# Patient Record
Sex: MALE | Race: WHITE | Employment: OTHER | URBAN - METROPOLITAN AREA
[De-identification: names, ages, dates, MRNs, and addresses within clinical notes are randomized per-mention and may not be internally consistent; named-entity substitution may affect disease eponyms.]

---

## 2018-08-23 ENCOUNTER — TELEPHONE (OUTPATIENT)
Dept: ADMINISTRATIVE | Age: 48
End: 2018-08-23

## 2018-09-19 ENCOUNTER — HOSPITAL ENCOUNTER (OUTPATIENT)
Age: 48
Discharge: HOME | End: 2018-09-21
Payer: COMMERCIAL

## 2018-09-19 LAB
ALBUMIN SERPL-MCNC: 4.1 G/DL (ref 3.5–5.2)
ALP BLD-CCNC: 50 U/L (ref 40–129)
ALT SERPL-CCNC: 34 U/L (ref 0–40)
ANION GAP SERPL CALCULATED.3IONS-SCNC: 17 MMOL/L (ref 7–16)
AST SERPL-CCNC: 27 U/L (ref 0–39)
BILIRUB SERPL-MCNC: 0.4 MG/DL (ref 0–1.2)
BUN BLDV-MCNC: 17 MG/DL (ref 6–20)
CALCIUM SERPL-MCNC: 9.1 MG/DL (ref 8.6–10.2)
CHLORIDE BLD-SCNC: 103 MMOL/L (ref 98–107)
CHOLESTEROL, TOTAL: 131 MG/DL (ref 0–199)
CO2: 22 MMOL/L (ref 22–29)
CREAT SERPL-MCNC: 1 MG/DL (ref 0.7–1.2)
GFR AFRICAN AMERICAN: >60
GFR NON-AFRICAN AMERICAN: >60 ML/MIN/1.73
GLUCOSE BLD-MCNC: 99 MG/DL (ref 74–109)
HDLC SERPL-MCNC: 32 MG/DL
LDL CHOLESTEROL CALCULATED: 80 MG/DL (ref 0–99)
POTASSIUM SERPL-SCNC: 4.5 MMOL/L (ref 3.5–5)
PROSTATE SPECIFIC ANTIGEN: 1.37 NG/ML (ref 0–4)
SODIUM BLD-SCNC: 142 MMOL/L (ref 132–146)
TOTAL PROTEIN: 6.7 G/DL (ref 6.4–8.3)
TRIGL SERPL-MCNC: 96 MG/DL (ref 0–149)
TSH SERPL DL<=0.05 MIU/L-ACNC: 1.49 UIU/ML (ref 0.27–4.2)
VITAMIN D 25-HYDROXY: 19 NG/ML (ref 30–100)
VLDLC SERPL CALC-MCNC: 19 MG/DL

## 2018-09-19 PROCEDURE — 80061 LIPID PANEL: CPT

## 2018-09-19 PROCEDURE — 84443 ASSAY THYROID STIM HORMONE: CPT

## 2018-09-19 PROCEDURE — 80053 COMPREHEN METABOLIC PANEL: CPT

## 2018-09-19 PROCEDURE — 82306 VITAMIN D 25 HYDROXY: CPT

## 2018-09-19 PROCEDURE — G0103 PSA SCREENING: HCPCS

## 2018-09-19 NOTE — PROGRESS NOTES
8.  Myxomatous MV with posterior leaflet prolapse. Mild to moderate MR, RVSP 30. 82.     Review of Systems:  Constitutional: negative for fever and chills  Respiratory: negative for cough and hemoptysis  Cardiovascular:   Gastrointestinal: negative for abdominal pain, diarrhea, nausea and vomiting  Genitourinary:negative for dysuria and hematuria  Derm: negative for rash and skin lesion(s)  Neurological: negative for seizures and tremors  Endocrine: negative for diabetic symptoms including polydipsia and polyuria  Musculoskeletal: negative for CTD  Psychiatric: negative for psychosis and major depression    On examination, skin is warm and dry. Respirations are unlabored. /68   Pulse 79   Resp 16   Ht 5' 10\" (1.778 m)   Wt 190 lb 9.6 oz (86.5 kg)   BMI 27.35 kg/m² . HEENT negative for scleral icterus. Extraocular muscles intact. No facial asymmetry or central cyanosis. Neck without masses or goiter. No bruit or JVD. Cardiac apex not displaced. Rhythm regular. 2/6 mid-to-late systolic murmur best heard at the apex. No radiation. No definite clicks. Diastole silent. Abdomen normal.  Extremities without edema. Lungs are clear. EKG today shows normal sinus rhythm. Based on the patient's functional capacity and auscultation, there has been no progression of the valvular abnormalities. An echocardiogram, however, will be rechecked to more accurately assess the severity of the MR as well as leaflet thickening and any chamber dilatation. Treatment and prognosis were reviewed with the patient. The prognosis is quite variable and will be further reviewed once the echo results are available. At completion of today's visit, medications include the following:    Current Outpatient Prescriptions:     fluticasone-salmeterol (ADVAIR) 250-50 MCG/DOSE AEPB, Inhale 1 puff into the lungs every 12 hours. , Disp: , Rfl:     lisinopril (PRINIVIL;ZESTRIL) 5 MG tablet, Take 5 mg by mouth daily. , Disp: , Rfl:       Barbara Martinez MD

## 2018-09-25 ENCOUNTER — OFFICE VISIT (OUTPATIENT)
Dept: CARDIOLOGY CLINIC | Age: 48
End: 2018-09-25
Payer: COMMERCIAL

## 2018-09-25 VITALS
DIASTOLIC BLOOD PRESSURE: 68 MMHG | SYSTOLIC BLOOD PRESSURE: 110 MMHG | HEIGHT: 70 IN | BODY MASS INDEX: 27.29 KG/M2 | HEART RATE: 79 BPM | WEIGHT: 190.6 LBS | RESPIRATION RATE: 16 BRPM

## 2018-09-25 DIAGNOSIS — R06.02 SHORTNESS OF BREATH: ICD-10-CM

## 2018-09-25 DIAGNOSIS — I34.1 MVP (MITRAL VALVE PROLAPSE): Primary | ICD-10-CM

## 2018-09-25 PROCEDURE — 99213 OFFICE O/P EST LOW 20 MIN: CPT | Performed by: INTERNAL MEDICINE

## 2018-09-25 PROCEDURE — 93000 ELECTROCARDIOGRAM COMPLETE: CPT | Performed by: INTERNAL MEDICINE

## 2018-10-08 ENCOUNTER — HOSPITAL ENCOUNTER (OUTPATIENT)
Dept: CARDIOLOGY | Age: 48
Discharge: HOME | End: 2018-10-08
Payer: COMMERCIAL

## 2018-10-08 DIAGNOSIS — I34.1 MVP (MITRAL VALVE PROLAPSE): ICD-10-CM

## 2018-10-08 DIAGNOSIS — R06.02 SHORTNESS OF BREATH: ICD-10-CM

## 2018-10-08 LAB
LV EF: 58 %
LVEF MODALITY: NORMAL

## 2018-10-08 PROCEDURE — 93306 TTE W/DOPPLER COMPLETE: CPT | Performed by: PSYCHIATRY & NEUROLOGY

## 2018-10-11 ENCOUNTER — TELEPHONE (OUTPATIENT)
Dept: CARDIOLOGY CLINIC | Age: 48
End: 2018-10-11

## 2018-10-11 NOTE — TELEPHONE ENCOUNTER
Left message for patient to contact office for echo results. Per Dr. Jesse Raya, his mitral valve prolapse and murmur are mild and do not require any significant additional evaluation. Maybe an echo in the next 4 or 5 years would be appropriate if there is no change in his clinical status or murmur. Letter forwarded to Dr. Becki Ayala.

## 2019-03-18 LAB
ALBUMIN SERPL-MCNC: NORMAL G/DL
ALP BLD-CCNC: NORMAL U/L
ALT SERPL-CCNC: NORMAL U/L
ANION GAP SERPL CALCULATED.3IONS-SCNC: NORMAL MMOL/L
AST SERPL-CCNC: NORMAL U/L
BILIRUB SERPL-MCNC: NORMAL MG/DL
BUN BLDV-MCNC: NORMAL MG/DL
CALCIUM SERPL-MCNC: NORMAL MG/DL
CHLORIDE BLD-SCNC: NORMAL MMOL/L
CHOLESTEROL, TOTAL: NORMAL
CHOLESTEROL/HDL RATIO: NORMAL
CO2: NORMAL
CREAT SERPL-MCNC: NORMAL MG/DL
GFR CALCULATED: NORMAL
GLUCOSE BLD-MCNC: NORMAL MG/DL
HDLC SERPL-MCNC: NORMAL MG/DL
LDL CHOLESTEROL CALCULATED: NORMAL
POTASSIUM SERPL-SCNC: NORMAL MMOL/L
SODIUM BLD-SCNC: NORMAL MMOL/L
TOTAL PROTEIN: NORMAL
TRIGL SERPL-MCNC: NORMAL MG/DL
VLDLC SERPL CALC-MCNC: NORMAL MG/DL

## 2019-04-01 ENCOUNTER — TELEPHONE (OUTPATIENT)
Dept: SURGERY | Age: 49
End: 2019-04-01

## 2019-04-01 NOTE — TELEPHONE ENCOUNTER
BRITTAOS rec'd a voicemail msg requesting to cancel pt's appt on 4/2/19 and that pt will call to RS; Bruna  returned call to [phone] and left a msg stating appt is cancelled.   Electronically signed by Bk Bentley on 4/1/19 at 1:30 PM

## 2019-04-09 ENCOUNTER — TELEPHONE (OUTPATIENT)
Dept: SURGERY | Age: 49
End: 2019-04-09

## 2019-04-11 ENCOUNTER — TELEPHONE (OUTPATIENT)
Dept: SURGERY | Age: 49
End: 2019-04-11

## 2019-04-11 NOTE — TELEPHONE ENCOUNTER
Second attempt, left message to schedule pt with Dr. Dee Menchaca for a new pt consult.   Electronically signed by Caden Montemayor on 4/11/19 at 9:34 AM

## 2019-04-15 ENCOUNTER — TELEPHONE (OUTPATIENT)
Dept: SURGERY | Age: 49
End: 2019-04-15

## 2019-04-15 NOTE — TELEPHONE ENCOUNTER
Third attempt, left messages three times. Unable to contact patient. We would be more than happy to schedule this patient with one of our surgeons when they call us back. At this time we are forwarding the referral back to referring provider to inform you that we were unable to schedule the patient. Thanks for the referral.  Any questions or concerns call [phone].   Electronically signed by Loni Mcdermott on 4/15/19 at 11:21 AM

## 2019-04-15 NOTE — TELEPHONE ENCOUNTER
[facility] called Dr. Acosta Bis ofc, [phone], and left a voicemail msg for Kelly to advise pt had an appt w/Dr. Jorge L Mcfadden on 4/2/19, which he cancelled and several attempts have been made to RS appt, but pt has not called back; sending referral back to PCP's ofc to follow up w/pt.   Electronically signed by Izzy Kern on 4/15/19 at 10:55 AM

## 2019-04-17 ENCOUNTER — TELEPHONE (OUTPATIENT)
Dept: ADMINISTRATIVE | Age: 49
End: 2019-04-17

## 2019-04-17 NOTE — TELEPHONE ENCOUNTER
Patient calling to reschedule colonoscopy screening consult from 4/2/19. Appointment scheduled for 5/14/19 at 3:45 pm in the [facility] office.

## 2019-05-14 ENCOUNTER — OFFICE VISIT (OUTPATIENT)
Dept: SURGERY | Age: 49
End: 2019-05-14
Payer: COMMERCIAL

## 2019-05-14 VITALS
HEIGHT: 71 IN | RESPIRATION RATE: 17 BRPM | HEART RATE: 94 BPM | WEIGHT: 191 LBS | OXYGEN SATURATION: 96 % | BODY MASS INDEX: 26.74 KG/M2 | SYSTOLIC BLOOD PRESSURE: 134 MMHG | DIASTOLIC BLOOD PRESSURE: 78 MMHG

## 2019-05-14 DIAGNOSIS — Z12.11 ENCOUNTER FOR SCREENING COLONOSCOPY: Primary | ICD-10-CM

## 2019-05-14 PROCEDURE — 1036F TOBACCO NON-USER: CPT | Performed by: SURGERY

## 2019-05-14 PROCEDURE — 3017F COLORECTAL CA SCREEN DOC REV: CPT | Performed by: SURGERY

## 2019-05-14 PROCEDURE — G8419 CALC BMI OUT NRM PARAM NOF/U: HCPCS | Performed by: SURGERY

## 2019-05-14 PROCEDURE — G8427 DOCREV CUR MEDS BY ELIG CLIN: HCPCS | Performed by: SURGERY

## 2019-05-14 PROCEDURE — 99203 OFFICE O/P NEW LOW 30 MIN: CPT | Performed by: SURGERY

## 2019-05-14 RX ORDER — ALBUTEROL SULFATE 90 UG/1
AEROSOL, METERED RESPIRATORY (INHALATION)
COMMUNITY
Start: 2019-05-13 | End: 2020-09-11

## 2019-05-14 RX ORDER — ALBUTEROL SULFATE 90 UG/1
AEROSOL, METERED RESPIRATORY (INHALATION)
COMMUNITY
Start: 2019-02-19 | End: 2020-09-11

## 2019-05-14 RX ORDER — BUDESONIDE AND FORMOTEROL FUMARATE DIHYDRATE 80; 4.5 UG/1; UG/1
AEROSOL RESPIRATORY (INHALATION) 2 TIMES DAILY
COMMUNITY
Start: 2019-05-13 | End: 2021-09-21 | Stop reason: SDUPTHER

## 2019-05-14 RX ORDER — LISINOPRIL 5 MG/1
TABLET ORAL
COMMUNITY
Start: 2011-11-30 | End: 2020-04-07 | Stop reason: SDUPTHER

## 2019-05-14 RX ORDER — DILTIAZEM HYDROCHLORIDE 60 MG/1
TABLET, FILM COATED ORAL
COMMUNITY
Start: 2019-04-29 | End: 2019-05-14

## 2019-05-14 NOTE — PROGRESS NOTES
[facility] Note    Assessment/Plan:      Diagnosis Orders   1. Encounter for screening colonoscopy      Schedule colonoscopy         Return for Colonoscopy. Chief Complaint   Patient presents with    Surgical Consult     new patient referral from Dr Marie Mccracken for colonoscopy screening. no family history of colon cancer. patient states he has no complaints. PCP: Delaney Rios DO    HPI: Eran Lamb is a 44 y.o. male who presents in consultation for colonoscopy. He has not had one previously. He denies any issues. He has no problems with diarrhea or constipation. He has not had any melena or hematochezia. He's had no abdominal pain or unintentional weight loss. He's had no bowel caliber changes. There is no family history of colon cancer or inflammatory bowel disease. He said he had a CT several years ago which showed diverticulosis per his report. He is an  at elder law      Past Medical History:   Diagnosis Date    Anxiety and depression     Asthma     GERD (gastroesophageal reflux disease)     MVP (mitral valve prolapse)        Past Surgical History:   Procedure Laterality Date    CHOLECYSTECTOMY, LAPAROSCOPIC         Prior to Admission medications    Medication Sig Start Date End Date Taking? Authorizing Provider   albuterol sulfate HFA (PROVENTIL HFA) 108 (90 Base) MCG/ACT inhaler  5/13/19  Yes Historical Provider, MD   budesonide-formoterol (SYMBICORT) 80-4.5 MCG/ACT AERO Inhale into the lungs 5/13/19  Yes Historical Provider, MD   lisinopril (PRINIVIL;ZESTRIL) 5 MG tablet Take by mouth 11/30/11  Yes Historical Provider, MD   albuterol sulfate  (90 Base) MCG/ACT inhaler  2/19/19   Historical Provider, MD   fluticasone-salmeterol (ADVAIR) 250-50 MCG/DOSE AEPB Inhale 1 puff into the lungs every 12 hours.     Historical Provider, MD       No Known Allergies    Social History     Socioeconomic History    Marital status: Single     Spouse name: None    guarding. Neurological: He is alert and oriented to person, place, and time. Skin: Skin is warm and dry. He is not diaphoretic. Rohan Ibanez MD  5/14/2019    NOTE: This report, in part or full,may have been transcribed using voice recognition software. Every effort was made to ensure accuracy; however, inadvertent computerized transcription errors may be present. Please excuse any transcriptional grammatical or spelling errors that may have escaped my editorial review.     CC: Acosta Gomes, DO

## 2019-05-17 ENCOUNTER — TELEPHONE (OUTPATIENT)
Dept: SURGERY | Age: 49
End: 2019-05-17

## 2019-05-17 NOTE — TELEPHONE ENCOUNTER
Cedrick Dania is scheduled for colonoscopy with Dr Marissa Perera on 05- at 8:30 am at [facility]. Patient is to arrive at 8:30 am. Patient needs to be NPO after midnight the night before procedure. MA left message on patient's voicemail to return call at [phone] to get instructions.   Electronically signed by Brissa Blair MA on 5/17/2019 at 3:13 PM

## 2019-05-22 ENCOUNTER — TELEPHONE (OUTPATIENT)
Dept: SURGERY | Age: 49
End: 2019-05-22

## 2019-05-22 NOTE — TELEPHONE ENCOUNTER
MA contacted [facility] for prior authorization. No prior authorization needed for colonoscopy with Dr. Sharmin Marcus at [facility] in [facility]. MA Spoke with Norma Giron, authorization Specialist. Reference number [id number]. MA scanned authorization form in media tab.     Electronically signed by Daylin Nails MA on 5/22/19 at 10:06 AM

## 2019-05-24 ENCOUNTER — TELEPHONE (OUTPATIENT)
Dept: ADMINISTRATIVE | Age: 49
End: 2019-05-24

## 2019-05-24 NOTE — TELEPHONE ENCOUNTER
Patient called in and stated he wants to cancel his Colonoscopy that was scheduled for him.  He states he is having it done with another .

## 2019-05-28 NOTE — TELEPHONE ENCOUNTER
Patient called and stated that he is cancelling his colonoscopy because he is going to see another physician.   MA Delta Severs cancelled test.  Electronically signed by Scott Aaron MA on 5/28/2019 at 10:09 AM

## 2019-06-24 ENCOUNTER — TELEPHONE (OUTPATIENT)
Dept: PRIMARY CARE CLINIC | Age: 49
End: 2019-06-24

## 2019-06-24 DIAGNOSIS — Z12.11 ENCOUNTER FOR SCREENING COLONOSCOPY: Primary | ICD-10-CM

## 2019-10-16 ENCOUNTER — OFFICE VISIT (OUTPATIENT)
Dept: FAMILY MEDICINE CLINIC | Age: 49
End: 2019-10-16
Payer: COMMERCIAL

## 2019-10-16 VITALS
TEMPERATURE: 97.7 F | BODY MASS INDEX: 27.44 KG/M2 | WEIGHT: 196 LBS | DIASTOLIC BLOOD PRESSURE: 78 MMHG | HEIGHT: 71 IN | SYSTOLIC BLOOD PRESSURE: 126 MMHG

## 2019-10-16 DIAGNOSIS — Z12.11 SCREENING FOR COLON CANCER: ICD-10-CM

## 2019-10-16 DIAGNOSIS — H10.32 ACUTE BACTERIAL CONJUNCTIVITIS OF LEFT EYE: Primary | ICD-10-CM

## 2019-10-16 PROCEDURE — 1036F TOBACCO NON-USER: CPT | Performed by: FAMILY MEDICINE

## 2019-10-16 PROCEDURE — 99213 OFFICE O/P EST LOW 20 MIN: CPT | Performed by: FAMILY MEDICINE

## 2019-10-16 PROCEDURE — G8428 CUR MEDS NOT DOCUMENT: HCPCS | Performed by: FAMILY MEDICINE

## 2019-10-16 PROCEDURE — 3017F COLORECTAL CA SCREEN DOC REV: CPT | Performed by: FAMILY MEDICINE

## 2019-10-16 PROCEDURE — G8484 FLU IMMUNIZE NO ADMIN: HCPCS | Performed by: FAMILY MEDICINE

## 2019-10-16 PROCEDURE — G8419 CALC BMI OUT NRM PARAM NOF/U: HCPCS | Performed by: FAMILY MEDICINE

## 2019-10-16 RX ORDER — AZITHROMYCIN 250 MG/1
250 TABLET, FILM COATED ORAL SEE ADMIN INSTRUCTIONS
Qty: 6 TABLET | Refills: 0 | Status: SHIPPED | OUTPATIENT
Start: 2019-10-16 | End: 2019-10-21

## 2019-10-16 ASSESSMENT — ENCOUNTER SYMPTOMS
RESPIRATORY NEGATIVE: 1
PHOTOPHOBIA: 0
EYE PAIN: 0
EYE REDNESS: 1

## 2019-10-17 ASSESSMENT — PATIENT HEALTH QUESTIONNAIRE - PHQ9
SUM OF ALL RESPONSES TO PHQ QUESTIONS 1-9: 0
1. LITTLE INTEREST OR PLEASURE IN DOING THINGS: 0
2. FEELING DOWN, DEPRESSED OR HOPELESS: 0
SUM OF ALL RESPONSES TO PHQ9 QUESTIONS 1 & 2: 0
SUM OF ALL RESPONSES TO PHQ QUESTIONS 1-9: 0

## 2019-11-20 DIAGNOSIS — Z12.11 SCREENING FOR COLON CANCER: ICD-10-CM

## 2020-03-31 ENCOUNTER — OFFICE VISIT (OUTPATIENT)
Dept: PRIMARY CARE CLINIC | Age: 50
End: 2020-03-31
Payer: COMMERCIAL

## 2020-03-31 VITALS
TEMPERATURE: 98.6 F | DIASTOLIC BLOOD PRESSURE: 68 MMHG | SYSTOLIC BLOOD PRESSURE: 118 MMHG | HEART RATE: 93 BPM | RESPIRATION RATE: 16 BRPM | OXYGEN SATURATION: 97 % | BODY MASS INDEX: 28 KG/M2 | WEIGHT: 200 LBS | HEIGHT: 71 IN

## 2020-03-31 PROCEDURE — G8484 FLU IMMUNIZE NO ADMIN: HCPCS | Performed by: PHYSICIAN ASSISTANT

## 2020-03-31 PROCEDURE — G8419 CALC BMI OUT NRM PARAM NOF/U: HCPCS | Performed by: PHYSICIAN ASSISTANT

## 2020-03-31 PROCEDURE — 1036F TOBACCO NON-USER: CPT | Performed by: PHYSICIAN ASSISTANT

## 2020-03-31 PROCEDURE — G8427 DOCREV CUR MEDS BY ELIG CLIN: HCPCS | Performed by: PHYSICIAN ASSISTANT

## 2020-03-31 PROCEDURE — 3017F COLORECTAL CA SCREEN DOC REV: CPT | Performed by: PHYSICIAN ASSISTANT

## 2020-03-31 PROCEDURE — 99213 OFFICE O/P EST LOW 20 MIN: CPT | Performed by: PHYSICIAN ASSISTANT

## 2020-03-31 RX ORDER — PREDNISONE 10 MG/1
10 TABLET ORAL 2 TIMES DAILY
Qty: 10 TABLET | Refills: 0 | Status: SHIPPED | OUTPATIENT
Start: 2020-03-31 | End: 2020-04-05

## 2020-03-31 RX ORDER — AZITHROMYCIN 250 MG/1
TABLET, FILM COATED ORAL
Qty: 6 TABLET | Refills: 0 | Status: SHIPPED | OUTPATIENT
Start: 2020-03-31 | End: 2020-04-10

## 2020-03-31 RX ORDER — GUAIFENESIN 400 MG/1
400 TABLET ORAL 4 TIMES DAILY PRN
Qty: 30 TABLET | Refills: 0 | Status: SHIPPED
Start: 2020-03-31 | End: 2020-09-11

## 2020-04-02 ENCOUNTER — TELEPHONE (OUTPATIENT)
Dept: PRIMARY CARE CLINIC | Age: 50
End: 2020-04-02

## 2020-04-02 NOTE — TELEPHONE ENCOUNTER
Message left on answering machine asking patient to return call regarding his recent visit to the flu clinic and his symptoms.

## 2020-04-07 RX ORDER — LISINOPRIL 5 MG/1
5 TABLET ORAL DAILY
Qty: 90 TABLET | Refills: 5 | Status: SHIPPED
Start: 2020-04-07 | End: 2021-06-28 | Stop reason: SDUPTHER

## 2020-04-20 ENCOUNTER — TELEPHONE (OUTPATIENT)
Dept: PRIMARY CARE CLINIC | Age: 50
End: 2020-04-20

## 2020-04-23 NOTE — TELEPHONE ENCOUNTER
Not sure how to do this. I really put the order in October 16 when I ordered it. What else do I  need to do?

## 2020-09-11 ENCOUNTER — HOSPITAL ENCOUNTER (OUTPATIENT)
Age: 50
Discharge: HOME | End: 2020-09-13
Payer: COMMERCIAL

## 2020-09-11 ENCOUNTER — OFFICE VISIT (OUTPATIENT)
Dept: PRIMARY CARE CLINIC | Age: 50
End: 2020-09-11
Payer: COMMERCIAL

## 2020-09-11 VITALS
SYSTOLIC BLOOD PRESSURE: 124 MMHG | BODY MASS INDEX: 27.44 KG/M2 | TEMPERATURE: 98.7 F | DIASTOLIC BLOOD PRESSURE: 68 MMHG | HEIGHT: 71 IN | WEIGHT: 196 LBS

## 2020-09-11 LAB
ALBUMIN SERPL-MCNC: 4.1 G/DL (ref 3.5–5.2)
ALP BLD-CCNC: 57 U/L (ref 40–129)
ALT SERPL-CCNC: 40 U/L (ref 0–40)
ANION GAP SERPL CALCULATED.3IONS-SCNC: 13 MMOL/L (ref 7–16)
AST SERPL-CCNC: 23 U/L (ref 0–39)
BASOPHILS ABSOLUTE: 0.02 E9/L (ref 0–0.2)
BASOPHILS RELATIVE PERCENT: 0.3 % (ref 0–2)
BILIRUB SERPL-MCNC: 0.3 MG/DL (ref 0–1.2)
BILIRUBIN URINE: NEGATIVE
BLOOD, URINE: NEGATIVE
BUN BLDV-MCNC: 15 MG/DL (ref 6–20)
CALCIUM SERPL-MCNC: 8.8 MG/DL (ref 8.6–10.2)
CHLORIDE BLD-SCNC: 105 MMOL/L (ref 98–107)
CHOLESTEROL, TOTAL: 140 MG/DL (ref 0–199)
CLARITY: CLEAR
CO2: 22 MMOL/L (ref 22–29)
COLOR: YELLOW
CREAT SERPL-MCNC: 1.1 MG/DL (ref 0.7–1.2)
EOSINOPHILS ABSOLUTE: 0.26 E9/L (ref 0.05–0.5)
EOSINOPHILS RELATIVE PERCENT: 4.1 % (ref 0–6)
GFR AFRICAN AMERICAN: >60
GFR NON-AFRICAN AMERICAN: >60 ML/MIN/1.73
GLUCOSE BLD-MCNC: 97 MG/DL (ref 74–99)
GLUCOSE URINE: NEGATIVE MG/DL
HCT VFR BLD CALC: 48.5 % (ref 37–54)
HDLC SERPL-MCNC: 29 MG/DL
HEMOGLOBIN: 15.8 G/DL (ref 12.5–16.5)
IMMATURE GRANULOCYTES #: 0.03 E9/L
IMMATURE GRANULOCYTES %: 0.5 % (ref 0–5)
KETONES, URINE: NEGATIVE MG/DL
LDL CHOLESTEROL CALCULATED: 91 MG/DL (ref 0–99)
LEUKOCYTE ESTERASE, URINE: NEGATIVE
LYMPHOCYTES ABSOLUTE: 1.1 E9/L (ref 1.5–4)
LYMPHOCYTES RELATIVE PERCENT: 17.5 % (ref 20–42)
MCH RBC QN AUTO: 29.6 PG (ref 26–35)
MCHC RBC AUTO-ENTMCNC: 32.6 % (ref 32–34.5)
MCV RBC AUTO: 90.8 FL (ref 80–99.9)
MONOCYTES ABSOLUTE: 0.6 E9/L (ref 0.1–0.95)
MONOCYTES RELATIVE PERCENT: 9.6 % (ref 2–12)
NEUTROPHILS ABSOLUTE: 4.26 E9/L (ref 1.8–7.3)
NEUTROPHILS RELATIVE PERCENT: 68 % (ref 43–80)
NITRITE, URINE: NEGATIVE
PDW BLD-RTO: 12.7 FL (ref 11.5–15)
PH UA: 5.5 (ref 5–9)
PLATELET # BLD: 201 E9/L (ref 130–450)
PMV BLD AUTO: 10.2 FL (ref 7–12)
POTASSIUM SERPL-SCNC: 4.9 MMOL/L (ref 3.5–5)
PROSTATE SPECIFIC ANTIGEN: 1.35 NG/ML (ref 0–4)
PROTEIN UA: NEGATIVE MG/DL
RBC # BLD: 5.34 E12/L (ref 3.8–5.8)
SODIUM BLD-SCNC: 140 MMOL/L (ref 132–146)
SPECIFIC GRAVITY UA: >=1.03 (ref 1–1.03)
TOTAL PROTEIN: 6.5 G/DL (ref 6.4–8.3)
TRIGL SERPL-MCNC: 99 MG/DL (ref 0–149)
UROBILINOGEN, URINE: 0.2 E.U./DL
VLDLC SERPL CALC-MCNC: 20 MG/DL
WBC # BLD: 6.3 E9/L (ref 4.5–11.5)

## 2020-09-11 PROCEDURE — 36415 COLL VENOUS BLD VENIPUNCTURE: CPT

## 2020-09-11 PROCEDURE — 80061 LIPID PANEL: CPT

## 2020-09-11 PROCEDURE — 99396 PREV VISIT EST AGE 40-64: CPT | Performed by: FAMILY MEDICINE

## 2020-09-11 PROCEDURE — 85025 COMPLETE CBC W/AUTO DIFF WBC: CPT

## 2020-09-11 PROCEDURE — G0103 PSA SCREENING: HCPCS

## 2020-09-11 PROCEDURE — 81003 URINALYSIS AUTO W/O SCOPE: CPT

## 2020-09-11 PROCEDURE — 80053 COMPREHEN METABOLIC PANEL: CPT

## 2020-09-11 ASSESSMENT — PATIENT HEALTH QUESTIONNAIRE - PHQ9
SUM OF ALL RESPONSES TO PHQ9 QUESTIONS 1 & 2: 0
SUM OF ALL RESPONSES TO PHQ QUESTIONS 1-9: 0
1. LITTLE INTEREST OR PLEASURE IN DOING THINGS: 0
2. FEELING DOWN, DEPRESSED OR HOPELESS: 0
SUM OF ALL RESPONSES TO PHQ QUESTIONS 1-9: 0

## 2020-09-11 ASSESSMENT — ENCOUNTER SYMPTOMS
EYES NEGATIVE: 1
ALLERGIC/IMMUNOLOGIC NEGATIVE: 1
GASTROINTESTINAL NEGATIVE: 1
RESPIRATORY NEGATIVE: 1

## 2020-09-11 NOTE — PROGRESS NOTES
20  Name: Nate Bill    : 1970    Sex: male    Age: 44 y.o. Subjective:  Chief Complaint: Patient is here for wellenss ck and  MR eduardo perez  Got turned dwon for    dsiabil ins due to MR   Elliot danica  Cardio  10-18   No cp ro sob   No pal  Ros neg      Review of Systems   Constitutional: Negative. HENT: Negative. Eyes: Negative. Respiratory: Negative. Cardiovascular: Negative. Gastrointestinal: Negative. Endocrine: Negative. Genitourinary: Negative. Musculoskeletal: Negative. Skin: Negative. Allergic/Immunologic: Negative. Neurological: Negative. Hematological: Negative. Psychiatric/Behavioral: Negative.           Current Outpatient Medications:     lisinopril (PRINIVIL;ZESTRIL) 5 MG tablet, Take 1 tablet by mouth daily, Disp: 90 tablet, Rfl: 5    budesonide-formoterol (SYMBICORT) 80-4.5 MCG/ACT AERO, Inhale into the lungs, Disp: , Rfl:   No Known Allergies  Social History     Socioeconomic History    Marital status: Single     Spouse name: Not on file    Number of children: Not on file    Years of education: Not on file    Highest education level: Not on file   Occupational History    Not on file   Social Needs    Financial resource strain: Not on file    Food insecurity     Worry: Not on file     Inability: Not on file    Transportation needs     Medical: Not on file     Non-medical: Not on file   Tobacco Use    Smoking status: Never Smoker    Smokeless tobacco: Never Used   Substance and Sexual Activity    Alcohol use: Yes     Comment: rare    Drug use: No    Sexual activity: Not on file   Lifestyle    Physical activity     Days per week: Not on file     Minutes per session: Not on file    Stress: Not on file   Relationships    Social connections     Talks on phone: Not on file     Gets together: Not on file     Attends Anabaptist service: Not on file     Active member of club or organization: Not on file     Attends meetings of clubs or organizations: Not on file     Relationship status: Not on file    Intimate partner violence     Fear of current or ex partner: Not on file     Emotionally abused: Not on file     Physically abused: Not on file     Forced sexual activity: Not on file   Other Topics Concern    Not on file   Social History Narrative        CHEW    ASTHMA    GERD    ALLERGIC RHINITS    [address] HAD CAD     [address]  AT [address]  [facility] [address] AT ECF IN [place]----[employer]---[employer] [employer] [place]----ECF IN    [place]-----ONW LAW FIRM ON [employer]------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------GRAD Sravani Casillas 2007-------JOINED ANOTHER      MOD MITRAL REGURG------DR LONDON---DR ABERNATHY   Echo  10-18    Echo with Mild MR    ALLERGY CATS-----sees  Dr Joyce Cervantes symbicort    RIGHT TESTICLE PAIN---  REFERRED TO G[facility]RIELE---PT WENT TO [facility]    DIVERTICULITIS 1-13    NEG US KIDNEY 4-15      Past Medical History:   Diagnosis Date    Allergic rhinitis     Allergy to cats     Anxiety and depression     Asthma     Diverticulitis 2013    Encounter for screening colonoscopy     GERD (gastroesophageal reflux disease)     MVP (mitral valve prolapse)     Rheumatic fever      Family History   Problem Relation Age of Onset    Heart Surgery Mother 36        valve surgery    Heart Surgery Father 71      Past Surgical History:   Procedure Laterality Date    CHOLECYSTECTOMY, LAPAROSCOPIC      US KIDNEY DUPLEX BILAT  2015    neg       Vitals:    20 0743   BP: 124/68   Temp: 98.7 °F (37.1 °C)   Weight: 196 lb (88.9 kg)   Height: 5' 11\" (1.803 m)       Objective:    Physical Exam  Vitals signs reviewed. Constitutional:       Appearance: He is well-developed. HENT:      Head: Normocephalic. Eyes:      Pupils: Pupils are equal, round, and reactive to light.    Neck:      Musculoskeletal: Normal

## 2020-09-12 ENCOUNTER — TELEPHONE (OUTPATIENT)
Dept: PRIMARY CARE CLINIC | Age: 50
End: 2020-09-12

## 2020-09-24 ENCOUNTER — OFFICE VISIT (OUTPATIENT)
Dept: CARDIOLOGY CLINIC | Age: 50
End: 2020-09-24
Payer: COMMERCIAL

## 2020-09-24 VITALS
RESPIRATION RATE: 16 BRPM | BODY MASS INDEX: 27.55 KG/M2 | WEIGHT: 196.8 LBS | HEART RATE: 80 BPM | HEIGHT: 71 IN | DIASTOLIC BLOOD PRESSURE: 62 MMHG | SYSTOLIC BLOOD PRESSURE: 104 MMHG

## 2020-09-24 PROCEDURE — 3017F COLORECTAL CA SCREEN DOC REV: CPT | Performed by: INTERNAL MEDICINE

## 2020-09-24 PROCEDURE — 93000 ELECTROCARDIOGRAM COMPLETE: CPT | Performed by: INTERNAL MEDICINE

## 2020-09-24 PROCEDURE — 1036F TOBACCO NON-USER: CPT | Performed by: INTERNAL MEDICINE

## 2020-09-24 PROCEDURE — G8419 CALC BMI OUT NRM PARAM NOF/U: HCPCS | Performed by: INTERNAL MEDICINE

## 2020-09-24 PROCEDURE — G8427 DOCREV CUR MEDS BY ELIG CLIN: HCPCS | Performed by: INTERNAL MEDICINE

## 2020-09-24 PROCEDURE — 99213 OFFICE O/P EST LOW 20 MIN: CPT | Performed by: INTERNAL MEDICINE

## 2020-09-24 NOTE — PROGRESS NOTES
[facility]  [facility]. BERYL Pyle M.D.  Aidan Russell. [employer] Carline Crosby, M.D. Chantel Guerrero M.D. Micheal England, MD Deatrice Oppenheim   1970  Maury Rojas DO      This 57-year-old man is seen today for outpatient cardiac assessment today. He was previously evaluated in 2018. He has a history of mitral valve prolapse and hemodynamically insufficient mitral regurgitation. He reports that he has felt well. He exercises regularly and has a negative cardiac review of systems. He is concerned however about progression of his valve disease. Medical history:   1. Asthma. 2. GERD. 3. Allergic rhinitis. 4. Laparoscopic cholecystectomy. 5. Anxiety and depression. 6. MVP documented by echo, 2010 (Dr. Carlene Contreras). LV dimension 3.9/2.6 cm diastole and systole. LA dimension 3.6 cm. Moderate MR described. Mild TR with Doppler evidence for normal RVSP. 7. No known drug allergies. 6. Family history mother had MV repair at age 36 and  after MV replacement age 71. Father had high cholesterol, carotid vascular disease, PVD and high BP. Father  at age 71.    5. Echo, 2011. Castillo Quince EF ~55%, myxomatous degeneration and prolapse  posterior leaflet   with moderate MR. Normal LV filling pattern . Mild TR with normal RVSP. LA slightly enlarged at 4.3 cm. 10. Lisinopril 5 mg qd added   2011. 11. Lifetime nonsmoker. 12. Echo, 2012 with moderate LAE at 4.5 cm. Myxomatous MV with posterior leaflet prolapse and moderate MR. Mild TR, normal RVSP. LV thickness and systolic function normal.   Diastolic function normal.   LV size normal.    13. Echo, 2014. Normal LV size and EF. Mild LAE (43 mm). E/E' 8.  Myxomatous MV with posterior leaflet prolapse. Mild to moderate MR, RVSP 30. 82.    14. Echo, 10/08/2018. Normal LVEF. Normal diastolic function.  No chamber dilatation. Mild mitral valve posterior leaflet prolapse (probably  middle scallop). No significant leaflet thickening demonstrated. Review of Systems:  Constitutional: negative for fever and chills  Respiratory: negative for cough and hemoptysis  Cardiovascular:   Gastrointestinal: negative for abdominal pain, diarrhea, nausea and vomiting  Genitourinary:negative for dysuria and hematuria  Derm: negative for rash and skin lesion(s)  Neurological: negative for seizures and tremors  Endocrine: negative for diabetic symptoms including polydipsia and polyuria  Musculoskeletal: negative for CTD  Psychiatric: negative for psychosis and major depression    On examination, he is an alert pleasant appropriate middle-age  male in no distress skin is warm and dry. Respirations are unlabored. /62   Pulse 80   Resp 16   Ht 5' 11\" (1.803 m)   Wt 196 lb 12.8 oz (89.3 kg)   BMI 27.45 kg/m² . HEENT negative for scleral icterus. Extraocular muscles intact. No facial asymmetry or central cyanosis. Neck without masses or goiter. No bruit or JVD. Cardiac apex not displaced. Rhythm regular. Apical mid-to-late peaking grade 2 high-pitched 2/6 WING. No definite click. Abdomen normal.  Extremities without edema. Lungs are clear    EKG today per Dr. Michael Ibrahim interpretation: Sinus rhythm 80/min. Normal.    According to the patient the hemodynamic significance of the murmur has important insurance issues. In the past it has been characterized as mild or mild-moderate. The study will be repeated with close attention to mitral valve morphology and the grading of MR severity. Medications are reviewed today and no changes made.     At completion of today's visit, medications include the following:    Current Outpatient Medications:     lisinopril (PRINIVIL;ZESTRIL) 5 MG tablet, Take 1 tablet by mouth daily, Disp: 90 tablet, Rfl: 5    budesonide-formoterol (SYMBICORT) 80-4.5 MCG/ACT AERO,

## 2020-11-30 ENCOUNTER — TELEPHONE (OUTPATIENT)
Dept: CARDIOLOGY | Age: 50
End: 2020-11-30

## 2020-12-01 ENCOUNTER — HOSPITAL ENCOUNTER (OUTPATIENT)
Dept: CARDIOLOGY | Age: 50
Discharge: HOME | End: 2020-12-01
Payer: COMMERCIAL

## 2020-12-01 LAB
LV EF: 60 %
LVEF MODALITY: NORMAL

## 2020-12-01 PROCEDURE — 93306 TTE W/DOPPLER COMPLETE: CPT

## 2020-12-03 ENCOUNTER — TELEPHONE (OUTPATIENT)
Dept: CARDIOLOGY CLINIC | Age: 50
End: 2020-12-03

## 2020-12-03 NOTE — TELEPHONE ENCOUNTER
Left message for patient to contact office. ----- Message from Fernandez Kahn MD sent at 12/2/2020  8:00 PM EST -----    Chary please explained to the patient that the mitral valve prolapse and mitral regurgitation have been once again demonstrated. However, the study was not adequate to adequately assess the exact severity of the regurgitation and therefore a AMY is recommended. This is an outpatient procedure and will lead to further recommendations regarding the best management.

## 2020-12-04 NOTE — TELEPHONE ENCOUNTER
Patient returned our call and was given echo results and recommendations per Dr. Gracia Simmonds. Patient verbalized understanding and wishes to proceed. He wishes to have AMY at [facility] and will have Covid testing performed at [facility]. Letter has been forwarded to Dr. Iris Tran.

## 2020-12-11 ENCOUNTER — HOSPITAL ENCOUNTER (OUTPATIENT)
Age: 50
Discharge: HOME | End: 2020-12-13
Payer: COMMERCIAL

## 2020-12-11 PROCEDURE — U0003 INFECTIOUS AGENT DETECTION BY NUCLEIC ACID (DNA OR RNA); SEVERE ACUTE RESPIRATORY SYNDROME CORONAVIRUS 2 (SARS-COV-2) (CORONAVIRUS DISEASE [COVID-19]), AMPLIFIED PROBE TECHNIQUE, MAKING USE OF HIGH THROUGHPUT TECHNOLOGIES AS DESCRIBED BY CMS-2020-01-R: HCPCS

## 2020-12-12 LAB
SARS-COV-2: NOT DETECTED
SOURCE: NORMAL

## 2020-12-14 NOTE — PROGRESS NOTES
Have you been tested for COVID  Yes    Tested on 12-11-20 for OR scheduled 12-17-20        Have you been told you were positive for COVID No  Have you had any known exposure to someone that is positive for COVID No  Do you have a cough                   No              Do you have shortness of breath No                 Do you have a sore throat            No                Are you having chills                    No                Are you having muscle aches. No                    Please come to the hospital wearing a mask and have your significant other wear a mask as well. Both of you should check your temperature before leaving to come here,  if it is 100 or higher please call [phone] for instruction. [facility]    The Preadmission Testing patient is instructed accordingly using the following criteria (check applicable):    ARRIVAL INSTRUCTIONS:  [x] Parking the day of Surgery is located in the Main Entrance lot. Upon entering the door, make an immediate right to the surgery reception desk    [x] Bring photo ID and insurance card    [] Bring in a copy of Living will or Durable Power of  papers.     [x] Please be sure to arrange for responsible adult to provide transportation to and from the hospital    [x] Please arrange for responsible adult to be with you for the 24 hour period post procedure due to having anesthesia      GENERAL INSTRUCTIONS:    [x] Nothing by mouth after midnight, including gum, candy, mints or water    [x] You may brush your teeth, but do not swallow any water    [] Take medications as instructed with 1-2 oz of water    [x] Stop herbal supplements and vitamins 5 days prior to procedure    [x] Follow preop dosing of blood thinners per physician instructions    [] Take 1/2 dose of evening insulin, but no insulin after midnight    [] No oral diabetic medications after midnight    [] If diabetic and have low blood sugar or feel symptomatic, take 1-2oz apple juice only    [] Bring inhalers day of surgery    [] Bring C-PAP/ Bi-Pap day of surgery    [] Bring urine specimen day of surgery    [x] Shower or bath with soap, lather and rinse well, AM of Surgery, no lotion, powders or creams to surgical site    [] Follow bowel prep as instructed per surgeon    [x] No tobacco products within 24 hours of surgery     [x] No alcohol or illegal drug use within 24 hours of surgery.     [x] Jewelry, body piercing's, eyeglasses, contact lenses and dentures are not permitted into surgery (bring cases)      [] Please do not wear any nail polish, make up or hair products on the day of surgery    [x] You can expect a call the business day prior to procedure to notify you if your arrival time changes    [x] If you receive a survey after surgery we would greatly appreciate your comments    [] Parent/guardian of a minor must accompany their child and remain on the premises  the entire time they are under our care     [] Pediatric patients may bring favorite toy, blanket or comfort item with them    [] A caregiver or family member must remain with the patient during their stay if they are mentally handicapped, have dementia, disoriented or unable to use a call light or would be a safety concern if left unattended    [x] Please notify surgeon if you develop any illness between now and time of surgery (cold, cough, sore throat, fever, nausea, vomiting) or any signs of infections  including skin, wounds, and dental.    [x]  The Outpatient Pharmacy is available to fill your prescription here on your day of surgery, ask your preop nurse for details    [x] Other instructions    EDUCATIONAL MATERIALS PROVIDED:    [] PAT Preoperative Education Packet/Booklet     [] Medication List    [] Transfusion bracelet applied with instructions    [] Shower with soap, lather and rinse well, and use CHG wipes provided the evening before surgery as instructed    []

## 2020-12-17 ENCOUNTER — ANESTHESIA EVENT (OUTPATIENT)
Dept: NON INVASIVE DIAGNOSTICS | Age: 50
End: 2020-12-17

## 2020-12-17 ENCOUNTER — HOSPITAL ENCOUNTER (OUTPATIENT)
Dept: NON INVASIVE DIAGNOSTICS | Age: 50
Discharge: HOME | End: 2020-12-17
Payer: COMMERCIAL

## 2020-12-17 ENCOUNTER — ANESTHESIA (OUTPATIENT)
Dept: NON INVASIVE DIAGNOSTICS | Age: 50
End: 2020-12-17

## 2020-12-17 VITALS
RESPIRATION RATE: 76 BRPM | OXYGEN SATURATION: 99 % | SYSTOLIC BLOOD PRESSURE: 104 MMHG | DIASTOLIC BLOOD PRESSURE: 72 MMHG

## 2020-12-17 VITALS
SYSTOLIC BLOOD PRESSURE: 108 MMHG | RESPIRATION RATE: 17 BRPM | TEMPERATURE: 97 F | HEART RATE: 62 BPM | DIASTOLIC BLOOD PRESSURE: 71 MMHG | BODY MASS INDEX: 27.3 KG/M2 | HEIGHT: 71 IN | WEIGHT: 195 LBS | OXYGEN SATURATION: 97 %

## 2020-12-17 LAB
LV EF: 63 %
LVEF MODALITY: NORMAL

## 2020-12-17 PROCEDURE — 2580000003 HC RX 258: Performed by: INTERNAL MEDICINE

## 2020-12-17 PROCEDURE — 6360000002 HC RX W HCPCS: Performed by: NURSE ANESTHETIST, CERTIFIED REGISTERED

## 2020-12-17 PROCEDURE — 3700000001 HC ADD 15 MINUTES (ANESTHESIA)

## 2020-12-17 PROCEDURE — 7100000011 HC PHASE II RECOVERY - ADDTL 15 MIN

## 2020-12-17 PROCEDURE — 93320 DOPPLER ECHO COMPLETE: CPT

## 2020-12-17 PROCEDURE — 3700000000 HC ANESTHESIA ATTENDED CARE

## 2020-12-17 PROCEDURE — 93312 ECHO TRANSESOPHAGEAL: CPT

## 2020-12-17 PROCEDURE — 93325 DOPPLER ECHO COLOR FLOW MAPG: CPT

## 2020-12-17 PROCEDURE — 6360000002 HC RX W HCPCS

## 2020-12-17 PROCEDURE — 7100000010 HC PHASE II RECOVERY - FIRST 15 MIN

## 2020-12-17 RX ORDER — SODIUM CHLORIDE 0.9 % (FLUSH) 0.9 %
10 SYRINGE (ML) INJECTION PRN
Status: CANCELLED | OUTPATIENT
Start: 2020-12-17

## 2020-12-17 RX ORDER — SODIUM CHLORIDE 0.9 % (FLUSH) 0.9 %
10 SYRINGE (ML) INJECTION EVERY 12 HOURS SCHEDULED
Status: DISCONTINUED | OUTPATIENT
Start: 2020-12-17 | End: 2020-12-18 | Stop reason: HOSPADM

## 2020-12-17 RX ORDER — PROPOFOL 10 MG/ML
INJECTION, EMULSION INTRAVENOUS CONTINUOUS PRN
Status: DISCONTINUED | OUTPATIENT
Start: 2020-12-17 | End: 2020-12-17 | Stop reason: SDUPTHER

## 2020-12-17 RX ORDER — SODIUM CHLORIDE 0.9 % (FLUSH) 0.9 %
10 SYRINGE (ML) INJECTION EVERY 12 HOURS SCHEDULED
Status: CANCELLED | OUTPATIENT
Start: 2020-12-17

## 2020-12-17 RX ORDER — SODIUM CHLORIDE 9 MG/ML
INJECTION, SOLUTION INTRAVENOUS CONTINUOUS
Status: DISCONTINUED | OUTPATIENT
Start: 2020-12-17 | End: 2020-12-18 | Stop reason: HOSPADM

## 2020-12-17 RX ORDER — SODIUM CHLORIDE 0.9 % (FLUSH) 0.9 %
10 SYRINGE (ML) INJECTION PRN
Status: DISCONTINUED | OUTPATIENT
Start: 2020-12-17 | End: 2020-12-18 | Stop reason: HOSPADM

## 2020-12-17 RX ADMIN — SODIUM CHLORIDE: 9 INJECTION, SOLUTION INTRAVENOUS at 08:21

## 2020-12-17 RX ADMIN — PROPOFOL 250 MCG/KG/MIN: 10 INJECTION, EMULSION INTRAVENOUS at 08:36

## 2020-12-17 ASSESSMENT — PAIN - FUNCTIONAL ASSESSMENT: PAIN_FUNCTIONAL_ASSESSMENT: 0-10

## 2020-12-17 NOTE — H&P
file    Number of children: Not on file    Years of education: Not on file    Highest education level: Not on file   Occupational History    Not on file   Social Needs    Financial resource strain: Not on file    Food insecurity     Worry: Not on file     Inability: Not on file    Transportation needs     Medical: Not on file     Non-medical: Not on file   Tobacco Use    Smoking status: Never Smoker    Smokeless tobacco: Never Used   Substance and Sexual Activity    Alcohol use: Yes     Comment: rare    Drug use: No    Sexual activity: Not on file   Lifestyle    Physical activity     Days per week: Not on file     Minutes per session: Not on file    Stress: Not on file   Relationships    Social connections     Talks on phone: Not on file     Gets together: Not on file     Attends Lutheran service: Not on file     Active member of club or organization: Not on file     Attends meetings of clubs or organizations: Not on file     Relationship status: Not on file    Intimate partner violence     Fear of current or ex partner: Not on file     Emotionally abused: Not on file     Physically abused: Not on file     Forced sexual activity: Not on file   Other Topics Concern    Not on file   Social History Narrative        CHEW    ASTHMA    GERD    ALLERGIC RHINITS    [facility] [address] HAD CAD     [address]  [address]  [facility] [address] AT ECF IN [place]----[employer]---LAW FIRM [facility] PART TIME----ECF IN    [place]-----ONW LAW FIRM ON [employer]------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 Pedal pulses palpable, no clubbing or cyanosis   ABDOMEN: Soft, non-tender to light palpation. Bowel sounds present. No palpable masses no organomegaly; no abdominal bruit  MS: Good muscle strength and tone. No atrophy or abnormal movements. : Deferred  SKIN: Warm and dry no statis dermatitis or ulcers   NEURO / PSYCH: Oriented to person, place and time. Speech clear and appropriate. Follows all commands. Pleasant affect     DATA:    ECG / Tele strips: please see HPI   Diagnostic:    No intake or output data in the 24 hours ending 12/17/20 0837    Labs:   CBC: No results for input(s): WBC, HGB, HCT, PLT in the last 72 hours. BMP: No results for input(s): NA, K, CO2, BUN, CREATININE, LABGLOM, CALCIUM in the last 72 hours. Invalid input(s): GLU  Mag: No results for input(s): MG in the last 72 hours. Phos: No results for input(s): PHOS in the last 72 hours. TSH: No results for input(s): TSH in the last 72 hours. HgA1c: No results found for: LABA1C  No results found for: EAG  proBNP: No results for input(s): PROBNP in the last 72 hours. PT/INR: No results for input(s): PROTIME, INR in the last 72 hours. APTT:No results for input(s): APTT in the last 72 hours. CARDIAC ENZYMES:No results for input(s): CKTOTAL, CKMB, CKMBINDEX, TROPONINI in the last 72 hours. FASTING LIPID PANEL:  Lab Results   Component Value Date    CHOL 140 09/11/2020    HDL 29 09/11/2020    LDLCALC 91 09/11/2020    TRIG 99 09/11/2020     LIVER PROFILE:No results for input(s): AST, ALT, LABALBU in the last 72 hours. Impression:   Severe MR with mitral valve prolapse    Plan:  AMY to evaluate MR.          Electronically signed by Estrella Howell MD on 12/17/2020 at 8:37 AM

## 2020-12-17 NOTE — ANESTHESIA PRE PROCEDURE
Department of Anesthesiology  Preprocedure Note       Name:  Lottie Cornejo   Age:  44 y.o.  :  1970                                          MRN:  [id number]         Date:  2020      Surgeon: * No surgeons listed *    Procedure: * No procedures listed *    Medications prior to admission:   Prior to Admission medications    Medication Sig Start Date End Date Taking?  Authorizing Provider   lisinopril (PRINIVIL;ZESTRIL) 5 MG tablet Take 1 tablet by mouth daily 20  Yes Les Zapata DO   budesonide-formoterol (SYMBICORT) 80-4.5 MCG/ACT AERO Inhale into the lungs 2 times daily  19  Yes Historical Provider, MD       Current medications:    Current Outpatient Medications   Medication Sig Dispense Refill    lisinopril (PRINIVIL;ZESTRIL) 5 MG tablet Take 1 tablet by mouth daily 90 tablet 5    budesonide-formoterol (SYMBICORT) 80-4.5 MCG/ACT AERO Inhale into the lungs 2 times daily        Current Facility-Administered Medications   Medication Dose Route Frequency Provider Last Rate Last Admin    0.9 % sodium chloride infusion   Intravenous Continuous Carmen Silva MD        sodium chloride flush 0.9 % injection 10 mL  10 mL Intravenous 2 times per day Carmen Silva MD        sodium chloride flush 0.9 % injection 10 mL  10 mL Intravenous PRN Carmen Silva MD           Allergies:  No Known Allergies    Problem List:    Patient Active Problem List   Diagnosis Code    MVP (mitral valve prolapse) I34.1    Anxiety and depression F41.9, F32.9    GERD (gastroesophageal reflux disease) K21.9    Moderate mitral insufficiency I34.0       Past Medical History:        Diagnosis Date    Allergic rhinitis     Allergy to cats     Asthma     controlled with inhaler     MVP (mitral valve prolapse)     for OR 20     Rheumatic fever        Past Surgical History:        Procedure Laterality Date    CHOLECYSTECTOMY, LAPAROSCOPIC      COLONOSCOPY      US KIDNEY DUPLEX BILAT  2015 neg        Social History:    Social History     Tobacco Use    Smoking status: Never Smoker    Smokeless tobacco: Never Used   Substance Use Topics    Alcohol use: Yes     Comment: rare                                Counseling given: Not Answered      Vital Signs (Current):   Vitals:    12/14/20 1530 12/17/20 0720   BP:  102/72   Pulse:  67   Resp:  20   Temp:  97.3 °F (36.3 °C)   TempSrc:  Temporal   SpO2:  95%   Weight: 195 lb (88.5 kg) 195 lb (88.5 kg)   Height: 5' 11\" (1.803 m) 5' 11\" (1.803 m)                                              BP Readings from Last 3 Encounters:   12/17/20 102/72   09/24/20 104/62   09/11/20 124/68       NPO Status: Time of last liquid consumption: 2200                        Time of last solid consumption: 2200                        Date of last liquid consumption: 12/16/20                        Date of last solid food consumption: 12/16/20    BMI:   Wt Readings from Last 3 Encounters:   12/17/20 195 lb (88.5 kg)   09/24/20 196 lb 12.8 oz (89.3 kg)   09/11/20 196 lb (88.9 kg)     Body mass index is 27.2 kg/m². CBC:   Lab Results   Component Value Date    WBC 6.3 09/11/2020    RBC 5.34 09/11/2020    HGB 15.8 09/11/2020    HCT 48.5 09/11/2020    MCV 90.8 09/11/2020    RDW 12.7 09/11/2020     09/11/2020       CMP:   Lab Results   Component Value Date     09/11/2020    K 4.9 09/11/2020     09/11/2020    CO2 22 09/11/2020    BUN 15 09/11/2020    CREATININE 1.1 09/11/2020    GFRAA >60 09/11/2020    LABGLOM >60 09/11/2020    GLUCOSE 97 09/11/2020    GLUCOSE 87 03/07/2012    PROT 6.5 09/11/2020    CALCIUM 8.8 09/11/2020    BILITOT 0.3 09/11/2020    ALKPHOS 57 09/11/2020    AST 23 09/11/2020    ALT 40 09/11/2020       POC Tests: No results for input(s): POCGLU, POCNA, POCK, POCCL, POCBUN, POCHEMO, POCHCT in the last 72 hours.     Coags: No results found for: PROTIME, INR, APTT    HCG (If Applicable): No results found for: PREGTESTUR, PREGSERUM, HCG, HCGQUANT ABGs: No results found for: PHART, PO2ART, PFT5HYG, JHJ1VXK, BEART, H5ZAUIJE     Type & Screen (If Applicable):  No results found for: LABABO, LABRH    Drug/Infectious Status (If Applicable):  No results found for: HIV, HEPCAB    COVID-19 Screening (If Applicable):   Lab Results   Component Value Date    COVID19 Not Detected 12/11/2020         Anesthesia Evaluation  Patient summary reviewed no history of anesthetic complications:   Airway: Mallampati: II  TM distance: >3 FB   Neck ROM: full  Mouth opening: > = 3 FB Dental:          Pulmonary: breath sounds clear to auscultation  (+) asthma:                           ROS comment: Hx of rheumatic fever. Cardiovascular:    (+) valvular problems/murmurs: MR,       ECG reviewed  Rhythm: regular  Rate: normal  Echocardiogram reviewed                  Neuro/Psych:   (+) psychiatric history:depression/anxiety             GI/Hepatic/Renal:   (+) GERD: well controlled,           Endo/Other: Negative Endo/Other ROS                    Abdominal:           Vascular: negative vascular ROS. Anesthesia Plan      MAC     ASA 2     ([facility] Covid--not detected.)  Induction: intravenous. Anesthetic plan and risks discussed with patient. Plan discussed with CRNA.     Attending anesthesiologist reviewed and agrees with Pre Eval content          Josefa Kasper MD   12/17/2020

## 2020-12-17 NOTE — ANESTHESIA POSTPROCEDURE EVALUATION
Department of Anesthesiology  Postprocedure Note    Patient: Davide Hughes  MRN: [id number]  YOB: 1970  Date of evaluation: 12/17/2020  Time:  12:54 PM     Procedure Summary     Date: 12/17/20 Room / Location: [facility] Echocardiography    Anesthesia Start: 0836 Anesthesia Stop: 5291    Procedure: TRANSESOPHAGEAL ECHO Diagnosis:     Scheduled Providers:  Responsible Provider: Brittney Quintanilla MD    Anesthesia Type: MAC ASA Status: 2          Anesthesia Type: MAC    Kandy Phase I: Kandy Score: 10    Kandy Phase II: Kandy Score: 10    Last vitals: Reviewed and per EMR flowsheets.        Anesthesia Post Evaluation    Patient location during evaluation: PACU  Level of consciousness: awake  Airway patency: patent  Nausea & Vomiting: no nausea and no vomiting  Complications: no  Cardiovascular status: hemodynamically stable  Respiratory status: acceptable

## 2020-12-18 ENCOUNTER — TELEPHONE (OUTPATIENT)
Dept: CARDIOLOGY CLINIC | Age: 50
End: 2020-12-18

## 2020-12-22 NOTE — TELEPHONE ENCOUNTER
Left message for patient to contact office. Letter forwarded to Dr. Sharmin Borrego.    ----- Message from Janis Lozano MD sent at 12/22/2020  1:59 PM EST -----        Chary please tell the patient that his mitral valve continues to have significant prolapse and regurgitation. It probably is not significantly changed from previously. At this point I am recommending he have additional opinion from valve clinic regarding appropriate management. I will see him back in 1 year.

## 2020-12-22 NOTE — TELEPHONE ENCOUNTER
Patient returned our call and was given results and recommendations per Dr. Mary Cardoza. Patient verbalized understanding and wishes to proceed with Valve Clinic referral.  Referral placed.

## 2021-01-14 ENCOUNTER — HOSPITAL ENCOUNTER (OUTPATIENT)
Dept: NON INVASIVE DIAGNOSTICS | Age: 51
Discharge: HOME | End: 2021-01-14
Payer: COMMERCIAL

## 2021-01-14 VITALS
WEIGHT: 200 LBS | TEMPERATURE: 96.6 F | BODY MASS INDEX: 28 KG/M2 | HEART RATE: 73 BPM | SYSTOLIC BLOOD PRESSURE: 127 MMHG | HEIGHT: 71 IN | DIASTOLIC BLOOD PRESSURE: 71 MMHG

## 2021-01-14 DIAGNOSIS — I34.0 MODERATE MITRAL INSUFFICIENCY: Primary | Chronic | ICD-10-CM

## 2021-01-14 PROCEDURE — 99211 OFF/OP EST MAY X REQ PHY/QHP: CPT

## 2021-01-14 PROCEDURE — 99203 OFFICE O/P NEW LOW 30 MIN: CPT | Performed by: THORACIC SURGERY (CARDIOTHORACIC VASCULAR SURGERY)

## 2021-01-14 PROCEDURE — 99244 OFF/OP CNSLTJ NEW/EST MOD 40: CPT | Performed by: INTERNAL MEDICINE

## 2021-01-14 NOTE — PROGRESS NOTES
[facility] Valve Clinic        Patient name: Syd Cobb    Reason for consult: MR    Referring Physician: Dr. Melita Garcia    Primary Care Physician: Shoshana Goldberg DO    Date of service: 1/14/2021    Chief Complaint: concern about valve    HPI:   This is a pleasant 17-year-old  male. With a longstanding history of mitral valve prolapse. He admits to not being very physically active but he denies any dyspnea at rest or with exertion. He denies orthopnea, PND, lower extremity edema, palpitations, syncope, presyncope, chest pain. He does not take any diuretics has not been hospitalized for heart failure. A focused history review includes:  1. Mitral valve prolapse  2. GERD  3. Started on lisinopril prophylactically. No history of hypertension    Allergies: No Known Allergies    Home medications:    Current Outpatient Medications   Medication Sig Dispense Refill    lisinopril (PRINIVIL;ZESTRIL) 5 MG tablet Take 1 tablet by mouth daily 90 tablet 5    budesonide-formoterol (SYMBICORT) 80-4.5 MCG/ACT AERO Inhale into the lungs 2 times daily        No current facility-administered medications for this encounter.         Past Medical History:  Past Medical History:   Diagnosis Date    Allergic rhinitis     Allergy to cats     Asthma     controlled with inhaler     MVP (mitral valve prolapse)     for OR 12-17-20     Rheumatic fever     S/P transesophageal echocardiogram (AMY) 12/17/2020    Dr. Kathe Lebron       Past Surgical History:  Past Surgical History:   Procedure Laterality Date    CHOLECYSTECTOMY, LAPAROSCOPIC      COLONOSCOPY      US KIDNEY DUPLEX BILAT  04/2015    neg        Social History:  Social History     Socioeconomic History    Marital status: Single     Spouse name: Not on file    Number of children: Not on file    Years of education: Not on file    Highest education level: Not on file   Occupational History    Not on file   Social Needs    Financial resource weight loss. HEENT: Denies visual changes or hearing loss. Heart: As per HPI. Lungs: Denies shortness of breath, cough, or wheezing. Gastrointestinal: Denies nausea, vomiting, constipation, or diarrhea. Genitourinary: Denies dysuria or hematuria. Psychiatric: Patient denies anxiety or depression. Neurologic: Patient denies weakness of the extremities, dizziness, or headaches. All other ROS checked and found to be negative. Objective:  Vitals /71 (Site: Right Upper Arm, Position: Sitting)   Pulse 73   Temp 96.6 °F (35.9 °C) (Temporal)   Ht 5' 11\" (1.803 m)   Wt 200 lb (90.7 kg)   BMI 27.89 kg/m²   General Appearance: Pleasant 44y.o. year old male who appears stated age. Communicates well, no acute distress. HEENT: Head is normocephalic, atraumatic. EOMs intact, PERRL. Trachea midline. Lungs: Normal respiratory rate and normal effort. He is not in respiratory distress. Breath sounds clear to auscultation. No wheezes. Heart: Normal rate. Regular rhythm. S1 normal and S2 normal.  2/6 holosystolic murmur over the apex. .   Chest: Symmetric chest wall expansion. Extremities: Normal range of motion. Neurological: Patient is alert and oriented to person, place and time. Patient has normal reflexes. Skin: Warm and dry. Abdomen: Abdomen is soft and non-distended. Bowel sounds are normal. There is no abdominal tenderness tenderness. There is no guarding. There is no mass. Pulses: Distal pulses are intact. Skin: Warm and dry without lesions.     Lab Results   Component Value Date     09/11/2020    K 4.9 09/11/2020     09/11/2020    CO2 22 09/11/2020    BUN 15 09/11/2020    CREATININE 1.1 09/11/2020    GLUCOSE 97 09/11/2020    CALCIUM 8.8 09/11/2020    PROT 6.5 09/11/2020    LABALBU 4.1 09/11/2020    BILITOT 0.3 09/11/2020    ALKPHOS 57 09/11/2020    AST 23 09/11/2020    ALT 40 09/11/2020    LABGLOM >60 09/11/2020    GFRAA >60 09/11/2020       Lab Results   Component Value Date    WBC 6.3 09/11/2020    HGB 15.8 09/11/2020    HCT 48.5 09/11/2020    MCV 90.8 09/11/2020     09/11/2020       Personally reviewed his AMY dated 12/17/2020 and TTE dated 12/1/2020:  Normal LV size and systolic function. ESD less than 4, LVEF greater than or equal to 60%  Normal RV size and systolic function  Normal estimated PA pressure  3+ MR due to posterior leaflet prolapse. 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 He is asymptomatic. I discussed with him at length the indications for mitral valve intervention for MR. Those being clinical symptoms, LV dilation or systolic dysfunction, new onset pulmonary hypertension, new onset AF or clearly severe MR with low anticipated surgical risk and high likelihood for surgical repair. He currently meets none of these indications. Plan:   · Return to valve clinic in 6 months with limited TTE with BMP and proBNP. · Surgical risk is low and when the time comes for mitral intervention he would be best served with open surgery. Seen and discussed in association with Dr. Erica Petit MD of cardiothoracic surgery.     Rafal Rivera MD, [facility]  Interventional Cardiology/Structural Heart Disease    Electronically signed by Rafal Rivera MD on 1/14/2021 at 12:43 PM

## 2021-01-14 NOTE — PROGRESS NOTES
[facility] Valve Clinic  Visit Note      Patient name: Gonzales Avila    Reason for consult:  MR     Primary Care Physician: Winnie Yung DO    Date of service: 1/14/2021    Chief Complaint:  MR, denies symptoms     HPI:  43 yo male with a hx of HTN, asthma, who had known MVP and moderate MR for several years. He has denied symptoms. He presents today in the valve clinic for opinion on repair. Allergies: No Known Allergies    Home medications:    Current Outpatient Medications   Medication Sig Dispense Refill    lisinopril (PRINIVIL;ZESTRIL) 5 MG tablet Take 1 tablet by mouth daily 90 tablet 5    budesonide-formoterol (SYMBICORT) 80-4.5 MCG/ACT AERO Inhale into the lungs 2 times daily        No current facility-administered medications for this encounter.         Past Medical History:  Past Medical History:   Diagnosis Date    Allergic rhinitis     Allergy to cats     Asthma     controlled with inhaler     MVP (mitral valve prolapse)     for OR 12-17-20     Rheumatic fever     S/P transesophageal echocardiogram (AMY) 12/17/2020    Dr. Kelley Casas       Past Surgical History:  Past Surgical History:   Procedure Laterality Date    CHOLECYSTECTOMY, LAPAROSCOPIC      COLONOSCOPY      US KIDNEY DUPLEX BILAT  04/2015    neg        Social History:  Social History     Socioeconomic History    Marital status: Single     Spouse name: Not on file    Number of children: Not on file    Years of education: Not on file    Highest education level: Not on file   Occupational History    Not on file   Social Needs    Financial resource strain: Not on file    Food insecurity     Worry: Not on file     Inability: Not on file   [employer] needs     Medical: Not on file     Non-medical: Not on file   Tobacco Use    Smoking status: Never Smoker    Smokeless tobacco: Never Used   Substance and Sexual Activity    Alcohol use: Yes     Comment: rare    Drug use: No    Sexual activity: Not on file Lifestyle    Physical activity     Days per week: Not on file     Minutes per session: Not on file    Stress: Not on file   Relationships    Social connections     Talks on phone: Not on file     Gets together: Not on file     Attends [employer] service: Not on file     Active member of club or organization: Not on file     Attends meetings of clubs or organizations: Not on file     Relationship status: Not on file    Intimate partner violence     Fear of current or ex partner: Not on file     Emotionally abused: Not on file     Physically abused: Not on file     Forced sexual activity: Not on file   Other Topics Concern    Not on file   Social History Narrative        CHEW    ASTHMA    GERD    ALLERGIC RHINITS    [address] HAD CAD     [employer] [address]  AT [address] AT ECF IN [place]----[employer]---[employer] TIME----ECF IN    [place]-----[employer] ON [employer]------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 HEENT: Denies visual changes or hearing loss. Heart: As per HPI. Lungs: Denies shortness of breath, cough, or wheezing. Gastrointestinal: Denies nausea, vomiting, constipation, or diarrhea. Genitourinary: dysuria or hematuria. Psychiatric: Patient denies anxiety or depression.    Neurologic: Patient denies weakness of the extremities, dizziness, or headaches. All other ROS checked and found to be negative. Objective:  Vitals /71 (Site: Right Upper Arm, Position: Sitting)   Pulse 73   Temp 96.6 °F (35.9 °C) (Temporal)   Ht 5' 11\" (1.803 m)   Wt 200 lb (90.7 kg)   BMI 27.89 kg/m²   General Appearance: Pleasant 44y.o. year old male who appears stated age. Communicates well, no acute distress. HEENT: Head is normocephalic, atraumatic. EOMs intact, PERRL. Trachea midline. Lungs: Normal respiratory rate and normal effort. He is not in respiratory distress. Breath sounds clear to auscultation. No wheezes. Heart: Normal rate. Regular rhythm. S1 normal and S2 normal. Positive for murmur. Chest: Symmetric chest wall expansion. Extremities: Normal range of motion. Neurological: Patient is alert and oriented to person, place and time. Patient has normal reflexes. Skin: Warm and dry. Abdomen: Abdomen is soft and non-distended. Bowel sounds are normal. There is no abdominal tenderness tenderness. There is no guarding. There is no mass. Pulses: Distal pulses are intact. Skin: Warm and dry without lesions. AYM procedure:Transesophageal Echo (AMY). Procedure Date  Date: 12/17/2020 Start: 08:37 AM     Study Location: Portable  Technical Quality: Adequate visualization     Patient Status: Routine     Height: 71 inches Weight: 195 pounds BSA: 2.09 m^2 BMI: 27.2 kg/m^2     HR: 85 bpm BP: 109/71 mmHg     AMY Performed By: the attending and the sonographer      Type of Anesthesia: General anesthesia      Findings      Left Ventricle   Normal left ventricle size and systolic function. Ejection fraction is visually estimated at 60-65%. No regional wall motion abnormalities seen. Normal left ventricle wall thickness. Right Ventricle   Normal right ventricular size and function. Left Atrium   Normal sized left atrium. No evidence of patent foramen ovale. Right Atrium   Normal right atrium size.       Mitral Valve Moderate holosystolic prolapse of multiple scallops (P1 and P2 and P3) of   posterior leaflet of mitral valve. No evidence of mitral valve stenosis. Moderate-to-severe mitral regurgitation with anteriorly directed jet. VC   0.5 cm, ERO 0.2 cm2. No PV flow reversal noted. Tricuspid Valve   The tricuspid valve appears structurally normal.   Physiologic and/or trace tricuspid regurgitation. Unable to estimate RVSP      Aortic Valve   The aortic valve is trileaflet with good leaflet separation. No hemodynamically significant aortic stenosis is present. No evidence of aortic valve regurgitation. Pulmonic Valve   The pulmonic valve was not well visualized. No evidence of any pulmonic regurgitation. Pericardial Effusion   No evidence for hemodynamically significant pericardial effusion. Pleural Effusion   No evidence of pleural effusion. Aorta   Normal aortic root and ascending aorta. Miscellaneous   Inferior Vena Cava not well visualized. Conclusions      Summary   Normal mitral valve structure and function. Physiologic and/or trace tricuspid regurgitation. Normal left ventricle size and systolic function. Ejection fraction is visually estimated at 60-65%. No regional wall motion abnormalities seen. Normal left ventricle wall thickness. Normal right ventricular size and function. Moderate holosystolic prolapse of multiple scallops (P1 and P2 and P3) of   posterior leaflet of mitral valve. Moderate-to-severe mitral regurgitation with anteriorly directed jet. VC   0.5 cm, ERO 0.2 cm2. No PV flow reversal noted. BP 91/60   No hemodynamically significant aortic stenosis is present. Physiologic and/or trace tricuspid regurgitation. Unable to estimate RVSP   No evidence for hemodynamically significant pericardial effusion.       Assessment/Plan    45 yo male with asymptomatic posterior mitral valve prolapse with moderate to severe MR   Repeat ECHO and follow up in 6 months       Electronically signed by Geri Holter, DO on 1/14/2021 at 12:39 PM

## 2021-01-20 ENCOUNTER — TELEPHONE (OUTPATIENT)
Dept: PRIMARY CARE CLINIC | Age: 51
End: 2021-01-20

## 2021-01-20 DIAGNOSIS — Z20.822 EXPOSURE TO COVID-19 VIRUS: Primary | ICD-10-CM

## 2021-01-20 NOTE — TELEPHONE ENCOUNTER
The pt was with someone on Friday that tested positive for covid since then, he says he doesn't have any symptoms but he is asking if he can get a covid test ordered to go to the [facility] to get tested

## 2021-03-13 ENCOUNTER — IMMUNIZATION (OUTPATIENT)
Dept: PRIMARY CARE CLINIC | Age: 51
End: 2021-03-13
Payer: COMMERCIAL

## 2021-03-13 PROCEDURE — 0011A COVID-19, MODERNA VACCINE 100MCG/0.5ML DOSE: CPT | Performed by: EMERGENCY MEDICINE

## 2021-03-13 PROCEDURE — 91301 COVID-19, MODERNA VACCINE 100MCG/0.5ML DOSE: CPT | Performed by: EMERGENCY MEDICINE

## 2021-04-15 ENCOUNTER — IMMUNIZATION (OUTPATIENT)
Dept: PRIMARY CARE CLINIC | Age: 51
End: 2021-04-15
Payer: COMMERCIAL

## 2021-04-15 PROCEDURE — 0012A COVID-19, MODERNA VACCINE 100MCG/0.5ML DOSE: CPT | Performed by: PHYSICIAN ASSISTANT

## 2021-04-15 PROCEDURE — 91301 COVID-19, MODERNA VACCINE 100MCG/0.5ML DOSE: CPT | Performed by: PHYSICIAN ASSISTANT

## 2021-06-28 ENCOUNTER — TELEPHONE (OUTPATIENT)
Dept: ADMINISTRATIVE | Age: 51
End: 2021-06-28

## 2021-06-28 RX ORDER — LISINOPRIL 5 MG/1
5 TABLET ORAL DAILY
Qty: 90 TABLET | Refills: 3 | Status: SHIPPED
Start: 2021-06-28 | End: 2021-09-21 | Stop reason: SDUPTHER

## 2021-07-06 ENCOUNTER — TELEPHONE (OUTPATIENT)
Dept: CARDIOLOGY CLINIC | Age: 51
End: 2021-07-06

## 2021-07-06 NOTE — TELEPHONE ENCOUNTER
Pt called and canceled his appointment. Was going to reschedule, but need clarity on docs schedule. Will call back tomorrow.

## 2021-07-07 ENCOUNTER — TELEPHONE (OUTPATIENT)
Dept: CARDIOLOGY CLINIC | Age: 51
End: 2021-07-07

## 2021-07-07 NOTE — TELEPHONE ENCOUNTER
I transferred the call to [facility] when he canceled the appointment. She called him back and rescheduled the echo and canceled the other.

## 2021-07-07 NOTE — TELEPHONE ENCOUNTER
----- Message from Vilma [place] sent at 7/7/2021  1:35 PM EDT -----  Mayi Hardwick - I saw your note that this patient called to cancel. I can have [address]  reschedule in structural clinic on a day that Dr. Sonia Stauffer is there. Could you please just have him also removed from the echo schedule for tomorrow? He is still on that one. (We have two separate schedules).  Thanks!!

## 2021-08-19 ENCOUNTER — HOSPITAL ENCOUNTER (OUTPATIENT)
Dept: CARDIOLOGY | Age: 51
Discharge: HOME | End: 2021-08-19
Payer: COMMERCIAL

## 2021-08-19 ENCOUNTER — OFFICE VISIT (OUTPATIENT)
Dept: CARDIOTHORACIC SURGERY | Age: 51
End: 2021-08-19
Payer: COMMERCIAL

## 2021-08-19 ENCOUNTER — OFFICE VISIT (OUTPATIENT)
Dept: CARDIOLOGY CLINIC | Age: 51
End: 2021-08-19
Payer: COMMERCIAL

## 2021-08-19 VITALS
WEIGHT: 195 LBS | SYSTOLIC BLOOD PRESSURE: 118 MMHG | HEIGHT: 71 IN | RESPIRATION RATE: 24 BRPM | DIASTOLIC BLOOD PRESSURE: 80 MMHG | HEART RATE: 67 BPM | BODY MASS INDEX: 27.3 KG/M2

## 2021-08-19 DIAGNOSIS — I34.1 MVP (MITRAL VALVE PROLAPSE): Primary | ICD-10-CM

## 2021-08-19 DIAGNOSIS — I34.0 NONRHEUMATIC MITRAL VALVE REGURGITATION: Primary | ICD-10-CM

## 2021-08-19 DIAGNOSIS — I34.0 NONRHEUMATIC MITRAL VALVE REGURGITATION: ICD-10-CM

## 2021-08-19 PROCEDURE — 1036F TOBACCO NON-USER: CPT | Performed by: THORACIC SURGERY (CARDIOTHORACIC VASCULAR SURGERY)

## 2021-08-19 PROCEDURE — G8419 CALC BMI OUT NRM PARAM NOF/U: HCPCS | Performed by: THORACIC SURGERY (CARDIOTHORACIC VASCULAR SURGERY)

## 2021-08-19 PROCEDURE — 99214 OFFICE O/P EST MOD 30 MIN: CPT | Performed by: THORACIC SURGERY (CARDIOTHORACIC VASCULAR SURGERY)

## 2021-08-19 PROCEDURE — 3017F COLORECTAL CA SCREEN DOC REV: CPT | Performed by: INTERNAL MEDICINE

## 2021-08-19 PROCEDURE — G8428 CUR MEDS NOT DOCUMENT: HCPCS | Performed by: THORACIC SURGERY (CARDIOTHORACIC VASCULAR SURGERY)

## 2021-08-19 PROCEDURE — 1036F TOBACCO NON-USER: CPT | Performed by: INTERNAL MEDICINE

## 2021-08-19 PROCEDURE — G8419 CALC BMI OUT NRM PARAM NOF/U: HCPCS | Performed by: INTERNAL MEDICINE

## 2021-08-19 PROCEDURE — 93308 TTE F-UP OR LMTD: CPT

## 2021-08-19 PROCEDURE — 99213 OFFICE O/P EST LOW 20 MIN: CPT | Performed by: INTERNAL MEDICINE

## 2021-08-19 PROCEDURE — 3017F COLORECTAL CA SCREEN DOC REV: CPT | Performed by: THORACIC SURGERY (CARDIOTHORACIC VASCULAR SURGERY)

## 2021-08-19 PROCEDURE — G8427 DOCREV CUR MEDS BY ELIG CLIN: HCPCS | Performed by: INTERNAL MEDICINE

## 2021-08-19 NOTE — PROGRESS NOTES
[id number] [facility]        Patient name: Gissell Fisher    Reason for consult: MR    Referring Physician: Dr. Rosita Fischer    Primary Care Physician: Gio Mae DO    Date of service: 8/19/2021    Chief Complaint: concern about valve    HPI:   This is a pleasant 57-year-old  male who returns for follow-up. Over the past 6 months he has not had any new dyspnea, orthopnea, PND, lower extremity edema, chest pain, syncope, presyncope, palpitations. He still does not require any oral diuretics. He has not had any heart failure hospitalizations or ED visits. A focused history review includes:  1. Mitral valve prolapse  2. GERD  3. Started on lisinopril prophylactically. No history of hypertension    Allergies: No Known Allergies    Home medications:    Current Outpatient Medications   Medication Sig Dispense Refill    lisinopril (PRINIVIL;ZESTRIL) 5 MG tablet Take 1 tablet by mouth daily 90 tablet 3    budesonide-formoterol (SYMBICORT) 80-4.5 MCG/ACT AERO Inhale into the lungs 2 times daily        No current facility-administered medications for this visit.      Facility-Administered Medications Ordered in Other Visits   Medication Dose Route Frequency Provider Last Rate Last Admin    perflutren lipid microspheres (DEFINITY) injection 1.65 mg  1.5 mL Intravenous ONCE PRN GAMAL Chen           Past Medical History:  Past Medical History:   Diagnosis Date    Allergic rhinitis     Allergy to cats     Asthma     controlled with inhaler     MVP (mitral valve prolapse)     for OR 12-17-20     Rheumatic fever     S/P transesophageal echocardiogram (AMY) 12/17/2020    Dr. Florencia Cruz       Past Surgical History:  Past Surgical History:   Procedure Laterality Date    CHOLECYSTECTOMY, LAPAROSCOPIC      COLONOSCOPY      US KIDNEY DUPLEX BILAT  04/2015    neg        Social History:  Social History     Socioeconomic History    Marital status: Single     Spouse name: Not on file    Number of children: Not on file    Years of education: Not on file    Highest education level: Not on file   Occupational History    Not on file   Tobacco Use    Smoking status: Never Smoker    Smokeless tobacco: Never Used   Vaping Use    Vaping Use: Never used   Substance and Sexual Activity    Alcohol use: Yes     Comment: rare    Drug use: No    Sexual activity: Not on file   Other Topics Concern    Not on file   Social History Narrative        CHEW    ASTHMA    GERD    ALLERGIC RHINITS    LAP GB    DEPRESSION    ANXIETY    DAD  AT 71 WITH KIDNEY FAILURE AND HAD CAD    MOM  HEART SURGERY HAD [address]  AT 69--HAD RHEUMATIC    FEVER    ADMIN AT ECF IN [place]----[employer]---[employer] [place] PART TIME----ECF IN    [place]-----[employer] ON [address]------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------[employer]    -------JOINED ANOTHER      MOD MITRAL REGURG------DR [place]ON---DR ABERNATHY   Echo  10-18    Echo with Mild MR    ALLERGY CATS-----sees  Dr Ruthie Maria symbicort    RIGHT TESTICLE PAIN---  REFERRED TO [facility]---PT WENT TO [facility]    DIVERTICULITIS 1-13    NEG US KIDNEY 4-15     Social Determinants of Health     Financial Resource Strain:     Difficulty of Paying Living Expenses:    Food Insecurity:     Worried About Running Out of Food in the Last Year:     Ran Out of Food in the Last Year:    Transportation Needs:     Lack of Transportation (Medical):      Lack of Transportation (Non-Medical):    Physical Activity:     Days of Exercise per Week:     Minutes of Exercise per Session:    Stress:     Feeling of Stress :    Social Connections:     Frequency of Communication with Friends and Family:     Frequency of Social Gatherings with Friends and Family:     Attends Orthodoxy Services:     Active Member of Clubs or Organizations:     Attends Club or Organization Meetings:     Marital Status:    Intimate Partner Violence:     Fear of Current or Ex-Partner:     Emotionally Abused:     Physically Abused:     Sexually Abused:        Family History:  Family History   Problem Relation Age of Onset    Heart Surgery Mother 36        valve surgery    Heart Surgery Father 71       Review of Systems:  Constitutional: Denies fevers, chills, or weight loss. HEENT: Denies visual changes or hearing loss. Heart: As per HPI. Lungs: Denies shortness of breath, cough, or wheezing. Gastrointestinal: Denies nausea, vomiting, constipation, or diarrhea. Genitourinary: Denies dysuria or hematuria. Psychiatric: Patient denies anxiety or depression. Neurologic: Patient denies weakness of the extremities, dizziness, or headaches. All other ROS checked and found to be negative. Objective:  Vitals /80 (Site: Left Upper Arm, Position: Sitting)   Pulse 67   Resp 24   Ht 5' 11\" (1.803 m)   Wt 195 lb (88.5 kg)   BMI 27.20 kg/m²   General Appearance: Pleasant 46y.o. year old male who appears stated age. Communicates well, no acute distress. HEENT: Head is normocephalic, atraumatic. EOMs intact, PERRL. Trachea midline. Lungs: Normal respiratory rate and normal effort. He is not in respiratory distress. Breath sounds clear to auscultation. No wheezes. Heart: Normal rate. Regular rhythm. S1 normal and S2 normal.  2/6 holosystolic murmur over the apex. .   Chest: Symmetric chest wall expansion. Extremities: Normal range of motion. Neurological: Patient is alert and oriented to person, place and time. Patient has normal reflexes. Skin: Warm and dry. Abdomen: Abdomen is soft and non-distended. Bowel sounds are normal. There is no abdominal tenderness tenderness. There is no guarding. There is no mass. Pulses: Distal pulses are intact. Skin: Warm and dry without lesions.     Lab Results   Component Value Date     09/11/2020    K 4.9 09/11/2020     09/11/2020    CO2 22 09/11/2020    BUN 15 09/11/2020    CREATININE 1.1 09/11/2020 GLUCOSE 97 09/11/2020    CALCIUM 8.8 09/11/2020    PROT 6.5 09/11/2020    LABALBU 4.1 09/11/2020    BILITOT 0.3 09/11/2020    ALKPHOS 57 09/11/2020    AST 23 09/11/2020    ALT 40 09/11/2020    LABGLOM >60 09/11/2020    GFRAA >60 09/11/2020       Lab Results   Component Value Date    WBC 6.3 09/11/2020    HGB 15.8 09/11/2020    HCT 48.5 09/11/2020    MCV 90.8 09/11/2020     09/11/2020       Personally reviewed his AMY dated 12/17/2020 and TTE dated 12/1/2020:  Normal LV size and systolic function. ESD less than 4, LVEF greater than or equal to 60%  Normal RV size and systolic function  Normal estimated PA pressure  3+ MR due to posterior leaflet prolapse. No PV systolic flow reversal on AMY  Mild TR    TTE today:  No significant change compared to December 2020    Assessment:   61-year-old  male in very good health who has mitral valve prolapse with moderately severe MR. He remains asymptomatic with normal LV size and systolic function, no pulmonary hypertension. I discussed with him at length the indications for mitral valve intervention for MR. Those being clinical symptoms, LV dilation or systolic dysfunction, new onset pulmonary hypertension, new onset AF or clearly severe MR with low anticipated surgical risk and high likelihood for surgical repair. He currently meets none of these indications. Plan:   · Return to valve clinic in 6 months with limited TTE with BMP and proBNP. · Surgical risk is low and when the time comes for mitral intervention he would be best served with open surgery. Seen and discussed in association with Dr. Kwabena Calzada MD of cardiothoracic surgery.     Traci Donald MD, [facility]  Interventional Cardiology/Structural Heart Disease    Electronically signed by Traci Donald MD on 8/19/2021 at 1:53 PM if Mr. Please get an echo today

## 2021-08-19 NOTE — PATIENT INSTRUCTIONS
1. Return for an echocardiogram and office visit in 6 months. 2. Call us in the meantime if you have any new symptoms of shortness of breath or swelling in the legs or chest discomfort or palpitations.

## 2021-08-19 NOTE — PROGRESS NOTES
[facility] [id number] [facility]  Visit Note      Patient name: Megha Morillo    Reason for consult: MR        Primary Care Physician: Cesia Pride DO    Date of service: 8/19/2021    Chief Complaint: MR HAAS: Patient is a 45 yo male who presents to valve clinic for 6 month follow up. Patient was previously seen in valve clinic 1/2021. At that time patient with  known MVP and moderate MR for several years. He has denied symptoms. Given the lack of symptoms and moderate MR it was elected to cont to monitor and return in 6 months ( today) with an echo   Patient continues to deny SOB, GARCIA, LE edema, CP or palpations     Allergies: No Known Allergies    Home medications:    Current Outpatient Medications   Medication Sig Dispense Refill    lisinopril (PRINIVIL;ZESTRIL) 5 MG tablet Take 1 tablet by mouth daily 90 tablet 3    budesonide-formoterol (SYMBICORT) 80-4.5 MCG/ACT AERO Inhale into the lungs 2 times daily        No current facility-administered medications for this visit.      Facility-Administered Medications Ordered in Other Visits   Medication Dose Route Frequency Provider Last Rate Last Admin    perflutren lipid microspheres (DEFINITY) injection 1.65 mg  1.5 mL Intravenous ONCE PRN GAMAL Gonzales           Past Medical History:  Past Medical History:   Diagnosis Date    Allergic rhinitis     Allergy to cats     Asthma     controlled with inhaler     MVP (mitral valve prolapse)     for OR 12-17-20     Rheumatic fever     S/P transesophageal echocardiogram (AMY) 12/17/2020    Dr. Brandon Hector       Past Surgical History:  Past Surgical History:   Procedure Laterality Date    CHOLECYSTECTOMY, LAPAROSCOPIC      COLONOSCOPY      US KIDNEY DUPLEX BILAT  04/2015    neg        Social History:  Social History     Socioeconomic History    Marital status: Single     Spouse name: Not on file    Number of children: Not on file    Years of education: Not on file    Highest education level: Not on file   Occupational History    Not on file   Tobacco Use    Smoking status: Never Smoker    Smokeless tobacco: Never Used   Vaping Use    Vaping Use: Never used   Substance and Sexual Activity    Alcohol use: Yes     Comment: rare    Drug use: No    Sexual activity: Not on file   Other Topics Concern    Not on file   Social History Narrative        CHEW    ASTHMA    GERD    ALLERGIC RHINITS    LAP GB    DEPRESSION    ANXIETY    DAD  AT 71 WITH KIDNEY FAILURE AND HAD CAD    MOM  HEART SURGERY HAD VAVLE OR IN [place]  AT 69--HAD RHEUMATIC    FEVER    ADMIN AT ECF IN [place]----[employer]---[employer] [place] PART TIME----ECF IN    [place]-----[employer] LAW FIRM ON [employer]------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------[employer]    -------JOINED ANOTHER      MOD MITRAL REGURG------DR [place]---DR ABERNATHY   Echo  10-18    Echo with Mild MR    ALLERGY CATS-----sees  Dr Destiny Liu symbicort    RIGHT TESTICLE PAIN---  REFERRED TO [facility]---PT WENT TO [facility]    DIVERTICULITIS 1-13    NEG US KIDNEY 4-15     Social Determinants of Health     Financial Resource Strain:     Difficulty of Paying Living Expenses:    Food Insecurity:     Worried About Running Out of Food in the Last Year:     Ran Out of Food in the Last Year:    Transportation Needs:     Lack of Transportation (Medical):      Lack of Transportation (Non-Medical):    Physical Activity:     Days of Exercise per Week:     Minutes of Exercise per Session:    Stress:     Feeling of Stress :    Social Connections:     Frequency of Communication with Friends and Family:     Frequency of Social Gatherings with Friends and Family:     Attends Jew Services:     Active Member of Clubs or Organizations:     Attends Club or Organization Meetings:     Marital Status:    Intimate Partner Violence:     Fear of Current or Ex-Partner:     Emotionally Abused:     Physically Abused:     Sexually Abused:        Family History:  Family History   Problem Relation Age of Onset    Heart Surgery Mother 36        valve surgery    Heart Surgery Father 71       Review of Systems:  Constitutional: Denies fevers, chills, or weight loss. HEENT: Denies visual changes or hearing loss. Heart: As per HPI. Lungs: Denies shortness of breath, cough, or wheezing. Gastrointestinal: Denies nausea, vomiting, constipation, or diarrhea. Genitourinary: dysuria or hematuria. Psychiatric: Patient denies anxiety or depression. Neurologic: Patient denies weakness of the extremities, dizziness, or headaches. All other ROS checked and found to be negative. Objective:  Vitals There were no vitals taken for this visit. General Appearance: Pleasant 46y.o. year old male who appears stated age. Communicates well, no acute distress. HEENT: Head is normocephalic, atraumatic. EOMs intact, PERRL. Trachea midline. Lungs: Normal respiratory rate and normal effort. He is not in respiratory distress. Breath sounds clear to auscultation. No wheezes. Heart: Normal rate. Regular rhythm. S1 normal and S2 normal. Positive for murmur. Chest: Symmetric chest wall expansion. Extremities: Normal range of motion. Neurological: Patient is alert and oriented to person, place and time. Patient has normal reflexes. Skin: Warm and dry. Abdomen: Abdomen is soft and non-distended. Bowel sounds are normal. There is no abdominal tenderness tenderness. There is no guarding. There is no mass. Pulses: Distal pulses are intact. Skin: Warm and dry without lesions.         Assessment:   Patient Active Problem List   Diagnosis    MVP (mitral valve prolapse)    Anxiety and depression    GERD (gastroesophageal reflux disease)    Moderate mitral insufficiency       45 yo male with mod/severe MR with MVP   Asymptomatic         Plan:   Continue to follow until symptomatic, HF, change in LV, or afib

## 2021-09-21 ENCOUNTER — TELEPHONE (OUTPATIENT)
Dept: PRIMARY CARE CLINIC | Age: 51
End: 2021-09-21

## 2021-09-21 ENCOUNTER — OFFICE VISIT (OUTPATIENT)
Dept: PRIMARY CARE CLINIC | Age: 51
End: 2021-09-21
Payer: COMMERCIAL

## 2021-09-21 VITALS
BODY MASS INDEX: 26.64 KG/M2 | DIASTOLIC BLOOD PRESSURE: 78 MMHG | WEIGHT: 191 LBS | SYSTOLIC BLOOD PRESSURE: 128 MMHG | TEMPERATURE: 98.1 F

## 2021-09-21 DIAGNOSIS — I34.0 NONRHEUMATIC MITRAL VALVE REGURGITATION: ICD-10-CM

## 2021-09-21 DIAGNOSIS — Z00.01 ENCOUNTER FOR ANNUAL GENERAL MEDICAL EXAMINATION WITH ABNORMAL FINDINGS IN ADULT: Primary | ICD-10-CM

## 2021-09-21 DIAGNOSIS — I10 ESSENTIAL HYPERTENSION: ICD-10-CM

## 2021-09-21 DIAGNOSIS — E78.2 MIXED HYPERLIPIDEMIA: ICD-10-CM

## 2021-09-21 DIAGNOSIS — R73.03 PRE-DIABETES: Primary | ICD-10-CM

## 2021-09-21 DIAGNOSIS — Z23 NEED FOR INFLUENZA VACCINATION: ICD-10-CM

## 2021-09-21 DIAGNOSIS — J45.20 ASTHMA IN ADULT, MILD INTERMITTENT, UNCOMPLICATED: ICD-10-CM

## 2021-09-21 PROCEDURE — 99396 PREV VISIT EST AGE 40-64: CPT | Performed by: FAMILY MEDICINE

## 2021-09-21 PROCEDURE — 90674 CCIIV4 VAC NO PRSV 0.5 ML IM: CPT | Performed by: FAMILY MEDICINE

## 2021-09-21 PROCEDURE — 90471 IMMUNIZATION ADMIN: CPT | Performed by: FAMILY MEDICINE

## 2021-09-21 RX ORDER — LISINOPRIL 5 MG/1
5 TABLET ORAL DAILY
Qty: 90 TABLET | Refills: 3 | Status: SHIPPED
Start: 2021-09-21 | End: 2022-10-12 | Stop reason: SDUPTHER

## 2021-09-21 RX ORDER — BUDESONIDE AND FORMOTEROL FUMARATE DIHYDRATE 80; 4.5 UG/1; UG/1
2 AEROSOL RESPIRATORY (INHALATION) 2 TIMES DAILY
Qty: 10.2 G | Refills: 12 | Status: SHIPPED
Start: 2021-09-21 | End: 2022-10-31 | Stop reason: SDUPTHER

## 2021-09-21 ASSESSMENT — PATIENT HEALTH QUESTIONNAIRE - PHQ9
SUM OF ALL RESPONSES TO PHQ QUESTIONS 1-9: 0
SUM OF ALL RESPONSES TO PHQ9 QUESTIONS 1 & 2: 0
2. FEELING DOWN, DEPRESSED OR HOPELESS: 0
SUM OF ALL RESPONSES TO PHQ QUESTIONS 1-9: 0
SUM OF ALL RESPONSES TO PHQ QUESTIONS 1-9: 0
1. LITTLE INTEREST OR PLEASURE IN DOING THINGS: 0

## 2021-09-21 ASSESSMENT — ENCOUNTER SYMPTOMS
ALLERGIC/IMMUNOLOGIC NEGATIVE: 1
EYES NEGATIVE: 1
RESPIRATORY NEGATIVE: 1
GASTROINTESTINAL NEGATIVE: 1

## 2021-09-21 NOTE — PROGRESS NOTES
21  Name: Bryn Souza    : 1970    Sex: male    Age: 46 y.o. Subjective:  Chief Complaint: Patient is here for a wellness check, blood pressure, asthma    As well. No chest pain or shortness of breath. Saw  Inter cardio and     watnst o see  Eery 6 mo  Chew tobacco---does very rarely  dwon 5 lbs in on eyear      Review of Systems   Constitutional: Negative. HENT: Negative. Eyes: Negative. Respiratory: Negative. Cardiovascular: Negative. Gastrointestinal: Negative. Endocrine: Negative. Genitourinary: Negative. Musculoskeletal: Negative. Skin: Negative. Allergic/Immunologic: Negative. Neurological: Negative. Hematological: Negative. Psychiatric/Behavioral: Negative.           Current Outpatient Medications:     lisinopril (PRINIVIL;ZESTRIL) 5 MG tablet, Take 1 tablet by mouth daily, Disp: 90 tablet, Rfl: 3    budesonide-formoterol (SYMBICORT) 80-4.5 MCG/ACT AERO, Inhale 2 puffs into the lungs 2 times daily, Disp: 10.2 g, Rfl: 12    Albuterol Sulfate, sensor, 108 (90 Base) MCG/ACT AEPB, Inhale 2 puffs into the lungs 4 times daily as needed (sob), Disp: 1 each, Rfl: 12  No Known Allergies  Social History     Socioeconomic History    Marital status: Single     Spouse name: Not on file    Number of children: Not on file    Years of education: Not on file    Highest education level: Not on file   Occupational History    Not on file   Tobacco Use    Smoking status: Never Smoker    Smokeless tobacco: Never Used   Vaping Use    Vaping Use: Never used   Substance and Sexual Activity    Alcohol use: Yes     Comment: rare    Drug use: No    Sexual activity: Not on file   Other Topics Concern    Not on file   Social History Narrative        CHEW    ASTHMA    GERD    ALLERGIC RHINITS    [facility] [address] HAD CAD    MOM  HEART SURGERY HAD  [address]  AT 69--HAD RHEUMATIC FEVER    ADMIN AT ECF IN [place]----[employer]---LAW [employer] [facility] PART TIME----ECF IN    [place]-----[employer] LAW FIRM ON [address]------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------[employer]    2007-------JOINED ANOTHER  2016    MOD MITRAL REGURG------DR [place]---DR ABERNATHY   Echo  10-18    Echo with Mild MR---then mod severe MR sen by  inter cardio  2021    ALLERGY CATS-----sees  Dr Mateus Orellana symbicort    RIGHT TESTICLE PAIN--- 2011 REFERRED TO [facility]---PT WENT TO [facility]    DIVERTICULITIS 1-13    NEG US KIDNEY 4-15     Social Determinants of Health     Financial Resource Strain:     Difficulty of Paying Living Expenses:    Food Insecurity:     Worried About Running Out of Food in the Last Year:     Ran Out of Food in the Last Year:    Transportation Needs:     Lack of Transportation (Medical):      Lack of Transportation (Non-Medical):    Physical Activity:     Days of Exercise per Week:     Minutes of Exercise per Session:    Stress:     Feeling of Stress :    Social Connections:     Frequency of Communication with Friends and Family:     Frequency of Social Gatherings with Friends and Family:     Attends Jewish Services:     Active Member of Clubs or Organizations:     Attends Club or Organization Meetings:     Marital Status:    Intimate Partner Violence:     Fear of Current or Ex-Partner:     Emotionally Abused:     Physically Abused:     Sexually Abused:       Past Medical History:   Diagnosis Date    Allergic rhinitis     Allergy to cats     Asthma     controlled with inhaler     MVP (mitral valve prolapse)     for OR 12-17-20     Rheumatic fever     S/P transesophageal echocardiogram (AMY) 12/17/2020    Dr. Esperanza Martinez     Family History   Problem Relation Age of Onset    Heart Surgery Mother 36        valve surgery    Heart Surgery Father 71      Past Surgical History:   Procedure Laterality Date    CHOLECYSTECTOMY, LAPAROSCOPIC      COLONOSCOPY      [address]  04/2015    neg Vitals:    09/21/21 0804   BP: 128/78   Temp: 98.1 °F (36.7 °C)   TempSrc: Oral   Weight: 191 lb (86.6 kg)       Objective:    Physical Exam  Vitals reviewed. Constitutional:       Appearance: He is well-developed. HENT:      Head: Normocephalic. Eyes:      Pupils: Pupils are equal, round, and reactive to light. Cardiovascular:      Rate and Rhythm: Normal rate and regular rhythm. Pulmonary:      Effort: Pulmonary effort is normal.      Breath sounds: Normal breath sounds. Abdominal:      Palpations: Abdomen is soft. Musculoskeletal:         General: Normal range of motion. Cervical back: Normal range of motion. Skin:     General: Skin is warm. Neurological:      Mental Status: He is alert and oriented to person, place, and time. Psychiatric:         Behavior: Behavior normal.         Carol Marshall was seen today for annual exam.    Diagnoses and all orders for this visit:    Encounter for annual general medical examination with abnormal findings in adult  -     CBC Auto Differential; Future  -     Comprehensive Metabolic Panel; Future  -     Lipid Panel; Future  -     PSA screening; Future  -     Urinalysis; Future    Mixed hyperlipidemia    Essential hypertension  -     lisinopril (PRINIVIL;ZESTRIL) 5 MG tablet; Take 1 tablet by mouth daily    Asthma in adult, mild intermittent, uncomplicated  -     budesonide-formoterol (SYMBICORT) 80-4.5 MCG/ACT AERO; Inhale 2 puffs into the lungs 2 times daily  -     Albuterol Sulfate, sensor, 108 (90 Base) MCG/ACT AEPB; Inhale 2 puffs into the lungs 4 times daily as needed (sob)    Nonrheumatic mitral valve regurgitation    Need for influenza vaccination  -     INFLUENZA, MDCK QUADV, 2 YRS AND OLDER, IM, PF, PREFILL SYR OR SDV, 0.5ML (FLUCELVAX QUADV, PF)        Comments: h m isues diet exer A great deal of time spent reviewing medications, diet, exercise, social issues.  Also reviewing the chart before entering the room with patient and finishing

## 2021-09-28 NOTE — TELEPHONE ENCOUNTER
Patient notified.   Will have fasting labs drawn in one to two weeks and wait to be notified of results

## 2021-10-28 ENCOUNTER — OFFICE VISIT (OUTPATIENT)
Dept: FAMILY MEDICINE CLINIC | Age: 51
End: 2021-10-28
Payer: COMMERCIAL

## 2021-10-28 VITALS
HEART RATE: 86 BPM | WEIGHT: 191 LBS | OXYGEN SATURATION: 95 % | DIASTOLIC BLOOD PRESSURE: 80 MMHG | TEMPERATURE: 97.2 F | BODY MASS INDEX: 26.74 KG/M2 | HEIGHT: 71 IN | RESPIRATION RATE: 18 BRPM | SYSTOLIC BLOOD PRESSURE: 122 MMHG

## 2021-10-28 DIAGNOSIS — J06.9 ACUTE UPPER RESPIRATORY INFECTION, UNSPECIFIED: Primary | ICD-10-CM

## 2021-10-28 DIAGNOSIS — J01.90 ACUTE NON-RECURRENT SINUSITIS, UNSPECIFIED LOCATION: ICD-10-CM

## 2021-10-28 DIAGNOSIS — R05.9 COUGH: ICD-10-CM

## 2021-10-28 DIAGNOSIS — R09.81 NASAL CONGESTION: ICD-10-CM

## 2021-10-28 DIAGNOSIS — R09.89 CHEST CONGESTION: ICD-10-CM

## 2021-10-28 PROCEDURE — G8419 CALC BMI OUT NRM PARAM NOF/U: HCPCS | Performed by: PHYSICIAN ASSISTANT

## 2021-10-28 PROCEDURE — 1036F TOBACCO NON-USER: CPT | Performed by: PHYSICIAN ASSISTANT

## 2021-10-28 PROCEDURE — G8427 DOCREV CUR MEDS BY ELIG CLIN: HCPCS | Performed by: PHYSICIAN ASSISTANT

## 2021-10-28 PROCEDURE — 99203 OFFICE O/P NEW LOW 30 MIN: CPT | Performed by: PHYSICIAN ASSISTANT

## 2021-10-28 PROCEDURE — 87426 SARSCOV CORONAVIRUS AG IA: CPT | Performed by: PHYSICIAN ASSISTANT

## 2021-10-28 PROCEDURE — 3017F COLORECTAL CA SCREEN DOC REV: CPT | Performed by: PHYSICIAN ASSISTANT

## 2021-10-28 PROCEDURE — G8482 FLU IMMUNIZE ORDER/ADMIN: HCPCS | Performed by: PHYSICIAN ASSISTANT

## 2021-10-28 RX ORDER — CHLORPHENIRAMINE MALEATE 4 MG/1
4 TABLET ORAL EVERY 6 HOURS PRN
Qty: 20 TABLET | Refills: 0 | Status: SHIPPED
Start: 2021-10-28 | End: 2022-06-20

## 2021-10-28 RX ORDER — PREDNISONE 10 MG/1
TABLET ORAL
Qty: 18 TABLET | Refills: 0 | Status: SHIPPED
Start: 2021-10-28 | End: 2022-05-07

## 2021-10-28 RX ORDER — AZITHROMYCIN 250 MG/1
250 TABLET, FILM COATED ORAL SEE ADMIN INSTRUCTIONS
Qty: 6 TABLET | Refills: 0 | Status: SHIPPED | OUTPATIENT
Start: 2021-10-28 | End: 2021-11-02

## 2021-10-28 RX ORDER — BENZONATATE 100 MG/1
100 CAPSULE ORAL 3 TIMES DAILY PRN
Qty: 21 CAPSULE | Refills: 0 | Status: SHIPPED | OUTPATIENT
Start: 2021-10-28 | End: 2021-11-04

## 2021-10-28 NOTE — PROGRESS NOTES
10/28/21  Linh Garcia : 1970 Sex: male  Age 46 y.o. Subjective:  Chief Complaint   Patient presents with    Congestion    Fatigue    Pharyngitis         HPI:   Linh Garcia , 46 y.o. male presents to [facility] care for evaluation of congestion, drainage, ear pain    HPI  49-year-old male presents to [facility] for evaluation of sinus congestion, drainage, fatigue, sore throat. The patient said the symptoms ongoing for the last couple of days. Patient states that essentially started yesterday evening. The patient did not have any fever, chills, loss of smell, taste. The patient has not had any body aches. The patient has not really noted any significant exposures to COVID-19. He is vaccinated. The patient states that he has occasional cough and it is nonproductive. The patient has been using his inhalers that do not seem to be helping at this point. ROS:   Unless otherwise stated in this report the patient's positive and negative responses for review of systems for constitutional, eyes, ENT, cardiovascular, respiratory, gastrointestinal, neurological, , musculoskeletal, and integument systems and related systems to the presenting problem are either stated in the history of present illness or were not pertinent or were negative for the symptoms and/or complaints related to the presenting medical problem. Positives and pertinent negatives as per HPI. All others reviewed and are negative.       PMH:     Past Medical History:   Diagnosis Date    Allergic rhinitis     Allergy to cats     Asthma     controlled with inhaler     MVP (mitral valve prolapse)     for OR 20     Rheumatic fever     S/P transesophageal echocardiogram ([facility]) 2020    Dr. Kirill Dinh       Past Surgical History:   Procedure Laterality Date    CHOLECYSTECTOMY, LAPAROSCOPIC      COLONOSCOPY      US KIDNEY DUPLEX BILAT  2015    neg        Family History   Problem Relation Age of Onset  Heart Surgery Mother 36        valve surgery    Heart Surgery Father 71       Medications:     Current Outpatient Medications:     azithromycin (ZITHROMAX) 250 MG tablet, Take 1 tablet by mouth See Admin Instructions for 5 days 500mg on day 1 followed by 250mg on days 2 - 5, Disp: 6 tablet, Rfl: 0    predniSONE (DELTASONE) 10 MG tablet, 3 tabs once daily for 3 days, 2 tabs once daily for 3 days, 1 tab once daily for 3 days, Disp: 18 tablet, Rfl: 0    benzonatate (TESSALON) 100 MG capsule, Take 1 capsule by mouth 3 times daily as needed for Cough, Disp: 21 capsule, Rfl: 0    chlorpheniramine (ALLER-CHLOR) 4 MG tablet, Take 1 tablet by mouth every 6 hours as needed for Allergies, Disp: 20 tablet, Rfl: 0    lisinopril (PRINIVIL;ZESTRIL) 5 MG tablet, Take 1 tablet by mouth daily, Disp: 90 tablet, Rfl: 3    budesonide-formoterol (SYMBICORT) 80-4.5 MCG/ACT AERO, Inhale 2 puffs into the lungs 2 times daily, Disp: 10.2 g, Rfl: 12    Albuterol Sulfate, sensor, 108 (90 Base) MCG/ACT AEPB, Inhale 2 puffs into the lungs 4 times daily as needed (sob), Disp: 1 each, Rfl: 12    Allergies:   No Known Allergies    Social History:     Social History     Tobacco Use    Smoking status: Never Smoker    Smokeless tobacco: Never Used   Vaping Use    Vaping Use: Never used   Substance Use Topics    Alcohol use: Yes     Comment: rare    Drug use: No       Patient lives at home. Physical Exam:     Vitals:    10/28/21 1703   BP: 122/80   Site: Right Upper Arm   Position: Sitting   Cuff Size: Medium Adult   Pulse: 86   Resp: 18   Temp: 97.2 °F (36.2 °C)   TempSrc: Temporal   SpO2: 95%   Weight: 191 lb (86.6 kg)   Height: 5' 11\" (1.803 m)       Exam:  Physical Exam  Nurse's notes and vital signs reviewed. The patient is not hypoxic. ? General: Alert, no acute distress, patient resting comfortably Patient is not toxic or lethargic. Skin: Warm, intact, no pallor noted. There is no evidence of rash at this time.   Head: Normocephalic, atraumatic  Eye: Normal conjunctiva  Ears, Nose, Throat: Right tympanic membrane clear, left tympanic membrane clear. No drainage or discharge noted. No pre- or post-auricular tenderness, erythema, or swelling noted. Nasal congestion, rhinorrhea. Posterior oropharynx shows erythema but no evidence of tonsillar hypertrophy, or exudate. the uvula is midline. No trismus or drooling is noted. Moist mucous membranes. Neck: No anterior/posterior lymphadenopathy noted. No erythema, no masses, no fluctuance or induration noted. No meningeal signs. Cardiovascular: Regular Rate and Rhythm  Respiratory: No acute distress, no rhonchi, wheezing or crackles noted. No stridor or retractions are noted. Neurological: A&O x4, normal speech  Psychiatric: Cooperative         Testing:     No results found for this visit on 10/28/21. Rapid Covid test was negative    Medical Decision Making:     Vital signs reviewed    Past medical history reviewed. Allergies reviewed. Medications reviewed. Patient on arrival does not appear to be in any apparent distress or discomfort. The patient has been seen and evaluated. The patient does not appear to be toxic or lethargic. Rapid Covid test was negative. The patient had Covid test that was performed and is pending at this time. The patient will be treated with a azithromycin, prednisone, chlorphentermine and Tessalon Perles. We will await the PCR testing. The patient vital signs are otherwise stable. The patient was educated on the proper dosage of motrin and tylenol and the appropriate intervals of each. The patient is to increase fluid intake over the next several days. The patient is to use OTC decongestant as needed. The patient is to return to express care or go directly to the emergency department should any of the signs or symptoms worsen. The patient is to followup with primary care physician in 2-3 days for repeat evaluation.  The patient has no other questions or concerns at this time the patient will be discharged home. Clinical Impression:   Ray Barton was seen today for congestion, fatigue and pharyngitis. Diagnoses and all orders for this visit:    Acute upper respiratory infection, unspecified    Chest congestion  -     POCT COVID-19, Antigen  -     COVID-19 Ambulatory; Future  -     azithromycin (ZITHROMAX) 250 MG tablet; Take 1 tablet by mouth See Admin Instructions for 5 days 500mg on day 1 followed by 250mg on days 2 - 5    Acute non-recurrent sinusitis, unspecified location    Cough    Nasal congestion    Other orders  -     predniSONE (DELTASONE) 10 MG tablet; 3 tabs once daily for 3 days, 2 tabs once daily for 3 days, 1 tab once daily for 3 days  -     benzonatate (TESSALON) 100 MG capsule; Take 1 capsule by mouth 3 times daily as needed for Cough  -     chlorpheniramine (ALLER-CHLOR) 4 MG tablet; Take 1 tablet by mouth every 6 hours as needed for Allergies        The patient is to call for any concerns or return if any of the signs or symptoms worsen. The patient is to follow-up with PCP in the next 2-3 days for repeat evaluation repeat assessment or go directly to the emergency department.      SIGNATURE: Kitty Ortiz III, PA-C

## 2021-10-29 DIAGNOSIS — R09.89 CHEST CONGESTION: ICD-10-CM

## 2021-10-30 LAB
SARS-COV-2: NOT DETECTED
SOURCE: NORMAL

## 2022-01-12 ENCOUNTER — OFFICE VISIT (OUTPATIENT)
Dept: FAMILY MEDICINE CLINIC | Age: 52
End: 2022-01-12
Payer: COMMERCIAL

## 2022-01-12 VITALS
DIASTOLIC BLOOD PRESSURE: 68 MMHG | HEART RATE: 88 BPM | TEMPERATURE: 98 F | OXYGEN SATURATION: 97 % | HEIGHT: 70 IN | BODY MASS INDEX: 28.06 KG/M2 | SYSTOLIC BLOOD PRESSURE: 124 MMHG | WEIGHT: 196 LBS

## 2022-01-12 DIAGNOSIS — Z20.822 SUSPECTED COVID-19 VIRUS INFECTION: Primary | ICD-10-CM

## 2022-01-12 DIAGNOSIS — R73.01 IMPAIRED FASTING GLUCOSE: ICD-10-CM

## 2022-01-12 DIAGNOSIS — J01.80 ACUTE NON-RECURRENT SINUSITIS OF OTHER SINUS: ICD-10-CM

## 2022-01-12 LAB
Lab: NORMAL
PERFORMING INSTRUMENT: NORMAL
QC PASS/FAIL: NORMAL
SARS-COV-2, POC: NORMAL

## 2022-01-12 PROCEDURE — 99213 OFFICE O/P EST LOW 20 MIN: CPT | Performed by: FAMILY MEDICINE

## 2022-01-12 PROCEDURE — 87426 SARSCOV CORONAVIRUS AG IA: CPT | Performed by: FAMILY MEDICINE

## 2022-01-12 RX ORDER — METHYLPREDNISOLONE 4 MG/1
TABLET ORAL
Qty: 1 KIT | Refills: 0 | Status: SHIPPED
Start: 2022-01-12 | End: 2022-05-07

## 2022-01-12 RX ORDER — AZITHROMYCIN 250 MG/1
250 TABLET, FILM COATED ORAL SEE ADMIN INSTRUCTIONS
Qty: 6 TABLET | Refills: 0 | Status: SHIPPED | OUTPATIENT
Start: 2022-01-12 | End: 2022-01-17

## 2022-01-12 ASSESSMENT — ENCOUNTER SYMPTOMS
WHEEZING: 0
EYES NEGATIVE: 1
RESPIRATORY NEGATIVE: 1
GASTROINTESTINAL NEGATIVE: 1
TROUBLE SWALLOWING: 0
SORE THROAT: 0
SHORTNESS OF BREATH: 0
STRIDOR: 0
CHEST TIGHTNESS: 0
ALLERGIC/IMMUNOLOGIC NEGATIVE: 1
APNEA: 0

## 2022-01-12 ASSESSMENT — PATIENT HEALTH QUESTIONNAIRE - PHQ9
SUM OF ALL RESPONSES TO PHQ QUESTIONS 1-9: 0
SUM OF ALL RESPONSES TO PHQ9 QUESTIONS 1 & 2: 0
1. LITTLE INTEREST OR PLEASURE IN DOING THINGS: 0
SUM OF ALL RESPONSES TO PHQ QUESTIONS 1-9: 0
2. FEELING DOWN, DEPRESSED OR HOPELESS: 0
SUM OF ALL RESPONSES TO PHQ QUESTIONS 1-9: 0
SUM OF ALL RESPONSES TO PHQ QUESTIONS 1-9: 0

## 2022-01-12 NOTE — PROGRESS NOTES
22  Name: Lobito Reason    : 1970    Sex: male    Age: 46 y.o. Subjective:  Chief Complaint: Patient is here for   Sore thraot  Cough sinuw mucus       Few days      Complains of cough and congestion sinus pressure over the last few days. No Temperature sweats chills. No chest pain or shortness of breath  No change in taste or smell      faield to get      gb  Sept  scott do today per requet      Review of Systems   Constitutional: Negative. Negative for chills, diaphoresis, fatigue and fever. HENT: Negative. Negative for sore throat and trouble swallowing. Congestion: nasal congestion. Eyes: Negative. Respiratory: Negative. Negative for apnea, chest tightness, shortness of breath, wheezing and stridor. Cough: productive with yellow mucus. No temperature or sweats or chills   Cardiovascular: Negative. Gastrointestinal: Negative. Endocrine: Negative. Genitourinary: Negative. Musculoskeletal: Negative. Skin: Negative. Allergic/Immunologic: Negative. Neurological: Negative. Hematological: Negative. Psychiatric/Behavioral: Negative.           Current Outpatient Medications:     azithromycin (ZITHROMAX) 250 MG tablet, Take 1 tablet by mouth See Admin Instructions for 5 days 500mg on day 1 followed by 250mg on days 2 - 5, Disp: 6 tablet, Rfl: 0    methylPREDNISolone (MEDROL DOSEPACK) 4 MG tablet, Take by mouth., Disp: 1 kit, Rfl: 0    predniSONE (DELTASONE) 10 MG tablet, 3 tabs once daily for 3 days, 2 tabs once daily for 3 days, 1 tab once daily for 3 days, Disp: 18 tablet, Rfl: 0    chlorpheniramine (ALLER-CHLOR) 4 MG tablet, Take 1 tablet by mouth every 6 hours as needed for Allergies, Disp: 20 tablet, Rfl: 0    lisinopril (PRINIVIL;ZESTRIL) 5 MG tablet, Take 1 tablet by mouth daily, Disp: 90 tablet, Rfl: 3    budesonide-formoterol (SYMBICORT) 80-4.5 MCG/ACT AERO, Inhale 2 puffs into the lungs 2 times daily, Disp: 10.2 g, Rfl: 12    Albuterol Sulfate, sensor, 108 (90 Base) MCG/ACT AEPB, Inhale 2 puffs into the lungs 4 times daily as needed (sob), Disp: 1 each, Rfl: 12  No Known Allergies  Social History     Socioeconomic History    Marital status: Single     Spouse name: Not on file    Number of children: Not on file    Years of education: Not on file    Highest education level: Not on file   Occupational History    Not on file   Tobacco Use    Smoking status: Never Smoker    Smokeless tobacco: Never Used   Vaping Use    Vaping Use: Never used   Substance and Sexual Activity    Alcohol use: Yes     Comment: rare    Drug use: No    Sexual activity: Not on file   Other Topics Concern    Not on file   Social History Narrative        CHEW    ASTHMA    GERD    ALLERGIC RHINITS    LAP GB    DEPRESSION    ANXIETY    DAD  AT 71 WITH KIDNEY FAILURE AND HAD CAD    MOM  HEART SURGERY HAD [address]  AT 69--HAD RHEUMATIC    FEVER    ADMIN AT ECF IN [place]----[facility]---[employer] [place] PART TIME----ECF IN    [place]-----ONW LAW [employer] ON [employer]------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------[employer]    -------JOINED ANOTHER      MOD MITRAL REGURG------DR [place]---DR ABERNATHY   Echo  10-18    Echo with Mild MR---then mod severe MR sen by  inter cardio      ALLERGY CATS-----sees  Dr Alex Moreno symbicort    RIGHT TESTICLE PAIN---  REFERRED TO [facility]---PT WENT TO [facility]    DIVERTICULITIS 1-13    NEG US KIDNEY 4-15     Social Determinants of Health     Financial Resource Strain:     Difficulty of Paying Living Expenses: Not on file   Food Insecurity:     Worried About Running Out of Food in the Last Year: Not on file    Dawn of Food in the Last Year: Not on file   Transportation Needs:     Lack of Transportation (Medical): Not on file    Lack of Transportation (Non-Medical):  Not on file   Physical Activity:     Days of Exercise per Week: Not on file    Minutes of Exercise per Session: Not on file   Stress:  Feeling of Stress : Not on file   Social Connections:     Frequency of Communication with Friends and Family: Not on file    Frequency of Social Gatherings with Friends and Family: Not on file    Attends [employer]ism Services: Not on file    Active Member of Clubs or Organizations: Not on file    Attends Club or Organization Meetings: Not on file    Marital Status: Not on file   Intimate Partner Violence:     Fear of Current or Ex-Partner: Not on file    Emotionally Abused: Not on file    Physically Abused: Not on file    Sexually Abused: Not on file   Housing Stability:     Unable to Pay for Housing in the Last Year: Not on file    Number of [place] in the Last Year: Not on file    Unstable Housing in the Last Year: Not on file      Past Medical History:   Diagnosis Date    Allergic rhinitis     Allergy to cats     Asthma     controlled with inhaler     MVP (mitral valve prolapse)     for OR 12-17-20     Rheumatic fever     S/P transesophageal echocardiogram ([facility]) 12/17/2020    Dr. Xavi Ivy     Family History   Problem Relation Age of Onset    Heart Surgery Mother 36        valve surgery    Heart Surgery Father 71      Past Surgical History:   Procedure Laterality Date    CHOLECYSTECTOMY, LAPAROSCOPIC      COLONOSCOPY      US KIDNEY DUPLEX BILAT  04/2015    neg       Vitals:    01/12/22 1542   BP: 124/68   Pulse: 88   Temp: 98 °F (36.7 °C)   SpO2: 97%   Weight: 196 lb (88.9 kg)   Height: 5' 10\" (1.778 m)       Objective:    Physical Exam  Vitals reviewed. Constitutional:       Appearance: He is well-developed. HENT:      Head: Normocephalic. Eyes:      Pupils: Pupils are equal, round, and reactive to light. Cardiovascular:      Rate and Rhythm: Normal rate and regular rhythm. Pulmonary:      Effort: Pulmonary effort is normal.      Breath sounds: Normal breath sounds. Abdominal:      Palpations: Abdomen is soft. Musculoskeletal:         General: Normal range of motion. Cervical back: Normal range of motion. Skin:     General: Skin is warm. Neurological:      Mental Status: He is alert and oriented to person, place, and time. Psychiatric:         Behavior: Behavior normal.         Devin Mejía was seen today for fatigue, nasal congestion, chest congestion and headache. Diagnoses and all orders for this visit:    Suspected COVID-19 virus infection  -     POCT COVID-19, Antigen    Acute non-recurrent sinusitis of other sinus  -     azithromycin (ZITHROMAX) 250 MG tablet; Take 1 tablet by mouth See Admin Instructions for 5 days 500mg on day 1 followed by 250mg on days 2 - 5  -     methylPREDNISolone (MEDROL DOSEPACK) 4 MG tablet; Take by mouth. Impaired fasting glucose  -     GLUCOSE 2 HOUR PP; Future  -     Hemoglobin A1C; Future        Comments: rapdi  covid    meds            meds  prescribed. Over-the-counter zinc and vitamin C. Purchase an oximeter and call if oxygen saturation less than 90%. If any temperature over 102 degree, shortness of breath,  chest pain go to the emergency room. Complete isolation until results are back from the Covid testing. Do not work until results are back. A great deal of time spent reviewing medications, diet, exercise, social issues. Also reviewing the chart before entering the room with patient and finishing charting after leaving patient's room. More than half of that time was spent face to face with the patient in counseling and coordinating care. Lab and call  Follow Up: Return for Reg Appt.      Seen by:  Chelsea Ross DO

## 2022-05-07 ENCOUNTER — OFFICE VISIT (OUTPATIENT)
Dept: FAMILY MEDICINE CLINIC | Age: 52
End: 2022-05-07
Payer: COMMERCIAL

## 2022-05-07 VITALS
DIASTOLIC BLOOD PRESSURE: 78 MMHG | HEART RATE: 96 BPM | SYSTOLIC BLOOD PRESSURE: 120 MMHG | OXYGEN SATURATION: 96 % | BODY MASS INDEX: 28.06 KG/M2 | HEIGHT: 70 IN | WEIGHT: 196 LBS

## 2022-05-07 DIAGNOSIS — J45.21 MILD INTERMITTENT ASTHMA WITH ACUTE EXACERBATION: Primary | ICD-10-CM

## 2022-05-07 PROCEDURE — 99213 OFFICE O/P EST LOW 20 MIN: CPT | Performed by: FAMILY MEDICINE

## 2022-05-07 RX ORDER — PREDNISONE 10 MG/1
TABLET ORAL
Qty: 30 TABLET | Refills: 0 | Status: SHIPPED
Start: 2022-05-07 | End: 2022-06-20

## 2022-05-07 ASSESSMENT — ENCOUNTER SYMPTOMS
ABDOMINAL PAIN: 0
NAUSEA: 0
SINUS PAIN: 0
DIARRHEA: 0
SHORTNESS OF BREATH: 1
VOMITING: 0
CONSTIPATION: 0
WHEEZING: 1
BACK PAIN: 0
SORE THROAT: 0
CHEST TIGHTNESS: 1
EYE PAIN: 0
COUGH: 1

## 2022-05-07 NOTE — PROGRESS NOTES
Cinthia Osman (:  1970) is a 45 y.o. male,Established patient, here for evaluation of the following chief complaint(s):  Cough (dry cough) and Congestion (x1 week)         ASSESSMENT/PLAN:  1. Mild intermittent asthma with acute exacerbation  Comments:  prednisone taper  add flonase and continue zyrtec  f/u next week if her worsens      Return if symptoms worsen or fail to improve. Subjective   SUBJECTIVE/OBJECTIVE:  Patient here with cough chest tightness started 4 days ago  Got worse yesterday  Albuterol inhaler not working  He has asthma and is using his symbicort daily and zyrtec which he just started    No fevers  Cough not productive  Feels well otherwise  No change in appertite  No chest pain or back pain      Review of Systems   Constitutional: Negative for appetite change, fatigue and fever. HENT: Positive for congestion and sneezing. Negative for ear pain, hearing loss, sinus pain and sore throat. Eyes: Negative for pain. Respiratory: Positive for cough, chest tightness, shortness of breath and wheezing. Cardiovascular: Negative for chest pain and leg swelling. Gastrointestinal: Negative for abdominal pain, constipation, diarrhea, nausea and vomiting. Endocrine: Negative for cold intolerance and heat intolerance. Genitourinary: Negative for difficulty urinating, hematuria, scrotal swelling, testicular pain and urgency. Musculoskeletal: Negative for arthralgias, back pain, joint swelling and myalgias. Skin: Negative for rash and wound. Neurological: Negative for dizziness, syncope and light-headedness. Hematological: Negative for adenopathy. Psychiatric/Behavioral: Negative for confusion and sleep disturbance. The patient is not nervous/anxious. Objective   Physical Exam  Vitals and nursing note reviewed. Constitutional:       General: He is not in acute distress. Appearance: He is well-developed. He is not ill-appearing.    HENT:      Head: Normocephalic and atraumatic. Right Ear: Tympanic membrane normal.      Left Ear: Tympanic membrane normal.      Nose: Nose normal.      Mouth/Throat:      Mouth: Mucous membranes are moist.   Eyes:      Pupils: Pupils are equal, round, and reactive to light. Cardiovascular:      Rate and Rhythm: Normal rate and regular rhythm. Pulmonary:      Effort: Pulmonary effort is normal. No respiratory distress. Breath sounds: Wheezing present. Musculoskeletal:      Cervical back: Normal range of motion. Skin:     General: Skin is warm and dry. Neurological:      Mental Status: He is alert and oriented to person, place, and time. Mental status is at baseline. Psychiatric:         Mood and Affect: Mood normal.         Thought Content: Thought content normal.                  An electronic signature was used to authenticate this note.     --Kobe Tobias, DO

## 2022-06-20 ENCOUNTER — OFFICE VISIT (OUTPATIENT)
Dept: PRIMARY CARE CLINIC | Age: 52
End: 2022-06-20
Payer: COMMERCIAL

## 2022-06-20 VITALS
BODY MASS INDEX: 27.69 KG/M2 | TEMPERATURE: 97.9 F | SYSTOLIC BLOOD PRESSURE: 128 MMHG | DIASTOLIC BLOOD PRESSURE: 78 MMHG | OXYGEN SATURATION: 97 % | HEART RATE: 74 BPM | WEIGHT: 193 LBS

## 2022-06-20 DIAGNOSIS — J20.8 ACUTE BRONCHITIS DUE TO OTHER SPECIFIED ORGANISMS: Primary | ICD-10-CM

## 2022-06-20 DIAGNOSIS — R09.89 CHEST CONGESTION: ICD-10-CM

## 2022-06-20 DIAGNOSIS — R05.9 COUGH IN ADULT: ICD-10-CM

## 2022-06-20 LAB
INFLUENZA A ANTIBODY: NORMAL
INFLUENZA B ANTIBODY: NORMAL
Lab: NORMAL
PERFORMING INSTRUMENT: NORMAL
QC PASS/FAIL: NORMAL
SARS-COV-2, POC: NORMAL

## 2022-06-20 PROCEDURE — 87426 SARSCOV CORONAVIRUS AG IA: CPT | Performed by: FAMILY MEDICINE

## 2022-06-20 PROCEDURE — 87804 INFLUENZA ASSAY W/OPTIC: CPT | Performed by: FAMILY MEDICINE

## 2022-06-20 PROCEDURE — 99213 OFFICE O/P EST LOW 20 MIN: CPT | Performed by: FAMILY MEDICINE

## 2022-06-20 RX ORDER — PREDNISONE 10 MG/1
TABLET ORAL
Qty: 18 TABLET | Refills: 0 | Status: SHIPPED
Start: 2022-06-20 | End: 2022-10-21 | Stop reason: ALTCHOICE

## 2022-06-20 RX ORDER — CEFDINIR 300 MG/1
300 CAPSULE ORAL 2 TIMES DAILY
Qty: 20 CAPSULE | Refills: 0 | Status: SHIPPED | OUTPATIENT
Start: 2022-06-20 | End: 2022-06-30

## 2022-06-20 ASSESSMENT — PATIENT HEALTH QUESTIONNAIRE - PHQ9
1. LITTLE INTEREST OR PLEASURE IN DOING THINGS: 0
2. FEELING DOWN, DEPRESSED OR HOPELESS: 0
SUM OF ALL RESPONSES TO PHQ9 QUESTIONS 1 & 2: 0
SUM OF ALL RESPONSES TO PHQ QUESTIONS 1-9: 0

## 2022-06-20 ASSESSMENT — ENCOUNTER SYMPTOMS
EYES NEGATIVE: 1
GASTROINTESTINAL NEGATIVE: 1
ALLERGIC/IMMUNOLOGIC NEGATIVE: 1
COUGH: 1

## 2022-06-20 NOTE — PROGRESS NOTES
22  Name: Victor M Melgar    : 1970    Sex: male    Age: 45 y.o. Subjective:  Chief Complaint: Patient is here for  Cough  Jared sinus mcuus     No tempchils   No   Cp or sbo no  Chg faria or smell      Review of Systems   Constitutional: Negative. HENT: Positive for postnasal drip. Eyes: Negative. Respiratory: Positive for cough. Cardiovascular: Negative. Gastrointestinal: Negative. Endocrine: Negative. Genitourinary: Negative. Musculoskeletal: Negative. Skin: Negative. Allergic/Immunologic: Negative. Neurological: Negative. Hematological: Negative. Psychiatric/Behavioral: Negative.           Current Outpatient Medications:     predniSONE (DELTASONE) 10 MG tablet, ONE TID FOR THREE DAYS, ONE BID FOR THREE DAYS, ONE QD FOR THREE DAYS   FOOD, Disp: 18 tablet, Rfl: 0    cefdinir (OMNICEF) 300 MG capsule, Take 1 capsule by mouth 2 times daily for 10 days, Disp: 20 capsule, Rfl: 0    lisinopril (PRINIVIL;ZESTRIL) 5 MG tablet, Take 1 tablet by mouth daily, Disp: 90 tablet, Rfl: 3    budesonide-formoterol (SYMBICORT) 80-4.5 MCG/ACT AERO, Inhale 2 puffs into the lungs 2 times daily, Disp: 10.2 g, Rfl: 12    Albuterol Sulfate, sensor, 108 (90 Base) MCG/ACT AEPB, Inhale 2 puffs into the lungs 4 times daily as needed (sob), Disp: 1 each, Rfl: 12  No Known Allergies  Social History     Socioeconomic History    Marital status: Single     Spouse name: Not on file    Number of children: Not on file    Years of education: Not on file    Highest education level: Not on file   Occupational History    Not on file   Tobacco Use    Smoking status: Never Smoker    Smokeless tobacco: Never Used   Vaping Use    Vaping Use: Never used   Substance and Sexual Activity    Alcohol use: Yes     Comment: rare    Drug use: No    Sexual activity: Not on file   Other Topics Concern    Not on file   Social History Narrative        CHEW    ASTHMA    GERD    ALLERGIC RHINITS ANANDA PAGAN    DEPRESSION    ANXIETY    DAD  AT 71 WITH KIDNEY FAILURE AND HAD CAD    MOM  HEART SURGERY HAD VAVLE OR IN EARLY 60\"S  AT 69--HAD RHEUMATIC    FEVER    ADMIN AT ECF IN [place]----[facility]---LAW FIRM [facility] PART TIME----ECF IN    [place]-----ONW LAW FIRM ON [employer]------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------[employer]    -------JOINED ANOTHER      MOD MITRAL REGURG------DR LONDON---DR ABERNATHY   Echo  10-18    Echo with Mild MR---then mod severe MR sen by  inter cardio      ALLERGY CATS-----sees  Dr Cespedes Anis symbicort    RIGHT TESTICLE PAIN---  REFERRED TO [facility]---PT WENT TO [facility]    DIVERTICULITIS -    NEG US KIDNEY -15     Social Determinants of Health     Financial Resource Strain:     Difficulty of Paying Living Expenses: Not on file   Food Insecurity:     Worried About Running Out of Food in the Last Year: Not on file    Dawn of Food in the Last Year: Not on file   Transportation Needs:     Lack of Transportation (Medical): Not on file    Lack of Transportation (Non-Medical):  Not on file   Physical Activity:     Days of Exercise per Week: Not on file    Minutes of Exercise per Session: Not on file   Stress:     Feeling of Stress : Not on file   Social Connections:     Frequency of Communication with Friends and Family: Not on file    Frequency of Social Gatherings with Friends and Family: Not on file    Attends Anabaptism Services: Not on file    Active Member of [address] [place] [address] [employer] or Organizations: Not on file    Attends Club or Organization Meetings: Not on file    Marital Status: Not on file   Intimate Partner Violence:     Fear of Current or Ex-Partner: Not on file    Emotionally Abused: Not on file    Physically Abused: Not on file    Sexually Abused: Not on file   Housing Stability:     Unable to Pay for Housing in the Last Year: Not on file    Number of [place] in the Last Year: Not on file    Unstable Housing in the Last Year: Not on file      Past Medical History:   Diagnosis Date    Allergic rhinitis     Allergy to cats     Asthma     controlled with inhaler     MVP (mitral valve prolapse)     for OR 12-17-20     Rheumatic fever     S/P transesophageal echocardiogram ([facility]) 12/17/2020    Dr. Tony Bueno     Family History   Problem Relation Age of Onset    Heart Surgery Mother 36        valve surgery    Heart Surgery Father 71      Past Surgical History:   Procedure Laterality Date    CHOLECYSTECTOMY, LAPAROSCOPIC      COLONOSCOPY      US KIDNEY DUPLEX BILAT  04/2015    neg       Vitals:    06/20/22 1641   BP: 128/78   Pulse: 74   Temp: 97.9 °F (36.6 °C)   TempSrc: Oral   SpO2: 97%   Weight: 193 lb (87.5 kg)       Objective:    Physical Exam  Vitals reviewed. Constitutional:       Appearance: He is well-developed. HENT:      Head: Normocephalic. Eyes:      Pupils: Pupils are equal, round, and reactive to light. Cardiovascular:      Rate and Rhythm: Normal rate and regular rhythm. Pulmonary:      Effort: Pulmonary effort is normal.      Breath sounds: Normal breath sounds. Abdominal:      Palpations: Abdomen is soft. Musculoskeletal:         General: Normal range of motion. Cervical back: Normal range of motion. Skin:     General: Skin is warm. Neurological:      Mental Status: He is alert and oriented to person, place, and time. Psychiatric:         Behavior: Behavior normal.         James Gottlieb was seen today for cough, congestion and fatigue. Diagnoses and all orders for this visit:    Acute bronchitis due to other specified organisms  -     predniSONE (DELTASONE) 10 MG tablet; ONE TID FOR THREE DAYS, ONE BID FOR THREE DAYS, ONE QD FOR THREE DAYS   FOOD  -     cefdinir (OMNICEF) 300 MG capsule;  Take 1 capsule by mouth 2 times daily for 10 days    Cough in adult  -     POCT COVID-19, Antigen  -     POCT Influenza A/B    Chest congestion  -     POCT COVID-19, Antigen  -     POCT Influenza A/B        Comments:  meds not great see  [facility] on inhalers    Not  Great carly villanueva for  cxr w [place]sette toe r      I educated the patient about all medications. Make sure they were correct and educated  on the risk associated with missing meds or taking them incorrectly. A list of medications is being sent home with patient today. Check blood pressure at home twice a day. Low-salt low caffeine diet. Call if systolic blood pressure is above 927 and diastolic blood pressures above 85. Only use a upper arm digital cuff. I informed patient about the risk associated with noncompliance of medication and taking medications incorrectly. Appropriate follow-up with myself and all specialist.  Encourage family members to take active role in assisting with medications and medical care. If any confusion should develop to notify my office immediately to avoid risk of worsening medical condition      A great deal of time spent reviewing medications, diet, exercise, social issues. Also reviewing the chart before entering the room with patient and finishing charting after leaving patient's room. More than half of that time was spent face to face with the patient in counseling and coordinating care. Follow Up: Return if symptoms worsen or fail to improve, for Meds. If worse go to ER. Not better in 24 hr see.      Seen by:  Judy Gilmore, DO

## 2022-10-12 DIAGNOSIS — I10 ESSENTIAL HYPERTENSION: ICD-10-CM

## 2022-10-14 RX ORDER — LISINOPRIL 5 MG/1
5 TABLET ORAL DAILY
Qty: 90 TABLET | Refills: 3 | Status: SHIPPED | OUTPATIENT
Start: 2022-10-14

## 2022-10-21 ENCOUNTER — OFFICE VISIT (OUTPATIENT)
Dept: FAMILY MEDICINE CLINIC | Age: 52
End: 2022-10-21
Payer: COMMERCIAL

## 2022-10-21 VITALS
HEIGHT: 70 IN | SYSTOLIC BLOOD PRESSURE: 122 MMHG | HEART RATE: 72 BPM | WEIGHT: 189 LBS | DIASTOLIC BLOOD PRESSURE: 80 MMHG | OXYGEN SATURATION: 97 % | BODY MASS INDEX: 27.06 KG/M2 | TEMPERATURE: 97.8 F

## 2022-10-21 DIAGNOSIS — R09.82 PND (POST-NASAL DRIP): ICD-10-CM

## 2022-10-21 DIAGNOSIS — Z20.822 SUSPECTED COVID-19 VIRUS INFECTION: ICD-10-CM

## 2022-10-21 DIAGNOSIS — J45.909 ACUTE ASTHMATIC BRONCHITIS: Primary | ICD-10-CM

## 2022-10-21 LAB
Lab: NORMAL
PERFORMING INSTRUMENT: NORMAL
QC PASS/FAIL: NORMAL
SARS-COV-2, POC: NORMAL

## 2022-10-21 PROCEDURE — 99214 OFFICE O/P EST MOD 30 MIN: CPT

## 2022-10-21 PROCEDURE — 87426 SARSCOV CORONAVIRUS AG IA: CPT

## 2022-10-21 RX ORDER — PREDNISONE 10 MG/1
TABLET ORAL
Qty: 18 TABLET | Refills: 0 | Status: SHIPPED | OUTPATIENT
Start: 2022-10-21 | End: 2022-10-30

## 2022-10-21 RX ORDER — CEFDINIR 300 MG/1
300 CAPSULE ORAL 2 TIMES DAILY
Qty: 20 CAPSULE | Refills: 0 | Status: SHIPPED
Start: 2022-10-21 | End: 2022-10-31

## 2022-10-21 NOTE — PROGRESS NOTES
2022     Jerel Marquez 45 y.o. male    : 1970   Chief Complaint:   Fatigue (Started 3 days ago. Inhaler, Zyrtec little relief) and Pharyngitis      History of Present Illness   Source of history provided by:  patient. Jerel Marquez is a 45 y.o. old male who presents to [address] [place] [address] for evaluation of fatigue x 3 days. Associated symptoms include fatigue. Since onset symptoms have been worsening. Patient has had no known Covid 19 exposure. Patient has not been diagnosed with COVID-19 in the last 90 days. Has taken Zyrtec and his inhaler  at home with some symptomatic relief. Denies any fever, chills, CP, dyspnea, LE edema, abdominal pain, nausea, vomiting, rash, dizziness, or lethargy. Denies any history of asthma, pneumonia, recurrent bronchitis or COPD. They have no history of tobacco abuse. ROS   Past Medical History:   Past Medical History:   Diagnosis Date    Allergic rhinitis     Allergy to cats     Asthma     controlled with inhaler     MVP (mitral valve prolapse)     for OR 20     Rheumatic fever     S/P transesophageal echocardiogram ([facility]) 2020    Dr. Anne Pierre     Past Surgical History:  has a past surgical history that includes Cholecystectomy, laparoscopic;  kidney duplex bilat (2015); and Colonoscopy. Social History:  reports that he has never smoked. He has never used smokeless tobacco. He reports current alcohol use. He reports that he does not use drugs. Family History: family history includes Heart Surgery (age of onset: 36) in his mother; Heart Surgery (age of onset: 71) in his father. Allergies: Patient has no known allergies.     Unless otherwise stated in this report the patient's positive and negative responses for review of systems for constitutional, eyes, ENT, cardiovascular, respiratory, gastrointestinal, neurological, , musculoskeletal, and integument systems and related systems to the presenting problem are either stated in / Reyne Boxer was seen today for fatigue and pharyngitis. Diagnoses and all orders for this visit:    Acute asthmatic bronchitis  -     predniSONE (DELTASONE) 10 MG tablet; Take 1 tablet by mouth three times daily for 3 days, THEN 1 tablet 2 times daily for 3 days, THEN 1 tablet daily for 3 days. -     cefdinir (OMNICEF) 300 MG capsule; Take 1 capsule by mouth 2 times daily for 10 days    Suspected COVID-19 virus infection  -     POCT COVID-19, Antigen    PND (post-nasal drip)  -     predniSONE (DELTASONE) 10 MG tablet; Take 1 tablet by mouth three times daily for 3 days, THEN 1 tablet 2 times daily for 3 days, THEN 1 tablet daily for 3 days. Disposition:  Disposition: Discharge to home      Scripts for Omnicef and Prednisone placed, side effects discussed. Increase fluids and rest.  Sleep with the head of the bed elevated. Coolmist humidifier. Follow-up with PCP in 7 to 10 days. Red flag symptoms were discussed with the patient including high or refractory fever, progressive SOB, dyspnea, CP, calf pain/swelling, shaking chills, vomiting, abdominal pain, lethargy, flank pain, or decreased urinary output. If any of these occur, they should go immediately to the emergency department for further evaluation. All questions were answered. The patient relies understanding and was agreeable to the above plan. VICKEY Chavarria - CNP    *NOTE: This report was transcribed using voice recognition software. Every effort was made to ensure accuracy; however, inadvertent computerized transcription errors may be present.

## 2022-10-31 ENCOUNTER — OFFICE VISIT (OUTPATIENT)
Dept: PRIMARY CARE CLINIC | Age: 52
End: 2022-10-31
Payer: COMMERCIAL

## 2022-10-31 VITALS
BODY MASS INDEX: 26.92 KG/M2 | HEIGHT: 70 IN | WEIGHT: 188 LBS | DIASTOLIC BLOOD PRESSURE: 78 MMHG | SYSTOLIC BLOOD PRESSURE: 124 MMHG | TEMPERATURE: 99 F

## 2022-10-31 DIAGNOSIS — Z23 NEED FOR INFLUENZA VACCINATION: ICD-10-CM

## 2022-10-31 DIAGNOSIS — J45.20 ASTHMA IN ADULT, MILD INTERMITTENT, UNCOMPLICATED: ICD-10-CM

## 2022-10-31 DIAGNOSIS — I10 ESSENTIAL HYPERTENSION: ICD-10-CM

## 2022-10-31 DIAGNOSIS — Z00.01 ENCOUNTER FOR ANNUAL GENERAL MEDICAL EXAMINATION WITH ABNORMAL FINDINGS IN ADULT: Primary | ICD-10-CM

## 2022-10-31 DIAGNOSIS — E78.2 MIXED HYPERLIPIDEMIA: ICD-10-CM

## 2022-10-31 DIAGNOSIS — I34.0 NONRHEUMATIC MITRAL VALVE REGURGITATION: ICD-10-CM

## 2022-10-31 DIAGNOSIS — Z00.01 ENCOUNTER FOR ANNUAL GENERAL MEDICAL EXAMINATION WITH ABNORMAL FINDINGS IN ADULT: ICD-10-CM

## 2022-10-31 LAB
ALBUMIN SERPL-MCNC: 4.1 G/DL (ref 3.5–5.2)
ALP BLD-CCNC: 56 U/L (ref 40–129)
ALT SERPL-CCNC: 31 U/L (ref 0–40)
ANION GAP SERPL CALCULATED.3IONS-SCNC: 16 MMOL/L (ref 7–16)
AST SERPL-CCNC: 27 U/L (ref 0–39)
BASOPHILS ABSOLUTE: 0.09 E9/L (ref 0–0.2)
BASOPHILS RELATIVE PERCENT: 1 % (ref 0–2)
BILIRUB SERPL-MCNC: 0.5 MG/DL (ref 0–1.2)
BILIRUBIN URINE: NEGATIVE
BLOOD, URINE: NEGATIVE
BUN BLDV-MCNC: 23 MG/DL (ref 6–20)
CALCIUM SERPL-MCNC: 9.3 MG/DL (ref 8.6–10.2)
CHLORIDE BLD-SCNC: 104 MMOL/L (ref 98–107)
CHOLESTEROL, TOTAL: 153 MG/DL (ref 0–199)
CLARITY: CLEAR
CO2: 22 MMOL/L (ref 22–29)
COLOR: YELLOW
CREAT SERPL-MCNC: 1.2 MG/DL (ref 0.7–1.2)
EOSINOPHILS ABSOLUTE: 0.3 E9/L (ref 0.05–0.5)
EOSINOPHILS RELATIVE PERCENT: 3.2 % (ref 0–6)
GFR SERPL CREATININE-BSD FRML MDRD: >60 ML/MIN/1.73
GLUCOSE BLD-MCNC: 85 MG/DL (ref 74–99)
GLUCOSE URINE: NEGATIVE MG/DL
HCT VFR BLD CALC: 48.4 % (ref 37–54)
HDLC SERPL-MCNC: 37 MG/DL
HEMOGLOBIN: 16.4 G/DL (ref 12.5–16.5)
IMMATURE GRANULOCYTES #: 0.18 E9/L
IMMATURE GRANULOCYTES %: 1.9 % (ref 0–5)
KETONES, URINE: NEGATIVE MG/DL
LDL CHOLESTEROL CALCULATED: 100 MG/DL (ref 0–99)
LEUKOCYTE ESTERASE, URINE: NEGATIVE
LYMPHOCYTES ABSOLUTE: 2.52 E9/L (ref 1.5–4)
LYMPHOCYTES RELATIVE PERCENT: 27.2 % (ref 20–42)
MCH RBC QN AUTO: 30.2 PG (ref 26–35)
MCHC RBC AUTO-ENTMCNC: 33.9 % (ref 32–34.5)
MCV RBC AUTO: 89.1 FL (ref 80–99.9)
MONOCYTES ABSOLUTE: 0.84 E9/L (ref 0.1–0.95)
MONOCYTES RELATIVE PERCENT: 9.1 % (ref 2–12)
NEUTROPHILS ABSOLUTE: 5.32 E9/L (ref 1.8–7.3)
NEUTROPHILS RELATIVE PERCENT: 57.6 % (ref 43–80)
NITRITE, URINE: NEGATIVE
PDW BLD-RTO: 13.2 FL (ref 11.5–15)
PH UA: 5.5 (ref 5–9)
PLATELET # BLD: 255 E9/L (ref 130–450)
PMV BLD AUTO: 9.7 FL (ref 7–12)
POTASSIUM SERPL-SCNC: 4.3 MMOL/L (ref 3.5–5)
PROSTATE SPECIFIC ANTIGEN: 1.49 NG/ML (ref 0–4)
PROTEIN UA: NEGATIVE MG/DL
RBC # BLD: 5.43 E12/L (ref 3.8–5.8)
SODIUM BLD-SCNC: 142 MMOL/L (ref 132–146)
SPECIFIC GRAVITY UA: >=1.03 (ref 1–1.03)
TOTAL PROTEIN: 6.5 G/DL (ref 6.4–8.3)
TRIGL SERPL-MCNC: 81 MG/DL (ref 0–149)
UROBILINOGEN, URINE: 0.2 E.U./DL
VLDLC SERPL CALC-MCNC: 16 MG/DL
WBC # BLD: 9.3 E9/L (ref 4.5–11.5)

## 2022-10-31 PROCEDURE — 3078F DIAST BP <80 MM HG: CPT | Performed by: FAMILY MEDICINE

## 2022-10-31 PROCEDURE — 90674 CCIIV4 VAC NO PRSV 0.5 ML IM: CPT | Performed by: FAMILY MEDICINE

## 2022-10-31 PROCEDURE — 90471 IMMUNIZATION ADMIN: CPT | Performed by: FAMILY MEDICINE

## 2022-10-31 PROCEDURE — 3074F SYST BP LT 130 MM HG: CPT | Performed by: FAMILY MEDICINE

## 2022-10-31 PROCEDURE — 99396 PREV VISIT EST AGE 40-64: CPT | Performed by: FAMILY MEDICINE

## 2022-10-31 RX ORDER — BUDESONIDE AND FORMOTEROL FUMARATE DIHYDRATE 80; 4.5 UG/1; UG/1
2 AEROSOL RESPIRATORY (INHALATION) 2 TIMES DAILY
Qty: 10.2 G | Refills: 12 | Status: SHIPPED | OUTPATIENT
Start: 2022-10-31

## 2022-10-31 ASSESSMENT — PATIENT HEALTH QUESTIONNAIRE - PHQ9
SUM OF ALL RESPONSES TO PHQ QUESTIONS 1-9: 0
SUM OF ALL RESPONSES TO PHQ QUESTIONS 1-9: 0
1. LITTLE INTEREST OR PLEASURE IN DOING THINGS: 0
SUM OF ALL RESPONSES TO PHQ9 QUESTIONS 1 & 2: 0
SUM OF ALL RESPONSES TO PHQ QUESTIONS 1-9: 0
SUM OF ALL RESPONSES TO PHQ QUESTIONS 1-9: 0
2. FEELING DOWN, DEPRESSED OR HOPELESS: 0

## 2022-10-31 ASSESSMENT — ENCOUNTER SYMPTOMS
EYES NEGATIVE: 1
GASTROINTESTINAL NEGATIVE: 1
RESPIRATORY NEGATIVE: 1
ALLERGIC/IMMUNOLOGIC NEGATIVE: 1

## 2022-10-31 NOTE — PROGRESS NOTES
10/31/22  Name: Nati Gonzalez    : 1970    Sex: male    Age: 45 y.o. Subjective:  Chief Complaint: Patient is here for a wellness check. Blood pressure. Cholesterol. Asthma  MR    Feel ok   no   cp or sob   not see cardio fro few yrs       fel ok      Review of Systems   Constitutional: Negative. HENT: Negative. Eyes: Negative. Respiratory: Negative. Cardiovascular: Negative. Gastrointestinal: Negative. Endocrine: Negative. Genitourinary: Negative. Musculoskeletal: Negative. Skin: Negative. Allergic/Immunologic: Negative. Neurological: Negative. Hematological: Negative. Psychiatric/Behavioral: Negative.          Current Outpatient Medications:     Albuterol Sulfate, sensor, 108 (90 Base) MCG/ACT AEPB, Inhale 2 puffs into the lungs 4 times daily as needed (sob), Disp: 1 each, Rfl: 12    budesonide-formoterol (SYMBICORT) 80-4.5 MCG/ACT AERO, Inhale 2 puffs into the lungs in the morning and 2 puffs in the evening., Disp: 10.2 g, Rfl: 12    lisinopril (PRINIVIL;ZESTRIL) 5 MG tablet, Take 1 tablet by mouth daily, Disp: 90 tablet, Rfl: 3  No Known Allergies  Social History     Socioeconomic History    Marital status: Single     Spouse name: Not on file    Number of children: Not on file    Years of education: Not on file    Highest education level: Not on file   Occupational History    Not on file   Tobacco Use    Smoking status: Never    Smokeless tobacco: Never   Vaping Use    Vaping Use: Never used   Substance and Sexual Activity    Alcohol use: Yes     Comment: rare    Drug use: No    Sexual activity: Not on file   Other Topics Concern    Not on file   Social History Narrative        CHEW    ASTHMA    GERD    ALLERGIC RHINITS    LAP GB    DEPRESSION    ANXIETY    DAD  AT 71 WITH KIDNEY FAILURE AND HAD CAD     [address]  AT 67--HAD RHEUMATIC    FEVER    ADMIN AT [facility] IN [place]----[employer]---[place] PART TIME----ECF IN    [place]-----ONW LAW FIRM ON [address]------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------GRAD [employer] S[employer]OL    2007-------JOINED ANOTHER  2016    MOD MITRAL REGURG------DR LONDON---DR ABERNATHY   Echo  10-18    Echo with Mild MR---then mod severe MR sen by  inter cardio  2021    ALLERGY CATS-----sees  Dr Elayne Ann--- 2011 REFERRED TO [facility]---PT WENT TO [facility]    DIVERTICULITIS 1-13    NEG US KIDNEY 4-15     Social Determinants of Health     Financial Resource Strain: Not on file   Food Insecurity: Not on file   Transportation Needs: Not on file   Physical Activity: Not on file   Stress: Not on file   Social Connections: Not on file   Intimate Partner Violence: Not on file   Housing Stability: Not on file      Past Medical History:   Diagnosis Date    Allergic rhinitis     Allergy to cats     Asthma     controlled with inhaler     MVP (mitral valve prolapse)     for OR 12-17-20     Rheumatic fever     S/P transesophageal echocardiogram ([facility]) 12/17/2020    Dr. Patricia Donnelly     Family History   Problem Relation Age of Onset    Heart Surgery Mother 36        valve surgery    Heart Surgery Father 71      Past Surgical History:   Procedure Laterality Date    CHOLECYSTECTOMY, LAPAROSCOPIC      COLONOSCOPY      US KIDNEY DUPLEX BILAT  04/2015    neg       Vitals:    10/31/22 0731   BP: 124/78   Temp: 99 °F (37.2 °C)   TempSrc: Oral   Weight: 188 lb (85.3 kg)   Height: 5' 10\" (1.778 m)       Objective:    Physical Exam  Vitals reviewed. Constitutional:       Appearance: Normal appearance. He is well-developed. HENT:      Head: Normocephalic. Right Ear: Tympanic membrane normal.      Left Ear: Tympanic membrane normal.      Nose: Nose normal.      Mouth/Throat:      Mouth: Mucous membranes are moist.   Eyes:      Pupils: Pupils are equal, round, and reactive to light. Cardiovascular:      Rate and Rhythm: Normal rate and regular rhythm.    Pulmonary:      Effort: Pulmonary effort is normal.      Breath sounds: Normal breath sounds. Abdominal:      General: Bowel sounds are normal.      Palpations: Abdomen is soft. Musculoskeletal:         General: Normal range of motion. Cervical back: Normal range of motion. Skin:     General: Skin is warm. Neurological:      Mental Status: He is alert and oriented to person, place, and time. Psychiatric:         Behavior: Behavior normal.       Amandeep Reed was seen today for annual exam.    Diagnoses and all orders for this visit:    Encounter for annual general medical examination with abnormal findings in adult  -     CBC with Auto Differential; Future  -     Comprehensive Metabolic Panel; Future  -     Lipid Panel; Future  -     PSA Screening; Future  -     Urinalysis; Future    Need for influenza vaccination  -     Influenza, FLUCELVAX, (age 10 mo+), IM, Preservative Free, 0.5 mL    Essential hypertension    Mixed hyperlipidemia    Nonrheumatic mitral valve regurgitation  -     [address] [facility]    Asthma in adult, mild intermittent, uncomplicated  -     Albuterol Sulfate, sensor, 108 (90 Base) MCG/ACT AEPB; Inhale 2 puffs into the lungs 4 times daily as needed (sob)  -     budesonide-formoterol (SYMBICORT) 80-4.5 MCG/ACT AERO; Inhale 2 puffs into the lungs in the morning and 2 puffs in the evening. Comments: appt cardio diet exer hm isusese    re  new meds         I educated the patient about all medications. Make sure they were correct and educated  on the risk associated with missing meds or taking them incorrectly. A list of medications is being sent home with patient today. .      Aggressive low-fat diet. Avoid red meats, greasy fried foods, dairy products. Avoid processed foods. Take cholesterol medications without food. Check blood pressure at home twice a day. Low-salt low caffeine diet. Call if systolic blood pressure is above 801 and diastolic blood pressures above 85.   Only use a upper arm digital cuff.      A great deal of time spent reviewing medications, diet, exercise, social issues. Also reviewing the chart before entering the room with patient and finishing charting after leaving patient's room. More than half of that time was spent face to face with the patient in counseling and coordinating care. Follow Up: Return in about 1 year (around 10/31/2023) for Lab Before.      Seen by:  Liliana Gaming DO

## 2022-11-01 ENCOUNTER — TELEPHONE (OUTPATIENT)
Dept: PRIMARY CARE CLINIC | Age: 52
End: 2022-11-01

## 2022-11-01 ENCOUNTER — TELEPHONE (OUTPATIENT)
Dept: CARDIOLOGY CLINIC | Age: 52
End: 2022-11-01

## 2022-11-01 NOTE — TELEPHONE ENCOUNTER
's pt. Last OV 8/2021. New referral in [address] [place] [address] for Nonrheumatic mitral valve regurgitation. Please advise. Thank you.

## 2022-11-03 NOTE — TELEPHONE ENCOUNTER
LVM for pt to return call for OV. Pt can be scheduled with any cardio. Pt was seen by Reji Spangler in [facility] 8/2021.

## 2023-03-06 ENCOUNTER — OFFICE VISIT (OUTPATIENT)
Dept: FAMILY MEDICINE CLINIC | Age: 53
End: 2023-03-06
Payer: COMMERCIAL

## 2023-03-06 VITALS
TEMPERATURE: 98.3 F | DIASTOLIC BLOOD PRESSURE: 80 MMHG | BODY MASS INDEX: 27.98 KG/M2 | WEIGHT: 195 LBS | SYSTOLIC BLOOD PRESSURE: 120 MMHG | OXYGEN SATURATION: 97 % | HEART RATE: 92 BPM

## 2023-03-06 DIAGNOSIS — R05.9 COUGH, UNSPECIFIED TYPE: ICD-10-CM

## 2023-03-06 DIAGNOSIS — J06.9 ACUTE UPPER RESPIRATORY INFECTION, UNSPECIFIED: Primary | ICD-10-CM

## 2023-03-06 DIAGNOSIS — J01.90 ACUTE NON-RECURRENT SINUSITIS, UNSPECIFIED LOCATION: ICD-10-CM

## 2023-03-06 LAB
Lab: NORMAL
PERFORMING INSTRUMENT: NORMAL
QC PASS/FAIL: NORMAL
SARS-COV-2, POC: NORMAL

## 2023-03-06 PROCEDURE — 3074F SYST BP LT 130 MM HG: CPT | Performed by: PHYSICIAN ASSISTANT

## 2023-03-06 PROCEDURE — 99213 OFFICE O/P EST LOW 20 MIN: CPT | Performed by: PHYSICIAN ASSISTANT

## 2023-03-06 PROCEDURE — 3079F DIAST BP 80-89 MM HG: CPT | Performed by: PHYSICIAN ASSISTANT

## 2023-03-06 PROCEDURE — 87426 SARSCOV CORONAVIRUS AG IA: CPT | Performed by: PHYSICIAN ASSISTANT

## 2023-03-06 RX ORDER — METHYLPREDNISOLONE 4 MG/1
TABLET ORAL
Qty: 1 KIT | Refills: 0 | Status: SHIPPED | OUTPATIENT
Start: 2023-03-06

## 2023-03-06 RX ORDER — BENZONATATE 100 MG/1
100 CAPSULE ORAL 3 TIMES DAILY PRN
Qty: 21 CAPSULE | Refills: 0 | Status: SHIPPED | OUTPATIENT
Start: 2023-03-06 | End: 2023-03-13

## 2023-03-06 RX ORDER — DOXYCYCLINE HYCLATE 100 MG
100 TABLET ORAL 2 TIMES DAILY
Qty: 20 TABLET | Refills: 0 | Status: SHIPPED | OUTPATIENT
Start: 2023-03-06 | End: 2023-03-16

## 2023-03-06 NOTE — PROGRESS NOTES
3/6/23  Megha Morillo : 1970 Sex: male  Age 52 y.o. Subjective:  Chief Complaint   Patient presents with    Congestion     X1 day    Pharyngitis    Cough    Drainage         HPI:   Megha Morillo , 52 y.o. male presents to express care for evaluation of cough, congestion, drainage, cough sore throat       HPI  68-year-old male presents to express care for evaluation congestion, sore throat, cough and drainage. The patient has had the symptoms ongoing for the last couple of days. The patient is coughing up some yellow sputum. The patient not having any blood noted. No fever, chills. No chest pain, shortness of breath. The patient is not currently on any antibiotics. ROS:   Unless otherwise stated in this report the patient's positive and negative responses for review of systems for constitutional, eyes, ENT, cardiovascular, respiratory, gastrointestinal, neurological, , musculoskeletal, and integument systems and related systems to the presenting problem are either stated in the history of present illness or were not pertinent or were negative for the symptoms and/or complaints related to the presenting medical problem. Positives and pertinent negatives as per HPI. All others reviewed and are negative.       PMH:     Past Medical History:   Diagnosis Date    Allergic rhinitis     Allergy to cats     Asthma     controlled with inhaler     MVP (mitral valve prolapse)     for OR 20     Rheumatic fever     S/P transesophageal echocardiogram ([facility]) 2020    Dr. Brandon Hector       Past Surgical History:   Procedure Laterality Date    CHOLECYSTECTOMY, LAPAROSCOPIC      COLONOSCOPY      US KIDNEY DUPLEX BILAT  2015    neg        Family History   Problem Relation Age of Onset    Heart Surgery Mother 36        valve surgery    Heart Surgery Father 71       Medications:     Current Outpatient Medications:     doxycycline hyclate (VIBRA-TABS) 100 MG tablet, Take 1 tablet by mouth 2 times daily for 10 days, Disp: 20 tablet, Rfl: 0    methylPREDNISolone (MEDROL DOSEPACK) 4 MG tablet, Take by mouth., Disp: 1 kit, Rfl: 0    benzonatate (TESSALON) 100 MG capsule, Take 1 capsule by mouth 3 times daily as needed for Cough, Disp: 21 capsule, Rfl: 0    Albuterol Sulfate, sensor, 108 (90 Base) MCG/ACT AEPB, Inhale 2 puffs into the lungs 4 times daily as needed (sob), Disp: 1 each, Rfl: 12    budesonide-formoterol (SYMBICORT) 80-4.5 MCG/ACT AERO, Inhale 2 puffs into the lungs in the morning and 2 puffs in the evening., Disp: 10.2 g, Rfl: 12    lisinopril (PRINIVIL;ZESTRIL) 5 MG tablet, Take 1 tablet by mouth daily, Disp: 90 tablet, Rfl: 3    Allergies:   No Known Allergies    Social History:     Social History     Tobacco Use    Smoking status: Never    Smokeless tobacco: Never   Vaping Use    Vaping Use: Never used   Substance Use Topics    Alcohol use: Yes     Comment: rare    Drug use: No       Patient lives at home. Physical Exam:     Vitals:    03/06/23 1502   BP: 120/80   Site: Right Upper Arm   Position: Sitting   Pulse: 92   Temp: 98.3 °F (36.8 °C)   TempSrc: Temporal   SpO2: 97%   Weight: 195 lb (88.5 kg)       Exam:  Physical Exam  Nurse's notes and vital signs reviewed. The patient is not hypoxic. ? General: Alert, no acute distress, patient resting comfortably Patient is not toxic or lethargic. Skin: Warm, intact, no pallor noted. There is no evidence of rash at this time. Head: Normocephalic, atraumatic  Eye: Normal conjunctiva  Ears, Nose, Throat: Right tympanic membrane clear, left tympanic membrane clear. No drainage or discharge noted. No pre- or post-auricular tenderness, erythema, or swelling noted. Nasal congestion, rhinorrhea, no epistaxis  Posterior oropharynx shows erythema and cobblestoning but no evidence of tonsillar hypertrophy, or exudate. the uvula is midline. No trismus or drooling is noted. Moist mucous membranes.   Neck: No anterior/posterior lymphadenopathy noted. No erythema, no masses, no fluctuance or induration noted. No meningeal signs. Cardiovascular: Regular Rate and Rhythm  Respiratory: No acute distress, no rhonchi, wheezing or crackles noted. No stridor or retractions are noted. Neurological: A&O x4, normal speech  Psychiatric: Cooperative       Testing:     Results for orders placed or performed in visit on 03/06/23   POCT COVID-19, Antigen   Result Value Ref Range    SARS-COV-2, POC Not-Detected Not Detected    Lot Number [id number]     QC Pass/Fail pass     Performing [employer]            Medical Decision Making:     Vital signs reviewed    Past medical history reviewed. Allergies reviewed. Medications reviewed. Patient on arrival does not appear to be in any apparent distress or discomfort. The patient has been seen and evaluated. The patient does not appear to be toxic or lethargic. COVID test negative    We will treat the patient with doxycycline, Tessalon Perles, and Medrol Dosepak. The patient was educated on the proper dosage of motrin and tylenol and the appropriate intervals of each. The patient is to increase fluid intake over the next several days. The patient is to use OTC decongestant as needed. The patient is to return to express care or go directly to the emergency department should any of the signs or symptoms worsen. The patient is to followup with primary care physician in 2-3 days for repeat evaluation. The patient has no other questions or concerns at this time the patient will be discharged home. Clinical Impression:   Fred Raya was seen today for congestion, pharyngitis, cough and drainage. Diagnoses and all orders for this visit:    Acute upper respiratory infection, unspecified    Cough, unspecified type  -     POCT COVID-19, Antigen    Acute non-recurrent sinusitis, unspecified location    Other orders  -     doxycycline hyclate (VIBRA-TABS) 100 MG tablet;  Take 1 tablet by mouth 2 times daily for 10 days  -     methylPREDNISolone (MEDROL DOSEPACK) 4 MG tablet; Take by mouth. -     benzonatate (TESSALON) 100 MG capsule; Take 1 capsule by mouth 3 times daily as needed for Cough      The patient is to call for any concerns or return if any of the signs or symptoms worsen. The patient is to follow-up with PCP in the next 2-3 days for repeat evaluation repeat assessment or go directly to the emergency department.      SIGNATURE: Verónica Heck III, PA-C

## 2023-05-31 ENCOUNTER — OFFICE VISIT (OUTPATIENT)
Dept: CARDIOLOGY CLINIC | Age: 53
End: 2023-05-31
Payer: COMMERCIAL

## 2023-05-31 VITALS
SYSTOLIC BLOOD PRESSURE: 110 MMHG | WEIGHT: 186.7 LBS | BODY MASS INDEX: 26.73 KG/M2 | HEIGHT: 70 IN | RESPIRATION RATE: 16 BRPM | DIASTOLIC BLOOD PRESSURE: 80 MMHG | HEART RATE: 77 BPM

## 2023-05-31 DIAGNOSIS — I34.0 NONRHEUMATIC MITRAL VALVE REGURGITATION: ICD-10-CM

## 2023-05-31 DIAGNOSIS — I10 ESSENTIAL HYPERTENSION: Primary | ICD-10-CM

## 2023-05-31 PROCEDURE — 3074F SYST BP LT 130 MM HG: CPT | Performed by: INTERNAL MEDICINE

## 2023-05-31 PROCEDURE — 99213 OFFICE O/P EST LOW 20 MIN: CPT | Performed by: INTERNAL MEDICINE

## 2023-05-31 PROCEDURE — 3078F DIAST BP <80 MM HG: CPT | Performed by: INTERNAL MEDICINE

## 2023-05-31 PROCEDURE — 93000 ELECTROCARDIOGRAM COMPLETE: CPT | Performed by: INTERNAL MEDICINE

## 2023-05-31 NOTE — PATIENT INSTRUCTIONS
Echo to assess mitral regurgitation  Continue lisinopril 5 mg po daily for hypertension  Continue rest of the current medications  Follow up with me in 3 months.

## 2023-05-31 NOTE — PROGRESS NOTES
OUTPATIENT CARDIOLOGY FOLLOW-UP    Name: Joyce Lam    Age: 52 y.o. Primary Care Physician: Aleksandr Tafoya DO    Date of Service: 5/31/2023    Chief Complaint:   Chief Complaint   Patient presents with    Hypertension       Interim History:   Mr. Ross Salgado is a 75-year-old  male with history of mitral valve prolapse with moderate to severe MR, GERD, initiated on lisinopril for hypertension, bronchial asthma, rheumatic fever allergic rhinitis presented for follow-up visit. Previously, patient was following with Dr. Tiara Gaines for his cardiology needs. He was seen in the office on 8/19/2021, since last visit, he has not any further hospitalizations or ER visits. He is compliant with medications, as well as salt and fluid intake. He does not take any over-the-counter arthritis medications. Gets occasional palpitations/heart racing. He gets winded with exertion, denies any orthopnea or PND or leg edema. Never passed out. Not seen a physician since 2021.no ER visits or hospital visits    No new cardiac complaints since last cardiology evaluation except for dyspnea with exertion. He denies recent chest pain, SOB, palpitations, lightheadedness, dizziness, syncope, PND, or orthopnea. SR on EKG.     Review of Systems:   Cardiac: As per HPI  General: No fever, chills  Pulmonary: As per HPI  HEENT: No visual disturbances, difficult swallowing  GI: No nausea, vomiting  Endocrine: No thyroid disease or DM  Musculoskeletal: FOSTER x 4, no focal motor deficits  Skin: Intact, no rashes  Neuro/Psych: No headache or seizures    Past Medical History:  Past Medical History:   Diagnosis Date    Allergic rhinitis     Allergy to cats     Asthma     controlled with inhaler     MVP (mitral valve prolapse)     for OR 12-17-20     Rheumatic fever     S/P transesophageal echocardiogram (AMY) 12/17/2020    Dr. Sofia Patel       Past Surgical History:  Past Surgical History:   Procedure Laterality Date    CHOLECYSTECTOMY,

## 2023-10-09 ENCOUNTER — HOSPITAL ENCOUNTER (OUTPATIENT)
Dept: CT IMAGING | Age: 53
Discharge: HOME | End: 2023-10-11
Payer: COMMERCIAL

## 2023-10-09 ENCOUNTER — OFFICE VISIT (OUTPATIENT)
Dept: PRIMARY CARE CLINIC | Age: 53
End: 2023-10-09
Payer: COMMERCIAL

## 2023-10-09 VITALS
DIASTOLIC BLOOD PRESSURE: 72 MMHG | SYSTOLIC BLOOD PRESSURE: 122 MMHG | HEIGHT: 70 IN | TEMPERATURE: 98.2 F | WEIGHT: 191.4 LBS | BODY MASS INDEX: 27.4 KG/M2

## 2023-10-09 DIAGNOSIS — I10 ESSENTIAL HYPERTENSION: ICD-10-CM

## 2023-10-09 DIAGNOSIS — K57.92 DIVERTICULITIS: Primary | ICD-10-CM

## 2023-10-09 DIAGNOSIS — Z00.01 ENCOUNTER FOR ANNUAL GENERAL MEDICAL EXAMINATION WITH ABNORMAL FINDINGS IN ADULT: ICD-10-CM

## 2023-10-09 DIAGNOSIS — R10.30 LOWER ABDOMINAL PAIN: ICD-10-CM

## 2023-10-09 DIAGNOSIS — K57.92 DIVERTICULITIS: ICD-10-CM

## 2023-10-09 DIAGNOSIS — J45.20 ASTHMA IN ADULT, MILD INTERMITTENT, UNCOMPLICATED: ICD-10-CM

## 2023-10-09 LAB
BILIRUBIN, POC: NEGATIVE
BLOOD URINE, POC: NEGATIVE
CLARITY, POC: CLEAR
COLOR, POC: YELLOW
GLUCOSE URINE, POC: NEGATIVE
KETONES, POC: NEGATIVE
LEUKOCYTE EST, POC: NEGATIVE
NITRITE, POC: NEGATIVE
PH, POC: 6
PROTEIN, POC: NEGATIVE
SPECIFIC GRAVITY, POC: 1.02
UROBILINOGEN, POC: 1

## 2023-10-09 PROCEDURE — 74176 CT ABD & PELVIS W/O CONTRAST: CPT

## 2023-10-09 PROCEDURE — 99214 OFFICE O/P EST MOD 30 MIN: CPT | Performed by: FAMILY MEDICINE

## 2023-10-09 PROCEDURE — 81002 URINALYSIS NONAUTO W/O SCOPE: CPT | Performed by: FAMILY MEDICINE

## 2023-10-09 PROCEDURE — 3074F SYST BP LT 130 MM HG: CPT | Performed by: FAMILY MEDICINE

## 2023-10-09 PROCEDURE — 3078F DIAST BP <80 MM HG: CPT | Performed by: FAMILY MEDICINE

## 2023-10-09 RX ORDER — CEFDINIR 300 MG/1
300 CAPSULE ORAL 2 TIMES DAILY
Qty: 20 CAPSULE | Refills: 0 | Status: SHIPPED | OUTPATIENT
Start: 2023-10-09 | End: 2023-10-19

## 2023-10-09 RX ORDER — METRONIDAZOLE 500 MG/1
500 TABLET ORAL 3 TIMES DAILY
Qty: 21 TABLET | Refills: 0 | Status: SHIPPED | OUTPATIENT
Start: 2023-10-09

## 2023-10-09 SDOH — ECONOMIC STABILITY: INCOME INSECURITY: HOW HARD IS IT FOR YOU TO PAY FOR THE VERY BASICS LIKE FOOD, HOUSING, MEDICAL CARE, AND HEATING?: NOT HARD AT ALL

## 2023-10-09 SDOH — ECONOMIC STABILITY: HOUSING INSECURITY
IN THE LAST 12 MONTHS, WAS THERE A TIME WHEN YOU DID NOT HAVE A STEADY PLACE TO SLEEP OR SLEPT IN A SHELTER (INCLUDING NOW)?: NO

## 2023-10-09 SDOH — ECONOMIC STABILITY: FOOD INSECURITY: WITHIN THE PAST 12 MONTHS, THE FOOD YOU BOUGHT JUST DIDN'T LAST AND YOU DIDN'T HAVE MONEY TO GET MORE.: NEVER TRUE

## 2023-10-09 SDOH — ECONOMIC STABILITY: FOOD INSECURITY: WITHIN THE PAST 12 MONTHS, YOU WORRIED THAT YOUR FOOD WOULD RUN OUT BEFORE YOU GOT MONEY TO BUY MORE.: NEVER TRUE

## 2023-10-09 ASSESSMENT — PATIENT HEALTH QUESTIONNAIRE - PHQ9
2. FEELING DOWN, DEPRESSED OR HOPELESS: 0
1. LITTLE INTEREST OR PLEASURE IN DOING THINGS: 0
SUM OF ALL RESPONSES TO PHQ QUESTIONS 1-9: 0
SUM OF ALL RESPONSES TO PHQ9 QUESTIONS 1 & 2: 0
SUM OF ALL RESPONSES TO PHQ QUESTIONS 1-9: 0

## 2023-10-09 ASSESSMENT — ENCOUNTER SYMPTOMS
ALLERGIC/IMMUNOLOGIC NEGATIVE: 1
EYES NEGATIVE: 1
RESPIRATORY NEGATIVE: 1
ABDOMINAL PAIN: 1

## 2023-10-09 NOTE — PROGRESS NOTES
Contrast? None; Future  -     cefdinir (OMNICEF) 300 MG capsule; Take 1 capsule by mouth 2 times daily for 10 days  -     metroNIDAZOLE (FLAGYL) 500 MG tablet; Take 1 tablet by mouth 3 times daily    Essential hypertension    Asthma in adult, mild intermittent, uncomplicated        Comments: lab in am   ct  asap   if  bad to er   See elvin villela colon    I educated the patient about all medications. Make sure they were correct and educated  on the risk associated with missing meds or taking them incorrectly. A list of medications is being sent home with patient today. Check blood pressure at home twice a day. Low-salt low caffeine diet. Call if systolic blood pressure is above 123 and diastolic blood pressures above 85. Only use a upper arm digital cuff. A great deal of time spent reviewing medications, diet, exercise, social issues. Also reviewing the chart before entering the room with patient and finishing charting after leaving patient's room. More than half of that time was spent face to face with the patient in counseling and coordinating care. I informed patient about the risk associated with noncompliance of medication and taking medications incorrectly. Appropriate follow-up with myself and all specialist.  Encourage family members to take active role in assisting with medications and medical care. If any confusion should develop to notify my office immediately to avoid risk of worsening medical condition            Aggressive low-fat diet. Avoid red meats, greasy fried foods, dairy products. Avoid processed foods. Take cholesterol medications without food. Follow Up: Return in about 1 week (around 10/16/2023) for Lab Before for wellness ck.      Seen by:  Coni Grossman DO

## 2023-10-10 ENCOUNTER — TELEPHONE (OUTPATIENT)
Dept: PRIMARY CARE CLINIC | Age: 53
End: 2023-10-10

## 2023-10-10 DIAGNOSIS — Z00.01 ENCOUNTER FOR ANNUAL GENERAL MEDICAL EXAMINATION WITH ABNORMAL FINDINGS IN ADULT: ICD-10-CM

## 2023-10-10 LAB
ABSOLUTE IMMATURE GRANULOCYTE: 0.06 K/UL (ref 0–0.58)
ALBUMIN SERPL-MCNC: 4.4 G/DL (ref 3.5–5.2)
ALP BLD-CCNC: 54 U/L (ref 40–129)
ALT SERPL-CCNC: 19 U/L (ref 0–40)
ANION GAP SERPL CALCULATED.3IONS-SCNC: 18 MMOL/L (ref 7–16)
AST SERPL-CCNC: 15 U/L (ref 0–39)
BASOPHILS ABSOLUTE: 0.03 K/UL (ref 0–0.2)
BASOPHILS RELATIVE PERCENT: 0 % (ref 0–2)
BILIRUB SERPL-MCNC: 0.8 MG/DL (ref 0–1.2)
BILIRUBIN URINE: NEGATIVE
BUN BLDV-MCNC: 17 MG/DL (ref 6–20)
CALCIUM SERPL-MCNC: 9.3 MG/DL (ref 8.6–10.2)
CHLORIDE BLD-SCNC: 103 MMOL/L (ref 98–107)
CHOLESTEROL: 121 MG/DL
CO2: 21 MMOL/L (ref 22–29)
COLOR: YELLOW
COMMENT: ABNORMAL
CREAT SERPL-MCNC: 1.1 MG/DL (ref 0.7–1.2)
EOSINOPHILS ABSOLUTE: 0.09 K/UL (ref 0.05–0.5)
EOSINOPHILS RELATIVE PERCENT: 1 % (ref 0–6)
GFR SERPL CREATININE-BSD FRML MDRD: >60 ML/MIN/1.73M2
GLUCOSE BLD-MCNC: 86 MG/DL (ref 74–99)
GLUCOSE URINE: NEGATIVE MG/DL
HCT VFR BLD CALC: 50.5 % (ref 37–54)
HDLC SERPL-MCNC: 44 MG/DL
HEMOGLOBIN: 16.6 G/DL (ref 12.5–16.5)
IMMATURE GRANULOCYTES: 1 % (ref 0–5)
KETONES, URINE: NEGATIVE MG/DL
LDL CHOLESTEROL: 69 MG/DL
LEUKOCYTE ESTERASE, URINE: NEGATIVE
LYMPHOCYTES ABSOLUTE: 1.01 K/UL (ref 1.5–4)
LYMPHOCYTES RELATIVE PERCENT: 9 % (ref 20–42)
MCH RBC QN AUTO: 30.1 PG (ref 26–35)
MCHC RBC AUTO-ENTMCNC: 32.9 G/DL (ref 32–34.5)
MCV RBC AUTO: 91.5 FL (ref 80–99.9)
MONOCYTES ABSOLUTE: 0.72 K/UL (ref 0.1–0.95)
MONOCYTES RELATIVE PERCENT: 6 % (ref 2–12)
NEUTROPHILS ABSOLUTE: 9.31 K/UL (ref 1.8–7.3)
NEUTROPHILS RELATIVE PERCENT: 83 % (ref 43–80)
NITRITE, URINE: NEGATIVE
PDW BLD-RTO: 13.1 % (ref 11.5–15)
PH UA: 6 (ref 5–9)
PLATELET # BLD: 241 K/UL (ref 130–450)
PMV BLD AUTO: 10 FL (ref 7–12)
POTASSIUM SERPL-SCNC: 4.5 MMOL/L (ref 3.5–5)
PROSTATE SPECIFIC ANTIGEN: 2.28 NG/ML (ref 0–4)
PROTEIN UA: NEGATIVE MG/DL
RBC # BLD: 5.52 M/UL (ref 3.8–5.8)
SODIUM BLD-SCNC: 142 MMOL/L (ref 132–146)
SPECIFIC GRAVITY UA: >1.03 (ref 1–1.03)
TOTAL PROTEIN: 7.2 G/DL (ref 6.4–8.3)
TRIGL SERPL-MCNC: 40 MG/DL
TURBIDITY: CLEAR
URINE HGB: NEGATIVE
UROBILINOGEN, URINE: 0.2 EU/DL (ref 0–1)
VLDLC SERPL CALC-MCNC: 8 MG/DL
WBC # BLD: 11.2 K/UL (ref 4.5–11.5)

## 2023-10-10 NOTE — TELEPHONE ENCOUNTER
Pt notified and voiced understanding.   Will continue antibiotics and see Dr. Alanna Aguilera on the 16th as planned

## 2023-10-10 NOTE — TELEPHONE ENCOUNTER
Notify the patient that the CAT scan did indeed show diverticulitis. No abscess formation. Which is a good thing. Continue antibiotics. See me on October 16 as we planned. He did mention a tiny aneurysm which we will discuss. No concern for now.   If pain worsens go to ER

## 2023-10-16 ENCOUNTER — OFFICE VISIT (OUTPATIENT)
Dept: PRIMARY CARE CLINIC | Age: 53
End: 2023-10-16
Payer: COMMERCIAL

## 2023-10-16 ENCOUNTER — TELEPHONE (OUTPATIENT)
Dept: VASCULAR SURGERY | Age: 53
End: 2023-10-16

## 2023-10-16 VITALS — HEIGHT: 70 IN | BODY MASS INDEX: 26.92 KG/M2 | TEMPERATURE: 97.6 F | WEIGHT: 188 LBS

## 2023-10-16 DIAGNOSIS — Z00.01 ENCOUNTER FOR ANNUAL GENERAL MEDICAL EXAMINATION WITH ABNORMAL FINDINGS IN ADULT: Primary | ICD-10-CM

## 2023-10-16 DIAGNOSIS — I34.1 MVP (MITRAL VALVE PROLAPSE): ICD-10-CM

## 2023-10-16 DIAGNOSIS — Z23 NEED FOR INFLUENZA VACCINATION: ICD-10-CM

## 2023-10-16 DIAGNOSIS — K57.92 DIVERTICULITIS: ICD-10-CM

## 2023-10-16 DIAGNOSIS — I72.8 SPLENIC ARTERY ANEURYSM (HCC): ICD-10-CM

## 2023-10-16 DIAGNOSIS — E78.2 MIXED HYPERLIPIDEMIA: ICD-10-CM

## 2023-10-16 PROCEDURE — 90674 CCIIV4 VAC NO PRSV 0.5 ML IM: CPT | Performed by: FAMILY MEDICINE

## 2023-10-16 PROCEDURE — 99396 PREV VISIT EST AGE 40-64: CPT | Performed by: FAMILY MEDICINE

## 2023-10-16 PROCEDURE — 90471 IMMUNIZATION ADMIN: CPT | Performed by: FAMILY MEDICINE

## 2023-10-16 NOTE — PROGRESS NOTES
10/16/23  Name: Uzma Reid    : 1970    Sex: male    Age: 52 y.o. Subjective:  Chief Complaint: Patient is here for wellenss   ck and  follow up   re   divertivluitis        And chol    MR  asthma     Feels wel  gi sx resoled    ct with    itis and    spleneic a rt  aneurysm  Shaina neg  Saw  carido   and  kimberley got  echo and was to see him in three months  he promsies tpo call echo dept today  And fu with cardio        Review of Systems   Constitutional: Negative. HENT: Negative. Eyes: Negative. Respiratory: Negative. Cardiovascular: Negative. Gastrointestinal: Negative. Endocrine: Negative. Genitourinary: Negative. Musculoskeletal: Negative. Skin: Negative. Allergic/Immunologic: Negative. Neurological: Negative. Hematological: Negative. Psychiatric/Behavioral: Negative.            Current Outpatient Medications:     Albuterol Sulfate, sensor, 108 (90 Base) MCG/ACT AEPB, Inhale 2 puffs into the lungs 4 times daily as needed (sob), Disp: 1 each, Rfl: 12    budesonide-formoterol (SYMBICORT) 80-4.5 MCG/ACT AERO, Inhale 2 puffs into the lungs in the morning and 2 puffs in the evening., Disp: 10.2 g, Rfl: 12    lisinopril (PRINIVIL;ZESTRIL) 5 MG tablet, Take 1 tablet by mouth daily, Disp: 90 tablet, Rfl: 3  No Known Allergies  Social History     Socioeconomic History    Marital status: Single     Spouse name: Not on file    Number of children: Not on file    Years of education: Not on file    Highest education level: Not on file   Occupational History    Not on file   Tobacco Use    Smoking status: Never    Smokeless tobacco: Never   Vaping Use    Vaping Use: Never used   Substance and Sexual Activity    Alcohol use: Yes     Comment: rare    Drug use: No    Sexual activity: Not on file   Other Topics Concern    Not on file   Social History Narrative        CHEW    ASTHMA    GERD    ALLERGIC RHINITS    LAP GB    DEPRESSION    ANXIETY    DAD  AT 67 WITH

## 2023-10-27 DIAGNOSIS — I10 ESSENTIAL HYPERTENSION: ICD-10-CM

## 2023-10-27 RX ORDER — LISINOPRIL 5 MG/1
5 TABLET ORAL DAILY
Qty: 90 TABLET | Refills: 3 | Status: SHIPPED | OUTPATIENT
Start: 2023-10-27

## 2023-11-16 ENCOUNTER — OFFICE VISIT (OUTPATIENT)
Dept: SURGERY | Age: 53
End: 2023-11-16
Payer: COMMERCIAL

## 2023-11-16 VITALS
HEIGHT: 70 IN | HEART RATE: 79 BPM | SYSTOLIC BLOOD PRESSURE: 122 MMHG | TEMPERATURE: 98.2 F | DIASTOLIC BLOOD PRESSURE: 70 MMHG | BODY MASS INDEX: 26.63 KG/M2 | WEIGHT: 186 LBS | OXYGEN SATURATION: 95 %

## 2023-11-16 DIAGNOSIS — K57.92 DIVERTICULITIS: Primary | ICD-10-CM

## 2023-11-16 DIAGNOSIS — K59.00 CONSTIPATION, UNSPECIFIED CONSTIPATION TYPE: ICD-10-CM

## 2023-11-16 PROCEDURE — 99204 OFFICE O/P NEW MOD 45 MIN: CPT | Performed by: SURGERY

## 2023-11-16 PROCEDURE — 3078F DIAST BP <80 MM HG: CPT | Performed by: SURGERY

## 2023-11-16 PROCEDURE — 3074F SYST BP LT 130 MM HG: CPT | Performed by: SURGERY

## 2023-11-16 NOTE — PROGRESS NOTES
[facility] Surgery Clinic Note    Assessment/Plan:      Diagnosis Orders   1. Diverticulitis      Improved. Will plan for colonoscopy. 2. Constipation, unspecified constipation type      Recommend fiber and probiotic. Return for Colonoscopy. Chief Complaint   Patient presents with    New Patient     Diverticulitis        PCP: Danielle Lopez DO    HPI: Mahamed Farmer is a 52 y.o. male who presents in consultation for diverticulitis. He had a CT previously performed last month. Imaging was reviewed. It does show uncomplicated diverticulitis. He was placed on antibiotics. Overall symptoms improved. He has not noted a little more constipation recently however. He denies any fiber usage. He has no blood in the stools. There is no family history of colon cancer or inflammatory bowel disease. Past Medical History:   Diagnosis Date    Allergic rhinitis     Allergy to cats     Asthma     controlled with inhaler     MVP (mitral valve prolapse)     for OR 12-17-20     Rheumatic fever     S/P transesophageal echocardiogram (AMY) 12/17/2020    Dr. Suraj Rendon       Past Surgical History:   Procedure Laterality Date    CHOLECYSTECTOMY, LAPAROSCOPIC      COLONOSCOPY      [address]  04/2015    neg        Prior to Admission medications    Medication Sig Start Date End Date Taking? Authorizing Provider   lisinopril (PRINIVIL;ZESTRIL) 5 MG tablet Take 1 tablet by mouth daily 10/27/23  Yes Les Zapata DO   Albuterol Sulfate, sensor, 108 (90 Base) MCG/ACT AEPB Inhale 2 puffs into the lungs 4 times daily as needed (sob) 10/31/22  Yes Les Zapata DO   budesonide-formoterol (SYMBICORT) 80-4.5 MCG/ACT AERO Inhale 2 puffs into the lungs in the morning and 2 puffs in the evening.  10/31/22  Yes Les Zapata DO       No Known Allergies    Social History     Socioeconomic History    Marital status: Single     Spouse name: None    Number of children: None    Years of

## 2023-12-29 ENCOUNTER — TELEPHONE (OUTPATIENT)
Dept: SURGERY | Age: 53
End: 2023-12-29

## 2023-12-29 ENCOUNTER — PREP FOR PROCEDURE (OUTPATIENT)
Dept: SURGERY | Age: 53
End: 2023-12-29

## 2023-12-29 PROBLEM — K59.00 CONSTIPATION: Status: ACTIVE | Noted: 2023-12-29

## 2023-12-29 NOTE — TELEPHONE ENCOUNTER
Prior Authorization Form:      DEMOGRAPHICS:                     Patient Name:  Fred Arango  Patient :  1970            Insurance:  Payor: Leonardo Cover / Plan: Leonardo Corder[place] - [place] PPO / Product Type: *No Product type* /   Insurance ID Number:    Payer/Plan Subscr  Sex Relation Sub. Ins. ID Effective Group Num   1.  [place] DA* 1970 Male Self [id number]                                    [id number]         DIAGNOSIS & PROCEDURE:                       Procedure/Operation: Colonoscopy           CPT Code: 04471    Diagnosis:  Diverticulitis/constipation    ICD10 Code: B36.91/Z05.52    Location:  [facility]    Surgeon:  Dr Antonio Mcdaniel INFORMATION:                          Date: 24    Time: 10:30 am              Anesthesia:  [facility]                                                       Status:  Outpatient        Special Comments:         Electronically signed by Evie May MA on 2023 at 6:42 AM

## 2023-12-29 NOTE — TELEPHONE ENCOUNTER
Erick Broussard is scheduled for colonoscopy with Dr Nano Henry on 01-18-24 at [facility]. Patient needs to be NPO after midnight the night before procedure. All surgery instructions were explained to the patient and a surgery letter was also mailed out. MA informed patient that PAT will also be calling to review pre-op instructions and medications. Patient verbalized understanding.   Electronically signed by Jennifer Obrien MA on 12/29/2023 at 6:41 AM

## 2024-01-15 NOTE — PROGRESS NOTES
[facility] PRE-ADMISSION TESTING INSTRUCTIONS    The Preadmission Testing patient is instructed accordingly using the following criteria (check applicable):    ARRIVAL INSTRUCTIONS:  [x] Parking the day of Surgery is located in the Main Entrance lot.  Upon entering the door, make an immediate right to the surgery reception desk    [x] Bring photo ID and insurance card    [] Bring in a copy of Living will or Durable Power of  papers.    [x] Please be sure to arrange transportation to and from the hospital    [x] Please arrange for someone to be with you the remainder of the day due to having anesthesia      GENERAL INSTRUCTIONS:    [x] Nothing by mouth after midnight, including gum, candy, mints or water    [x] You may brush your teeth, but do not swallow any water    [x] Take medications as instructed with 1-2 oz of water    [x] Stop herbal supplements and vitamins 5 days prior to procedure    [] Follow preop dosing of blood thinners per physician instructions    [] Do not take insulin or oral diabetic medications    [] If diabetic and have low blood sugar or feel symptomatic, take 1-2oz apple juice or glucose tablets    [] Bring inhalers day of surgery    [] Bring C-PAP/ Bi-Pap day of surgery    [] Bring urine specimen day of surgery    [x] Antibacterial Soap shower or bath AM of Surgery, no lotion, powders or creams to surgical site    [x] Follow bowel prep as instructed per surgeon    [x] No tobacco products within 24 hours of surgery     [x] No alcohol or illegal drug use within 24 hours of surgery.    [x] Jewelry, body piercing's, eyeglasses, contact lenses and dentures are not permitted into surgery (bring cases)      [] Please do not wear any nail polish or make up on the day of surgery    [] If not already done, you can expect a call from registration    [x] If surgeon requests a time change you will be notified the day prior to surgery    [] If you receive a survey after

## 2024-01-15 NOTE — PROGRESS NOTES
SPOKE WITH DR. CHARLTON . HE NEEDS TO SCHEDULE THE ECHO ORDERED 5/3/23 TO BE DONE TO BE DONE AND SCHEDULE AN APPOINTMENT WITH DR. MENJIVAR. HE CAN HAVE THE COLONOSCOPY WITHOUT THE RESULTS . CYTNHIA NOTIFIED TO HAVE ECHO AND DR. SYLVESTER MALONEY SCHEDULED BEFORE HE HAS THE COLONOSCOPY DONE.

## 2024-01-17 ENCOUNTER — ANESTHESIA EVENT (OUTPATIENT)
Dept: ENDOSCOPY | Age: 54
End: 2024-01-17
Payer: COMMERCIAL

## 2024-01-17 ASSESSMENT — LIFESTYLE VARIABLES: SMOKING_STATUS: 0

## 2024-01-17 NOTE — ANESTHESIA PRE PROCEDURE
Department of Anesthesiology  Preprocedure Note       Name:  Arvin Ruiz   Age:  53 y.o.  :  1970                                          MRN:  [id number]         Date:  2024      Surgeon: Surgeon(s):  Oscar Osman MD    Procedure: Procedure(s):  COLONOSCOPY DIAGNOSTIC    Medications prior to admission:   Prior to Admission medications    Medication Sig Start Date End Date Taking? Authorizing Provider   lisinopril (PRINIVIL;ZESTRIL) 5 MG tablet Take 1 tablet by mouth daily  Patient taking differently: Take 1 tablet by mouth at bedtime 10/27/23   Les Zapata DO   Albuterol Sulfate, sensor, 108 (90 Base) MCG/ACT AEPB Inhale 2 puffs into the lungs 4 times daily as needed (sob) 10/31/22   Les Zapata DO   budesonide-formoterol (SYMBICORT) 80-4.5 MCG/ACT AERO Inhale 2 puffs into the lungs in the morning and 2 puffs in the evening. 10/31/22   Les Zapata DO       Current medications:    No current facility-administered medications for this encounter.     Current Outpatient Medications   Medication Sig Dispense Refill    lisinopril (PRINIVIL;ZESTRIL) 5 MG tablet Take 1 tablet by mouth daily (Patient taking differently: Take 1 tablet by mouth at bedtime) 90 tablet 3    Albuterol Sulfate, sensor, 108 (90 Base) MCG/ACT AEPB Inhale 2 puffs into the lungs 4 times daily as needed (sob) 1 each 12    budesonide-formoterol (SYMBICORT) 80-4.5 MCG/ACT AERO Inhale 2 puffs into the lungs in the morning and 2 puffs in the evening. 10.2 g 12       Allergies:  No Known Allergies    Problem List:    Patient Active Problem List   Diagnosis Code    MVP (mitral valve prolapse) I34.1    Anxiety and depression F41.9, F32.A    GERD (gastroesophageal reflux disease) K21.9    Nonrheumatic mitral valve regurgitation I34.0    Mixed hyperlipidemia E78.2    Essential hypertension I10    Asthma in adult, mild intermittent, uncomplicated J45.20    Diverticulitis K57.92    Splenic artery aneurysm (HCC) I72.8

## 2024-01-18 ENCOUNTER — ANESTHESIA (OUTPATIENT)
Dept: ENDOSCOPY | Age: 54
End: 2024-01-18
Payer: COMMERCIAL

## 2024-01-18 ENCOUNTER — HOSPITAL ENCOUNTER (OUTPATIENT)
Age: 54
Setting detail: OUTPATIENT SURGERY
Discharge: HOME | End: 2024-01-18
Attending: SURGERY | Admitting: SURGERY
Payer: COMMERCIAL

## 2024-01-18 VITALS
WEIGHT: 185 LBS | TEMPERATURE: 97.5 F | BODY MASS INDEX: 26.48 KG/M2 | OXYGEN SATURATION: 100 % | SYSTOLIC BLOOD PRESSURE: 126 MMHG | RESPIRATION RATE: 20 BRPM | HEART RATE: 80 BPM | HEIGHT: 70 IN | DIASTOLIC BLOOD PRESSURE: 64 MMHG

## 2024-01-18 DIAGNOSIS — K59.00 CONSTIPATION: ICD-10-CM

## 2024-01-18 DIAGNOSIS — K57.92 DIVERTICULITIS: ICD-10-CM

## 2024-01-18 PROCEDURE — 2709999900 HC NON-CHARGEABLE SUPPLY: Performed by: SURGERY

## 2024-01-18 PROCEDURE — 7100000011 HC PHASE II RECOVERY - ADDTL 15 MIN: Performed by: SURGERY

## 2024-01-18 PROCEDURE — 3609010600 HC COLONOSCOPY POLYPECTOMY SNARE/COLD BIOPSY: Performed by: SURGERY

## 2024-01-18 PROCEDURE — 45385 COLONOSCOPY W/LESION REMOVAL: CPT | Performed by: SURGERY

## 2024-01-18 PROCEDURE — 6360000002 HC RX W HCPCS

## 2024-01-18 PROCEDURE — 7100000010 HC PHASE II RECOVERY - FIRST 15 MIN: Performed by: SURGERY

## 2024-01-18 PROCEDURE — 88305 TISSUE EXAM BY PATHOLOGIST: CPT

## 2024-01-18 PROCEDURE — 3700000001 HC ADD 15 MINUTES (ANESTHESIA): Performed by: SURGERY

## 2024-01-18 PROCEDURE — 2580000003 HC RX 258

## 2024-01-18 PROCEDURE — 3700000000 HC ANESTHESIA ATTENDED CARE: Performed by: SURGERY

## 2024-01-18 RX ORDER — PROPOFOL 10 MG/ML
INJECTION, EMULSION INTRAVENOUS PRN
Status: DISCONTINUED | OUTPATIENT
Start: 2024-01-18 | End: 2024-01-18 | Stop reason: SDUPTHER

## 2024-01-18 RX ORDER — SODIUM CHLORIDE 9 MG/ML
INJECTION, SOLUTION INTRAVENOUS CONTINUOUS PRN
Status: DISCONTINUED | OUTPATIENT
Start: 2024-01-18 | End: 2024-01-18 | Stop reason: SDUPTHER

## 2024-01-18 RX ADMIN — SODIUM CHLORIDE: 9 INJECTION, SOLUTION INTRAVENOUS at 10:08

## 2024-01-18 RX ADMIN — PROPOFOL 250 MG: 10 INJECTION, EMULSION INTRAVENOUS at 10:17

## 2024-01-18 ASSESSMENT — PAIN - FUNCTIONAL ASSESSMENT
PAIN_FUNCTIONAL_ASSESSMENT: 0-10
PAIN_FUNCTIONAL_ASSESSMENT: 0-10
PAIN_FUNCTIONAL_ASSESSMENT: ACTIVITIES ARE NOT PREVENTED

## 2024-01-18 NOTE — H&P
[facility] Surgery Clinic Note     Assessment/Plan:        Diagnosis Orders   1. Diverticulitis        Improved.  Will plan for colonoscopy.       2. Constipation, unspecified constipation type        Recommend fiber and probiotic.                Return for Colonoscopy.             Chief Complaint   Patient presents with    New Patient       Diverticulitis          PCP: Les Zapata DO     HPI: Arvin Ruiz is a 53 y.o. male who presents in consultation for diverticulitis.  He had a CT previously performed last month.  Imaging was reviewed.  It does show uncomplicated diverticulitis.  He was placed on antibiotics.  Overall symptoms improved.  He has not noted a little more constipation recently however.  He denies any fiber usage.  He has no blood in the stools.  There is no family history of colon cancer or inflammatory bowel disease.        Past Medical History        Past Medical History:   Diagnosis Date    Allergic rhinitis      Allergy to cats      Asthma       controlled with inhaler     MVP (mitral valve prolapse)       for OR 12-17-20     Rheumatic fever      S/P transesophageal echocardiogram (AMY) 12/17/2020     Dr. Evans            Past Surgical History         Past Surgical History:   Procedure Laterality Date    CHOLECYSTECTOMY, LAPAROSCOPIC        COLONOSCOPY        US KIDNEY DUPLEX BILAT   04/2015     neg             Home Medications           Prior to Admission medications    Medication Sig Start Date End Date Taking? Authorizing Provider   lisinopril (PRINIVIL;ZESTRIL) 5 MG tablet Take 1 tablet by mouth daily 10/27/23   Yes Les Zapata DO   Albuterol Sulfate, sensor, 108 (90 Base) MCG/ACT AEPB Inhale 2 puffs into the lungs 4 times daily as needed (sob) 10/31/22   Yes Les Zapata DO   budesonide-formoterol (SYMBICORT) 80-4.5 MCG/ACT AERO Inhale 2 puffs into the lungs in the morning and 2 puffs in the evening. 10/31/22   Yes Les Zapata DO            No Known

## 2024-01-18 NOTE — ANESTHESIA POSTPROCEDURE EVALUATION
Department of Anesthesiology  Postprocedure Note    Patient: Arvin Ruiz  MRN: [id number]  YOB: 1970  Date of evaluation: 1/18/2024    Procedure Summary       Date: 01/18/24 Room / Location: [place] [address] / [facility]    Anesthesia Start: 1008 Anesthesia Stop: 1036    Procedure: COLONOSCOPY POLYPECTOMY SNARE COLD Diagnosis:       Diverticulitis      Constipation      (Diverticulitis [K57.92])      (Constipation [K59.00])    Surgeons: Oscar Osman MD Responsible Provider: John Ashraf DO    Anesthesia Type: MAC ASA Status: 2            Anesthesia Type: No value filed.    Kandy Phase I: Kandy Score: 10    Kandy Phase II: Kandy Score: 10    Anesthesia Post Evaluation    Patient location during evaluation: bedside  Patient participation: complete - patient participated  Level of consciousness: awake  Pain score: 1  Airway patency: patent  Nausea & Vomiting: no vomiting and no nausea  Cardiovascular status: hemodynamically stable  Respiratory status: acceptable  Hydration status: stable  Comments: Seen and examined.  Progressing as expected.  No anesthetic related questions or concerns at this time.  Pain management: adequate    No notable events documented.

## 2024-01-22 LAB — SURGICAL PATHOLOGY REPORT: NORMAL

## 2024-02-20 ENCOUNTER — OFFICE VISIT (OUTPATIENT)
Dept: SURGERY | Age: 54
End: 2024-02-20
Payer: COMMERCIAL

## 2024-02-20 VITALS
WEIGHT: 187 LBS | TEMPERATURE: 97.6 F | OXYGEN SATURATION: 99 % | SYSTOLIC BLOOD PRESSURE: 132 MMHG | BODY MASS INDEX: 26.77 KG/M2 | HEIGHT: 70 IN | DIASTOLIC BLOOD PRESSURE: 85 MMHG | HEART RATE: 75 BPM

## 2024-02-20 DIAGNOSIS — K59.00 CONSTIPATION, UNSPECIFIED CONSTIPATION TYPE: ICD-10-CM

## 2024-02-20 DIAGNOSIS — K57.92 DIVERTICULITIS: Primary | ICD-10-CM

## 2024-02-20 DIAGNOSIS — K63.5 COLORECTAL POLYP DETECTED ON COLONOSCOPY: ICD-10-CM

## 2024-02-20 PROCEDURE — G8427 DOCREV CUR MEDS BY ELIG CLIN: HCPCS | Performed by: SURGERY

## 2024-02-20 PROCEDURE — G8419 CALC BMI OUT NRM PARAM NOF/U: HCPCS | Performed by: SURGERY

## 2024-02-20 PROCEDURE — 1036F TOBACCO NON-USER: CPT | Performed by: SURGERY

## 2024-02-20 PROCEDURE — 3075F SYST BP GE 130 - 139MM HG: CPT | Performed by: SURGERY

## 2024-02-20 PROCEDURE — 99213 OFFICE O/P EST LOW 20 MIN: CPT | Performed by: SURGERY

## 2024-02-20 PROCEDURE — G8482 FLU IMMUNIZE ORDER/ADMIN: HCPCS | Performed by: SURGERY

## 2024-02-20 PROCEDURE — 3079F DIAST BP 80-89 MM HG: CPT | Performed by: SURGERY

## 2024-02-20 PROCEDURE — 3017F COLORECTAL CA SCREEN DOC REV: CPT | Performed by: SURGERY

## 2024-02-20 NOTE — PROGRESS NOTES
[facility] Surgery Clinic Note    Assessment/Plan:     Diagnosis Orders   1. Diverticulitis      Fiber diet      2. Constipation, unspecified constipation type      fiber diet      3. Colorectal polyp detected on colonoscopy      Repeat colonoscopy in 5 years          Return in about 5 years (around 2/20/2029) for Colonoscopy.      Chief Complaint   Patient presents with    Follow-up     Colon follow up 1/18       PCP: Les Zapata DO    HPI: Arvin Ruiz is a 54 y.o. male here for follow-up of colonoscopy for history of diverticulitis and constipation.  He had a polyp removed.  Pathology was to be tubular adenoma.  He also had diverticulosis and internal hemorrhoids noted.  He is doing well.  Has no abdominal pain.  His constipation has resolved.  There is no blood in the stools.      Review of Systems   All other systems reviewed and are negative.        Past Medical History:   Diagnosis Date    Allergic rhinitis     Allergy to cats     Asthma     controlled with inhaler     Diverticulitis     MVP (mitral valve prolapse)     for OR 12-17-20     S/P transesophageal echocardiogram (AMY) 12/17/2020    Dr. Evans       Past Surgical History:   Procedure Laterality Date    CHOLECYSTECTOMY, LAPAROSCOPIC      COLONOSCOPY      COLONOSCOPY N/A 1/18/2024    COLONOSCOPY POLYPECTOMY SNARE COLD performed by Oscar Osman MD at [facility] ENDOSCOPY     KIDNEY DUPLEX BILAT  04/2015    neg        Family History   Problem Relation Age of Onset    Heart Surgery Mother 40        valve surgery    Heart Surgery Father 69       Social History     Socioeconomic History    Marital status: Single     Spouse name: Not on file    Number of children: Not on file    Years of education: Not on file    Highest education level: Not on file   Occupational History    Not on file   Tobacco Use    Smoking status: Never    Smokeless tobacco: Never   Vaping Use    Vaping Use: Never used   Substance and Sexual Activity    Alcohol use: Yes

## 2024-02-27 ENCOUNTER — HOSPITAL ENCOUNTER (OUTPATIENT)
Dept: CARDIOLOGY | Age: 54
Discharge: HOME | End: 2024-02-29
Payer: COMMERCIAL

## 2024-02-27 VITALS
BODY MASS INDEX: 26.77 KG/M2 | SYSTOLIC BLOOD PRESSURE: 130 MMHG | WEIGHT: 187 LBS | DIASTOLIC BLOOD PRESSURE: 82 MMHG | HEIGHT: 70 IN

## 2024-02-27 DIAGNOSIS — I34.0 MITRAL REGURGITATION: ICD-10-CM

## 2024-02-27 LAB
ECHO AO ASC DIAM: 2.5 CM
ECHO AO ASCENDING AORTA INDEX: 1.23 CM/M2
ECHO AO SINUS VALSALVA DIAM: 3.5 CM
ECHO AO SINUS VALSALVA INDEX: 1.72 CM/M2
ECHO AV AREA PEAK VELOCITY: 2.2 CM2
ECHO AV AREA VTI: 2.4 CM2
ECHO AV AREA/BSA PEAK VELOCITY: 1.1 CM2/M2
ECHO AV AREA/BSA VTI: 1.2 CM2/M2
ECHO AV CUSP MM: 2.3 CM
ECHO AV MEAN GRADIENT: 2 MMHG
ECHO AV MEAN VELOCITY: 0.7 M/S
ECHO AV PEAK GRADIENT: 4 MMHG
ECHO AV PEAK VELOCITY: 1 M/S
ECHO AV VELOCITY RATIO: 0.8
ECHO AV VTI: 17.2 CM
ECHO BSA: 2.05 M2
ECHO EST RA PRESSURE: 3 MMHG
ECHO LA DIAMETER INDEX: 1.87 CM/M2
ECHO LA DIAMETER: 3.8 CM
ECHO LA VOL A-L A2C: 72 ML (ref 18–58)
ECHO LA VOL MOD A2C: 69 ML (ref 18–58)
ECHO LA VOL MOD A4C: 54 ML (ref 18–58)
ECHO LA VOLUME AREA LENGTH: 68 ML
ECHO LA VOLUME INDEX A-L A2C: 35 ML/M2 (ref 16–34)
ECHO LA VOLUME INDEX A-L A4C: 30 ML/M2 (ref 16–34)
ECHO LA VOLUME INDEX AREA LENGTH: 33 ML/M2 (ref 16–34)
ECHO LA VOLUME INDEX MOD A2C: 34 ML/M2 (ref 16–34)
ECHO LA VOLUME INDEX MOD A4C: 27 ML/M2 (ref 16–34)
ECHO LV FRACTIONAL SHORTENING: 28 % (ref 28–44)
ECHO LV INTERNAL DIMENSION DIASTOLE INDEX: 2.61 CM/M2
ECHO LV INTERNAL DIMENSION DIASTOLIC: 5.3 CM (ref 4.2–5.9)
ECHO LV INTERNAL DIMENSION SYSTOLIC INDEX: 1.87 CM/M2
ECHO LV INTERNAL DIMENSION SYSTOLIC: 3.8 CM
ECHO LV ISOVOLUMETRIC RELAXATION TIME (IVRT): 73.8 MS
ECHO LV IVSD: 0.7 CM (ref 0.6–1)
ECHO LV IVSS: 1.2 CM
ECHO LV MASS 2D: 127 G (ref 88–224)
ECHO LV MASS INDEX 2D: 62.5 G/M2 (ref 49–115)
ECHO LV POSTERIOR WALL DIASTOLIC: 0.7 CM (ref 0.6–1)
ECHO LV POSTERIOR WALL SYSTOLIC: 1.2 CM
ECHO LV RELATIVE WALL THICKNESS RATIO: 0.26
ECHO LVOT AREA: 3.1 CM2
ECHO LVOT AV VTI INDEX: 0.81
ECHO LVOT DIAM: 2 CM
ECHO LVOT MEAN GRADIENT: 1 MMHG
ECHO LVOT PEAK GRADIENT: 2 MMHG
ECHO LVOT PEAK VELOCITY: 0.8 M/S
ECHO LVOT STROKE VOLUME INDEX: 21.5 ML/M2
ECHO LVOT SV: 43.6 ML
ECHO LVOT VTI: 13.9 CM
ECHO MV "A" WAVE DURATION: 73.8 MSEC
ECHO MV A VELOCITY: 0.55 M/S
ECHO MV AREA PHT: 3.9 CM2
ECHO MV AREA VTI: 1.8 CM2
ECHO MV E DECELERATION TIME (DT): 181.2 MS
ECHO MV E VELOCITY: 0.74 M/S
ECHO MV E/A RATIO: 1.35
ECHO MV EROA PISA: 0.2 CM2
ECHO MV LVOT VTI INDEX: 1.76
ECHO MV MAX VELOCITY: 1.2 M/S
ECHO MV MEAN GRADIENT: 2 MMHG
ECHO MV MEAN VELOCITY: 0.6 M/S
ECHO MV PEAK GRADIENT: 6 MMHG
ECHO MV PRESSURE HALF TIME (PHT): 56.7 MS
ECHO MV REGURGITANT ALIASING (NYQUIST) VELOCITY: 31 CM/S
ECHO MV REGURGITANT RADIUS PISA: 0.81 CM
ECHO MV REGURGITANT VELOCITY PISA: 5.8 M/S
ECHO MV REGURGITANT VOLUME PISA: 33.47 ML
ECHO MV REGURGITANT VTIA: 152 CM
ECHO MV VTI: 24.5 CM
ECHO PV MAX VELOCITY: 0.9 M/S
ECHO PV MEAN GRADIENT: 1 MMHG
ECHO PV MEAN VELOCITY: 0.5 M/S
ECHO PV PEAK GRADIENT: 3 MMHG
ECHO PV VTI: 14.8 CM
ECHO PVEIN A DURATION: 107.3 MS
ECHO PVEIN A VELOCITY: 0.3 M/S
ECHO PVEIN PEAK D VELOCITY: 0.5 M/S
ECHO PVEIN PEAK S VELOCITY: 0.6 M/S
ECHO PVEIN S/D RATIO: 1.2
ECHO RIGHT VENTRICULAR SYSTOLIC PRESSURE (RVSP): 21 MMHG
ECHO RV INTERNAL DIMENSION: 2.4 CM
ECHO RV TAPSE: 2.2 CM (ref 1.7–?)
ECHO TV REGURGITANT MAX VELOCITY: 2.12 M/S
ECHO TV REGURGITANT PEAK GRADIENT: 18 MMHG

## 2024-02-27 PROCEDURE — 93306 TTE W/DOPPLER COMPLETE: CPT

## 2024-02-28 ENCOUNTER — TELEPHONE (OUTPATIENT)
Dept: CARDIOLOGY CLINIC | Age: 54
End: 2024-02-28

## 2024-02-28 NOTE — TELEPHONE ENCOUNTER
----- Message from Gabbie Evans MD sent at 2/28/2024  3:38 PM EST -----  Based on AMY his heart function appears normal (low normal for patients with leaky mitral valve) but the mitral regurgitation/leakiness appears severe.  I would recommend doing another AMY if not we may have to consider doing an MRI for quantification of regurgitant jet.  Follow-up with me as scheduled on 4/15/2024.  Please ask him to see whether he has any symptoms of dyspnea with exertion.

## 2024-02-29 NOTE — TELEPHONE ENCOUNTER
Patient notified of echo results and Dr. Evans's recommendations.  Patient denies any symptoms of GARCIA at this time.  Advised patient to call the office if he does develop any symptoms.

## 2024-03-05 DIAGNOSIS — I10 ESSENTIAL HYPERTENSION: ICD-10-CM

## 2024-03-05 DIAGNOSIS — J45.20 ASTHMA IN ADULT, MILD INTERMITTENT, UNCOMPLICATED: ICD-10-CM

## 2024-03-05 RX ORDER — BUDESONIDE AND FORMOTEROL FUMARATE DIHYDRATE 80; 4.5 UG/1; UG/1
2 AEROSOL RESPIRATORY (INHALATION)
Qty: 10.2 G | Refills: 12 | Status: SHIPPED | OUTPATIENT
Start: 2024-03-05

## 2024-03-05 RX ORDER — LISINOPRIL 5 MG/1
5 TABLET ORAL NIGHTLY
Qty: 90 TABLET | Refills: 3 | Status: SHIPPED | OUTPATIENT
Start: 2024-03-05

## 2024-03-05 NOTE — TELEPHONE ENCOUNTER
Patients last appointment 10/16/2023.  Patients next scheduled appointment   Future Appointments   Date Time Provider Department Center   4/15/2024 10:30 AM Ruel Hogan MD [facility]   4/18/2024  2:00 PM Gabbie Evans MD [facility]   10/17/2024  7:15 AM Les Zapata, DO [facility]

## 2024-03-26 ENCOUNTER — OFFICE VISIT (OUTPATIENT)
Dept: CARDIOLOGY CLINIC | Age: 54
End: 2024-03-26
Payer: COMMERCIAL

## 2024-03-26 VITALS
BODY MASS INDEX: 26.77 KG/M2 | HEIGHT: 70 IN | RESPIRATION RATE: 16 BRPM | SYSTOLIC BLOOD PRESSURE: 116 MMHG | DIASTOLIC BLOOD PRESSURE: 72 MMHG | HEART RATE: 61 BPM | WEIGHT: 187 LBS

## 2024-03-26 DIAGNOSIS — I10 ESSENTIAL HYPERTENSION: Primary | ICD-10-CM

## 2024-03-26 DIAGNOSIS — I34.0 MITRAL VALVE INSUFFICIENCY, UNSPECIFIED ETIOLOGY: Primary | ICD-10-CM

## 2024-03-26 PROCEDURE — G8482 FLU IMMUNIZE ORDER/ADMIN: HCPCS | Performed by: INTERNAL MEDICINE

## 2024-03-26 PROCEDURE — 99214 OFFICE O/P EST MOD 30 MIN: CPT | Performed by: INTERNAL MEDICINE

## 2024-03-26 PROCEDURE — G8427 DOCREV CUR MEDS BY ELIG CLIN: HCPCS | Performed by: INTERNAL MEDICINE

## 2024-03-26 PROCEDURE — 3017F COLORECTAL CA SCREEN DOC REV: CPT | Performed by: INTERNAL MEDICINE

## 2024-03-26 PROCEDURE — 3078F DIAST BP <80 MM HG: CPT | Performed by: INTERNAL MEDICINE

## 2024-03-26 PROCEDURE — 93000 ELECTROCARDIOGRAM COMPLETE: CPT | Performed by: INTERNAL MEDICINE

## 2024-03-26 PROCEDURE — 3074F SYST BP LT 130 MM HG: CPT | Performed by: INTERNAL MEDICINE

## 2024-03-26 PROCEDURE — 1036F TOBACCO NON-USER: CPT | Performed by: INTERNAL MEDICINE

## 2024-03-26 PROCEDURE — G8419 CALC BMI OUT NRM PARAM NOF/U: HCPCS | Performed by: INTERNAL MEDICINE

## 2024-03-26 NOTE — PATIENT INSTRUCTIONS
TTE results were reviewed and discussed with the patient.  Has moderate to severe MR with an EF 55 to 60% with P2 prolapse.  Recommended to get a AMY for further evaluation of mitral regurgitation and possible referral to valve clinic at [facility].  Continue lisinopril 5 mg po daily for hypertension  Continue rest of the current medications  Recent lipid panel was reviewed well-controlled LDL 69.  Follow up with me in 3 months.

## 2024-03-26 NOTE — PROGRESS NOTES
OUTPATIENT CARDIOLOGY FOLLOW-UP    Name: Arvin Ruiz    Age: 54 y.o.    Primary Care Physician: Les Zapata DO    Date of Service: 3/26/2024    Chief Complaint:   Chief Complaint   Patient presents with    Hypertension     6 month office visit       Interim History:   Mr. Ruiz is a 54-year-old  male with history of mitral valve prolapse with moderate to severe MR, GERD, initiated on lisinopril for hypertension, bronchial asthma, rheumatic fever allergic rhinitis presented for follow-up visit.  Previously, patient was following with Dr. Pelletier for his cardiology needs.  He was seen in the office on 5/31/2023, since last visit, he has not any further hospitalizations or ER visits.    He is compliant with medications, as well as salt and fluid intake.  He does not take any over-the-counter arthritis medications.    Gets occasional palpitations/heart racing.   He gets winded with exertion, denies any orthopnea or PND or leg edema. Never passed out.  Patient denies any chest pain, palpitations, dizziness lightheadedness syncope presyncope.  Patient had repeat echocardiogram on 2/27/2024 which showed moderate to severe mitral regurgitation with a P2 prolapse.  LV end systolic dimension 38, LVEF 55 to 60%.    No new cardiac complaints since last cardiology evaluation except for dyspnea with exertion. He denies recent chest pain, SOB, palpitations, lightheadedness, dizziness, syncope, PND, or orthopnea. SR on EKG.    Review of Systems:   Cardiac: As per HPI  General: No fever, chills  Pulmonary: As per HPI  HEENT: No visual disturbances, difficult swallowing  GI: No nausea, vomiting  Endocrine: No thyroid disease or DM  Musculoskeletal: FOSTER x 4, no focal motor deficits  Skin: Intact, no rashes  Neuro/Psych: No headache or seizures    Past Medical History:  Past Medical History:   Diagnosis Date    Allergic rhinitis     Allergy to cats     Asthma     controlled with inhaler     Diverticulitis

## 2024-04-05 ENCOUNTER — TELEPHONE (OUTPATIENT)
Dept: CARDIOLOGY CLINIC | Age: 54
End: 2024-04-05

## 2024-04-05 NOTE — TELEPHONE ENCOUNTER
I was able to reach patient today after several messages left to discuss his AMY.  He does not have a preference of hospital but does prefer Dr. Evans to do it.  Dr. Evans is in [place] the week of 4/22/24, patient is available any day but 4/26/24.  AMY will be scheduled accordingly.

## 2024-04-08 ENCOUNTER — TELEPHONE (OUTPATIENT)
Dept: CARDIOLOGY CLINIC | Age: 54
End: 2024-04-08

## 2024-04-08 NOTE — TELEPHONE ENCOUNTER
Per Dr. Evans, patient needs AMY re: moderate to severe mitral regurgitation.  Scheduled for 4/25/24.  Prior auth pending.    AMY (15497)  MR (I34.0)

## 2024-04-08 NOTE — TELEPHONE ENCOUNTER
04/08/2024-Called [facility] ([facility]).  No prior auth required for AMY (45513).  Ref #[id number]

## 2024-04-15 ENCOUNTER — OFFICE VISIT (OUTPATIENT)
Dept: VASCULAR SURGERY | Age: 54
End: 2024-04-15
Payer: COMMERCIAL

## 2024-04-15 VITALS — WEIGHT: 190 LBS | BODY MASS INDEX: 27.26 KG/M2

## 2024-04-15 DIAGNOSIS — I72.8 SPLENIC ARTERY ANEURYSM (HCC): Primary | ICD-10-CM

## 2024-04-15 PROCEDURE — G8419 CALC BMI OUT NRM PARAM NOF/U: HCPCS | Performed by: SURGERY

## 2024-04-15 PROCEDURE — 1036F TOBACCO NON-USER: CPT | Performed by: SURGERY

## 2024-04-15 PROCEDURE — 3017F COLORECTAL CA SCREEN DOC REV: CPT | Performed by: SURGERY

## 2024-04-15 PROCEDURE — G8427 DOCREV CUR MEDS BY ELIG CLIN: HCPCS | Performed by: SURGERY

## 2024-04-15 PROCEDURE — 99203 OFFICE O/P NEW LOW 30 MIN: CPT | Performed by: SURGERY

## 2024-04-15 NOTE — PROGRESS NOTES
Transportation Needs: Unknown (10/9/2023)    PRAPARE - Transportation     Lack of Transportation (Medical): Not on file     Lack of Transportation (Non-Medical): No   Physical Activity: Not on file   Stress: Not on file   Social Connections: Not on file   Intimate Partner Violence: Not on file   Housing Stability: Unknown (10/9/2023)    Housing Stability Vital Sign     Unable to Pay for Housing in the Last Year: Not on file     Number of Places Lived in the Last Year: Not on file     Unstable Housing in the Last Year: No     Family History   Problem Relation Age of Onset    Heart Surgery Mother 40        valve surgery    Heart Surgery Father 69     - Negative for AAA  Labs  Lab Results   Component Value Date    WBC 11.2 10/10/2023    HGB 16.6 (H) 10/10/2023    HCT 50.5 10/10/2023     10/10/2023    K 4.5 10/10/2023    BUN 17 10/10/2023    CREATININE 1.1 10/10/2023     PHYSICAL EXAM:    Wt 86.2 kg (190 lb)   BMI 27.26 kg/m²   CONSTITUTIONAL:   Awake, alert, cooperative  PSYCHIATRIC :  Oriented to time, place and person     Appropriate insight to disease process  EYES: Lids and lashes normal  ENT:  External ears and nose without lesions   Hearing deficits not noted  NECK: Supple, symmetrical, trachea midline   Thyroid goiter not appreciated  LUNGS:  No increased work of breathing                 Clear to auscultation  CARDIOVASCULAR:  regular rate and rhythm   ABDOMEN:  soft, non-distended, non-tender   Aorta is not palpable  Lymphatics : Cervical lymphadenopathy not noted     Femoral lymphadenopathy not noted  SKIN:   Normal skin color   Texture and turgor normal, no induration  EXTREMITIES:   R UE 5/5 strength   No cyanosis noted in nail beds  L UE 5/5 strength   No cyanosis noted in nail beds  R LE Edema absent   5/5 strength  L LE Edema absent   5/5 strength  R femoral 2+ L femoral 2+   R popliteal 2+ L popliteal 2+   R posterior tibial 2+ L posterior tibial 2+     RADIOLOGY:  Imaging Study reviewed  CTA

## 2024-04-22 ENCOUNTER — TELEPHONE (OUTPATIENT)
Dept: VASCULAR SURGERY | Age: 54
End: 2024-04-22

## 2024-04-22 VITALS — BODY MASS INDEX: 25.9 KG/M2 | HEIGHT: 71 IN | WEIGHT: 185 LBS

## 2024-04-22 NOTE — TELEPHONE ENCOUNTER
Peer to peer completed for CTA abd/pelvis for f/u evaluation of 1.1cm splenic artery aneurysm. Approval received. If no change on repeat imaging, plan for repeat CT imaging in 3 years vs [facility].

## 2024-04-22 NOTE — PROGRESS NOTES
[facility] PRE-ADMISSION TESTING INSTRUCTIONS    The Preadmission Testing patient is instructed accordingly using the following criteria (check applicable):    ARRIVAL INSTRUCTIONS:  [x] Parking the day of Surgery is located in the Main Entrance lot.  Upon entering the door, make an immediate right to the surgery reception desk    [x] Bring photo ID and insurance card    [x] Bring in a copy of Living will or Durable Power of  papers.    [x] Please be sure to arrange for responsible adult to provide transportation to and from the hospital    [x] Please arrange for responsible adult to be with you for the 24 hour period post procedure due to having anesthesia    [x] If you awake am of surgery not feeling well or have temperature >100 please call [phone]    GENERAL INSTRUCTIONS:    [x] Nothing by mouth after midnight, including gum, candy, mints or water    [x] You may brush your teeth, but do not swallow any water    [x] Take medications as instructed with 1-2 oz of water    [] Stop herbal supplements and vitamins 5 days prior to procedure    [] Follow preop dosing of blood thinners per physician instructions    [] Take 1/2 dose of evening insulin, but no insulin after midnight    [] No oral diabetic medications after midnight    [] If diabetic and have low blood sugar or feel symptomatic, take 1-2oz apple juice only    [x] Bring inhalers day of surgery    [] Bring C-PAP/ Bi-Pap day of surgery    [] Bring urine specimen day of surgery    [x] Shower or bath with soap, lather and rinse well, AM of Surgery, no lotion, powders or creams to surgical site    [] Follow bowel prep as instructed per surgeon    [x] No tobacco products within 24 hours of surgery     [x] No alcohol or illegal drug use within 24 hours of surgery.    [x] Jewelry, body piercing's, eyeglasses, contact lenses and dentures are not permitted into surgery (bring cases)      [x] Please do not wear any nail polish,

## 2024-04-22 NOTE — TELEPHONE ENCOUNTER
Scheduled CTA abd/pelvis at [facility] 5/2/24 at 3:00 pm.  Pt notified and instructed to be NPO x 3 hours prior and to have labs drawn.

## 2024-04-23 ENCOUNTER — TELEPHONE (OUTPATIENT)
Dept: CARDIOLOGY CLINIC | Age: 54
End: 2024-04-23

## 2024-04-23 NOTE — TELEPHONE ENCOUNTER
Earle Crawford at [facility] on 4/25/24 has been moved from 8:00 a.m. to 7:30 a.m.  Left message for patient to call the office.

## 2024-04-25 ENCOUNTER — HOSPITAL ENCOUNTER (OUTPATIENT)
Age: 54
Discharge: HOME | End: 2024-04-25
Payer: COMMERCIAL

## 2024-04-25 ENCOUNTER — ANESTHESIA EVENT (OUTPATIENT)
Age: 54
End: 2024-04-25
Payer: COMMERCIAL

## 2024-04-25 ENCOUNTER — HOSPITAL ENCOUNTER (OUTPATIENT)
Age: 54
Discharge: HOME | End: 2024-04-27
Payer: COMMERCIAL

## 2024-04-25 ENCOUNTER — ANESTHESIA (OUTPATIENT)
Age: 54
End: 2024-04-25
Payer: COMMERCIAL

## 2024-04-25 VITALS
DIASTOLIC BLOOD PRESSURE: 72 MMHG | HEART RATE: 74 BPM | SYSTOLIC BLOOD PRESSURE: 108 MMHG | OXYGEN SATURATION: 99 % | RESPIRATION RATE: 16 BRPM

## 2024-04-25 DIAGNOSIS — I34.1 MVP (MITRAL VALVE PROLAPSE): Primary | ICD-10-CM

## 2024-04-25 DIAGNOSIS — I34.0 MITRAL VALVE INSUFFICIENCY, UNSPECIFIED ETIOLOGY: ICD-10-CM

## 2024-04-25 LAB
ECHO AO ASC DIAM: 2.9 CM
ECHO AO SINUS VALSALVA DIAM: 3.2 CM
ECHO EST RA PRESSURE: 8 MMHG
ECHO LV INTERNAL DIMENSION SYSTOLIC: 3.7 CM
ECHO MV AREA PHT: 4.4 CM2
ECHO MV MAX VELOCITY: 0.7 M/S
ECHO MV MEAN GRADIENT: 1 MMHG
ECHO MV MEAN VELOCITY: 0.5 M/S
ECHO MV PEAK GRADIENT: 2 MMHG
ECHO MV PRESSURE HALF TIME (PHT): 50.5 MS
ECHO MV VTI: 15.3 CM
ECHO RIGHT VENTRICULAR SYSTOLIC PRESSURE (RVSP): 25 MMHG
ECHO TV REGURGITANT MAX VELOCITY: 2.04 M/S
ECHO TV REGURGITANT PEAK GRADIENT: 17 MMHG

## 2024-04-25 PROCEDURE — 7100000010 HC PHASE II RECOVERY - FIRST 15 MIN

## 2024-04-25 PROCEDURE — 6360000002 HC RX W HCPCS: Performed by: NURSE ANESTHETIST, CERTIFIED REGISTERED

## 2024-04-25 PROCEDURE — 7100000011 HC PHASE II RECOVERY - ADDTL 15 MIN

## 2024-04-25 PROCEDURE — 93320 DOPPLER ECHO COMPLETE: CPT

## 2024-04-25 PROCEDURE — 3700000000 HC ANESTHESIA ATTENDED CARE

## 2024-04-25 PROCEDURE — 2580000003 HC RX 258: Performed by: NURSE ANESTHETIST, CERTIFIED REGISTERED

## 2024-04-25 PROCEDURE — 3700000001 HC ADD 15 MINUTES (ANESTHESIA)

## 2024-04-25 PROCEDURE — 93319 3D ECHO IMG CGEN CAR ANOMAL: CPT

## 2024-04-25 RX ORDER — SODIUM CHLORIDE 0.9 % (FLUSH) 0.9 %
5-40 SYRINGE (ML) INJECTION PRN
Status: DISCONTINUED | OUTPATIENT
Start: 2024-04-25 | End: 2024-04-28 | Stop reason: HOSPADM

## 2024-04-25 RX ORDER — PROPOFOL 10 MG/ML
INJECTION, EMULSION INTRAVENOUS PRN
Status: DISCONTINUED | OUTPATIENT
Start: 2024-04-25 | End: 2024-04-25 | Stop reason: SDUPTHER

## 2024-04-25 RX ORDER — SODIUM CHLORIDE 0.9 % (FLUSH) 0.9 %
5-40 SYRINGE (ML) INJECTION EVERY 12 HOURS SCHEDULED
Status: DISCONTINUED | OUTPATIENT
Start: 2024-04-25 | End: 2024-04-28 | Stop reason: HOSPADM

## 2024-04-25 RX ORDER — SODIUM CHLORIDE 9 MG/ML
INJECTION, SOLUTION INTRAVENOUS PRN
Status: DISCONTINUED | OUTPATIENT
Start: 2024-04-25 | End: 2024-04-28 | Stop reason: HOSPADM

## 2024-04-25 RX ORDER — SODIUM CHLORIDE 9 MG/ML
INJECTION, SOLUTION INTRAVENOUS CONTINUOUS PRN
Status: DISCONTINUED | OUTPATIENT
Start: 2024-04-25 | End: 2024-04-25 | Stop reason: SDUPTHER

## 2024-04-25 RX ADMIN — PROPOFOL 340 MG: 10 INJECTION, EMULSION INTRAVENOUS at 07:41

## 2024-04-25 RX ADMIN — SODIUM CHLORIDE: 9 INJECTION, SOLUTION INTRAVENOUS at 07:29

## 2024-04-25 ASSESSMENT — PAIN SCALES - GENERAL
PAINLEVEL_OUTOF10: 0
PAINLEVEL_OUTOF10: 0

## 2024-04-25 ASSESSMENT — LIFESTYLE VARIABLES: SMOKING_STATUS: 0

## 2024-04-25 NOTE — ANESTHESIA POSTPROCEDURE EVALUATION
Department of Anesthesiology  Postprocedure Note    Patient: Arvin Ruiz  MRN: [id number]  YOB: 1970  Date of evaluation: 4/25/2024    Procedure Summary       Date: 04/25/24 Room / Location: [facility] Cardiology    Anesthesia Start: 0729 Anesthesia Stop: 0812    Procedure: AMY (PRN CONTRAST/BUBBLE/3D) Diagnosis: Mitral valve insufficiency, unspecified etiology    Scheduled Providers:  Responsible Provider: Ciara Damian DO    Anesthesia Type: MAC ASA Status: 3            Anesthesia Type: MAC    Kandy Phase I: Kandy Score: 10    Kandy Phase II:      Anesthesia Post Evaluation    Patient location during evaluation: PACU  Patient participation: waiting for patient participation  Level of consciousness: sleepy but conscious  Pain score: 0  Airway patency: patent  Nausea & Vomiting: no vomiting and no nausea  Cardiovascular status: hemodynamically stable  Respiratory status: acceptable  Hydration status: stable  Pain management: adequate        No notable events documented.

## 2024-04-25 NOTE — H&P
72 hours.  Phos: No results for input(s): \"PHOS\" in the last 72 hours.  TSH: No results for input(s): \"TSH\" in the last 72 hours.  HgA1c: No results found for: \"LABA1C\"  No results found for: \"EAG\"  proBNP: No results for input(s): \"PROBNP\" in the last 72 hours.  PT/INR: No results for input(s): \"PROTIME\", \"INR\" in the last 72 hours.  APTT:No results for input(s): \"APTT\" in the last 72 hours.  CARDIAC ENZYMES:No results for input(s): \"CKTOTAL\", \"CKMB\", \"CKMBINDEX\", \"TROPONINI\" in the last 72 hours.  FASTING LIPID PANEL:  Lab Results   Component Value Date/Time    CHOL 121 10/10/2023 11:37 AM    CHOL 153 10/31/2022 07:48 AM    HDL 44 10/10/2023 11:37 AM    LDLCALC 100 10/31/2022 07:48 AM    TRIG 40 10/10/2023 11:37 AM     LIVER PROFILE:No results for input(s): \"AST\", \"ALT\", \"LABALBU\" in the last 72 hours.    Impression:  Mitral valve prolapse with severe MR    Plan:  AMY    Electronically signed by Gabbie Evans MD on 4/25/2024 at 7:17 AM

## 2024-04-25 NOTE — PROCEDURES
Preprocedure diagnosis: AMY    Procedures: AMY    Primary procedure  Written informed consent was obtained, the AMY was performed under stable fasting condition. The patient was set up for monitoring of surface EKG and pulse oximetry continuously. Blood pressure was monitored and with automatic cuff measurements. Supplemental oxygen was administered. The procedure was performed under anesthesia by anesthesiology. After ensuring adequate anesthesia, AMY probe was intubated without difficulty.     Result: severe eccentric mitral regurgitation, full report to follow    Complication: None    Patient was monitored until awake and vitals remained stable.    Gabbie Evans M.D.  [facility] cardiology

## 2024-04-25 NOTE — ANESTHESIA PRE PROCEDURE
Department of Anesthesiology  Preprocedure Note       Name:  Arvin Ruiz   Age:  54 y.o.  :  1970                                          MRN:  [id number]         Date:  2024      Surgeon: * No surgeons listed *    Procedure: * No procedures listed *    Medications prior to admission:   Prior to Admission medications    Medication Sig Start Date End Date Taking? Authorizing Provider   budesonide-formoterol (SYMBICORT) 80-4.5 MCG/ACT AERO Inhale 2 puffs into the lungs in the morning and 2 puffs in the evening. 3/5/24   Les Zapata,    lisinopril (PRINIVIL;ZESTRIL) 5 MG tablet Take 1 tablet by mouth at bedtime 3/5/24   Les Zapata DO   Albuterol Sulfate, sensor, 108 (90 Base) MCG/ACT AEPB Inhale 2 puffs into the lungs 4 times daily as needed (sob) 10/31/22   Les Zapata DO       Current medications:    Current Outpatient Medications   Medication Sig Dispense Refill    budesonide-formoterol (SYMBICORT) 80-4.5 MCG/ACT AERO Inhale 2 puffs into the lungs in the morning and 2 puffs in the evening. 10.2 g 12    lisinopril (PRINIVIL;ZESTRIL) 5 MG tablet Take 1 tablet by mouth at bedtime 90 tablet 3    Albuterol Sulfate, sensor, 108 (90 Base) MCG/ACT AEPB Inhale 2 puffs into the lungs 4 times daily as needed (sob) 1 each 12     No current facility-administered medications for this encounter.       Allergies:  No Known Allergies    Problem List:    Patient Active Problem List   Diagnosis Code    MVP (mitral valve prolapse) I34.1    Anxiety and depression F41.9, F32.A    GERD (gastroesophageal reflux disease) K21.9    Nonrheumatic mitral valve regurgitation I34.0    Mixed hyperlipidemia E78.2    Essential hypertension I10    Asthma in adult, mild intermittent, uncomplicated J45.20    Diverticulitis K57.92    Splenic artery aneurysm (HCC) I72.8    Constipation K59.00       Past Medical History:        Diagnosis Date    Allergic rhinitis     Allergy to cats     Asthma     controlled with

## 2024-04-26 ENCOUNTER — TELEPHONE (OUTPATIENT)
Dept: CARDIOLOGY CLINIC | Age: 54
End: 2024-04-26

## 2024-04-26 NOTE — TELEPHONE ENCOUNTER
----- Message from Gabbie Evans MD sent at 4/26/2024  2:13 PM EDT -----  AMY results were reviewed and discussed with the patient on 4/25/2024 of.  He needs to follow-up with the valve clinic and would like to go to [facility].  Please refer him to heart surgeon at  the [facility] (I do not know the names). Patient has severe MR secondary to mitral valve prolapse.

## 2024-04-29 DIAGNOSIS — I72.8 SPLENIC ARTERY ANEURYSM (HCC): ICD-10-CM

## 2024-04-29 DIAGNOSIS — I34.0 SEVERE MITRAL REGURGITATION: Primary | ICD-10-CM

## 2024-04-29 LAB
ANION GAP SERPL CALCULATED.3IONS-SCNC: 13 MMOL/L (ref 7–16)
BUN BLDV-MCNC: 25 MG/DL (ref 6–20)
CALCIUM SERPL-MCNC: 9.2 MG/DL (ref 8.6–10.2)
CO2: 22 MMOL/L (ref 22–29)
CREAT SERPL-MCNC: 1.1 MG/DL (ref 0.7–1.2)
GFR SERPL CREATININE-BSD FRML MDRD: 80 ML/MIN/1.73M2
GLUCOSE BLD-MCNC: 94 MG/DL (ref 74–99)
POTASSIUM SERPL-SCNC: 4.3 MMOL/L (ref 3.5–5)
SODIUM BLD-SCNC: 138 MMOL/L (ref 132–146)

## 2024-05-02 ENCOUNTER — HOSPITAL ENCOUNTER (OUTPATIENT)
Dept: CT IMAGING | Age: 54
Discharge: HOME | End: 2024-05-04
Attending: SURGERY
Payer: COMMERCIAL

## 2024-05-02 DIAGNOSIS — I72.8 SPLENIC ARTERY ANEURYSM (HCC): ICD-10-CM

## 2024-05-02 PROCEDURE — 74174 CTA ABD&PLVS W/CONTRAST: CPT

## 2024-05-02 PROCEDURE — 6360000004 HC RX CONTRAST MEDICATION: Performed by: RADIOLOGY

## 2024-05-02 RX ADMIN — IOPAMIDOL 75 ML: 755 INJECTION, SOLUTION INTRAVENOUS at 14:59

## 2024-05-03 ENCOUNTER — TELEPHONE (OUTPATIENT)
Dept: CARDIOLOGY | Facility: HOSPITAL | Age: 54
End: 2024-05-03
Payer: COMMERCIAL

## 2024-05-03 DIAGNOSIS — I34.0 MITRAL VALVE INSUFFICIENCY, UNSPECIFIED ETIOLOGY: Primary | ICD-10-CM

## 2024-05-20 ENCOUNTER — APPOINTMENT (OUTPATIENT)
Dept: CARDIOLOGY | Facility: HOSPITAL | Age: 54
End: 2024-05-20
Payer: COMMERCIAL

## 2024-05-20 ENCOUNTER — OFFICE VISIT (OUTPATIENT)
Dept: CARDIAC SURGERY | Facility: HOSPITAL | Age: 54
End: 2024-05-20
Payer: COMMERCIAL

## 2024-05-20 ENCOUNTER — OFFICE VISIT (OUTPATIENT)
Dept: CARDIOLOGY | Facility: HOSPITAL | Age: 54
End: 2024-05-20
Payer: COMMERCIAL

## 2024-05-20 ENCOUNTER — APPOINTMENT (OUTPATIENT)
Dept: CARDIAC SURGERY | Facility: HOSPITAL | Age: 54
End: 2024-05-20
Payer: COMMERCIAL

## 2024-05-20 VITALS
WEIGHT: 183.6 LBS | HEART RATE: 68 BPM | OXYGEN SATURATION: 97 % | SYSTOLIC BLOOD PRESSURE: 118 MMHG | HEIGHT: 70 IN | DIASTOLIC BLOOD PRESSURE: 77 MMHG | BODY MASS INDEX: 26.28 KG/M2

## 2024-05-20 DIAGNOSIS — I34.0 NONRHEUMATIC MITRAL VALVE REGURGITATION: Primary | ICD-10-CM

## 2024-05-20 DIAGNOSIS — I34.0 MITRAL VALVE INSUFFICIENCY, UNSPECIFIED ETIOLOGY: ICD-10-CM

## 2024-05-20 PROCEDURE — 99205 OFFICE O/P NEW HI 60 MIN: CPT | Performed by: THORACIC SURGERY (CARDIOTHORACIC VASCULAR SURGERY)

## 2024-05-20 PROCEDURE — 99215 OFFICE O/P EST HI 40 MIN: CPT | Performed by: THORACIC SURGERY (CARDIOTHORACIC VASCULAR SURGERY)

## 2024-05-20 PROCEDURE — 99204 OFFICE O/P NEW MOD 45 MIN: CPT | Performed by: INTERNAL MEDICINE

## 2024-05-20 PROCEDURE — 99214 OFFICE O/P EST MOD 30 MIN: CPT | Performed by: INTERNAL MEDICINE

## 2024-05-20 RX ORDER — LISINOPRIL 5 MG/1
5 TABLET ORAL DAILY
COMMUNITY

## 2024-05-20 RX ORDER — BUDESONIDE AND FORMOTEROL FUMARATE DIHYDRATE 80; 4.5 UG/1; UG/1
2 AEROSOL RESPIRATORY (INHALATION)
COMMUNITY

## 2024-05-20 ASSESSMENT — PAIN SCALES - GENERAL: PAINLEVEL: 0-NO PAIN

## 2024-05-20 NOTE — PROGRESS NOTES
Cardiologist: Osei      HPI:   53 y/o gentleman with pmh of asthma and GERD.    The patient has known MVP for the last 10 years. He has been under survaillence. His most recent TTE on 02/2024 (we dont have images) showed EF 55-60%, mod-severe MR. He then underwent GISELLE on 04/25/24 (we reviewed) -- shows prolapse of P2/P3 with severe eccentric MR.  The patient is not very active. He reports dyspnea when he rarely jogs - otherwise doesn't exercise and reports no symptoms.     He is independent in ADLs/IADLs and uses no aids to ambulate.       ROS:    Constitutional: fatigue  Eyes: no acute eye problems, no blurred vision, no diplopia, no eye pain  ENT: no nosebleeds, no acute hearing loss, no earache, no sore throat  Cardiovascular: dyspnea on exertion, no chest pain  Respiratory: no chronic cough, not coughing up sputum, no wheezing that is consistent with asthma  Gastrointestinal: no acute bowel complaints  Musculoskeletal: no acute arthralgias, no acute myalgias, no acute joint swelling  Skin: no skin rashes, no change in skin color and pigmentation, no skin lesions and no skin lumps.   Neurological: no headaches, no dizziness, no tingling, no fainting and no limb weakness.   Psychiatric:  no suicidal ideation, no confusion, no personality change and no emotional problems.   Hematologic/Lymphatic: no bleeding issues.   All other systems have been reviewed and are negative for complaint.     Gen: Alert, awake, O x 3  Head: No evidence of trauma  Neck: No JVD  Heart: RRR, 3/6 holosystolic murmur apex  Lungs: Bilateral CTA  Abd: Soft, NT, ND  Ext: No edema, no cellulitis  Neuro: CN II-XII intact, 5/5 bilateral     IGNACIO Workup:  - NYHA: I  - Frailty: 0/5  - TTE:02/2024 (we dont have images) showed EF 55-60%, mod-severe MR.  - GISELLE:GISELLE on 04/25/24 (we reviewed) -- shows prolapse of P2/P3 with severe eccentric MR.  - RLHC:pending  - CPM (anesthesia clearance): pending  - dental clearance: regular visits -- no  "issues  - STS            5/20/2024    10:23 AM   Vitals   Systolic 118   Diastolic 77   Heart Rate 68   Height (in) 1.778 m (5' 10\")   Weight (lb) 183.6   BMI 26.34 kg/m2   BSA (m2) 2.03 m2   Visit Report Report        Current Outpatient Medications   Medication Instructions    budesonide-formoteroL (Symbicort) 80-4.5 mcg/actuation inhaler 2 puffs, inhalation, 2 times daily RT, Rinse mouth with water after use to reduce aftertaste and incidence of candidiasis. Do not swallow.    lisinopril 5 mg, oral, Daily              Impression:  54-year-old gentleman with known mitral valve prolapse who has progressed to severe eccentric mitral regurgitation in the setting of P2/P3 prolapse.    The patient is sent to our clinic for evaluation and recommendations.      Plan:  Recommend surgical mitral valve repair          "

## 2024-05-20 NOTE — PROGRESS NOTES
Cardiologist: Osei      HPI:   53 y/o gentleman with pmh of asthma and GERD.    The patient has known MVP for the last 10 years. He has been under survaillence. His most recent TTE on 02/2024 (we dont have images) showed EF 55-60%, mod-severe MR. He then underwent GISELLE on 04/25/24 (we reviewed) -- shows prolapse of P2/P3 with severe eccentric MR.  The patient is not very active. He reports dyspnea when he rarely jogs - otherwise doesn't exercise and reports no symptoms.     He is independent in ADLs/IADLs and uses no aids to ambulate.       ROS:    Constitutional: fatigue  Eyes: no acute eye problems, no blurred vision, no diplopia, no eye pain  ENT: no nosebleeds, no acute hearing loss, no earache, no sore throat  Cardiovascular: dyspnea on exertion, no chest pain  Respiratory: no chronic cough, not coughing up sputum, no wheezing that is consistent with asthma  Gastrointestinal: no acute bowel complaints  Musculoskeletal: no acute arthralgias, no acute myalgias, no acute joint swelling  Skin: no skin rashes, no change in skin color and pigmentation, no skin lesions and no skin lumps.   Neurological: no headaches, no dizziness, no tingling, no fainting and no limb weakness.   Psychiatric:  no suicidal ideation, no confusion, no personality change and no emotional problems.   Hematologic/Lymphatic: no bleeding issues.   All other systems have been reviewed and are negative for complaint.     Gen: Alert, awake, O x 3  Head: No evidence of trauma  Neck: No JVD  Heart: RRR, 3/6 holosystolic murmur apex  Lungs: Bilateral CTA  Abd: Soft, NT, ND  Ext: No edema, no cellulitis  Neuro: CN II-XII intact, 5/5 bilateral     IGNACIO Workup:  - NYHA: I  - Frailty: 0/5  - TTE:02/2024 (we dont have images) showed EF 55-60%, mod-severe MR.  - GISELLE:GISELLE on 04/25/24 (we reviewed) -- shows prolapse of P2/P3 with severe eccentric MR.  - RLHC:pending  - CPM (anesthesia clearance): pending  - dental clearance: regular visits -- no issues  -  "STS            5/20/2024    10:23 AM   Vitals   Systolic 118   Diastolic 77   Heart Rate 68   Height (in) 1.778 m (5' 10\")   Weight (lb) 183.6   BMI 26.34 kg/m2   BSA (m2) 2.03 m2   Visit Report Report        Current Outpatient Medications   Medication Instructions    budesonide-formoteroL (Symbicort) 80-4.5 mcg/actuation inhaler 2 puffs, inhalation, 2 times daily RT, Rinse mouth with water after use to reduce aftertaste and incidence of candidiasis. Do not swallow.    lisinopril 5 mg, oral, Daily              Impression:  54-year-old gentleman with known mitral valve prolapse who has progressed to severe eccentric mitral regurgitation in the setting of P2/P3 prolapse.    The patient is sent to our clinic for evaluation and recommendations.      Plan:  Recommend surgical mitral valve repair        " General:     NAD, well-nourished, well-appearing  Head:     NC/AT, EOMI, oral mucosa moist  Neck:     supple  Lungs:     CTA b/l, no w/r/r  CVS:     S1S2, RRR, no m/g/r  Abd:     +BS, s/nt/nd, no organomegaly  Ext:    2+ radial and pedal pulses, no c/c/e  Neuro: grossly intact

## 2024-05-30 ENCOUNTER — CARDIOLOGY CONFERENCE (OUTPATIENT)
Dept: CARDIOLOGY | Facility: CLINIC | Age: 54
End: 2024-05-30
Payer: COMMERCIAL

## 2024-05-31 NOTE — PROGRESS NOTES
Case was reviewed in Valve and Structural Heart team meeting.  55 yo gentleman with known MVP- prolapse P2/P3 with severe eccentric MR.  Recommend surgery.  Dr. Goldberg to follow.

## 2024-06-10 PROBLEM — I72.8 SPLENIC ARTERY ANEURYSM (CMS-HCC): Status: ACTIVE | Noted: 2023-10-16

## 2024-06-10 PROBLEM — K57.92 DIVERTICULITIS: Status: ACTIVE | Noted: 2023-10-16

## 2024-06-10 PROBLEM — J45.20 MILD INTERMITTENT ASTHMA, UNCOMPLICATED (HHS-HCC): Status: ACTIVE | Noted: 2024-06-10

## 2024-06-10 PROBLEM — I34.1 MVP (MITRAL VALVE PROLAPSE): Status: ACTIVE | Noted: 2019-05-13

## 2024-06-10 PROBLEM — I10 ESSENTIAL HYPERTENSION: Status: ACTIVE | Noted: 2021-09-21

## 2024-06-10 PROBLEM — E78.2 MIXED HYPERLIPIDEMIA: Status: ACTIVE | Noted: 2021-09-21

## 2024-06-10 PROBLEM — K21.9 GERD (GASTROESOPHAGEAL REFLUX DISEASE): Status: ACTIVE | Noted: 2024-06-10

## 2024-06-11 ENCOUNTER — TELEMEDICINE (OUTPATIENT)
Dept: CARDIAC SURGERY | Facility: CLINIC | Age: 54
End: 2024-06-11
Payer: COMMERCIAL

## 2024-06-11 DIAGNOSIS — I34.0 MITRAL VALVE INSUFFICIENCY, UNSPECIFIED ETIOLOGY: ICD-10-CM

## 2024-06-11 PROCEDURE — 99212 OFFICE O/P EST SF 10 MIN: CPT | Performed by: THORACIC SURGERY (CARDIOTHORACIC VASCULAR SURGERY)

## 2024-06-12 DIAGNOSIS — I34.0 MITRAL VALVE INSUFFICIENCY, UNSPECIFIED ETIOLOGY: Primary | ICD-10-CM

## 2024-06-12 NOTE — PROGRESS NOTES
This was a follow-up visit with Gavin Moreau.  He goes by the name Harinder.    At our last visit, we had indicated that likely we would proceed with minimally invasive mitral valve repair.  His case was discussed at our structural heart meeting, and it was decided that a mitral valve repair would be his best option.    The patient understands this and agrees with proceeding.  We will schedule his operation for the last week of October according to his wishes.  We will organize his cardiac catheterization at [facility] in July.  We will organize his PAT visit approximately 2 or 3 weeks before his surgery at which time he will also get a CT angiogram of the chest and abdomen as is our routine with the patient undergoing minimally invasive mitral valve repair, to ensure that the vasculature and chest wall anatomy are feasible.

## 2024-06-17 PROBLEM — I34.0 MITRAL VALVE INSUFFICIENCY: Status: ACTIVE | Noted: 2024-06-12

## 2024-06-17 RX ORDER — SODIUM CHLORIDE, SODIUM LACTATE, POTASSIUM CHLORIDE, CALCIUM CHLORIDE 600; 310; 30; 20 MG/100ML; MG/100ML; MG/100ML; MG/100ML
20 INJECTION, SOLUTION INTRAVENOUS CONTINUOUS
OUTPATIENT
Start: 2024-06-17

## 2024-07-17 DIAGNOSIS — I10 ESSENTIAL HYPERTENSION: ICD-10-CM

## 2024-07-17 DIAGNOSIS — J45.20 ASTHMA IN ADULT, MILD INTERMITTENT, UNCOMPLICATED: ICD-10-CM

## 2024-07-17 RX ORDER — LISINOPRIL 5 MG/1
5 TABLET ORAL NIGHTLY
Qty: 90 TABLET | Refills: 3 | Status: SHIPPED | OUTPATIENT
Start: 2024-07-17

## 2024-07-17 RX ORDER — BUDESONIDE AND FORMOTEROL FUMARATE DIHYDRATE 80; 4.5 UG/1; UG/1
2 AEROSOL RESPIRATORY (INHALATION)
Qty: 10.2 G | Refills: 3 | Status: SHIPPED | OUTPATIENT
Start: 2024-07-17

## 2024-07-24 ENCOUNTER — HOSPITAL ENCOUNTER (OUTPATIENT)
Dept: RADIOLOGY | Facility: HOSPITAL | Age: 54
Discharge: HOME | End: 2024-07-24
Payer: COMMERCIAL

## 2024-07-24 DIAGNOSIS — I34.0 MITRAL VALVE INSUFFICIENCY, UNSPECIFIED ETIOLOGY: ICD-10-CM

## 2024-07-24 LAB
CREAT SERPL-MCNC: 1.27 MG/DL (ref 0.6–1.3)
GFR SERPL CREATININE-BSD FRML MDRD: 66 ML/MIN/1.73M*2

## 2024-07-24 PROCEDURE — 82565 ASSAY OF CREATININE: CPT

## 2024-07-24 PROCEDURE — 71275 CT ANGIOGRAPHY CHEST: CPT

## 2024-07-24 PROCEDURE — 2550000001 HC RX 255 CONTRASTS: Performed by: THORACIC SURGERY (CARDIOTHORACIC VASCULAR SURGERY)

## 2024-08-16 DIAGNOSIS — I34.0 NONRHEUMATIC MITRAL VALVE REGURGITATION: Primary | ICD-10-CM

## 2024-08-17 ENCOUNTER — LAB (OUTPATIENT)
Dept: LAB | Facility: LAB | Age: 54
End: 2024-08-17
Payer: COMMERCIAL

## 2024-08-17 DIAGNOSIS — I34.0 NONRHEUMATIC MITRAL VALVE REGURGITATION: ICD-10-CM

## 2024-08-17 LAB
ANION GAP SERPL CALC-SCNC: 11 MMOL/L (ref 10–20)
BUN SERPL-MCNC: 21 MG/DL (ref 6–23)
CALCIUM SERPL-MCNC: 9.4 MG/DL (ref 8.6–10.3)
CHLORIDE SERPL-SCNC: 105 MMOL/L (ref 98–107)
CO2 SERPL-SCNC: 26 MMOL/L (ref 21–32)
CREAT SERPL-MCNC: 1.03 MG/DL (ref 0.5–1.3)
EGFRCR SERPLBLD CKD-EPI 2021: 85 ML/MIN/1.73M*2
ERYTHROCYTE [DISTWIDTH] IN BLOOD BY AUTOMATED COUNT: 12.7 % (ref 11.5–14.5)
GLUCOSE SERPL-MCNC: 90 MG/DL (ref 74–99)
HCT VFR BLD AUTO: 49.9 % (ref 41–52)
HGB BLD-MCNC: 16.6 G/DL (ref 13.5–17.5)
MCH RBC QN AUTO: 29.5 PG (ref 26–34)
MCHC RBC AUTO-ENTMCNC: 33.3 G/DL (ref 32–36)
MCV RBC AUTO: 89 FL (ref 80–100)
NRBC BLD-RTO: 0 /100 WBCS (ref 0–0)
PLATELET # BLD AUTO: 215 X10*3/UL (ref 150–450)
POTASSIUM SERPL-SCNC: 4.2 MMOL/L (ref 3.5–5.3)
RBC # BLD AUTO: 5.62 X10*6/UL (ref 4.5–5.9)
SODIUM SERPL-SCNC: 138 MMOL/L (ref 136–145)
WBC # BLD AUTO: 7.1 X10*3/UL (ref 4.4–11.3)

## 2024-08-17 PROCEDURE — 80048 BASIC METABOLIC PNL TOTAL CA: CPT

## 2024-08-17 PROCEDURE — 36415 COLL VENOUS BLD VENIPUNCTURE: CPT

## 2024-08-17 PROCEDURE — 85027 COMPLETE CBC AUTOMATED: CPT

## 2024-08-19 ENCOUNTER — HOSPITAL ENCOUNTER (OUTPATIENT)
Facility: HOSPITAL | Age: 54
Setting detail: OUTPATIENT SURGERY
Discharge: HOME | End: 2024-08-19
Attending: INTERNAL MEDICINE | Admitting: INTERNAL MEDICINE
Payer: COMMERCIAL

## 2024-08-19 VITALS
TEMPERATURE: 97.7 F | SYSTOLIC BLOOD PRESSURE: 135 MMHG | HEART RATE: 66 BPM | BODY MASS INDEX: 26.29 KG/M2 | DIASTOLIC BLOOD PRESSURE: 73 MMHG | WEIGHT: 183.64 LBS | HEIGHT: 70 IN | RESPIRATION RATE: 16 BRPM | OXYGEN SATURATION: 97 %

## 2024-08-19 DIAGNOSIS — I34.0 MITRAL VALVE INSUFFICIENCY, UNSPECIFIED ETIOLOGY: Primary | ICD-10-CM

## 2024-08-19 LAB — GLUCOSE BLD MANUAL STRIP-MCNC: 93 MG/DL (ref 74–99)

## 2024-08-19 PROCEDURE — 7100000009 HC PHASE TWO TIME - INITIAL BASE CHARGE: Performed by: INTERNAL MEDICINE

## 2024-08-19 PROCEDURE — 99153 MOD SED SAME PHYS/QHP EA: CPT | Performed by: INTERNAL MEDICINE

## 2024-08-19 PROCEDURE — 99152 MOD SED SAME PHYS/QHP 5/>YRS: CPT | Performed by: INTERNAL MEDICINE

## 2024-08-19 PROCEDURE — 2500000005 HC RX 250 GENERAL PHARMACY W/O HCPCS: Performed by: INTERNAL MEDICINE

## 2024-08-19 PROCEDURE — 7100000010 HC PHASE TWO TIME - EACH INCREMENTAL 1 MINUTE: Performed by: INTERNAL MEDICINE

## 2024-08-19 PROCEDURE — C1894 INTRO/SHEATH, NON-LASER: HCPCS | Performed by: INTERNAL MEDICINE

## 2024-08-19 PROCEDURE — 99222 1ST HOSP IP/OBS MODERATE 55: CPT | Performed by: NURSE PRACTITIONER

## 2024-08-19 PROCEDURE — C1760 CLOSURE DEV, VASC: HCPCS | Performed by: INTERNAL MEDICINE

## 2024-08-19 PROCEDURE — 2550000001 HC RX 255 CONTRASTS: Performed by: INTERNAL MEDICINE

## 2024-08-19 PROCEDURE — 2720000007 HC OR 272 NO HCPCS: Performed by: INTERNAL MEDICINE

## 2024-08-19 PROCEDURE — 2500000004 HC RX 250 GENERAL PHARMACY W/ HCPCS (ALT 636 FOR OP/ED): Performed by: NURSE PRACTITIONER

## 2024-08-19 PROCEDURE — 82947 ASSAY GLUCOSE BLOOD QUANT: CPT

## 2024-08-19 PROCEDURE — C1887 CATHETER, GUIDING: HCPCS | Performed by: INTERNAL MEDICINE

## 2024-08-19 PROCEDURE — 2500000004 HC RX 250 GENERAL PHARMACY W/ HCPCS (ALT 636 FOR OP/ED): Performed by: INTERNAL MEDICINE

## 2024-08-19 PROCEDURE — 2500000001 HC RX 250 WO HCPCS SELF ADMINISTERED DRUGS (ALT 637 FOR MEDICARE OP): Performed by: NURSE PRACTITIONER

## 2024-08-19 PROCEDURE — 93453 R&L HRT CATH W/VENTRICLGRPHY: CPT | Performed by: INTERNAL MEDICINE

## 2024-08-19 RX ORDER — NAPROXEN SODIUM 220 MG/1
324 TABLET, FILM COATED ORAL ONCE
Status: COMPLETED | OUTPATIENT
Start: 2024-08-19 | End: 2024-08-19

## 2024-08-19 RX ORDER — VERAPAMIL HYDROCHLORIDE 2.5 MG/ML
INJECTION, SOLUTION INTRAVENOUS AS NEEDED
Status: DISCONTINUED | OUTPATIENT
Start: 2024-08-19 | End: 2024-08-19 | Stop reason: HOSPADM

## 2024-08-19 RX ORDER — SODIUM CHLORIDE 9 MG/ML
3 INJECTION, SOLUTION INTRAVENOUS CONTINUOUS
Status: DISCONTINUED | OUTPATIENT
Start: 2024-08-19 | End: 2024-08-19 | Stop reason: HOSPADM

## 2024-08-19 RX ORDER — LIDOCAINE HYDROCHLORIDE 10 MG/ML
INJECTION, SOLUTION EPIDURAL; INFILTRATION; INTRACAUDAL; PERINEURAL AS NEEDED
Status: DISCONTINUED | OUTPATIENT
Start: 2024-08-19 | End: 2024-08-19 | Stop reason: HOSPADM

## 2024-08-19 RX ORDER — HEPARIN SODIUM 1000 [USP'U]/ML
INJECTION, SOLUTION INTRAVENOUS; SUBCUTANEOUS AS NEEDED
Status: DISCONTINUED | OUTPATIENT
Start: 2024-08-19 | End: 2024-08-19 | Stop reason: HOSPADM

## 2024-08-19 RX ORDER — MIDAZOLAM HYDROCHLORIDE 1 MG/ML
INJECTION, SOLUTION INTRAMUSCULAR; INTRAVENOUS AS NEEDED
Status: DISCONTINUED | OUTPATIENT
Start: 2024-08-19 | End: 2024-08-19 | Stop reason: HOSPADM

## 2024-08-19 RX ORDER — FENTANYL CITRATE 50 UG/ML
INJECTION, SOLUTION INTRAMUSCULAR; INTRAVENOUS AS NEEDED
Status: DISCONTINUED | OUTPATIENT
Start: 2024-08-19 | End: 2024-08-19 | Stop reason: HOSPADM

## 2024-08-19 RX ORDER — SODIUM CHLORIDE 9 MG/ML
50 INJECTION, SOLUTION INTRAVENOUS CONTINUOUS
Status: DISCONTINUED | OUTPATIENT
Start: 2024-08-19 | End: 2024-08-19

## 2024-08-19 ASSESSMENT — PAIN SCALES - GENERAL
PAINLEVEL_OUTOF10: 0 - NO PAIN

## 2024-08-19 ASSESSMENT — COLUMBIA-SUICIDE SEVERITY RATING SCALE - C-SSRS
2. HAVE YOU ACTUALLY HAD ANY THOUGHTS OF KILLING YOURSELF?: NO
6. HAVE YOU EVER DONE ANYTHING, STARTED TO DO ANYTHING, OR PREPARED TO DO ANYTHING TO END YOUR LIFE?: NO
1. IN THE PAST MONTH, HAVE YOU WISHED YOU WERE DEAD OR WISHED YOU COULD GO TO SLEEP AND NOT WAKE UP?: NO

## 2024-08-19 ASSESSMENT — PAIN SCALES - PAIN ASSESSMENT IN ADVANCED DEMENTIA (PAINAD): TOTALSCORE: OTHER (COMMENT)

## 2024-08-19 ASSESSMENT — PAIN - FUNCTIONAL ASSESSMENT: PAIN_FUNCTIONAL_ASSESSMENT: 0-10

## 2024-08-19 NOTE — H&P
History Of Present Illness  Gavin Moreau is a 54 y.o. male Presented for right and left heart cath with Dr. Vogt. Denies any chest pain or shortness of breath.   PMHx includes MVP with servere MR, Asthma, GERD.      Past Medical History:  History reviewed. No pertinent past medical history.     Past Surgical History:  History reviewed. No pertinent surgical history.       Social History:   reports that he has never smoked. He has never used smokeless tobacco. He reports current alcohol use. He reports that he does not use drugs.     Family History:  No family history on file.     Allergies:  No Known Allergies     Home Medications:  Current Outpatient Medications   Medication Instructions    budesonide-formoteroL (Symbicort) 80-4.5 mcg/actuation inhaler 2 puffs, inhalation, 2 times daily RT, Rinse mouth with water after use to reduce aftertaste and incidence of candidiasis. Do not swallow.    lisinopril 5 mg, oral, Daily       Inpatient Medications:  Scheduled medications   Medication Dose Route Frequency     PRN medications   Medication     Continuous Medications   Medication Dose Last Rate    sodium chloride 0.9%  50 mL/hr 50 mL/hr (08/19/24 0830)         Review of Systems       Physical Exam  Cardiovascular:      Rate and Rhythm: Normal rate and regular rhythm.      Pulses:           Radial pulses are 2+ on the right side and 2+ on the left side.        Dorsalis pedis pulses are 2+ on the right side and 2+ on the left side.      Heart sounds: Murmur heard.   Pulmonary:      Effort: Pulmonary effort is normal.      Breath sounds: Normal breath sounds.   Abdominal:      General: Abdomen is flat.      Palpations: Abdomen is soft.   Musculoskeletal:      Right lower leg: No edema.      Left lower leg: No edema.   Skin:     General: Skin is warm and dry.   Neurological:      Mental Status: He is alert and oriented to person, place, and time.        Sedation Plan    ASA 3     Mallampati class: II.           NPO since  "midnight    Last Recorded Vitals  Pulse 68, temperature 36.3 °C (97.3 °F), temperature source Temporal, resp. rate 16, height 1.778 m (5' 10\"), weight 83.3 kg (183 lb 10.3 oz), SpO2 96%.         Vitals from the Past 24 Hours  Heart Rate:  [68]   Temp:  [36.3 °C (97.3 °F)]   Resp:  [16]   Height:  [177.8 cm (5' 10\")]   Weight:  [83.3 kg (183 lb 10.3 oz)]   SpO2:  [96 %]          Relevant Results    Labs    CBC:   Recent Labs     08/17/24  0919   WBC 7.1   HGB 16.6   HCT 49.9      MCV 89     BMP/CMP:   Recent Labs     08/17/24  0919      K 4.2      BUN 21   CREATININE 1.03   CO2 26   CALCIUM 9.4   GLUCOSE 90      Lipid Panel: No results for input(s): \"CHOL\", \"HDL\", \"CHHDL\", \"LDL\", \"VLDL\", \"TRIG\", \"NHDL\" in the last 07130 hours.  Cardiac       No lab exists for component: \"CK\", \"CKMBP\"   Hemoglobin A1C: No results for input(s): \"HGBA1C\" in the last 92231 hours.  TSH/ Free T4: No results for input(s): \"TSH\", \"FREET4\" in the last 70614 hours.  Iron: No results for input(s): \"FERRITIN\", \"TIBC\", \"IRONSAT\", \"BNP\" in the last 03893 hours.  Coag:     ABO: No results found for: \"ABO\"    Past Cardiology Tests (Last 3 Years):    EKG:  No results for input(s): \"ATRRATE\", \"VENTRATE\", \"PRINT\", \"QRSDUR\", \"QTCFRED\", \"QTCCALCB\" in the last 39393 hours.No results found for this or any previous visit (from the past 4464 hour(s)).  Echo:  Echocardiogram:   ECHOCARDIOGRAM 02/27/2024    Narrative    Left Ventricle: Normal left ventricular systolic function with a  visually estimated EF of 55 - 60%. Left ventricle size is normal. Normal  wall thickness. Ventricular mass is normal. Normal wall motion. Normal  diastolic function.    Right Ventricle: Right ventricle size is normal. Normal systolic  function. TAPSE is 2.2 cm.    Aortic Valve: No stenosis.    Mitral Valve: Moderate leaflet prolapse noted of the posterior leaflet  (P2). Moderate to severe mid to late systolic regurgitation. There is  eccentric anteriorly " "directed jet in addition to central jets.  Normal  pulmonary veins.    Tricuspid Valve: Normal RVSP. The estimated RVSP is 21 mmHg.    Pericardium: No pericardial effusion.    Image quality is adequate.    Consider GISELLE for further evaluation mitral regurgitation severity.    Left Ventricle  Normal left ventricular systolic function with a visually estimated EF of 55 - 60%. Left ventricle size is normal. Normal wall thickness. Ventricular mass is normal. Normal wall motion. Normal diastolic function.    Right Ventricle  Right ventricle size is normal. Normal systolic function. TAPSE is 2.2 cm.    Left Atrium  Left atrium size is normal.    Right Atrium  Right atrium size is normal.    IVC/SVC  IVC diameter is less than or equal to 21 mm and decreases greater than 50% during inspiration; therefore the estimated right atrial pressure is normal (~3 mmHg). IVC size is normal.    Mitral Valve  Valve structure is normal. Moderate leaflet prolapse noted of the posterior leaflet (P2). Moderate to severe mid to late systolic regurgitation. There is eccentric anteriorly directed jet in addition to central jets. Normal pulmonary veins. No stenosis noted.    Tricuspid Valve  Valve structure is normal. Trace regurgitation. No stenosis noted. Normal RVSP. The estimated RVSP is 21 mmHg.    Aortic Valve  Valve structure is normal. No regurgitation. No stenosis.    Pulmonic Valve  Valve structure is normal. No regurgitation. No stenosis noted.    Ascending Aorta  Normal sized sinus of Valsalva, aortic root and ascending aorta.    Pericardium  No pericardial effusion.    Study Details  Image quality: adequate. No contrast was given.    Ejection Fractions:  No results found for: \"EF\"  Cath:  Coronary Angiography: No results found for this or any previous visit from the past 1800 days.    Right Heart Cath: No results found for this or any previous visit from the past 1800 days.    Stress Test:  Nuclear:No results found for this or any " previous visit from the past 1800 days.    Metabolic Stress: No results found for this or any previous visit from the past 1800 days.    Cardiac Imaging:  Cardiac Scoring: No results found for this or any previous visit from the past 1800 days.    Cardiac MRI: No results found for this or any previous visit from the past 1800 days.         Assessment/Plan  Assessment/Plan   Principal Problem:    Mitral valve insufficiency      Severe mitral valve regurgitation  -Plan for right and left heart cath       NP discussed with Dr. Vogt regarding plan of care/ discharge plan      I spent 30 minutes in the professional and overall care of this patient.      Saul Sofia, APRN-CNP

## 2024-08-19 NOTE — Clinical Note
Closure device placed in the right radial artery. Site closed by radial compression system. 14 ml of air hemo 1040

## 2024-08-19 NOTE — POST-PROCEDURE NOTE
Physician Transition of Care Summary  Invasive Cardiovascular Lab    Procedure Date: 8/19/2024  Attending:    * Gavin Vogt - Primary  Resident/Fellow/Other Assistant: Surgeons and Role:  * No surgeons found with a matching role *    Indications:   Pre-op Diagnosis      * Mitral valve insufficiency, unspecified etiology [I34.0]    Post-procedure diagnosis:   Post-op Diagnosis     * Mitral valve insufficiency, unspecified etiology [I34.0]    Procedure(s):   Left & Right Heart Cath w Angiography & LV  49139 - WA R & L HRT CATH WINJX HRT ART& L VENTR IMG        Procedure Findings:   No significant CAD  Mildly elevated left and right heart filling pressures.  Mild pulmonary hypertension.  Normal CO/CI  No AS    Description of the Procedure:   R radial artery  R antecubital vein    Complications:   none    Stents/Implants:   Implants       No implant documentation for this case.            Anticoagulation/Antiplatelet Plan:   N/a    Estimated Blood Loss:   10 mL    Anesthesia: Moderate Sedation Anesthesia Staff: No anesthesia staff entered.    Any Specimen(s) Removed:   Order Name Source Comment Collection Info Order Time   CBC Blood, Venous   8/19/2024  8:10 AM     Release result to [email or web address]   Immediate        BASIC METABOLIC PANEL Blood, Venous   8/19/2024  8:10 AM     Release result to [employer]   Immediate            Disposition:   home      Electronically signed by: Gavin Vogt MD, 8/19/2024 10:41 AM

## 2024-09-30 ENCOUNTER — CLINICAL SUPPORT (OUTPATIENT)
Dept: PREADMISSION TESTING | Facility: HOSPITAL | Age: 54
End: 2024-09-30
Payer: COMMERCIAL

## 2024-09-30 NOTE — CPM/PAT NURSE NOTE
CPM/PAT Nurse Note      Name: Gavin Moreau (Gavin Moreau)  /Age: 1970/54 y.o.       Past Medical History:   Diagnosis Date    Anxiety     Asthma (HHS-HCC)     Colon polyp     Depression     Diverticulosis     GERD (gastroesophageal reflux disease)     History of rheumatic fever     Hypertension     Mitral valve prolapse     Severe mitral regurgitation     Splenic artery aneurysm (CMS-HCC)        Past Surgical History:   Procedure Laterality Date    CARDIAC CATHETERIZATION N/A 2024    Procedure: Left & Right Heart Cath w Angiography & LV;  Surgeon: Gavin Votg MD;  Location: [facility] Cardiac Cath Lab;  Service: Cardiovascular;  Laterality: N/A;    CHOLECYSTECTOMY      COLONOSCOPY  2024       Patient  has no history on file for sexual activity.    No family history on file.    No Known Allergies    Prior to Admission medications    Medication Sig Start Date End Date Taking? Authorizing Provider   budesonide-formoteroL (Symbicort) 80-4.5 mcg/actuation inhaler Inhale 2 puffs 2 times a day. Rinse mouth with water after use to reduce aftertaste and incidence of candidiasis. Do not swallow.    Historical Provider, MD   lisinopril 5 mg tablet Take 1 tablet (5 mg) by mouth once daily.    Historical Provider, MD BAGLEY ROS     DASI Risk Score    No data to display       Caprini DVT Assessment    No data to display       Modified Frailty Index    No data to display       CHADS2 Stroke Risk  Current as of 7 hours ago        N/A 3 to 100%: High Risk   2 to < 3%: Medium Risk   0 to < 2%: Low Risk     Last Change: N/A          This score determines the patient's risk of having a stroke if the patient has atrial fibrillation.        This score is not applicable to this patient. Components are not calculated.          Revised Cardiac Risk Index    No data to display       Apfel Simplified Score    No data to display       Risk Analysis Index Results This Encounter    No data found in the last 10  encounters.       Stop Bang Score      Flowsheet Row Admission (Discharged) from 8/19/2024 in [facility] with Gavin Vogt MD   Do you snore loudly? 1 filed at 08/19/2024 0828   Do you often feel tired or fatigued after your sleep? 0 filed at 08/19/2024 0828   Has anyone ever observed you stop breathing in your sleep? 0 filed at 08/19/2024 0828   Do you have or are you being treated for high blood pressure? 1 filed at 08/19/2024 0828   Recent BMI (Calculated) 26.4 filed at 08/19/2024 0828   Is BMI greater than 35 kg/m2? 0=No filed at 08/19/2024 0828   Age older than 50 years old? 1=Yes filed at 08/19/2024 0828   Gender - Male 1=Yes filed at 08/19/2024 0828        Scheduled for mini mitral valve replacement with Dr. Goldberg on 10/30/24.  PCP: Marcello Cheek DO [facility] 10/16/23- [facility] 10/16/23  Cardiac Surgery: Simeon Goldberg MD L[facility] 6/11/24  Cardiology- Structural Heart: Db Flor MD LOV 5/20/24 seen for severe eccentric mitral regurgitation.  Cardiology: Tiffanie Franz MD LOV 3/26/24- [facility]  -Cardiac Cath; 8/19/24  CONCLUSIONS:   1. No significant CAD with minimal luminal irregularities in a right dominant system.   2. Mildly elevated left and right heart filling pressures.   3. Mild pulmonary hypertension.   4. Normal CO/CI.   5. No evidence of intracardiac shunt.   6. No evidence of aortic stenosis.    -GISELLE; 4/25/24   Left Ventricle: Normal left ventricular systolic function with a   visually estimated EF of ~60%. Left ventricle size is normal. Normal wall   thickness.  LVESD 37 cm. Normal wall motion.     Right Ventricle: Right ventricle size is normal. Normal systolic   function.    Mitral Valve: Severe leaflet prolapse noted of the posterior leaflet   (P1, P2 and P3) P2 has severe prolapse compared to other segments. Severe   regurgitation with an eccentrically directed jet that is wall hugging with   choanda effect and may underestimate severity ( images 97-99). Systolic    blunting of the pulmonary veins. No stenosis noted. MV area by PHT is 4.4   cm2.    Tricuspid Valve: Normal RVSP. The estimated RVSP is 25 mmHg.     Left Atrium: Normal sized appendage. No left atrial appendage thrombus   noted. No left atrial appendage mass noted.     Interatrial Septum: No interatrial shunt visualized with color Doppler.   Agitated saline study was negative with and without provocation.     Pericardium: No pericardial effusion.     Image quality is adequate.     -Transthoracic Echo; 2/27/24    Left Ventricle: Normal left ventricular systolic function with a   visually estimated EF of 55 - 60%. Left ventricle size is normal. Normal   wall thickness. Ventricular mass is normal. Normal wall motion. Normal   diastolic function.     Right Ventricle: Right ventricle size is normal. Normal systolic   function. TAPSE is 2.2 cm.     Aortic Valve: No stenosis.     Mitral Valve: Moderate leaflet prolapse noted of the posterior leaflet   (P2). Moderate to severe mid to late systolic regurgitation. There is   eccentric anteriorly directed jet in addition to central jets.  Normal   pulmonary veins.     Tricuspid Valve: Normal RVSP. The estimated RVSP is 21 mmHg.     Pericardium: No pericardial effusion.     Image quality is adequate.     Consider GISELLE for further evaluation mitral regurgitation severity.     -CTA Abdomen Pelvis; 5/2/24  1. Mid splenic artery aneurysm 1.3 x 1.2 x 1 cm with partial inferior and   posterior rim calcifications.  No progression given differences in technique   from CT abdomen pelvis dated 10/09/2023   2. No acute vascular findings otherwise with nonaneurysmal patent abdominal   aorta.  No advanced atherosclerotic involvement   3. Sigmoid diverticulosis without acute diverticulitis.   4. Mild-to-moderate colonic stool burden.   5. Mild prostatomegaly.   6. Cholecystectomy.   7. Unless otherwise indicated or stated incidental findings do not require   dedicated follow-up imaging.      Nurse Plan of Action:   Unable to reach the patient. LVM.  ONLY    Amanda Marie RN  Pre-Admission Testing

## 2024-10-07 ENCOUNTER — APPOINTMENT (OUTPATIENT)
Dept: PREADMISSION TESTING | Facility: HOSPITAL | Age: 54
End: 2024-10-07
Payer: COMMERCIAL

## 2024-10-16 ENCOUNTER — PRE-ADMISSION TESTING (OUTPATIENT)
Dept: PREADMISSION TESTING | Facility: HOSPITAL | Age: 54
End: 2024-10-16
Payer: COMMERCIAL

## 2024-10-16 VITALS
BODY MASS INDEX: 26.92 KG/M2 | HEIGHT: 70 IN | WEIGHT: 188 LBS | HEART RATE: 98 BPM | SYSTOLIC BLOOD PRESSURE: 136 MMHG | TEMPERATURE: 97.5 F | DIASTOLIC BLOOD PRESSURE: 82 MMHG

## 2024-10-16 DIAGNOSIS — I34.0 MITRAL VALVE INSUFFICIENCY, UNSPECIFIED ETIOLOGY: Primary | ICD-10-CM

## 2024-10-16 LAB
ABO GROUP (TYPE) IN BLOOD: NORMAL
ANION GAP SERPL CALC-SCNC: 14 MMOL/L (ref 10–20)
ANTIBODY SCREEN: NORMAL
APPEARANCE UR: CLEAR
APTT PPP: 37 SECONDS (ref 27–38)
BASOPHILS # BLD AUTO: 0.04 X10*3/UL (ref 0–0.1)
BASOPHILS NFR BLD AUTO: 0.6 %
BILIRUB UR STRIP.AUTO-MCNC: NEGATIVE MG/DL
BUN SERPL-MCNC: 21 MG/DL (ref 6–23)
CALCIUM SERPL-MCNC: 9.2 MG/DL (ref 8.6–10.6)
CHLORIDE SERPL-SCNC: 103 MMOL/L (ref 98–107)
CO2 SERPL-SCNC: 25 MMOL/L (ref 21–32)
COLOR UR: NORMAL
CREAT SERPL-MCNC: 1.02 MG/DL (ref 0.5–1.3)
CRP SERPL-MCNC: 0.22 MG/DL
EGFRCR SERPLBLD CKD-EPI 2021: 86 ML/MIN/1.73M*2
EOSINOPHIL # BLD AUTO: 0.26 X10*3/UL (ref 0–0.7)
EOSINOPHIL NFR BLD AUTO: 3.6 %
ERYTHROCYTE [DISTWIDTH] IN BLOOD BY AUTOMATED COUNT: 12.7 % (ref 11.5–14.5)
GLUCOSE SERPL-MCNC: 100 MG/DL (ref 74–99)
GLUCOSE UR STRIP.AUTO-MCNC: NORMAL MG/DL
HCT VFR BLD AUTO: 49 % (ref 41–52)
HGB BLD-MCNC: 16.4 G/DL (ref 13.5–17.5)
IMM GRANULOCYTES # BLD AUTO: 0.03 X10*3/UL (ref 0–0.7)
IMM GRANULOCYTES NFR BLD AUTO: 0.4 % (ref 0–0.9)
INR PPP: 1 (ref 0.9–1.1)
KETONES UR STRIP.AUTO-MCNC: NEGATIVE MG/DL
LEUKOCYTE ESTERASE UR QL STRIP.AUTO: NEGATIVE
LYMPHOCYTES # BLD AUTO: 1.26 X10*3/UL (ref 1.2–4.8)
LYMPHOCYTES NFR BLD AUTO: 17.3 %
MCH RBC QN AUTO: 29.8 PG (ref 26–34)
MCHC RBC AUTO-ENTMCNC: 33.5 G/DL (ref 32–36)
MCV RBC AUTO: 89 FL (ref 80–100)
MONOCYTES # BLD AUTO: 0.75 X10*3/UL (ref 0.1–1)
MONOCYTES NFR BLD AUTO: 10.3 %
NEUTROPHILS # BLD AUTO: 4.93 X10*3/UL (ref 1.2–7.7)
NEUTROPHILS NFR BLD AUTO: 67.8 %
NITRITE UR QL STRIP.AUTO: NEGATIVE
NRBC BLD-RTO: 0 /100 WBCS (ref 0–0)
PH UR STRIP.AUTO: 6 [PH]
PLATELET # BLD AUTO: 204 X10*3/UL (ref 150–450)
POTASSIUM SERPL-SCNC: 4.2 MMOL/L (ref 3.5–5.3)
PROT UR STRIP.AUTO-MCNC: NEGATIVE MG/DL
PROTHROMBIN TIME: 11 SECONDS (ref 9.8–12.8)
RBC # BLD AUTO: 5.51 X10*6/UL (ref 4.5–5.9)
RBC # UR STRIP.AUTO: NEGATIVE /UL
RH FACTOR (ANTIGEN D): NORMAL
SODIUM SERPL-SCNC: 138 MMOL/L (ref 136–145)
SP GR UR STRIP.AUTO: 1.02
UROBILINOGEN UR STRIP.AUTO-MCNC: NORMAL MG/DL
WBC # BLD AUTO: 7.3 X10*3/UL (ref 4.4–11.3)

## 2024-10-16 PROCEDURE — 86850 RBC ANTIBODY SCREEN: CPT

## 2024-10-16 PROCEDURE — 85025 COMPLETE CBC W/AUTO DIFF WBC: CPT

## 2024-10-16 PROCEDURE — 80048 BASIC METABOLIC PNL TOTAL CA: CPT

## 2024-10-16 PROCEDURE — 86140 C-REACTIVE PROTEIN: CPT

## 2024-10-16 PROCEDURE — 36415 COLL VENOUS BLD VENIPUNCTURE: CPT

## 2024-10-16 PROCEDURE — 85730 THROMBOPLASTIN TIME PARTIAL: CPT

## 2024-10-16 PROCEDURE — 81003 URINALYSIS AUTO W/O SCOPE: CPT

## 2024-10-16 PROCEDURE — 87081 CULTURE SCREEN ONLY: CPT

## 2024-10-16 PROCEDURE — 99204 OFFICE O/P NEW MOD 45 MIN: CPT | Performed by: NURSE PRACTITIONER

## 2024-10-16 RX ORDER — CHLORHEXIDINE GLUCONATE ORAL RINSE 1.2 MG/ML
15 SOLUTION DENTAL AS NEEDED
Qty: 473 ML | Refills: 0 | Status: SHIPPED | OUTPATIENT
Start: 2024-10-16 | End: 2024-10-18

## 2024-10-16 RX ORDER — CHLORHEXIDINE GLUCONATE 40 MG/ML
SOLUTION TOPICAL DAILY PRN
Qty: 473 ML | Refills: 0 | Status: SHIPPED | OUTPATIENT
Start: 2024-10-16 | End: 2024-10-21

## 2024-10-16 ASSESSMENT — DUKE ACTIVITY SCORE INDEX (DASI)
CAN YOU DO MODERATE WORK AROUND THE HOUSE LIKE VACUUMING, SWEEPING FLOORS OR CARRYING GROCERIES: YES
CAN YOU DO LIGHT WORK AROUND THE HOUSE LIKE DUSTING OR WASHING DISHES: YES
DASI METS SCORE: 9
TOTAL_SCORE: 50.7
CAN YOU PARTICIPATE IN MODERATE RECREATIONAL ACTIVITIES LIKE GOLF, BOWLING, DANCING, DOUBLES TENNIS OR THROWING A BASEBALL OR FOOTBALL: YES
CAN YOU TAKE CARE OF YOURSELF (EAT, DRESS, BATHE, OR USE TOILET): YES
CAN YOU DO YARD WORK LIKE RAKING LEAVES, WEEDING OR PUSHING A MOWER: YES
CAN YOU WALK INDOORS, SUCH AS AROUND YOUR HOUSE: YES
CAN YOU RUN A SHORT DISTANCE: YES
CAN YOU HAVE SEXUAL RELATIONS: YES
CAN YOU PARTICIPATE IN STRENOUS SPORTS LIKE SWIMMING, SINGLES TENNIS, FOOTBALL, BASKETBALL, OR SKIING: NO
CAN YOU WALK A BLOCK OR TWO ON LEVEL GROUND: YES
CAN YOU DO HEAVY WORK AROUND THE HOUSE LIKE SCRUBBING FLOORS OR LIFTING AND MOVING HEAVY FURNITURE: YES
CAN YOU CLIMB A FLIGHT OF STAIRS OR WALK UP A HILL: YES

## 2024-10-16 ASSESSMENT — ENCOUNTER SYMPTOMS
RESPIRATORY NEGATIVE: 1
NECK NEGATIVE: 1
CONSTITUTIONAL NEGATIVE: 1
NEUROLOGICAL NEGATIVE: 1
CARDIOVASCULAR NEGATIVE: 1
ENDOCRINE NEGATIVE: 1
EYES NEGATIVE: 1
MUSCULOSKELETAL NEGATIVE: 1
GASTROINTESTINAL NEGATIVE: 1

## 2024-10-16 ASSESSMENT — LIFESTYLE VARIABLES: SMOKING_STATUS: NONSMOKER

## 2024-10-16 NOTE — PREPROCEDURE INSTRUCTIONS
Fasting Guidelines    NPO Instructions:    Do not eat any food after midnight the night before your surgery/procedure.  You may have up to TEN ounces of clear liquids until TWO hours before your instructed arrival time to the hospital. This includes water, black tea/coffee, (no milk or cream), apple juice, and/or electrolyte drinks (Gatorade).  You may chew gum up to TWO hours before your surgery/procedure.    Additional Instructions:     We have sent a prescription for Hibiclens soap and Peridex mouth wash to your preferred pharmacy.  If you have not already, Please  your prescription and start using as directed before surgery.  Follow the instruction sheet provided to you at your CPM/PAT appointment.    Avoid herbal supplements, multivitamins and NSAIDS (non-steroidal anti-inflammatory drugs) such as Advil, Aleve, Ibuprofen, Naproxen, Excedrin, Meloxicam or Celebrex for at least 7 days prior to surgery. May take Tylenol as needed.    Avoid tobacco and alcohol products for 24 hours prior to surgery.    CONTACT SURGEON'S OFFICE IF YOU DEVELOP:  * Fever = 100.4 F   * New respiratory symptoms (e.g. cough, shortness of breath, respiratory distress, sore throat)  * Recent loss of taste or smell  *Flu like symptoms such as headache, fatigue or gastrointestinal symptoms  * You develop any open sores, shingles, burning or painful urination   AND/OR:  * You no longer wish to have the surgery.  * Any other personal circumstances change that may lead to the need to cancel or defer this surgery.  *You were admitted to any hospital within one week of your planned procedure.    Seven/Six Days before Surgery:  Review your medication instructions, stop indicated medications    Day of Surgery:  Review your medication instructions, take indicated medications  Wear comfortable loose fitting clothing  Do not use moisturizers, creams, lotions or perfume  All jewelry and valuables should be left at home      [facility]  [phone]           Patient Information: Pre-Operative Infection Prevention Measures     Why did I have my nose, under my arms, and groin swabbed?  The purpose of the swab is to identify Staphylococcus aureus inside your nose or on your skin.  The swab was sent to the laboratory for culture.  A positive swab/culture for Staphylococcus aureus is called colonization or carriage.      What is Staphylococcus aureus?  Staphylococcus aureus, also known as “staph”, is a germ found on the skin or in the nose of healthy people.  Sometimes Staphylococcus aureus can get into the body and cause an infection.  This can be minor (such as pimples, boils, or other skin problems).  It might also be serious (such as a blood infection, pneumonia, or a surgical site infection).    What is Staphylococcus aureus colonization or carriage?  Colonization or carriage means that a person has the germ but is not sick from it.  These bacteria can be spread on the hands or when breathing or sneezing.    How is Staphylococcus aureus spread?  It is most often spread by close contact with a person or item that carries it.    What happens if my culture is positive for Staphylococcus aureus?  Your doctor/medical team will use this information to guide any antibiotic treatment which may be necessary.  Regardless of the culture results, we will clean the inside of your nose with a betadine swab just before you have your surgery.      Will I get an infection if I have Staphylococcus aureus in my nose or on my skin?  Anyone can get an infection with Staphylococcus aureus.  However, the best way to reduce your risk of infection is to follow the instructions provided to you for the use of your CHG soap and dental rinse.        Patient Information: Oral/Dental Rinse    What is oral/dental rinse?   It is a mouthwash. It is a way of cleaning the mouth with a germ-killing solution before your surgery.  The solution contains  chlorhexidine, commonly known as CHG.   It is used inside the mouth to kill a bacteria known as Staphylococcus aureus.  Let your doctor know if you are allergic to Chlorhexidine.    Why do I need to use CHG oral/dental rinse?  The CHG oral/dental rinse helps to kill a bacteria in your mouth known as Staphylococcus aureus.     This reduces the risk of infection at the surgical site.      Using your CHG oral/dental rinse  STEPS:  Use your CHG oral/dental rinse after you brush your teeth the night before (at bedtime) and the morning of your surgery.  Follow all directions on your prescription label.    Use the cap on the container to measure 15ml   Swish (gargle if you can) the mouthwash in your mouth for at least 30 seconds, (do not swallow) and spit out  After you use your CHG rinse, do not rinse your mouth with water, drink or eat.  Please refer to the prescription label for the appropriate time to resume oral intake      What side effects might I have using the CHG oral/dental rinse?  CHG rinse will stick to plaque on the teeth.  Brush and floss just before use.  Teeth brushing will help avoid staining of plaque during use.      Patient Information: Home Preoperative Antibacterial Shower      What is a home preoperative antibacterial shower?  This shower is a way of cleaning the skin with a germ-killing solution before surgery.  The solution contains chlorhexidine, commonly known as CHG.  CHG is a skin cleanser with germ-killing ability.  Let your doctor know if you are allergic to chlorhexidine.    Why do I need to take a preoperative antibacterial shower?  Skin is not sterile.  It is best to try to make your skin as free of germs as possible before surgery.  Proper cleansing with a germ-killing soap before surgery can lower the number of germs on your skin.  This helps to reduce the risk of infection at the surgical site.  Following the instructions listed below will help you prepare your skin for surgery.       How do I use the solution?  Steps:  Begin using your CHG soap 5 days before your scheduled surgery on ________________________.    First, wash and rinse your hair using the CHG soap. Keep CHG soap away from ear canals and eyes.  Rinse completely, do not condition.  Hair extensions should be removed.  Wash your face with your normal soap and rinse.    Apply the CHG solution to a clean wet washcloth.  Turn the water off or move away from the water spray to avoid premature rinsing of the CHG soap as you are applying.   Firmly lather your entire body from the neck down.  Do not use on your face.  Pay special attention to the area(s) where your incision(s) will be located unless they are on your face.  Avoid scrubbing your skin too hard.  The important point is to have the CHG soap sit on your skin for 3 minutes.    When the 3 minutes are up, turn on the water and rinse the CHG solution off your body completely.   DO NOT wash with regular soap after you have used the CHG soap solution  Pat yourself dry with a clean, freshly-laundered towel.  DO NOT apply powders, deodorants, or lotions.  Dress in clean, freshly laundered nightclothes.    Be sure to sleep with clean, freshly laundered sheets.  Be aware that CHG will cause stains on fabrics; if you wash them with bleach after use.  Rinse your washcloth and other linens that have contact with CHG completely.  Use only non-chlorine detergents to launder the items used.   The morning of surgery is the fifth day.  Repeat the above steps and dress in clean comfortable clothing     Whom should I contact if I have any questions regarding the use of CHG soap?  Call the [facility], [facility] at [phone] if you have any questions.               Preoperative Brain Exercises    What are brain exercises?  A brain exercise is any activity that engages your thinking (cognitive) skills.    What types of activities are  considered brain exercises?  Jigsaw puzzles, crossword puzzles, word jumble, memory games, word search, and many more.  Many can be found free online or on your phone via a mobile delaney.    Why should I do brain exercises before my surgery?  More recent research has shown brain exercise before surgery can lower the risk of postoperative delirium (confusion) which can be especially important for older adults.  Patients who did brain exercises for 5 to 10 hours the days before surgery, cut their risk of postoperative delirium in half up to 1 week after surgery.         [facility]    Preoperative Deep Breathing Exercises    Why it is important to do deep breathing exercises before my surgery?  Deep breathing exercises strengthen your breathing muscles.  This helps you to recover after your surgery and decreases the chance of breathing complications.      How are the deep breathing exercises done?  Sit straight with your back supported.  Breathe in deeply and slowly through your nose. Your lower rib cage should expand and your abdomen may move forward.  Hold that breath for 3 to 5 seconds.  Breathe out through pursed lips, slowly and completely.  Rest and repeat 10 times every hour while awake.  Rest longer if you become dizzy or lightheaded.         Patient and Family Education             Ways You Can Help Prevent Blood Clots             This handout explains some simple things you can do to help prevent blood clots.      Blood clots are blockages that can form in the body's veins. When a blood clot forms in your deep veins, it may be called a deep vein thrombosis, or DVT for short. Blood clots can happen in any part of the body where blood flows, but they are most common in the arms and legs. If a piece of a blood clot breaks free and travels to the lungs, it is called a pulmonary embolus (PE). A PE can be a very serious problem.         Being in the hospital or having surgery can raise your  chances of getting a blood clot because you may not be well enough to move around as much as you normally do.         Ways you can help prevent blood clots in the hospital         Wearing SCDs. SCDs stands for Sequential Compression Devices.   SCDs are special sleeves that wrap around your legs  They attach to a pump that fills them with air to gently squeeze your legs every few minutes.   This helps return the blood in your legs to your heart.   SCDs should only be taken off when walking or bathing.   SCDs may not be comfortable, but they can help save your life.               Wearing compression stockings - if your doctor orders them. These special snug fitting stockings gently squeeze your legs to help blood flow.       Walking. Walking helps move the blood in your legs.   If your doctor says it is ok, try walking the halls at least   5 times a day. Ask us to help you get up, so you don't fall.      Taking any blood thinning medicines your doctor orders.        Page 1 of 2     [facility]; 3/23   Ways you can help prevent blood clots at home       Wearing compression stockings - if your doctor orders them. ? Walking - to help move the blood in your legs.       Taking any blood thinning medicines your doctor orders.      Signs of a blood clot or PE      Tell your doctor or nurse know right away if you have of the problems listed below.    If you are at home, seek medical care right away. Call 911 for chest pain or problems breathing.               Signs of a blood clot (DVT) - such as pain,  swelling, redness or warmth in your arm or leg      Signs of a pulmonary embolism (PE) - such as chest     pain or feeling short of breath

## 2024-10-16 NOTE — CPM/PAT H&P
CPM/PAT Evaluation       Name: Gavin Moreau (Gavin Moreau)  /Age: 1970/54 y.o.     Visit Type:   In-Person       Chief Complaint: Mitral valve insufficiency, unspecified etiology    HPI  Patient is a 54-year-old male with a past medical history significant for mitral valve prolapse being evaluated in [facility] in anticipation of a mini mitral valve replacement with Dr. Goldberg on 10-30-24.  Past Medical History:   Diagnosis Date    Anxiety     Asthma ([facility])     Colon polyp     Depression     Diverticulosis     GERD (gastroesophageal reflux disease)     History of rheumatic fever     Hypertension     Mitral valve prolapse     Severe mitral regurgitation     Splenic artery aneurysm ([facility])        Past Surgical History:   Procedure Laterality Date    CARDIAC CATHETERIZATION N/A 2024    Procedure: Left & Right Heart Cath w Angiography & LV;  Surgeon: Gavin Vogt MD;  Location: [facility] Cardiac Cath Lab;  Service: Cardiovascular;  Laterality: N/A;    CHOLECYSTECTOMY      COLONOSCOPY  2024       Patient  has no history on file for sexual activity.    No family history on file.    No Known Allergies    Prior to Admission medications    Medication Sig Start Date End Date Taking? Authorizing Provider   budesonide-formoteroL (Symbicort) 80-4.5 mcg/actuation inhaler Inhale 2 puffs 2 times a day. Rinse mouth with water after use to reduce aftertaste and incidence of candidiasis. Do not swallow.   Yes Historical Provider, MD   lisinopril 5 mg tablet Take 1 tablet (5 mg) by mouth once daily.   Yes Historical Provider, MD BAGLEY ROS:   Constitutional:   neg    Neuro/Psych:   neg    Eyes:   neg    Ears:   neg    Nose:   neg    Mouth:   neg    Throat:   neg    Neck:   neg    Cardio:   neg    Respiratory:   neg    Endocrine:   neg    GI:   neg    :   neg    Musculoskeletal:   neg    Hematologic:   neg    Skin:  neg        Physical Exam  HENT:      Head: Normocephalic and atraumatic.      Nose: Nose normal.       Mouth/Throat:      Mouth: Mucous membranes are moist.   Cardiovascular:      Rate and Rhythm: Normal rate and regular rhythm.      Heart sounds: Murmur heard.   Pulmonary:      Effort: Pulmonary effort is normal.      Breath sounds: Normal breath sounds.   Abdominal:      Palpations: Abdomen is soft.   Skin:     General: Skin is warm.   Neurological:      General: No focal deficit present.      Mental Status: He is alert and oriented to person, place, and time.   Psychiatric:         Mood and Affect: Mood normal.         Behavior: Behavior normal.          PAT AIRWAY:   Airway:     Mallampati::  II    TM distance::  >3 FB    Neck ROM::  Full  normal        Visit Vitals  /82   Pulse 98   Temp 36.4 °C (97.5 °F)       DASI Risk Score      Flowsheet Row Pre-Admission Testing from 10/16/2024 in [facility] Questionnaire Series Submission from 10/8/2024 in [facility] with Generic Provider Emelina   Can you take care of yourself (eat, dress, bathe, or use toilet)?  2.75 filed at 10/16/2024 1526 2.75  filed at 10/08/2024 1713   Can you walk indoors, such as around your house? 1.75 filed at 10/16/2024 1526 1.75  filed at 10/08/2024 1713   Can you walk a block or two on level ground?  2.75 filed at 10/16/2024 1526 2.75  filed at 10/08/2024 1713   Can you climb a flight of stairs or walk up a hill? 5.5 filed at 10/16/2024 1526 5.5  filed at 10/08/2024 1713   Can you run a short distance? 8 filed at 10/16/2024 1526 8  filed at 10/08/2024 1713   Can you do light work around the house like dusting or washing dishes? 2.7 filed at 10/16/2024 1526 2.7  filed at 10/08/2024 1713   Can you do moderate work around the house like vacuuming, sweeping floors or carrying groceries? 3.5 filed at 10/16/2024 1526 3.5  filed at 10/08/2024 1713   Can you do heavy work around the house like scrubbing floors or lifting and moving heavy furniture?  8 filed at 10/16/2024 1526 8  filed at 10/08/2024 1713   Can you do yard  work like raking leaves, weeding or pushing a mower? 4.5 filed at 10/16/2024 1526 4.5  filed at 10/08/2024 1713   Can you have sexual relations? 5.25 filed at 10/16/2024 1526 5.25  filed at 10/08/2024 1713   Can you participate in moderate recreational activities like golf, bowling, dancing, doubles tennis or throwing a baseball or football? 6 filed at 10/16/2024 1526 6  filed at 10/08/2024 1713   Can you participate in strenous sports like swimming, singles tennis, football, basketball, or skiing? 0 filed at 10/16/2024 1526 7.5  filed at 10/08/2024 1713   DASI SCORE 50.7 filed at 10/16/2024 1526 58.2  filed at 10/08/2024 1713   METS Score (Will be calculated only when all the questions are answered) 9 filed at 10/16/2024 1526 9.9  filed at 10/08/2024 1713          Caprini DVT Assessment    No data to display       Modified Frailty Index    No data to display       CHADS2 Stroke Risk  Current as of 6 hours ago        N/A 3 to 100%: High Risk   2 to < 3%: Medium Risk   0 to < 2%: Low Risk     Last Change: N/A          This score determines the patient's risk of having a stroke if the patient has atrial fibrillation.        This score is not applicable to this patient. Components are not calculated.          Revised Cardiac Risk Index    No data to display       Apfel Simplified Score    No data to display       Risk Analysis Index Results This Encounter    No data found in the last 10 encounters.       Stop Bang Score      Flowsheet Row Pre-Admission Testing from 10/16/2024 in [facility] Questionnaire Series Submission from 10/8/2024 in [facility] with Generic Provider Emelina   Do you snore loudly? 1 filed at 10/16/2024 1527 0  filed at 10/08/2024 1713   Do you often feel tired or fatigued after your sleep? 1 filed at 10/16/2024 1527 0  filed at 10/08/2024 1713   Has anyone ever observed you stop breathing in your sleep? 0 filed at 10/16/2024 1527 0  filed at 10/08/2024 1713   Do you have or are  you being treated for high blood pressure? 1 filed at 10/16/2024 1527 1  filed at 10/08/2024 1713   Recent BMI (Calculated) 27 filed at 10/16/2024 1527 26.4  filed at 10/08/2024 1713   Is BMI greater than 35 kg/m2? 0=No filed at 10/16/2024 1527 0=No  filed at 10/08/2024 1713   Age older than 50 years old? 1=Yes filed at 10/16/2024 1527 1=Yes  filed at 10/08/2024 1713   Is your neck circumference greater than 17 inches (Male) or 16 inches (Female)? 0 filed at 10/16/2024 1527 --   Gender - Male 1=Yes filed at 10/16/2024 1527 1=Yes  filed at 10/08/2024 1713   STOP-BANG Total Score 5 filed at 10/16/2024 1527 --            Assessment and Plan:   Anesthesia:  The patient denies problems with anesthesia in the past such as PONV, prolonged sedation, awareness, dental damage, aspiration, cardiac arrest, difficult intubation, or unexpected hospital admissions.     Neuro:   The patient has no neurological diagnoses or significant findings on chart review, clinical presentation, and evaluation. No grossly apparent neurological perioperative risk. The patient is at increased risk for perioperative stroke secondary to hypertension , general anesthesia, operative time >2.5 hours.    HEENT/Airway  No diagnoses, significant findings on chart review, clinical presentation, or evaluation. No documented or reported history of airway difficulty.     Cardiovascular  The patient is scheduled for cardiac surgery.  Preoperative evaluation is complete pending results of lab work  RCRI  The patient meets 0 RCRI criteria and therefor has a 3.9% risk of major adverse cardiac complications.  METS  The patient's functional capacity capacity is greater than 4 METS.  EKG  The patient has no EKG or echocardiographic changes concerning for myocardial ischemia.   Heart Failure  The patient has no known history of heart failure.  Additionally, the patient reports no symptoms of heart failure and demonstrates no signs of heart failure.  Hypertension  Evaluation  The patient has a known history of hypertension that is controlled.  Patient's hypertension is most consistent with stage 1.  Heart Rhythm Evaluation  The patient has no history of arrhythmias.  Heart Valve Evaluation  The patient has a known history of valvular heart disease.  Per patient's prior studies, the patient has Mitral Valve Prolapse  Cardiology Evaluation  The patient follows with cardiology, Dr. Franz. Patient was last seen 4-25-24. Per note,  Pt has Mitral valve prolapse with severe MR. Pt to have mitral valve replacement  The patient has a 30-day risk for MACE of 0 predictors, 3.9% risk for cardiac death, nonfatal myocardial infarction, and nonfatal cardiac arrest.  LOBO score which indicates a 0.2% risk of intraoperative or 30-day postoperative MACE (major adverse cardiac event).    Pulmonary   The patient has asthma. The patient is at increased risk of perioperative pulmonary complications secondary to thoracic surgery.    The patient has a stop bang score of 5, which places patient at high risk for having STUART.    ARISCAT 50, High, 42.1% risk of in-hospital postoperative pulmonary complications  PRODIGY 8, low risk of respiratory depression episode. Patient given PI sheet for preoperative deep breathing exercises.    Hematology  No diagnoses or significant findings on chart review or clinical presentation and evaluation.  Antiplatelet management   The patient is not currently receiving antiplatelet therapy.  Anticoagulation management  The patient is not currently receiving anticoagulation therapy.    Caprini score 7, high risk of perioperative VTE.     Patient instructed to ambulate as soon as possible postoperatively to decrease thromboembolic risk. Initiate mechanical DVT prophylaxis as soon as possible and initiate chemical prophylaxis when deemed safe from a bleeding standpoint post surgery.     Transfusion Evaluation  A type and screen was obtained given the likelihood for  perioperative transfusion of blood or blood products.    Gastrointestinal  The patient has GERD    Eat 10- 0,  self-perceived oropharyngeal dysphagia scale (0-40)     Genitourinary  No diagnoses or significant findings on chart review or clinical presentation and evaluation.    Renal  No renal diagnoses or significant findings on chart review or clinical presentation and evaluation. The patient has specific risk factors associated with increased risk of perioperative renal complications related to age greater than 55, male gender, hypertension. DPS 2-3 medium risk for perioperative acute kidney injury.    Musculoskeletal  No diagnoses or significant findings on chart review or clinical presentation and evaluation.    Endocrine  Diabetes Evaluation  The patient has no history of diabetes mellitus.  Thyroid Disease Evaluation  The patient has no history of thyroid disease.    ID  No diagnoses or significant findings on chart review or clinical presentation and evaluation. MRSA screening obtained. Prescriptions and instructions given for Hibiclens and Peridex.    -Preoperative medication instructions were provided and reviewed with the patient.  Any additional testing or evaluation was explained to the patient.  NPO Instructions were discussed, and the patient's questions were answered prior to conclusion of this encounter. Patient verbalized understanding of preoperative instructions. After Visit Summary given.        Recent Results (from the past week)   C-Reactive Protein    Collection Time: 10/16/24  3:59 PM   Result Value Ref Range    C-Reactive Protein 0.22 <1.00 mg/dL   CBC and Auto Differential    Collection Time: 10/16/24  3:59 PM   Result Value Ref Range    WBC 7.3 4.4 - 11.3 x10*3/uL    nRBC 0.0 0.0 - 0.0 /100 WBCs    RBC 5.51 4.50 - 5.90 x10*6/uL    Hemoglobin 16.4 13.5 - 17.5 g/dL    Hematocrit 49.0 41.0 - 52.0 %    MCV 89 80 - 100 fL    MCH 29.8 26.0 - 34.0 pg    MCHC 33.5 32.0 - 36.0 g/dL    RDW 12.7  11.5 - 14.5 %    Platelets 204 150 - 450 x10*3/uL    Neutrophils % 67.8 40.0 - 80.0 %    Immature Granulocytes %, Automated 0.4 0.0 - 0.9 %    Lymphocytes % 17.3 13.0 - 44.0 %    Monocytes % 10.3 2.0 - 10.0 %    Eosinophils % 3.6 0.0 - 6.0 %    Basophils % 0.6 0.0 - 2.0 %    Neutrophils Absolute 4.93 1.20 - 7.70 x10*3/uL    Immature Granulocytes Absolute, Automated 0.03 0.00 - 0.70 x10*3/uL    Lymphocytes Absolute 1.26 1.20 - 4.80 x10*3/uL    Monocytes Absolute 0.75 0.10 - 1.00 x10*3/uL    Eosinophils Absolute 0.26 0.00 - 0.70 x10*3/uL    Basophils Absolute 0.04 0.00 - 0.10 x10*3/uL   Coagulation Screen    Collection Time: 10/16/24  3:59 PM   Result Value Ref Range    Protime 11.0 9.8 - 12.8 seconds    INR 1.0 0.9 - 1.1    aPTT 37 27 - 38 seconds   Staphylococcus aureus/MRSA colonization, Culture    Collection Time: 10/16/24  3:59 PM    Specimen: Anterior Nares; Swab   Result Value Ref Range    Staph/MRSA Screen Culture (A)      Isolated: Methicillin Susceptible Staphylococcus aureus (MSSA)   Type And Screen    Collection Time: 10/16/24  3:59 PM   Result Value Ref Range    ABO TYPE O     Rh TYPE POS     ANTIBODY SCREEN NEG    Basic Metabolic Panel    Collection Time: 10/16/24  3:59 PM   Result Value Ref Range    Glucose 100 (H) 74 - 99 mg/dL    Sodium 138 136 - 145 mmol/L    Potassium 4.2 3.5 - 5.3 mmol/L    Chloride 103 98 - 107 mmol/L    Bicarbonate 25 21 - 32 mmol/L    Anion Gap 14 10 - 20 mmol/L    Urea Nitrogen 21 6 - 23 mg/dL    Creatinine 1.02 0.50 - 1.30 mg/dL    eGFR 86 >60 mL/min/1.73m*2    Calcium 9.2 8.6 - 10.6 mg/dL   Urinalysis with Reflex Culture and Microscopic    Collection Time: 10/16/24  3:59 PM   Result Value Ref Range    Color, Urine Light-Yellow Light-Yellow, Yellow, Dark-Yellow    Appearance, Urine Clear Clear    Specific Gravity, Urine 1.023 1.005 - 1.035    pH, Urine 6.0 5.0, 5.5, 6.0, 6.5, 7.0, 7.5, 8.0    Protein, Urine NEGATIVE NEGATIVE, 10 (TRACE), 20 (TRACE) mg/dL    Glucose, Urine  Normal Normal mg/dL    Blood, Urine NEGATIVE NEGATIVE    Ketones, Urine NEGATIVE NEGATIVE mg/dL    Bilirubin, Urine NEGATIVE NEGATIVE    Urobilinogen, Urine Normal Normal mg/dL    Nitrite, Urine NEGATIVE NEGATIVE    Leukocyte Esterase, Urine NEGATIVE NEGATIVE   Extra Urine Gray Tube    Collection Time: 10/16/24  3:59 PM   Result Value Ref Range    Extra Tube Hold for add-ons.

## 2024-10-17 LAB
HOLD SPECIMEN: NORMAL
STAPHYLOCOCCUS SPEC CULT: ABNORMAL

## 2024-10-19 ENCOUNTER — OFFICE VISIT (OUTPATIENT)
Dept: FAMILY MEDICINE CLINIC | Age: 54
End: 2024-10-19
Payer: COMMERCIAL

## 2024-10-19 VITALS
SYSTOLIC BLOOD PRESSURE: 104 MMHG | BODY MASS INDEX: 25.9 KG/M2 | OXYGEN SATURATION: 97 % | TEMPERATURE: 97.7 F | DIASTOLIC BLOOD PRESSURE: 64 MMHG | WEIGHT: 185 LBS | RESPIRATION RATE: 18 BRPM | HEIGHT: 71 IN | HEART RATE: 106 BPM

## 2024-10-19 DIAGNOSIS — J45.20 ASTHMA IN ADULT, MILD INTERMITTENT, UNCOMPLICATED: ICD-10-CM

## 2024-10-19 DIAGNOSIS — J06.9 VIRAL URI: Primary | ICD-10-CM

## 2024-10-19 PROCEDURE — G8484 FLU IMMUNIZE NO ADMIN: HCPCS | Performed by: FAMILY MEDICINE

## 2024-10-19 PROCEDURE — G8419 CALC BMI OUT NRM PARAM NOF/U: HCPCS | Performed by: FAMILY MEDICINE

## 2024-10-19 PROCEDURE — 3017F COLORECTAL CA SCREEN DOC REV: CPT | Performed by: FAMILY MEDICINE

## 2024-10-19 PROCEDURE — 3078F DIAST BP <80 MM HG: CPT | Performed by: FAMILY MEDICINE

## 2024-10-19 PROCEDURE — 99213 OFFICE O/P EST LOW 20 MIN: CPT | Performed by: FAMILY MEDICINE

## 2024-10-19 PROCEDURE — G8427 DOCREV CUR MEDS BY ELIG CLIN: HCPCS | Performed by: FAMILY MEDICINE

## 2024-10-19 PROCEDURE — 1036F TOBACCO NON-USER: CPT | Performed by: FAMILY MEDICINE

## 2024-10-19 PROCEDURE — 3074F SYST BP LT 130 MM HG: CPT | Performed by: FAMILY MEDICINE

## 2024-10-19 RX ORDER — PREDNISONE 10 MG/1
10 TABLET ORAL 2 TIMES DAILY
Qty: 10 TABLET | Refills: 0 | Status: SHIPPED | OUTPATIENT
Start: 2024-10-19 | End: 2024-10-24

## 2024-10-19 RX ORDER — BENZONATATE 200 MG/1
200 CAPSULE ORAL 3 TIMES DAILY PRN
Qty: 30 CAPSULE | Refills: 0 | Status: SHIPPED | OUTPATIENT
Start: 2024-10-19 | End: 2024-10-26

## 2024-10-19 ASSESSMENT — ENCOUNTER SYMPTOMS
SORE THROAT: 0
SINUS PAIN: 0
CONSTIPATION: 0
WHEEZING: 0
BACK PAIN: 0
VOMITING: 0
NAUSEA: 0
SHORTNESS OF BREATH: 0
ABDOMINAL PAIN: 0
EYE PAIN: 0
DIARRHEA: 0
COUGH: 1

## 2024-10-19 NOTE — PROGRESS NOTES
Arvin Ruiz (:  1970) is a 54 y.o. male,Established patient, here for evaluation of the following chief complaint(s):  URI (Fatigue, cough-declined testing/)         Assessment & Plan  Asthma in adult, mild intermittent, uncomplicated            Viral URI    Prednisone x 5 days tessolon for the cough, add muucinex and symbicort as rescue inhaler as well as regualr dose, f/u as needed    Orders:    predniSONE (DELTASONE) 10 MG tablet; Take 1 tablet by mouth 2 times daily for 5 days    benzonatate (TESSALON) 200 MG capsule; Take 1 capsule by mouth 3 times daily as needed for Cough      Return if symptoms worsen or fail to improve.       Subjective   Here with cough and fatigue  Started Tuesday  Feels run down  No fevers  A little more coughing  No headaches    Of note he is haviong heart surgery  on at [facility]            Review of Systems   Constitutional:  Positive for fatigue. Negative for appetite change and fever.   HENT:  Positive for congestion and postnasal drip. Negative for ear pain, hearing loss, sinus pain and sore throat.    Eyes:  Negative for pain.   Respiratory:  Positive for cough. Negative for shortness of breath and wheezing.    Cardiovascular:  Negative for chest pain and leg swelling.   Gastrointestinal:  Negative for abdominal pain, constipation, diarrhea, nausea and vomiting.   Endocrine: Negative for cold intolerance and heat intolerance.   Genitourinary:  Negative for difficulty urinating, hematuria, scrotal swelling, testicular pain and urgency.   Musculoskeletal:  Negative for arthralgias, back pain, joint swelling and myalgias.   Skin:  Negative for rash and wound.   Neurological:  Negative for dizziness, syncope and light-headedness.   Hematological:  Negative for adenopathy.   Psychiatric/Behavioral:  Negative for confusion and sleep disturbance. The patient is not nervous/anxious.           Objective   Physical Exam  Vitals and nursing note

## 2024-10-24 DIAGNOSIS — I34.0 MITRAL VALVE INSUFFICIENCY, UNSPECIFIED ETIOLOGY: ICD-10-CM

## 2024-10-29 ENCOUNTER — TELEMEDICINE (OUTPATIENT)
Dept: CARDIAC SURGERY | Facility: HOSPITAL | Age: 54
End: 2024-10-29
Payer: COMMERCIAL

## 2024-10-29 DIAGNOSIS — I34.0 MITRAL VALVE INSUFFICIENCY, UNSPECIFIED ETIOLOGY: ICD-10-CM

## 2024-10-29 PROCEDURE — 99212 OFFICE O/P EST SF 10 MIN: CPT | Performed by: THORACIC SURGERY (CARDIOTHORACIC VASCULAR SURGERY)

## 2024-11-04 NOTE — H&P (VIEW-ONLY)
Harinder was seen in follow-up today.  He is scheduled to undergo minimally invasive mitral valve repair on 19 November.  He had some questions about his upcoming surgery.    We spent approximately 20 to 30 minutes reviewing several questions.  Specifically he wanted to know which type of valve we would use if the valve cannot be repaired.  If it cannot be repaired he would like to have a tissue valve and we confirmed this choice.  He asked about the duration of cardiopulmonary bypass, and how long he would be admitted to hospital.  He asked about his ability to arrive after surgery and about being home alone.  All of these questions were addressed to his satisfaction.    The surgery will be on November 19 in the form of a minimally invasive mitral valve repair.  Since our last visit his coronary angiogram on 19 August showed no significant coronary artery disease with mild pulmonary hypertension.  A CT angiogram of his chest and abdomen showed no significant abnormality related to his heart.  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  There is a small lesion within the liver consistent with a benign hemangioma.

## 2024-11-05 NOTE — PATIENT INSTRUCTIONS
Virtual Visit Details    Type of service:  Video Visit     Originating Location (pt. Location): Home    Distant Location (provider location):  On-site  Platform used for Video Visit: [facility]      Diabetes Self-Management Education & Support Visit- Short    Dali Martines presented today for education related to Type 1 diabetes    Patient is being treated with:  Omnipod 5 Insulin Pump    Year of diagnosis: 1988  Referring provider:  Felicia Mckeon MD  Living Situation: Lives alone and has a dog.  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  Currently not employed     Purpose of today's visit:  Follow up insulin pump management.  She uses Fiasp rapid acting insulin in her insulin pump.   She is also taking Ozempic 1 mg once weekly.      Subjective/Objective:      Dali is currently using the Omnipod 5 insulin pump.   She is using the Dexcom G6 continuous glucose monitor with this pump.   Dali Martines meets the following criteria for a continuous glucose monitoring (CGM) system.      Has a diagnosis of diabetes,: This patient has a diagnosis of Type 1 diabetes  Uses a personal continuous glucose monitor and checks glucose four or more times daily and has for the past 90 days as documented in glucose reports I have personally reviewed   Treated with insulin with multiple daily injections (MDI)  or a constant subcutaneous infusion (CSI) pump:  This patient is using a subcutaneous insulin pump.     Has additional conditions that require closer monitoring than can be obtained by blood glucose testing.  This patient  requires frequent adjustments of the insulin treatment regimen, based on therapeutic CGM test results, has severe excursions of blood glucose , has frequent episodes of hypoglycemia, and has day to day variations in work, activity and meal schedules that confound the degree of regimentation needed  to self manage glycemia with blood glucose testing  Has  completed comprehensive diabetes education and training specific to the use of a CGM device.                      Assessment/Plan:    Since our last visit, Dali has had a number of stressful events she's been dealing with.   She has been the sole support person for her mother, who has been diagnosed with vascular dementia and lives alone.  In the past month, her mother fell and suffered a hairline hip fracture that was not diagnosed until she had walked around on it for a while, and it became displaced, necessitating surgery and hospitalization and a stay in transitional care.  She is now at home, but still requiring a great deal of assistance, so Dali has moved in with her for the time being until her mother has recovered sufficiently to be able to assess her baseline cognition.  Dali herself has had a second Covid-19 infection during this time as well, but states that the symptoms were fairly mild for the most part, and she was able to get on Paxlovid relatively quickly which she feels helped.      With all this going on, it's been quite a challenge to maintain control over the past month or so, and because her eating pattern has been disrupted, she's had many more lows, especially early in October, however her report indicates that she is having far fewer episodes in the past week or so.      Because she is trying to re-establish her normal daily routine, making adjustments in her pump settings doesn't seem to be indicated right now.  Encouraged her to continue to try to maintain as normal a routine as possible and we made an appointment at the end of December to re-evaluate.      Follow-up:  Follow up December 31.         Time spent in this visit: 30 minutes    Any diabetes medication dose changes were made via the CDE Protocol and Collaborative Practice Agreement. A copy of this encounter was provided to the patient's referring provider.    departments.

## 2024-11-22 PROBLEM — I34.0 MITRAL REGURGITATION, MYXOMATOUS: Status: ACTIVE | Noted: 2024-11-22

## 2024-11-25 ENCOUNTER — ANESTHESIA EVENT (OUTPATIENT)
Dept: OPERATING ROOM | Facility: HOSPITAL | Age: 54
End: 2024-11-25
Payer: COMMERCIAL

## 2024-11-25 NOTE — PROGRESS NOTES
Pharmacy Medication History Review    Gavin Moreau is a 54 y.o. male who is planned to be admitted for Mitral valve insufficiency. Pharmacy called the patient prior to their scheduled procedure and reviewed the patient's [id number] medications for accuracy.    Medications ADDED:  none  Medications CHANGED:  none  Medications REMOVED:   none    Please review updated prior to admission medication list and comments regarding how patient may be taking medications differently by going to Admission tab --> Admission Orders --> Admit Orders / Review prior to admission medications.     Preferred pharmacy, last doses of medications, and allergies to be confirmed with patient by nursing the day of procedure.     Sources used to complete the med history include spoke with patient, medication dispense report, oarrs, care everywhere    Below are additional concerns with the patient's PTA list.  none    Do Acharya   [facility]  Please reach out via Secure Chat for questions

## 2024-11-26 ENCOUNTER — ANESTHESIA (OUTPATIENT)
Dept: OPERATING ROOM | Facility: HOSPITAL | Age: 54
End: 2024-11-26
Payer: COMMERCIAL

## 2024-11-26 ENCOUNTER — APPOINTMENT (OUTPATIENT)
Dept: CARDIOLOGY | Facility: HOSPITAL | Age: 54
DRG: 219 | End: 2024-11-26
Payer: COMMERCIAL

## 2024-11-26 ENCOUNTER — APPOINTMENT (OUTPATIENT)
Dept: RADIOLOGY | Facility: HOSPITAL | Age: 54
DRG: 219 | End: 2024-11-26
Payer: COMMERCIAL

## 2024-11-26 ENCOUNTER — HOSPITAL ENCOUNTER (INPATIENT)
Dept: OPERATING ROOM | Facility: HOSPITAL | Age: 54
Discharge: HOME | DRG: 219 | End: 2024-11-26
Payer: COMMERCIAL

## 2024-11-26 ENCOUNTER — HOSPITAL ENCOUNTER (INPATIENT)
Facility: HOSPITAL | Age: 54
End: 2024-11-26
Attending: THORACIC SURGERY (CARDIOTHORACIC VASCULAR SURGERY) | Admitting: THORACIC SURGERY (CARDIOTHORACIC VASCULAR SURGERY)
Payer: COMMERCIAL

## 2024-11-26 DIAGNOSIS — I10 ESSENTIAL HYPERTENSION: ICD-10-CM

## 2024-11-26 DIAGNOSIS — I34.0 NONRHEUMATIC MITRAL VALVE REGURGITATION: ICD-10-CM

## 2024-11-26 DIAGNOSIS — I46.9 CARDIAC ARREST: ICD-10-CM

## 2024-11-26 DIAGNOSIS — K57.92 DIVERTICULITIS: ICD-10-CM

## 2024-11-26 DIAGNOSIS — I34.1 MVP (MITRAL VALVE PROLAPSE): ICD-10-CM

## 2024-11-26 DIAGNOSIS — I72.8 SPLENIC ARTERY ANEURYSM (CMS-HCC): ICD-10-CM

## 2024-11-26 DIAGNOSIS — K21.9 GASTROESOPHAGEAL REFLUX DISEASE WITHOUT ESOPHAGITIS: ICD-10-CM

## 2024-11-26 DIAGNOSIS — J45.20 MILD INTERMITTENT ASTHMA, UNCOMPLICATED (HHS-HCC): ICD-10-CM

## 2024-11-26 DIAGNOSIS — E78.2 MIXED HYPERLIPIDEMIA: ICD-10-CM

## 2024-11-26 DIAGNOSIS — I34.0 MITRAL REGURGITATION, MYXOMATOUS: ICD-10-CM

## 2024-11-26 DIAGNOSIS — I05.9 RHEUMATIC MITRAL VALVE DISEASE, UNSPECIFIED: ICD-10-CM

## 2024-11-26 DIAGNOSIS — Z98.890 S/P MITRAL VALVE REPAIR: ICD-10-CM

## 2024-11-26 DIAGNOSIS — I34.0 MITRAL VALVE INSUFFICIENCY, UNSPECIFIED ETIOLOGY: Primary | ICD-10-CM

## 2024-11-26 LAB
ABO GROUP (TYPE) IN BLOOD: NORMAL
ABO GROUP (TYPE) IN BLOOD: NORMAL
ACT BLD: 101 SEC (ref 82–174)
ACT BLD: 412 SEC (ref 82–174)
ACT BLD: 445 SEC (ref 82–174)
ACT BLD: 479 SEC (ref 82–174)
ACT BLD: 485 SEC (ref 82–174)
ACT BLD: 514 SEC (ref 82–174)
ACT BLD: 517 SEC (ref 82–174)
ACT BLD: 564 SEC (ref 82–174)
ACT BLD: 98 SEC (ref 82–174)
ALBUMIN SERPL BCP-MCNC: 3.4 G/DL (ref 3.4–5)
ANION GAP BLDA CALCULATED.4IONS-SCNC: 10 MMO/L (ref 10–25)
ANION GAP BLDA CALCULATED.4IONS-SCNC: 10 MMO/L (ref 10–25)
ANION GAP BLDA CALCULATED.4IONS-SCNC: 11 MMO/L (ref 10–25)
ANION GAP BLDA CALCULATED.4IONS-SCNC: 12 MMO/L (ref 10–25)
ANION GAP BLDA CALCULATED.4IONS-SCNC: 13 MMO/L (ref 10–25)
ANION GAP BLDA CALCULATED.4IONS-SCNC: 16 MMO/L (ref 10–25)
ANION GAP BLDA CALCULATED.4IONS-SCNC: 17 MMO/L (ref 10–25)
ANION GAP BLDA CALCULATED.4IONS-SCNC: 18 MMO/L (ref 10–25)
ANION GAP BLDA CALCULATED.4IONS-SCNC: 18 MMO/L (ref 10–25)
ANION GAP BLDA CALCULATED.4IONS-SCNC: 7 MMO/L (ref 10–25)
ANION GAP BLDA CALCULATED.4IONS-SCNC: 7 MMO/L (ref 10–25)
ANION GAP BLDA CALCULATED.4IONS-SCNC: 8 MMO/L (ref 10–25)
ANION GAP BLDA CALCULATED.4IONS-SCNC: 8 MMO/L (ref 10–25)
ANION GAP BLDA CALCULATED.4IONS-SCNC: 9 MMO/L (ref 10–25)
ANION GAP BLDV CALCULATED.4IONS-SCNC: 16 MMOL/L (ref 10–25)
ANION GAP BLDV CALCULATED.4IONS-SCNC: 18 MMOL/L (ref 10–25)
ANION GAP BLDV CALCULATED.4IONS-SCNC: 9 MMOL/L (ref 10–25)
ANION GAP SERPL CALC-SCNC: 16 MMOL/L (ref 10–20)
ANTIBODY SCREEN: NORMAL
ANTIBODY SCREEN: NORMAL
APTT PPP: 35 SECONDS (ref 27–38)
BASE EXCESS BLDA CALC-SCNC: -0.7 MMOL/L (ref -2–3)
BASE EXCESS BLDA CALC-SCNC: -0.7 MMOL/L (ref -2–3)
BASE EXCESS BLDA CALC-SCNC: -0.8 MMOL/L (ref -2–3)
BASE EXCESS BLDA CALC-SCNC: -1.3 MMOL/L (ref -2–3)
BASE EXCESS BLDA CALC-SCNC: -1.3 MMOL/L (ref -2–3)
BASE EXCESS BLDA CALC-SCNC: -10.1 MMOL/L (ref -2–3)
BASE EXCESS BLDA CALC-SCNC: -10.5 MMOL/L (ref -2–3)
BASE EXCESS BLDA CALC-SCNC: -11.2 MMOL/L (ref -2–3)
BASE EXCESS BLDA CALC-SCNC: -12.3 MMOL/L (ref -2–3)
BASE EXCESS BLDA CALC-SCNC: -2.3 MMOL/L (ref -2–3)
BASE EXCESS BLDA CALC-SCNC: -3.4 MMOL/L (ref -2–3)
BASE EXCESS BLDA CALC-SCNC: -6.1 MMOL/L (ref -2–3)
BASE EXCESS BLDA CALC-SCNC: -6.7 MMOL/L (ref -2–3)
BASE EXCESS BLDA CALC-SCNC: -8.5 MMOL/L (ref -2–3)
BASE EXCESS BLDA CALC-SCNC: -8.7 MMOL/L (ref -2–3)
BASE EXCESS BLDA CALC-SCNC: -8.7 MMOL/L (ref -2–3)
BASE EXCESS BLDA CALC-SCNC: -9.9 MMOL/L (ref -2–3)
BASE EXCESS BLDA CALC-SCNC: 0.5 MMOL/L (ref -2–3)
BASE EXCESS BLDA CALC-SCNC: 2.5 MMOL/L (ref -2–3)
BASE EXCESS BLDV CALC-SCNC: -7.4 MMOL/L (ref -2–3)
BASE EXCESS BLDV CALC-SCNC: -9.1 MMOL/L (ref -2–3)
BASE EXCESS BLDV CALC-SCNC: 1.8 MMOL/L (ref -2–3)
BLOOD EXPIRATION DATE: NORMAL
BODY TEMPERATURE: 37 DEGREES CELSIUS
BUN SERPL-MCNC: 14 MG/DL (ref 6–23)
CA-I BLD-SCNC: 1.25 MMOL/L (ref 1.1–1.33)
CA-I BLDA-SCNC: 0.9 MMOL/L (ref 1.1–1.33)
CA-I BLDA-SCNC: 0.91 MMOL/L (ref 1.1–1.33)
CA-I BLDA-SCNC: 0.92 MMOL/L (ref 1.1–1.33)
CA-I BLDA-SCNC: 0.93 MMOL/L (ref 1.1–1.33)
CA-I BLDA-SCNC: 0.93 MMOL/L (ref 1.1–1.33)
CA-I BLDA-SCNC: 0.98 MMOL/L (ref 1.1–1.33)
CA-I BLDA-SCNC: 0.99 MMOL/L (ref 1.1–1.33)
CA-I BLDA-SCNC: 1.06 MMOL/L (ref 1.1–1.33)
CA-I BLDA-SCNC: 1.08 MMOL/L (ref 1.1–1.33)
CA-I BLDA-SCNC: 1.13 MMOL/L (ref 1.1–1.33)
CA-I BLDA-SCNC: 1.15 MMOL/L (ref 1.1–1.33)
CA-I BLDA-SCNC: 1.16 MMOL/L (ref 1.1–1.33)
CA-I BLDA-SCNC: 1.19 MMOL/L (ref 1.1–1.33)
CA-I BLDA-SCNC: 1.27 MMOL/L (ref 1.1–1.33)
CA-I BLDA-SCNC: 1.28 MMOL/L (ref 1.1–1.33)
CA-I BLDA-SCNC: 1.32 MMOL/L (ref 1.1–1.33)
CA-I BLDA-SCNC: 1.59 MMOL/L (ref 1.1–1.33)
CA-I BLDV-SCNC: 1 MMOL/L (ref 1.1–1.33)
CA-I BLDV-SCNC: 1.22 MMOL/L (ref 1.1–1.33)
CA-I BLDV-SCNC: 1.24 MMOL/L (ref 1.1–1.33)
CALCIUM SERPL-MCNC: 9 MG/DL (ref 8.6–10.6)
CFT FORM KAOLIN IND BLD RES TEG: 1.6 MIN (ref 0.8–2.1)
CFT FORM KAOLIN IND BLD RES TEG: 3.8 MIN (ref 0.8–2.1)
CHLORIDE BLDA-SCNC: 105 MMOL/L (ref 98–107)
CHLORIDE BLDA-SCNC: 106 MMOL/L (ref 98–107)
CHLORIDE BLDA-SCNC: 106 MMOL/L (ref 98–107)
CHLORIDE BLDA-SCNC: 108 MMOL/L (ref 98–107)
CHLORIDE BLDA-SCNC: 109 MMOL/L (ref 98–107)
CHLORIDE BLDA-SCNC: 110 MMOL/L (ref 98–107)
CHLORIDE BLDA-SCNC: 111 MMOL/L (ref 98–107)
CHLORIDE BLDA-SCNC: 111 MMOL/L (ref 98–107)
CHLORIDE BLDA-SCNC: 112 MMOL/L (ref 98–107)
CHLORIDE BLDV-SCNC: 103 MMOL/L (ref 98–107)
CHLORIDE BLDV-SCNC: 106 MMOL/L (ref 98–107)
CHLORIDE BLDV-SCNC: 107 MMOL/L (ref 98–107)
CHLORIDE SERPL-SCNC: 110 MMOL/L (ref 98–107)
CHOLEST SERPL-MCNC: 96 MG/DL (ref 0–199)
CHOLESTEROL/HDL RATIO: 4
CLOT ANGLE.KAOLIN INDUCED BLD RES TEG: 52 DEG (ref 63–78)
CLOT ANGLE.KAOLIN INDUCED BLD RES TEG: 70 DEG (ref 63–78)
CLOT INIT KAO IND P HEP NEUT BLD RES TEG: 10.1 MIN (ref 4.3–8.3)
CLOT INIT KAO IND P HEP NEUT BLD RES TEG: 6.9 MIN (ref 4.6–9.1)
CLOT INIT KAO IND P HEP NEUT BLD RES TEG: 7.2 MIN (ref 4.3–8.3)
CLOT INIT KAO IND P HEP NEUT BLD RES TEG: 8.6 MIN (ref 4.6–9.1)
CO2 SERPL-SCNC: 20 MMOL/L (ref 21–32)
COHGB MFR BLDA: 1.1 %
COHGB MFR BLDA: 1.1 %
COHGB MFR BLDA: 1.2 %
COHGB MFR BLDA: 1.3 %
COHGB MFR BLDA: 1.3 %
COHGB MFR BLDA: 1.5 %
COHGB MFR BLDA: 1.5 %
COHGB MFR BLDV: 1.2 %
CREAT SERPL-MCNC: 1.33 MG/DL (ref 0.5–1.3)
DISPENSE STATUS: NORMAL
DO-HGB MFR BLDA: 0 % (ref 0–5)
DO-HGB MFR BLDA: 0 % (ref 0–5)
DO-HGB MFR BLDA: 0.3 % (ref 0–5)
DO-HGB MFR BLDA: 0.4 % (ref 0–5)
DO-HGB MFR BLDA: 0.4 % (ref 0–5)
DO-HGB MFR BLDA: 0.9 % (ref 0–5)
DO-HGB MFR BLDA: 2.3 % (ref 0–5)
EGFRCR SERPLBLD CKD-EPI 2021: 63 ML/MIN/1.73M*2
ERYTHROCYTE [DISTWIDTH] IN BLOOD BY AUTOMATED COUNT: 12.6 % (ref 11.5–14.5)
EST. AVERAGE GLUCOSE BLD GHB EST-MCNC: 103 MG/DL
FIBRINOGEN BLD CALC-MCNC: 292 MG/DL (ref 278–581)
FIBRINOGEN BLD CALC-MCNC: 339 MG/DL (ref 278–581)
FIBRINOGEN PPP-MCNC: 224 MG/DL (ref 200–400)
GLUCOSE BLDA-MCNC: 113 MG/DL (ref 74–99)
GLUCOSE BLDA-MCNC: 122 MG/DL (ref 74–99)
GLUCOSE BLDA-MCNC: 134 MG/DL (ref 74–99)
GLUCOSE BLDA-MCNC: 140 MG/DL (ref 74–99)
GLUCOSE BLDA-MCNC: 145 MG/DL (ref 74–99)
GLUCOSE BLDA-MCNC: 157 MG/DL (ref 74–99)
GLUCOSE BLDA-MCNC: 169 MG/DL (ref 74–99)
GLUCOSE BLDA-MCNC: 170 MG/DL (ref 74–99)
GLUCOSE BLDA-MCNC: 172 MG/DL (ref 74–99)
GLUCOSE BLDA-MCNC: 178 MG/DL (ref 74–99)
GLUCOSE BLDA-MCNC: 181 MG/DL (ref 74–99)
GLUCOSE BLDA-MCNC: 182 MG/DL (ref 74–99)
GLUCOSE BLDA-MCNC: 184 MG/DL (ref 74–99)
GLUCOSE BLDA-MCNC: 190 MG/DL (ref 74–99)
GLUCOSE BLDA-MCNC: 197 MG/DL (ref 74–99)
GLUCOSE BLDA-MCNC: 208 MG/DL (ref 74–99)
GLUCOSE BLDA-MCNC: 258 MG/DL (ref 74–99)
GLUCOSE BLDA-MCNC: 296 MG/DL (ref 74–99)
GLUCOSE BLDA-MCNC: 376 MG/DL (ref 74–99)
GLUCOSE BLDV-MCNC: 130 MG/DL (ref 74–99)
GLUCOSE BLDV-MCNC: 179 MG/DL (ref 74–99)
GLUCOSE BLDV-MCNC: 184 MG/DL (ref 74–99)
GLUCOSE SERPL-MCNC: 193 MG/DL (ref 74–99)
HBA1C MFR BLD: 5.2 %
HCO3 BLDA-SCNC: 15.3 MMOL/L (ref 22–26)
HCO3 BLDA-SCNC: 16.1 MMOL/L (ref 22–26)
HCO3 BLDA-SCNC: 16.7 MMOL/L (ref 22–26)
HCO3 BLDA-SCNC: 17.3 MMOL/L (ref 22–26)
HCO3 BLDA-SCNC: 17.7 MMOL/L (ref 22–26)
HCO3 BLDA-SCNC: 17.8 MMOL/L (ref 22–26)
HCO3 BLDA-SCNC: 17.9 MMOL/L (ref 22–26)
HCO3 BLDA-SCNC: 18.4 MMOL/L (ref 22–26)
HCO3 BLDA-SCNC: 18.6 MMOL/L (ref 22–26)
HCO3 BLDA-SCNC: 20.3 MMOL/L (ref 22–26)
HCO3 BLDA-SCNC: 22.7 MMOL/L (ref 22–26)
HCO3 BLDA-SCNC: 23.2 MMOL/L (ref 22–26)
HCO3 BLDA-SCNC: 23.7 MMOL/L (ref 22–26)
HCO3 BLDA-SCNC: 24.2 MMOL/L (ref 22–26)
HCO3 BLDA-SCNC: 24.3 MMOL/L (ref 22–26)
HCO3 BLDA-SCNC: 24.3 MMOL/L (ref 22–26)
HCO3 BLDA-SCNC: 24.6 MMOL/L (ref 22–26)
HCO3 BLDA-SCNC: 24.6 MMOL/L (ref 22–26)
HCO3 BLDA-SCNC: 27.9 MMOL/L (ref 22–26)
HCO3 BLDV-SCNC: 18.8 MMOL/L (ref 22–26)
HCO3 BLDV-SCNC: 20.1 MMOL/L (ref 22–26)
HCO3 BLDV-SCNC: 28.6 MMOL/L (ref 22–26)
HCT VFR BLD AUTO: 44.6 % (ref 41–52)
HCT VFR BLD EST: 35 % (ref 41–52)
HCT VFR BLD EST: 37 % (ref 41–52)
HCT VFR BLD EST: 38 % (ref 41–52)
HCT VFR BLD EST: 39 % (ref 41–52)
HCT VFR BLD EST: 39 % (ref 41–52)
HCT VFR BLD EST: 40 % (ref 41–52)
HCT VFR BLD EST: 41 % (ref 41–52)
HCT VFR BLD EST: 41 % (ref 41–52)
HCT VFR BLD EST: 42 % (ref 41–52)
HCT VFR BLD EST: 42 % (ref 41–52)
HCT VFR BLD EST: 43 % (ref 41–52)
HCT VFR BLD EST: 44 % (ref 41–52)
HCT VFR BLD EST: 44 % (ref 41–52)
HCT VFR BLD EST: 46 % (ref 41–52)
HCT VFR BLD EST: 47 % (ref 41–52)
HDLC SERPL-MCNC: 24 MG/DL
HGB BLD-MCNC: 15 G/DL (ref 13.5–17.5)
HGB BLDA-MCNC: 11.8 G/DL (ref 13.5–17.5)
HGB BLDA-MCNC: 12.2 G/DL (ref 13.5–17.5)
HGB BLDA-MCNC: 12.2 G/DL (ref 13.5–17.5)
HGB BLDA-MCNC: 12.6 G/DL (ref 13.5–17.5)
HGB BLDA-MCNC: 12.7 G/DL (ref 13.5–17.5)
HGB BLDA-MCNC: 12.8 G/DL (ref 13.5–17.5)
HGB BLDA-MCNC: 12.9 G/DL (ref 13.5–17.5)
HGB BLDA-MCNC: 13.4 G/DL (ref 13.5–17.5)
HGB BLDA-MCNC: 13.5 G/DL (ref 13.5–17.5)
HGB BLDA-MCNC: 13.5 G/DL (ref 13.5–17.5)
HGB BLDA-MCNC: 13.8 G/DL (ref 13.5–17.5)
HGB BLDA-MCNC: 14 G/DL (ref 13.5–17.5)
HGB BLDA-MCNC: 14 G/DL (ref 13.5–17.5)
HGB BLDA-MCNC: 14.1 G/DL (ref 13.5–17.5)
HGB BLDA-MCNC: 14.3 G/DL (ref 13.5–17.5)
HGB BLDA-MCNC: 14.3 G/DL (ref 13.5–17.5)
HGB BLDA-MCNC: 14.4 G/DL (ref 13.5–17.5)
HGB BLDA-MCNC: 15.3 G/DL (ref 13.5–17.5)
HGB BLDA-MCNC: 15.3 G/DL (ref 13.5–17.5)
HGB BLDA-MCNC: 15.4 G/DL (ref 13.5–17.5)
HGB BLDA-MCNC: 15.5 G/DL (ref 13.5–17.5)
HGB BLDV-MCNC: 12.9 G/DL (ref 13.5–17.5)
HGB BLDV-MCNC: 14.7 G/DL (ref 13.5–17.5)
HGB BLDV-MCNC: 14.8 G/DL (ref 13.5–17.5)
INHALED O2 CONCENTRATION: 100 %
INHALED O2 CONCENTRATION: 21 %
INHALED O2 CONCENTRATION: 25 %
INHALED O2 CONCENTRATION: 40 %
INHALED O2 CONCENTRATION: 50 %
INHALED O2 CONCENTRATION: 50 %
INHALED O2 CONCENTRATION: 65 %
INHALED O2 CONCENTRATION: 70 %
INHALED O2 CONCENTRATION: 75 %
INHALED O2 CONCENTRATION: 75 %
INHALED O2 CONCENTRATION: 80 %
INR PPP: 1.1 (ref 0.9–1.1)
LACTATE BLDA-SCNC: 1.1 MMOL/L (ref 0.4–2)
LACTATE BLDA-SCNC: 1.2 MMOL/L (ref 0.4–2)
LACTATE BLDA-SCNC: 1.3 MMOL/L (ref 0.4–2)
LACTATE BLDA-SCNC: 1.3 MMOL/L (ref 0.4–2)
LACTATE BLDA-SCNC: 1.5 MMOL/L (ref 0.4–2)
LACTATE BLDA-SCNC: 1.8 MMOL/L (ref 0.4–2)
LACTATE BLDA-SCNC: 2.6 MMOL/L (ref 0.4–2)
LACTATE BLDA-SCNC: 2.9 MMOL/L (ref 0.4–2)
LACTATE BLDA-SCNC: 3.1 MMOL/L (ref 0.4–2)
LACTATE BLDA-SCNC: 4.4 MMOL/L (ref 0.4–2)
LACTATE BLDA-SCNC: 4.6 MMOL/L (ref 0.4–2)
LACTATE BLDA-SCNC: 4.7 MMOL/L (ref 0.4–2)
LACTATE BLDA-SCNC: 5 MMOL/L (ref 0.4–2)
LACTATE BLDA-SCNC: 5.1 MMOL/L (ref 0.4–2)
LACTATE BLDA-SCNC: 5.1 MMOL/L (ref 0.4–2)
LACTATE BLDA-SCNC: 5.7 MMOL/L (ref 0.4–2)
LACTATE BLDA-SCNC: 5.9 MMOL/L (ref 0.4–2)
LACTATE BLDA-SCNC: 6.2 MMOL/L (ref 0.4–2)
LACTATE BLDA-SCNC: 8.1 MMOL/L (ref 0.4–2)
LACTATE BLDV-SCNC: 1.1 MMOL/L (ref 0.4–2)
LACTATE BLDV-SCNC: 7.1 MMOL/L (ref 0.4–2)
LACTATE BLDV-SCNC: 7.2 MMOL/L (ref 0.4–2)
LDLC SERPL CALC-MCNC: 54 MG/DL
MA KAOLIN BLD RES TEG: 53 MM (ref 52–69)
MA KAOLIN BLD RES TEG: 54 MM (ref 52–69)
MA KAOLIN+TF BLD RES TEG: 52 MM (ref 52–70)
MA KAOLIN+TF BLD RES TEG: 58 MM (ref 52–70)
MA TF IND+IIB-IIIA INH BLD RES TEG: 16 MM (ref 15–32)
MA TF IND+IIB-IIIA INH BLD RES TEG: 19 MM (ref 15–32)
MAGNESIUM SERPL-MCNC: 3.73 MG/DL (ref 1.6–2.4)
MCH RBC QN AUTO: 29.8 PG (ref 26–34)
MCHC RBC AUTO-ENTMCNC: 33.6 G/DL (ref 32–36)
MCV RBC AUTO: 89 FL (ref 80–100)
METHGB MFR BLDA: 0.3 % (ref 0–1.5)
METHGB MFR BLDA: 0.4 % (ref 0–1.5)
METHGB MFR BLDA: 0.5 % (ref 0–1.5)
METHGB MFR BLDA: 0.6 % (ref 0–1.5)
METHGB MFR BLDV: 0.2 % (ref 0–1.5)
NON HDL CHOLESTEROL: 72 MG/DL (ref 0–149)
NRBC BLD-RTO: 0 /100 WBCS (ref 0–0)
OXYHGB MFR BLDA: 95.6 % (ref 94–98)
OXYHGB MFR BLDA: 95.7 % (ref 94–98)
OXYHGB MFR BLDA: 95.9 % (ref 94–98)
OXYHGB MFR BLDA: 96.1 % (ref 94–98)
OXYHGB MFR BLDA: 96.9 % (ref 94–98)
OXYHGB MFR BLDA: 97.2 % (ref 94–98)
OXYHGB MFR BLDA: 97.2 % (ref 94–98)
OXYHGB MFR BLDA: 97.4 % (ref 94–98)
OXYHGB MFR BLDA: 97.6 % (ref 94–98)
OXYHGB MFR BLDA: 97.8 % (ref 94–98)
OXYHGB MFR BLDA: 97.9 % (ref 94–98)
OXYHGB MFR BLDA: 98 % (ref 94–98)
OXYHGB MFR BLDA: 98.2 % (ref 94–98)
OXYHGB MFR BLDA: 98.3 % (ref 94–98)
OXYHGB MFR BLDA: 98.4 % (ref 94–98)
OXYHGB MFR BLDA: 98.4 % (ref 94–98)
OXYHGB MFR BLDA: 98.6 % (ref 94–98)
OXYHGB MFR BLDV: 68.7 % (ref 45–75)
OXYHGB MFR BLDV: 73.4 % (ref 45–75)
OXYHGB MFR BLDV: 85.8 % (ref 45–75)
PCO2 BLDA: 31 MM HG (ref 38–42)
PCO2 BLDA: 32 MM HG (ref 38–42)
PCO2 BLDA: 34 MM HG (ref 38–42)
PCO2 BLDA: 35 MM HG (ref 38–42)
PCO2 BLDA: 35 MM HG (ref 38–42)
PCO2 BLDA: 36 MM HG (ref 38–42)
PCO2 BLDA: 37 MM HG (ref 38–42)
PCO2 BLDA: 40 MM HG (ref 38–42)
PCO2 BLDA: 41 MM HG (ref 38–42)
PCO2 BLDA: 43 MM HG (ref 38–42)
PCO2 BLDA: 43 MM HG (ref 38–42)
PCO2 BLDA: 44 MM HG (ref 38–42)
PCO2 BLDA: 45 MM HG (ref 38–42)
PCO2 BLDA: 50 MM HG (ref 38–42)
PCO2 BLDA: 51 MM HG (ref 38–42)
PCO2 BLDA: 51 MM HG (ref 38–42)
PCO2 BLDA: 67 MM HG (ref 38–42)
PCO2 BLDV: 47 MM HG (ref 41–51)
PCO2 BLDV: 47 MM HG (ref 41–51)
PCO2 BLDV: 53 MM HG (ref 41–51)
PH BLDA: 7.09 PH (ref 7.38–7.42)
PH BLDA: 7.15 PH (ref 7.38–7.42)
PH BLDA: 7.17 PH (ref 7.38–7.42)
PH BLDA: 7.19 PH (ref 7.38–7.42)
PH BLDA: 7.24 PH (ref 7.38–7.42)
PH BLDA: 7.27 PH (ref 7.38–7.42)
PH BLDA: 7.29 PH (ref 7.38–7.42)
PH BLDA: 7.3 PH (ref 7.38–7.42)
PH BLDA: 7.3 PH (ref 7.38–7.42)
PH BLDA: 7.32 PH (ref 7.38–7.42)
PH BLDA: 7.35 PH (ref 7.38–7.42)
PH BLDA: 7.36 PH (ref 7.38–7.42)
PH BLDA: 7.37 PH (ref 7.38–7.42)
PH BLDA: 7.38 PH (ref 7.38–7.42)
PH BLDA: 7.38 PH (ref 7.38–7.42)
PH BLDA: 7.39 PH (ref 7.38–7.42)
PH BLDA: 7.4 PH (ref 7.38–7.42)
PH BLDA: 7.43 PH (ref 7.38–7.42)
PH BLDA: 7.43 PH (ref 7.38–7.42)
PH BLDV: 7.21 PH (ref 7.33–7.43)
PH BLDV: 7.24 PH (ref 7.33–7.43)
PH BLDV: 7.34 PH (ref 7.33–7.43)
PHOSPHATE SERPL-MCNC: 2.8 MG/DL (ref 2.5–4.9)
PLATELET # BLD AUTO: 175 X10*3/UL (ref 150–450)
PO2 BLDA: 101 MM HG (ref 85–95)
PO2 BLDA: 113 MM HG (ref 85–95)
PO2 BLDA: 121 MM HG (ref 85–95)
PO2 BLDA: 124 MM HG (ref 85–95)
PO2 BLDA: 171 MM HG (ref 85–95)
PO2 BLDA: 171 MM HG (ref 85–95)
PO2 BLDA: 195 MM HG (ref 85–95)
PO2 BLDA: 220 MM HG (ref 85–95)
PO2 BLDA: 248 MM HG (ref 85–95)
PO2 BLDA: 263 MM HG (ref 85–95)
PO2 BLDA: 274 MM HG (ref 85–95)
PO2 BLDA: 307 MM HG (ref 85–95)
PO2 BLDA: 326 MM HG (ref 85–95)
PO2 BLDA: 354 MM HG (ref 85–95)
PO2 BLDA: 526 MM HG (ref 85–95)
PO2 BLDA: 80 MM HG (ref 85–95)
PO2 BLDA: 82 MM HG (ref 85–95)
PO2 BLDA: 85 MM HG (ref 85–95)
PO2 BLDA: 93 MM HG (ref 85–95)
PO2 BLDV: 42 MM HG (ref 35–45)
PO2 BLDV: 49 MM HG (ref 35–45)
PO2 BLDV: 54 MM HG (ref 35–45)
POTASSIUM BLDA-SCNC: 3.1 MMOL/L (ref 3.5–5.3)
POTASSIUM BLDA-SCNC: 3.1 MMOL/L (ref 3.5–5.3)
POTASSIUM BLDA-SCNC: 3.4 MMOL/L (ref 3.5–5.3)
POTASSIUM BLDA-SCNC: 3.5 MMOL/L (ref 3.5–5.3)
POTASSIUM BLDA-SCNC: 3.6 MMOL/L (ref 3.5–5.3)
POTASSIUM BLDA-SCNC: 3.9 MMOL/L (ref 3.5–5.3)
POTASSIUM BLDA-SCNC: 4.3 MMOL/L (ref 3.5–5.3)
POTASSIUM BLDA-SCNC: 4.4 MMOL/L (ref 3.5–5.3)
POTASSIUM BLDA-SCNC: 4.5 MMOL/L (ref 3.5–5.3)
POTASSIUM BLDA-SCNC: 4.9 MMOL/L (ref 3.5–5.3)
POTASSIUM BLDA-SCNC: 4.9 MMOL/L (ref 3.5–5.3)
POTASSIUM BLDA-SCNC: 5 MMOL/L (ref 3.5–5.3)
POTASSIUM BLDA-SCNC: 5 MMOL/L (ref 3.5–5.3)
POTASSIUM BLDA-SCNC: 5.3 MMOL/L (ref 3.5–5.3)
POTASSIUM BLDA-SCNC: 5.5 MMOL/L (ref 3.5–5.3)
POTASSIUM BLDA-SCNC: 5.6 MMOL/L (ref 3.5–5.3)
POTASSIUM BLDA-SCNC: 5.8 MMOL/L (ref 3.5–5.3)
POTASSIUM BLDA-SCNC: 6.2 MMOL/L (ref 3.5–5.3)
POTASSIUM BLDA-SCNC: 6.3 MMOL/L (ref 3.5–5.3)
POTASSIUM BLDV-SCNC: 3.4 MMOL/L (ref 3.5–5.3)
POTASSIUM BLDV-SCNC: 3.5 MMOL/L (ref 3.5–5.3)
POTASSIUM BLDV-SCNC: 4.9 MMOL/L (ref 3.5–5.3)
POTASSIUM SERPL-SCNC: 3.4 MMOL/L (ref 3.5–5.3)
PRODUCT BLOOD TYPE: 5100
PRODUCT BLOOD TYPE: 5100
PRODUCT BLOOD TYPE: 8400
PRODUCT BLOOD TYPE: 9500
PRODUCT CODE: NORMAL
PROTHROMBIN TIME: 12 SECONDS (ref 9.8–12.8)
RBC # BLD AUTO: 5.03 X10*6/UL (ref 4.5–5.9)
RH FACTOR (ANTIGEN D): NORMAL
RH FACTOR (ANTIGEN D): NORMAL
SAO2 % BLDA: 100 % (ref 94–100)
SAO2 % BLDA: 98 % (ref 94–100)
SAO2 % BLDA: 99 % (ref 94–100)
SAO2 % BLDV: 70 % (ref 45–75)
SAO2 % BLDV: 75 % (ref 45–75)
SAO2 % BLDV: 87 % (ref 45–75)
SODIUM BLDA-SCNC: 132 MMOL/L (ref 136–145)
SODIUM BLDA-SCNC: 133 MMOL/L (ref 136–145)
SODIUM BLDA-SCNC: 134 MMOL/L (ref 136–145)
SODIUM BLDA-SCNC: 135 MMOL/L (ref 136–145)
SODIUM BLDA-SCNC: 136 MMOL/L (ref 136–145)
SODIUM BLDA-SCNC: 137 MMOL/L (ref 136–145)
SODIUM BLDA-SCNC: 138 MMOL/L (ref 136–145)
SODIUM BLDA-SCNC: 138 MMOL/L (ref 136–145)
SODIUM BLDA-SCNC: 139 MMOL/L (ref 136–145)
SODIUM BLDA-SCNC: 139 MMOL/L (ref 136–145)
SODIUM BLDA-SCNC: 140 MMOL/L (ref 136–145)
SODIUM BLDA-SCNC: 140 MMOL/L (ref 136–145)
SODIUM BLDV-SCNC: 136 MMOL/L (ref 136–145)
SODIUM BLDV-SCNC: 139 MMOL/L (ref 136–145)
SODIUM BLDV-SCNC: 140 MMOL/L (ref 136–145)
SODIUM SERPL-SCNC: 143 MMOL/L (ref 136–145)
SPECIMEN DRAWN FROM PATIENT: ABNORMAL
TEST COMMENT: ABNORMAL
TEST COMMENT: NORMAL
TRIGL SERPL-MCNC: 90 MG/DL (ref 0–149)
UNIT ABO: NORMAL
UNIT NUMBER: NORMAL
UNIT RH: NORMAL
UNIT VOLUME: 215
UNIT VOLUME: 218
UNIT VOLUME: 220
UNIT VOLUME: 301
VLDL: 18 MG/DL (ref 0–40)
WBC # BLD AUTO: 32.4 X10*3/UL (ref 4.4–11.3)

## 2024-11-26 PROCEDURE — 33427 REPAIR OF MITRAL VALVE: CPT | Performed by: THORACIC SURGERY (CARDIOTHORACIC VASCULAR SURGERY)

## 2024-11-26 PROCEDURE — 3600000012 HC PERFUSION TIME - EACH INCREMENTAL 1 MINUTE: Performed by: THORACIC SURGERY (CARDIOTHORACIC VASCULAR SURGERY)

## 2024-11-26 PROCEDURE — 2500000004 HC RX 250 GENERAL PHARMACY W/ HCPCS (ALT 636 FOR OP/ED)

## 2024-11-26 PROCEDURE — 84132 ASSAY OF SERUM POTASSIUM: CPT

## 2024-11-26 PROCEDURE — 2500000001 HC RX 250 WO HCPCS SELF ADMINISTERED DRUGS (ALT 637 FOR MEDICARE OP)

## 2024-11-26 PROCEDURE — C1900 LEAD, CORONARY VENOUS: HCPCS | Performed by: THORACIC SURGERY (CARDIOTHORACIC VASCULAR SURGERY)

## 2024-11-26 PROCEDURE — 36556 INSERT NON-TUNNEL CV CATH: CPT

## 2024-11-26 PROCEDURE — 37799 UNLISTED PX VASCULAR SURGERY: CPT

## 2024-11-26 PROCEDURE — 3700000001 HC GENERAL ANESTHESIA TIME - INITIAL BASE CHARGE: Performed by: THORACIC SURGERY (CARDIOTHORACIC VASCULAR SURGERY)

## 2024-11-26 PROCEDURE — C1889 IMPLANT/INSERT DEVICE, NOC: HCPCS | Performed by: THORACIC SURGERY (CARDIOTHORACIC VASCULAR SURGERY)

## 2024-11-26 PROCEDURE — A4649 SURGICAL SUPPLIES: HCPCS | Performed by: THORACIC SURGERY (CARDIOTHORACIC VASCULAR SURGERY)

## 2024-11-26 PROCEDURE — 94002 VENT MGMT INPAT INIT DAY: CPT

## 2024-11-26 PROCEDURE — B24BZZ4 ULTRASONOGRAPHY OF HEART WITH AORTA, TRANSESOPHAGEAL: ICD-10-PCS | Performed by: THORACIC SURGERY (CARDIOTHORACIC VASCULAR SURGERY)

## 2024-11-26 PROCEDURE — 85347 COAGULATION TIME ACTIVATED: CPT

## 2024-11-26 PROCEDURE — 83036 HEMOGLOBIN GLYCOSYLATED A1C: CPT

## 2024-11-26 PROCEDURE — 85018 HEMOGLOBIN: CPT

## 2024-11-26 PROCEDURE — 99291 CRITICAL CARE FIRST HOUR: CPT

## 2024-11-26 PROCEDURE — 2500000002 HC RX 250 W HCPCS SELF ADMINISTERED DRUGS (ALT 637 FOR MEDICARE OP, ALT 636 FOR OP/ED)

## 2024-11-26 PROCEDURE — 71045 X-RAY EXAM CHEST 1 VIEW: CPT

## 2024-11-26 PROCEDURE — 3600000011 HC PERFUSION TIME - INITIAL BASE CHARGE: Performed by: THORACIC SURGERY (CARDIOTHORACIC VASCULAR SURGERY)

## 2024-11-26 PROCEDURE — 74018 RADEX ABDOMEN 1 VIEW: CPT

## 2024-11-26 PROCEDURE — 85027 COMPLETE CBC AUTOMATED: CPT

## 2024-11-26 PROCEDURE — 8E0W0CZ ROBOTIC ASSISTED PROCEDURE OF TRUNK REGION, OPEN APPROACH: ICD-10-PCS | Performed by: THORACIC SURGERY (CARDIOTHORACIC VASCULAR SURGERY)

## 2024-11-26 PROCEDURE — 85576 BLOOD PLATELET AGGREGATION: CPT

## 2024-11-26 PROCEDURE — 99291 CRITICAL CARE FIRST HOUR: CPT | Performed by: ANESTHESIOLOGY

## 2024-11-26 PROCEDURE — 76937 US GUIDE VASCULAR ACCESS: CPT

## 2024-11-26 PROCEDURE — 83735 ASSAY OF MAGNESIUM: CPT

## 2024-11-26 PROCEDURE — 83050 HGB METHEMOGLOBIN QUAN: CPT

## 2024-11-26 PROCEDURE — 82330 ASSAY OF CALCIUM: CPT

## 2024-11-26 PROCEDURE — 3600000017 HC OR TIME - EACH INCREMENTAL 1 MINUTE - PROCEDURE LEVEL SIX: Performed by: THORACIC SURGERY (CARDIOTHORACIC VASCULAR SURGERY)

## 2024-11-26 PROCEDURE — 2500000005 HC RX 250 GENERAL PHARMACY W/O HCPCS

## 2024-11-26 PROCEDURE — 80061 LIPID PANEL: CPT

## 2024-11-26 PROCEDURE — 2500000004 HC RX 250 GENERAL PHARMACY W/ HCPCS (ALT 636 FOR OP/ED): Mod: JZ | Performed by: THORACIC SURGERY (CARDIOTHORACIC VASCULAR SURGERY)

## 2024-11-26 PROCEDURE — 5A1221Z PERFORMANCE OF CARDIAC OUTPUT, CONTINUOUS: ICD-10-PCS | Performed by: THORACIC SURGERY (CARDIOTHORACIC VASCULAR SURGERY)

## 2024-11-26 PROCEDURE — 2500000004 HC RX 250 GENERAL PHARMACY W/ HCPCS (ALT 636 FOR OP/ED): Performed by: THORACIC SURGERY (CARDIOTHORACIC VASCULAR SURGERY)

## 2024-11-26 PROCEDURE — 5A12012 PERFORMANCE OF CARDIAC OUTPUT, SINGLE, MANUAL: ICD-10-PCS | Performed by: THORACIC SURGERY (CARDIOTHORACIC VASCULAR SURGERY)

## 2024-11-26 PROCEDURE — 85384 FIBRINOGEN ACTIVITY: CPT

## 2024-11-26 PROCEDURE — 02UG0JZ SUPPLEMENT MITRAL VALVE WITH SYNTHETIC SUBSTITUTE, OPEN APPROACH: ICD-10-PCS | Performed by: THORACIC SURGERY (CARDIOTHORACIC VASCULAR SURGERY)

## 2024-11-26 PROCEDURE — 82947 ASSAY GLUCOSE BLOOD QUANT: CPT

## 2024-11-26 PROCEDURE — 85730 THROMBOPLASTIN TIME PARTIAL: CPT

## 2024-11-26 PROCEDURE — 82375 ASSAY CARBOXYHB QUANT: CPT

## 2024-11-26 PROCEDURE — 3700000002 HC GENERAL ANESTHESIA TIME - EACH INCREMENTAL 1 MINUTE: Performed by: THORACIC SURGERY (CARDIOTHORACIC VASCULAR SURGERY)

## 2024-11-26 PROCEDURE — 93005 ELECTROCARDIOGRAM TRACING: CPT

## 2024-11-26 PROCEDURE — 2500000005 HC RX 250 GENERAL PHARMACY W/O HCPCS: Performed by: THORACIC SURGERY (CARDIOTHORACIC VASCULAR SURGERY)

## 2024-11-26 PROCEDURE — 2720000007 HC OR 272 NO HCPCS: Performed by: THORACIC SURGERY (CARDIOTHORACIC VASCULAR SURGERY)

## 2024-11-26 PROCEDURE — 36620 INSERTION CATHETER ARTERY: CPT

## 2024-11-26 PROCEDURE — 86923 COMPATIBILITY TEST ELECTRIC: CPT

## 2024-11-26 PROCEDURE — 86900 BLOOD TYPING SEROLOGIC ABO: CPT

## 2024-11-26 PROCEDURE — A4312 CATH W/O BAG 2-WAY SILICONE: HCPCS | Performed by: THORACIC SURGERY (CARDIOTHORACIC VASCULAR SURGERY)

## 2024-11-26 PROCEDURE — 93010 ELECTROCARDIOGRAM REPORT: CPT | Performed by: INTERNAL MEDICINE

## 2024-11-26 PROCEDURE — 5A2204Z RESTORATION OF CARDIAC RHYTHM, SINGLE: ICD-10-PCS | Performed by: THORACIC SURGERY (CARDIOTHORACIC VASCULAR SURGERY)

## 2024-11-26 PROCEDURE — 2780000003 HC OR 278 NO HCPCS: Performed by: THORACIC SURGERY (CARDIOTHORACIC VASCULAR SURGERY)

## 2024-11-26 PROCEDURE — 2020000001 HC ICU ROOM DAILY

## 2024-11-26 PROCEDURE — P9047 ALBUMIN (HUMAN), 25%, 50ML: HCPCS | Mod: JZ | Performed by: THORACIC SURGERY (CARDIOTHORACIC VASCULAR SURGERY)

## 2024-11-26 PROCEDURE — 36415 COLL VENOUS BLD VENIPUNCTURE: CPT | Performed by: THORACIC SURGERY (CARDIOTHORACIC VASCULAR SURGERY)

## 2024-11-26 PROCEDURE — 86850 RBC ANTIBODY SCREEN: CPT | Performed by: THORACIC SURGERY (CARDIOTHORACIC VASCULAR SURGERY)

## 2024-11-26 PROCEDURE — 3600000018 HC OR TIME - INITIAL BASE CHARGE - PROCEDURE LEVEL SIX: Performed by: THORACIC SURGERY (CARDIOTHORACIC VASCULAR SURGERY)

## 2024-11-26 DEVICE — IMPLANTABLE DEVICE
Type: IMPLANTABLE DEVICE | Site: HEART | Status: FUNCTIONAL
Brand: CARPENTIER-EDWARDS PHYSIO II ANNULOPLASTY RING

## 2024-11-26 RX ORDER — SODIUM CHLORIDE, SODIUM LACTATE, POTASSIUM CHLORIDE, CALCIUM CHLORIDE 600; 310; 30; 20 MG/100ML; MG/100ML; MG/100ML; MG/100ML
20 INJECTION, SOLUTION INTRAVENOUS CONTINUOUS
Status: DISCONTINUED | OUTPATIENT
Start: 2024-11-26 | End: 2024-11-26

## 2024-11-26 RX ORDER — INSULIN LISPRO 100 [IU]/ML
0-15 INJECTION, SOLUTION INTRAVENOUS; SUBCUTANEOUS EVERY 4 HOURS
Status: DISCONTINUED | OUTPATIENT
Start: 2024-11-26 | End: 2024-11-27

## 2024-11-26 RX ORDER — FLUTICASONE FUROATE AND VILANTEROL 100; 25 UG/1; UG/1
1 POWDER RESPIRATORY (INHALATION)
Status: DISCONTINUED | OUTPATIENT
Start: 2024-11-27 | End: 2024-12-09 | Stop reason: HOSPADM

## 2024-11-26 RX ORDER — GLYCOPYRROLATE 0.2 MG/ML
INJECTION INTRAMUSCULAR; INTRAVENOUS AS NEEDED
Status: DISCONTINUED | OUTPATIENT
Start: 2024-11-26 | End: 2024-11-26

## 2024-11-26 RX ORDER — TRANEXAMIC ACID 10 MG/ML
INJECTION, SOLUTION INTRAVENOUS AS NEEDED
Status: DISCONTINUED | OUTPATIENT
Start: 2024-11-26 | End: 2024-11-26

## 2024-11-26 RX ORDER — CALCIUM GLUCONATE 20 MG/ML
1 INJECTION, SOLUTION INTRAVENOUS EVERY 6 HOURS PRN
Status: DISCONTINUED | OUTPATIENT
Start: 2024-11-26 | End: 2024-11-28

## 2024-11-26 RX ORDER — OXYCODONE HYDROCHLORIDE 10 MG/1
10 TABLET ORAL EVERY 4 HOURS PRN
Status: DISCONTINUED | OUTPATIENT
Start: 2024-11-26 | End: 2024-12-01

## 2024-11-26 RX ORDER — HYDROMORPHONE HYDROCHLORIDE 0.2 MG/ML
0.2 INJECTION INTRAMUSCULAR; INTRAVENOUS; SUBCUTANEOUS
Status: DISCONTINUED | OUTPATIENT
Start: 2024-11-26 | End: 2024-11-27

## 2024-11-26 RX ORDER — POTASSIUM CHLORIDE 29.8 MG/ML
40 INJECTION INTRAVENOUS EVERY 6 HOURS PRN
Status: DISCONTINUED | OUTPATIENT
Start: 2024-11-26 | End: 2024-11-28

## 2024-11-26 RX ORDER — MIDAZOLAM HYDROCHLORIDE 1 MG/ML
INJECTION INTRAMUSCULAR; INTRAVENOUS AS NEEDED
Status: DISCONTINUED | OUTPATIENT
Start: 2024-11-26 | End: 2024-11-26

## 2024-11-26 RX ORDER — EPINEPHRINE IN 0.9 % SOD CHLOR 4MG/250ML
0-.06 PLASTIC BAG, INJECTION (ML) INTRAVENOUS CONTINUOUS
Status: DISCONTINUED | OUTPATIENT
Start: 2024-11-26 | End: 2024-11-27

## 2024-11-26 RX ORDER — KETAMINE HYDROCHLORIDE 100 MG/ML
INJECTION, SOLUTION INTRAMUSCULAR; INTRAVENOUS CONTINUOUS PRN
Status: DISCONTINUED | OUTPATIENT
Start: 2024-11-26 | End: 2024-11-26

## 2024-11-26 RX ORDER — CALCIUM GLUCONATE 20 MG/ML
2 INJECTION, SOLUTION INTRAVENOUS EVERY 6 HOURS PRN
Status: DISCONTINUED | OUTPATIENT
Start: 2024-11-26 | End: 2024-11-28

## 2024-11-26 RX ORDER — PROTAMINE SULFATE 10 MG/ML
INJECTION, SOLUTION INTRAVENOUS AS NEEDED
Status: DISCONTINUED | OUTPATIENT
Start: 2024-11-26 | End: 2024-11-26

## 2024-11-26 RX ORDER — PROPOFOL 10 MG/ML
INJECTION, EMULSION INTRAVENOUS AS NEEDED
Status: DISCONTINUED | OUTPATIENT
Start: 2024-11-26 | End: 2024-11-26

## 2024-11-26 RX ORDER — ACETAMINOPHEN 10 MG/ML
INJECTION, SOLUTION INTRAVENOUS AS NEEDED
Status: DISCONTINUED | OUTPATIENT
Start: 2024-11-26 | End: 2024-11-26

## 2024-11-26 RX ORDER — LIDOCAINE HYDROCHLORIDE 20 MG/ML
INJECTION, SOLUTION INFILTRATION; PERINEURAL AS NEEDED
Status: DISCONTINUED | OUTPATIENT
Start: 2024-11-26 | End: 2024-11-26

## 2024-11-26 RX ORDER — INDOMETHACIN 25 MG/1
CAPSULE ORAL AS NEEDED
Status: DISCONTINUED | OUTPATIENT
Start: 2024-11-26 | End: 2024-11-26

## 2024-11-26 RX ORDER — BUPIVACAINE HYDROCHLORIDE 5 MG/ML
INJECTION, SOLUTION PERINEURAL AS NEEDED
Status: DISCONTINUED | OUTPATIENT
Start: 2024-11-26 | End: 2024-11-26 | Stop reason: HOSPADM

## 2024-11-26 RX ORDER — NALOXONE HYDROCHLORIDE 0.4 MG/ML
0.2 INJECTION, SOLUTION INTRAMUSCULAR; INTRAVENOUS; SUBCUTANEOUS EVERY 5 MIN PRN
Status: DISCONTINUED | OUTPATIENT
Start: 2024-11-26 | End: 2024-12-09 | Stop reason: HOSPADM

## 2024-11-26 RX ORDER — CEFAZOLIN SODIUM 2 G/100ML
2 INJECTION, SOLUTION INTRAVENOUS EVERY 8 HOURS
Status: COMPLETED | OUTPATIENT
Start: 2024-11-26 | End: 2024-11-28

## 2024-11-26 RX ORDER — DEXMEDETOMIDINE HYDROCHLORIDE 4 UG/ML
INJECTION, SOLUTION INTRAVENOUS CONTINUOUS PRN
Status: DISCONTINUED | OUTPATIENT
Start: 2024-11-26 | End: 2024-11-26

## 2024-11-26 RX ORDER — SODIUM CHLORIDE, SODIUM GLUCONATE, SODIUM ACETATE, POTASSIUM CHLORIDE AND MAGNESIUM CHLORIDE 30; 37; 368; 526; 502 MG/100ML; MG/100ML; MG/100ML; MG/100ML; MG/100ML
INJECTION, SOLUTION INTRAVENOUS CONTINUOUS PRN
Status: DISCONTINUED | OUTPATIENT
Start: 2024-11-26 | End: 2024-11-26

## 2024-11-26 RX ORDER — NEOSTIGMINE METHYLSULFATE 1 MG/ML
4 INJECTION INTRAVENOUS ONCE
Status: COMPLETED | OUTPATIENT
Start: 2024-11-26 | End: 2024-11-26

## 2024-11-26 RX ORDER — CEFAZOLIN 1 G/1
INJECTION, POWDER, FOR SOLUTION INTRAVENOUS AS NEEDED
Status: DISCONTINUED | OUTPATIENT
Start: 2024-11-26 | End: 2024-11-26

## 2024-11-26 RX ORDER — SODIUM CHLORIDE 0.9 G/100ML
IRRIGANT IRRIGATION AS NEEDED
Status: DISCONTINUED | OUTPATIENT
Start: 2024-11-26 | End: 2024-11-26 | Stop reason: HOSPADM

## 2024-11-26 RX ORDER — CALCIUM CHLORIDE INJECTION 100 MG/ML
INJECTION, SOLUTION INTRAVENOUS AS NEEDED
Status: DISCONTINUED | OUTPATIENT
Start: 2024-11-26 | End: 2024-11-26

## 2024-11-26 RX ORDER — FENTANYL CITRATE 50 UG/ML
INJECTION, SOLUTION INTRAMUSCULAR; INTRAVENOUS AS NEEDED
Status: DISCONTINUED | OUTPATIENT
Start: 2024-11-26 | End: 2024-11-26

## 2024-11-26 RX ORDER — ACETAMINOPHEN 325 MG/1
650 TABLET ORAL EVERY 6 HOURS
Status: DISCONTINUED | OUTPATIENT
Start: 2024-11-26 | End: 2024-12-09 | Stop reason: HOSPADM

## 2024-11-26 RX ORDER — DEXTROSE 50 % IN WATER (D50W) INTRAVENOUS SYRINGE
AS NEEDED
Status: DISCONTINUED | OUTPATIENT
Start: 2024-11-26 | End: 2024-11-26

## 2024-11-26 RX ORDER — PANTOPRAZOLE SODIUM 40 MG/10ML
40 INJECTION, POWDER, LYOPHILIZED, FOR SOLUTION INTRAVENOUS DAILY
Status: DISCONTINUED | OUTPATIENT
Start: 2024-11-26 | End: 2024-11-27

## 2024-11-26 RX ORDER — DEXMEDETOMIDINE HYDROCHLORIDE 4 UG/ML
.1-1.5 INJECTION, SOLUTION INTRAVENOUS CONTINUOUS
Status: DISCONTINUED | OUTPATIENT
Start: 2024-11-26 | End: 2024-11-27

## 2024-11-26 RX ORDER — PROPOFOL 10 MG/ML
0-50 INJECTION, EMULSION INTRAVENOUS CONTINUOUS
Status: DISCONTINUED | OUTPATIENT
Start: 2024-11-26 | End: 2024-11-27

## 2024-11-26 RX ORDER — OXYCODONE HYDROCHLORIDE 5 MG/1
5 TABLET ORAL EVERY 4 HOURS PRN
Status: DISCONTINUED | OUTPATIENT
Start: 2024-11-26 | End: 2024-12-01

## 2024-11-26 RX ORDER — MAGNESIUM SULFATE HEPTAHYDRATE 500 MG/ML
INJECTION, SOLUTION INTRAMUSCULAR; INTRAVENOUS AS NEEDED
Status: DISCONTINUED | OUTPATIENT
Start: 2024-11-26 | End: 2024-11-26

## 2024-11-26 RX ORDER — POTASSIUM CHLORIDE 14.9 MG/ML
20 INJECTION INTRAVENOUS EVERY 6 HOURS PRN
Status: DISCONTINUED | OUTPATIENT
Start: 2024-11-26 | End: 2024-11-28

## 2024-11-26 RX ORDER — NOREPINEPHRINE BITARTRATE/D5W 8 MG/250ML
PLASTIC BAG, INJECTION (ML) INTRAVENOUS CONTINUOUS PRN
Status: DISCONTINUED | OUTPATIENT
Start: 2024-11-26 | End: 2024-11-26

## 2024-11-26 RX ORDER — MAGNESIUM SULFATE HEPTAHYDRATE 40 MG/ML
4 INJECTION, SOLUTION INTRAVENOUS EVERY 6 HOURS PRN
Status: DISCONTINUED | OUTPATIENT
Start: 2024-11-26 | End: 2024-11-28

## 2024-11-26 RX ORDER — GLYCOPYRROLATE 0.2 MG/ML
0.8 INJECTION INTRAMUSCULAR; INTRAVENOUS ONCE
Status: COMPLETED | OUTPATIENT
Start: 2024-11-26 | End: 2024-11-26

## 2024-11-26 RX ORDER — ROCURONIUM BROMIDE 10 MG/ML
INJECTION, SOLUTION INTRAVENOUS AS NEEDED
Status: DISCONTINUED | OUTPATIENT
Start: 2024-11-26 | End: 2024-11-26

## 2024-11-26 RX ORDER — EPINEPHRINE IN 0.9 % SOD CHLOR 4MG/250ML
PLASTIC BAG, INJECTION (ML) INTRAVENOUS CONTINUOUS PRN
Status: DISCONTINUED | OUTPATIENT
Start: 2024-11-26 | End: 2024-11-26

## 2024-11-26 RX ORDER — POLYETHYLENE GLYCOL 3350 17 G/17G
17 POWDER, FOR SOLUTION ORAL DAILY
Status: DISCONTINUED | OUTPATIENT
Start: 2024-11-26 | End: 2024-12-05

## 2024-11-26 RX ORDER — HEPARIN SODIUM 1000 [USP'U]/ML
INJECTION, SOLUTION INTRAVENOUS; SUBCUTANEOUS AS NEEDED
Status: DISCONTINUED | OUTPATIENT
Start: 2024-11-26 | End: 2024-11-26

## 2024-11-26 RX ORDER — AMOXICILLIN 250 MG
2 CAPSULE ORAL 2 TIMES DAILY
Status: DISCONTINUED | OUTPATIENT
Start: 2024-11-26 | End: 2024-12-05

## 2024-11-26 RX ORDER — MUPIROCIN 20 MG/G
OINTMENT TOPICAL 2 TIMES DAILY
Status: COMPLETED | OUTPATIENT
Start: 2024-11-26 | End: 2024-12-01

## 2024-11-26 RX ORDER — DEXTROSE 50 % IN WATER (D50W) INTRAVENOUS SYRINGE
25
Status: DISCONTINUED | OUTPATIENT
Start: 2024-11-26 | End: 2024-12-09 | Stop reason: HOSPADM

## 2024-11-26 RX ORDER — DEXMEDETOMIDINE HYDROCHLORIDE 4 UG/ML
INJECTION, SOLUTION INTRAVENOUS
Status: COMPLETED
Start: 2024-11-26 | End: 2024-11-26

## 2024-11-26 RX ORDER — NOREPINEPHRINE BITARTRATE/D5W 8 MG/250ML
0-.1 PLASTIC BAG, INJECTION (ML) INTRAVENOUS CONTINUOUS
Status: DISCONTINUED | OUTPATIENT
Start: 2024-11-26 | End: 2024-11-27

## 2024-11-26 RX ORDER — MAGNESIUM SULFATE HEPTAHYDRATE 40 MG/ML
2 INJECTION, SOLUTION INTRAVENOUS EVERY 6 HOURS PRN
Status: DISCONTINUED | OUTPATIENT
Start: 2024-11-26 | End: 2024-11-28

## 2024-11-26 SDOH — HEALTH STABILITY: MENTAL HEALTH: CURRENT SMOKER: 0

## 2024-11-26 ASSESSMENT — COLUMBIA-SUICIDE SEVERITY RATING SCALE - C-SSRS
1. IN THE PAST MONTH, HAVE YOU WISHED YOU WERE DEAD OR WISHED YOU COULD GO TO SLEEP AND NOT WAKE UP?: NO
2. HAVE YOU ACTUALLY HAD ANY THOUGHTS OF KILLING YOURSELF?: NO
6. HAVE YOU EVER DONE ANYTHING, STARTED TO DO ANYTHING, OR PREPARED TO DO ANYTHING TO END YOUR LIFE?: NO

## 2024-11-26 ASSESSMENT — PAIN - FUNCTIONAL ASSESSMENT
PAIN_FUNCTIONAL_ASSESSMENT: CPOT (CRITICAL CARE PAIN OBSERVATION TOOL)
PAIN_FUNCTIONAL_ASSESSMENT: 0-10
PAIN_FUNCTIONAL_ASSESSMENT: CPOT (CRITICAL CARE PAIN OBSERVATION TOOL)

## 2024-11-26 ASSESSMENT — PAIN SCALES - GENERAL: PAINLEVEL_OUTOF10: 0 - NO PAIN

## 2024-11-26 NOTE — ANESTHESIA PROCEDURE NOTES
Central Venous Line:    Date/Time: 11/26/2024 8:00 AM    A central venous line was placed in the OR for the following indication(s): central venous access.  Staffing  Performed: resident   Authorized by: Renee Harrison MD    Performed by: Nitish Shankar MD    Sterility preparation included the following: provider hand hygiene performed prior to central venous catheter insertion, all 5 sterile barriers used (gloves, gown, cap, mask, large sterile drape) during central venous catheter insertion, antiseptic used during central venous catheter insertion and skin prep agent completely dried prior to procedure.  The patient was placed in Trendelenburg position.    Right internal jugular vein was prepped.    The site was prepped with Chlorhexidine.  Size: 9 Fr   Number of Lumens: double lumen      During the procedure, the following specific steps were taken: target vein identified, needle advanced into vein and blood aspirated and guidewire advanced into vein.  Seldinger technique used.  Procedure performed using ultrasound guidance.  Sterile gel and probe cover used in ultrasound-guided central venous catheter insertion.    Intravenous verification was obtained by ultrasound, venous blood return and manometry.      Post insertion care included: all ports aspirated, all ports flushed easily, guidewire removed intact, Biopatch applied, line sutured in place and dressing applied.    During the procedure the patient experienced: patient tolerated procedure well with no complications.           images stored in chart

## 2024-11-26 NOTE — ANESTHESIA PROCEDURE NOTES
Airway  Date/Time: 11/26/2024 7:48 AM  Urgency: elective    Airway not difficult    Staffing  Performed: resident   Authorized by: Renee Harrison MD    Performed by: Nitish Shankar MD  Patient location during procedure: OR    Indications and Patient Condition  Indications for airway management: anesthesia  Spontaneous Ventilation: absent  Sedation level: deep  Preoxygenated: yes  Patient position: sniffing  Mask difficulty assessment: 2 - vent by mask + OA or adjuvant +/- NMBA    Final Airway Details  Final airway type: endotracheal airway      Successful airway: ETT - double lumen left  Cuffed: yes   Successful intubation technique: video laryngoscopy  Facilitating devices/methods: intubating stylet  Endotracheal tube insertion site: oral  Blade: Dione  Blade size: #4  ETT DL size (fr): 39  Cormack-Lehane Classification: grade I - full view of glottis  Placement verified by: bronchoscopy and capnometry   Number of attempts at approach: 1  Number of other approaches attempted: 0

## 2024-11-26 NOTE — H&P
CTICU History & Physical    HPI:  Gavin Moreau is a 54 y.o. with PMH/PSH of Mitral valve prolapse, asthma, GERD, rheumatic fever.    Now presents to the CTICU from the OR s/p Robotic MVR via mini thoracotomy with Dr. Goldberg on 11/26.      Cardiac Testing:   GISELLE:4/25/24    Left Ventricle: Normal left ventricular systolic function with a   visually estimated EF of ~60%. Left ventricle size is normal. Normal wall   thickness.  LVESD 37 cm. Normal wall motion.     Right Ventricle: Right ventricle size is normal. Normal systolic   function.    Mitral Valve: Severe leaflet prolapse noted of the posterior leaflet   (P1, P2 and P3) P2 has severe prolapse compared to other segments. Severe   regurgitation with an eccentrically directed jet that is wall hugging with   choanda effect and may underestimate severity ( images 97-99). Systolic   blunting of the pulmonary veins. No stenosis noted. MV area by PHT is 4.4   cm2.    Tricuspid Valve: Normal RVSP. The estimated RVSP is 25 mmHg.     Left Atrium: Normal sized appendage. No left atrial appendage thrombus   noted. No left atrial appendage mass noted.     Interatrial Septum: No interatrial shunt visualized with color Doppler.   Agitated saline study was negative with and without provocation.     Pericardium: No pericardial effusion.     Image quality is adequate.       R & LHC: 8/19/24  CONCLUSIONS:   1. No significant CAD with minimal luminal irregularities in a right dominant system.   2. Mildly elevated left and right heart filling pressures.   3. Mild pulmonary hypertension.   4. Normal CO/CI.   5. No evidence of intracardiac shunt.   6. No evidence of aortic stenosis.    Intra-Op:  Procedure/Surgeon: Ashlyn  Out of OR Time (document on ventilator card): 1535     OR Course/Issues: Surgical course with no issues.     On transfer to the CTICU bed patient R on T/VF arrested requiring CPR and shock x 2 with ROSC after 4 minutes.      CPB time: 219  Cross clamp time:  109  Circ arrest time: N/A   Echo Pre/Post: LVEF 45%/LVEF 50-55%/ RV moderate  Chest Tubes/Drains: R chest jos   Temporary wires location/setting: V-wires, VVI @ 50     Fluids  Crystalloid: 1.5 L  Colloid: none  Cellsaver: 900 mL  Products: none  EBL: 500 mL  UOP: 415 cc     Anesthesia  Intubation: Glidescope; Grade 1/easy  Intravenous Access: R internal jugular MAC w/ mini MAC, L brachial a-line, PIVs: 16 g LH, 16 g RH  AICD: N/A  PPM: N/A  Regional anesthesia: N/A   Benzodiazepines: 2 mg midazolam total  Opioids: 200 mcg fentanyl total  NMB: 100 mg rocuronium at 1500  TOF/ reversal given: 1615  Antibiotic time: cefazolin 12:30 redosed  Temperature on admission to ICU: 36.4 C    PMH:   Past Medical History:   Diagnosis Date    Asthma     Colon polyp     Diverticulosis     GERD (gastroesophageal reflux disease)     History of rheumatic fever     Hypertension     Mitral valve prolapse     Severe mitral regurgitation     Splenic artery aneurysm (CMS-HCC)        PSH:   Past Surgical History:   Procedure Laterality Date    CARDIAC CATHETERIZATION N/A 08/19/2024    Procedure: Left & Right Heart Cath w Angiography & LV;  Surgeon: Gavin Vogt MD;  Location: [facility] Cardiac [facility] Lab;  Service: Cardiovascular;  Laterality: N/A;    CHOLECYSTECTOMY      COLONOSCOPY  01/18/2024       Social history:   Social History     Socioeconomic History    Marital status: Unknown     Spouse name: Not on file    Number of children: Not on file    Years of education: Not on file    Highest education level: Not on file   Occupational History    Not on file   Tobacco Use    Smoking status: Never    Smokeless tobacco: Never   Substance and Sexual Activity    Alcohol use: Yes     Comment: ocassionally    Drug use: Never    Sexual activity: Not on file   Other Topics Concern    Not on file   Social History Narrative    Not on file     Social Drivers of Health     Financial Resource Strain: Low Risk  (10/9/2023)    [facility] [email or web address]  [facility]    Overall Financial Resource Strain (CARDIA)     Difficulty of Paying Living Expenses: Not hard at all   Food Insecurity: No Food Insecurity (10/9/2023)    [facility]    Hunger Vital Sign     Worried About Running Out of Food in the Last Year: Never true     Ran Out of Food in the Last Year: Never true   Transportation Needs: Unknown (10/9/2023)    Received from [facility]    PRAPARE - Transportation     Lack of Transportation (Medical): Not on file     Lack of Transportation (Non-Medical): No   Physical Activity: Not on file   Stress: Not on file   Social Connections: Not on file   Intimate Partner Violence: Not on file   Housing Stability: Unknown (10/9/2023)    Received from [facility]    Housing Stability Vital Sign     Unable to Pay for Housing in the Last Year: Not on file     Number of Places Lived in the Last Year: Not on file     Unstable Housing in the Last Year: No        Family history:  Family History   Problem Relation Name Age of Onset    Valvular heart disease Mother      Stroke Mother          Allergies: No Known Allergies    Home meds:   Medications Prior to Admission   Medication Sig Dispense Refill Last Dose/Taking    budesonide-formoteroL (Symbicort) 80-4.5 mcg/actuation inhaler Inhale 2 puffs 2 times a day. Rinse mouth with water after use to reduce aftertaste and incidence of candidiasis. Do not swallow.   11/26/2024 Morning    lisinopril 5 mg tablet Take 1 tablet (5 mg) by mouth once daily.   11/25/2024       Review of Systems:  ROS unable to be completed as patient currently intubated and sedated    Active Medications:   Scheduled medications  acetaminophen, 650 mg, oral, q6h  ceFAZolin, 2 g, intravenous, q8h  [Held by provider] fluticasone  furoate-vilanteroL, 1 puff, inhalation, Daily  glycopyrrolate, 0.8 mg, intravenous, Once  insulin lispro, 0-15 Units, subcutaneous, q4h  mupirocin, , Topical, BID  neostigmine, 4 mg, intravenous, Once  polyethylene glycol, 17 g, oral, Daily  sennosides-docusate sodium, 2 tablet, oral, BID      Continuous medications  EPINEPHrine, 0-0.08 mcg/kg/min (Adjusted)  norepinephrine, 0-0.1 mcg/kg/min (Adjusted)  propofol, 0-50 mcg/kg/min (Adjusted)      PRN medications  PRN medications: alteplase, calcium gluconate, calcium gluconate, dextrose **OR** glucagon, HYDROmorphone, magnesium sulfate, magnesium sulfate, naloxone, oxyCODONE, oxyCODONE, potassium chloride, potassium chloride    Vitals:  Most Recent:  Vitals:    11/26/24 1532   BP: 94/61   Pulse: 100   Resp: 12   Temp: 36.4 °C (97.5 °F)   SpO2: 100%       I/O:  No intake/output data recorded.    Physical Exam:   Constitutional: Intubated and sedated on mechanical ventilation in NAD  Neuro: Neuro intact without obvious focal deficits, on sedation.  PERRL  CV: RRR.  S1S2.  Junctional on monitor.  V wires set VVI @ 50  Pulm: CTAB on mechanical ventilation.  CT with appropriate serosang output and no airleak.  : rodriguez in place with clear/yellow urine   GI: S/ND/NT. Hypoactive BS. OGT in place with bilious output.  Extremities: NV exam intact x 4.  No edema.  Skin: WDI.  Postop dressings intact.  Chest tube dressing intact.    Psych: JASON, sedated      All relevant labs, imaging, and diagnostics reviewed.     Assessment:  Gavin Moreau is a 54 y.o. with PMH/PSH of Mitral valve prolapse, asthma, GERD, rheumatic fever Presents to the CTICU from the OR s/p Robotic MVR via mini thoracotomy with Dr. Goldberg on 11/26. While attempting to transfer to the CTICU bed in OR patient R on T/VF arrested requiring CPR and shock x 2 with ROSC after 4 minutes.    Plan:  NEURO:  No hx.  Acute post op pain.  Patient is currently intubated and sedated on propofol infusion.  No obvious focal  deficits, GOLDEN x 4 spontaneously.  -->   - Serial neuro and pain assessments   - Reversed, switch propofol to precedex, then SAT per unit protocol but daily at minimum     - Scheduled Tylenol   - PRN oxycodone and Dilaudid for pain   - PT/OT Consult, OOB to chair as tolerated, chair position if not tolerated   - CAM ICU score qshift.    - Sleep/wake cycle hygiene     CV:  Hx of MV prolapse, Rheumatic fever, mild PHTN .  Now s/p robotic MVR. Pre & post cardiac function:pre LVEF 45%, moderated RV, post LVEF 50-55%, moderate RV, ECHO checked post arrest and showed some reduction in LVEF with some dilation, moderate RV .  V wires set to VVI @ 50,  Junctional on monitor, HR 50's.  Arrived to ICU on epi at  0.08 and norepi 0.08. CVP 8, ScVO2 75. -->  - Continuous EKG and ABP monitoring  - Titrate vasopressors to maintain goal MAP > 65  - Wean intropes to goal ScVO2 > 60  - Received 1 L LR for elevated lactate  - Epi decreased to 0.06 2/2 up trending lactate  - Maintain pacer VVI 50 backup  - Address if needs ASA tomorrow   - Holding home lisinopril 5 mg daily    PULM:  Hx of asthma, mild pHTN.  Currently intubated on ventilator with appropriate oxygenation and ventilation.  Chest tube with no airleak and appropriate serosang output, to suction. Post op CXR shows right sided atelectasis vs edema likely from contusion from CPR. Has a double lumen ETT. -->  - F/u post op CXR and daily CXR while in ICU  - Begin CPAP trials and extubate when criteria met.    - Wean FiO2 maintaining SpO2 >92%.   - ABGs as needed  - Aggressive BPH, IS q1h and OOB to chair when extubated  - Maintain chest tubes to wall suction, notify team for high output per unit protocol  - Change over to single lumen ETT when appropriate  - Holding home breo-ellipta until extubated    GI:  Hx of GERD. NPO with OGT in place, minimal bilious output. -->  - Start PPI if remains intubated, DC once extubated  - NPO, swallow eval 4 hours post extubation.    -  Senna-Colace and miralax BID for bowel regimen post op    :  No hx, baseline Cr 1.02.  Arrival to ICU DENZEL risk score:  low risk. Rodriguez in place with appropriate UOP.  -->  - Continue rodriguez catheter for strict I/Os.    - Goal UOP 0.5ml/kg/hr  - RFP as clinically indicated.    - Replete electrolytes per CTICU protocol.    ENDO: No hx, A1c 5.2 11/26.  Postoperative hyperglycemia well controlled with SSI. -->  - Maintain BG <180  - SSI q4 per CTICU protocol    HEME:  No hx.  Acute blood loss anemia without active s/s of bleeding. Chest tube with appropriate serosanguinous drainage. Hemoglobin possibly concentrated at 15. -->    - Monitor drain output volume and characteristics  - CBC, coags, and fibrinogen post op and as clinically indicated  - SCDs for DVT prophylaxis.    - Hold SQH POD0 d/t increased risk of bleed.   - last type and screen: 11/26    ID:  MRSA negative. MSSA +.  Afebrile, no current indications of infection -->  - Trend temp q4h  - Periop Ancef x48hrs  - Bactroban x 5 days    Skin:   Postoperative dressings CDI without s/s of infection.   - arrived to ICU from OR with preventative Mepilex dressings in place on sacrum and heels, to be changed per unit protocol and PRN  - every shift skin assessment per nursing and weekly ICU skin rounds  - moisture barrier to be applied with sincere care  - postoperative dressings removed/changed per protocol  - active skin problems addressed with nursing on daily rounds and wound care interventions in place as warranted    Proph:  SCDs  PPI    G:  Line  Right IJ MAC w mini MAC placed 11/26  Left brachial veena placed 11/26  Rodriguez placed 11/26  PIVs    Daily Risk Screen:  Intubated? Yes, planned for extubation later today  Central line? Yes, keep for hemodynamic monitoring and vasoactives  Rodriguez? Yes, keep for critical need for accurate I&O's     F: Family: will update family at bedside as able    A,B,C,D,E,F,G: reviewed    Restraints: The indications and risks/benefits  of non-violent/non self-destructive restraints were discussed and bilateral soft wrist restraints warranted.    Dispo: CTICU     I have personally spent 60 minutes of critical care time, exclusive of time spent on any procedures, in evaluation and management of this critically ill patient’s condition as above.    Mirna Lord, APRN-CNP  CTICU [id number]

## 2024-11-26 NOTE — BRIEF OP NOTE
"Date: 2024  OR Location: [facility] OR    Name: Gavin Mroeau \"Harinder\", : 1970, Age: 54 y.o., MRN: [id number], Sex: male    Diagnosis  Pre-op Diagnosis      * Mitral valve insufficiency, unspecified etiology [I34.0] Post-op Diagnosis     * Mitral valve insufficiency, unspecified etiology [I34.0]     Procedures    * MIS ROBOTIC MV REPAIR  AK REPLACEMENT MITRAL VALVE W/CARDIOPULMONARY BYP [16580]      * MIS ROBOTIC MV REPAIR  Right Femoral Venous and Arterial Cannulation    CPR was performed after transportatin to CTICU bed, prior to extubation.  Attendings notified and present, defibrillation was delivered X 2, transferred back to OR bed, converted back to NSR with pacing at 100 Bpm.  *    Chest Tubes/Drains: Chest Tube ( 28 Amharic) Tam       Temporary Pacing Wires: Ventricular    -Settings: 100 VVI   -Underlying Rhythm:    Permanent pacer/ICD: No   -Preoperative settings:    -Intra-op/ Postoperative settings:    Sternotomy performed by: N/A    Conduit Harvested by:  N/A    Sternal Wires placed by: N/A    Arm/Leg/Groin Closure/Cutdown performed by: Right Groin- Kamila PA-C   Right Mini Thoracotomy- Marut RNFA     Cardio Pulmonary Bypass Time: 219 mins  Cross-clamp Time: 169 mins  Circulatory Arrest: No Time:     Is patient candidate for Emergency Re-sternotomy? No   -If yes, POD #10 is -     Surgeons      * Simeon Goldberg - Primary    Resident/Fellow/Other Assistant:  Surgeons and Role:     * Otoniel Prabhakar MD PhD - Assisting     * Narcisa Miranda MD - Assisting     * Delphine Treviño PA-C - [facility] Assist    Staff:   Danielulator: Maeve Davis Person: Nena  Circulator: Jordan  Surgical Assistant: Brian  Surgical Assistant: Noé Richardson Circulator: Carmelita Davis Person: Gavin  Surgical Assistant: Ivan    Anesthesia Staff: Anesthesiologist: Renee Harrison MD  Anesthesia Resident: Nitish Shankar MD  Perfusionist: Fred Dominguez  Frontline Breaker: Alfie Vogt MD    Procedure " Summary    Anesthesia: General  ASA: III  Estimated Blood Loss: 150 mL  Intra-op Medications:   Administrations occurring from 0715 to 1155 on 11/26/24:   Medication Name Total Dose   sodium chloride 0.9 % irrigation solution 5,000 mL   ceFAZolin (Ancef) vial 1 g 2 g   dexmedeTOMIDine 4 mcg/mL in 100 mL NS infusion 151.4 mcg   EPINEPHrine (Adrenalin) 4 mg/ 250 mL NS (16 mcg/mL) infusion 0.01 mg   fentaNYL (Sublimaze) injection 50 mcg/mL 200 mcg   glycopyrrolate (Robinul) injection 0.2 mg   heparin 1,000 units/mL 49,000 Units   insulin regular (HumuLIN R,NovoLIN R) 100 Units in sodium chloride 0.9% 100 mL (1 Units/mL) infusion 4.53 Units   insulin regular (HumuLIN R, NovoLIN R) bolus from bag 2 Units   ketamine (Ketalar) 500 mg in dextrose 5% 250 mL (2 mg/mL) infusion 151.4 mg   ketamine injection 50 mg/ 5 mL (10 mg/mL) 40 mg   lidocaine (Xylocaine) injection 2 % 60 mg   midazolam PF (Versed) injection 1 mg/mL 2 mg   norepinephrine (Levophed) 8 mg/250 mL D5W (premix) 0.55 mg   norepinephrine (Levophed) 16 mcg/mL IV bolus 32 mcg   perfusion prime builder 910 mL   propofol (Diprivan) injection 10 mg/mL 60 mg   rocuronium (ZeMuron) 50 mg/5 mL injection 180 mg   sodium bicarbonate injection 1 mEq/mL 50 mEq   NaCl 0.9 % bolus Cannot be calculated   tranexamic acid (Cyklokapron) 6,250 mg in sodium chloride 0.9% 312.5 mL (20 mg/mL) infusion 735.15 mg   tranexamic acid 1,000 mg/100 mL NS (premix) 1,267.5 mg              Anesthesia Record               Intraprocedure I/O Totals          Intake    Dexmedetomidine 0.00 mL    The total shown is the total volume documented since Anesthesia Start was filed.    Ketamine 0.00 mL    The total shown is the total volume documented since Anesthesia Start was filed.    NaCl 0.9 % bolus 250.00 mL    Norepinephrine Drip 0.00 mL    The total shown is the total volume documented since Anesthesia Start was filed.    Tranexamic Acid 0.00 mL    The total shown is the total volume documented  since Anesthesia Start was filed.    Insulin Drip 0.00 mL    The total shown is the total volume documented since Anesthesia Start was filed.    Epinephrine Drip 0.00 mL    The total shown is the total volume documented since Anesthesia Start was filed.    perfusion prime builder 910.00 mL    Cell Saver 750 mL    Total Intake 1910 mL       Output    Urine 355 mL    Total Output 355 mL       Net    Net Volume 1555 mL          Specimen: No specimens collected               Findings: Severe Mitral Valve Regurgitation     Complications:  None; patient tolerated the procedure well.     Disposition: ICU - intubated and hemodynamically stable.  Condition: stable  Specimens Collected: No specimens collected  Attending Attestation: I was present and scrubbed for the key portions of the procedure.    Simeon Goldberg  Phone Number: [phone]

## 2024-11-26 NOTE — ANESTHESIA PREPROCEDURE EVALUATION
Patient: Gavin Moreau    Procedure Information       Date/Time: 11/26/24 0715    Procedure: MIS ROBOTIC MV REPAIR - MIS ROBOTIC MV REPAIR, DR. TARIQ CLINE, 1ST ROUND CASE    Location: [facility] OR  / [facility] OR    Surgeons: Simeon Goldberg MD, MS            Relevant Problems   Cardiac   (+) Essential hypertension   (+) MVP (mitral valve prolapse)   (+) Mitral regurgitation, myxomatous   (+) Mitral valve insufficiency   (+) Mixed hyperlipidemia   (+) Nonrheumatic mitral valve regurgitation      Pulmonary   (+) Mild intermittent asthma, uncomplicated (HHS-HCC)      GI  Splenic artery aneurysm   (+) GERD (gastroesophageal reflux disease)       Clinical information reviewed:    Allergies  Meds               NPO Detail:  No data recorded     Physical Exam    Airway  Mallampati: I  TM distance: >3 FB  Neck ROM: full     Cardiovascular - normal exam     Dental - normal exam     Pulmonary - normal exam     Abdominal            Anesthesia Plan    History of general anesthesia?: yes  History of complications of general anesthesia?: no    ASA 3     general     The patient is not a current smoker.    intravenous induction   Anesthetic plan and risks discussed with patient.  Use of blood products discussed with patient who consented to blood products.    Plan discussed with resident.

## 2024-11-26 NOTE — ANESTHESIA PROCEDURE NOTES
Arterial Line:    Date/Time: 11/26/2024 7:25 AM    Staffing  Performed: resident   Authorized by: Renee Harrison MD    Performed by: Nitish Shankar MD    An arterial line was placed. Procedure performed using ultrasound guidance.in the OR for the following indication(s): continuous blood pressure monitoring and blood sampling needed.    A 20 gauge (size) (length), Angiocath (type) catheter was placed into the Left brachial artery, secured by Tegaderm,   Seldinger technique used.  Events:  patient tolerated procedure well with no complications.

## 2024-11-26 NOTE — ANESTHESIA PROCEDURE NOTES
Peripheral IV  Date/Time: 11/26/2024 7:52 AM      Placement  Needle size: 16 G  Laterality: left  Location: hand  Local anesthetic: none  Site prep: alcohol  Technique: anatomical landmarks  Attempts: 1

## 2024-11-26 NOTE — ANESTHESIA POSTPROCEDURE EVALUATION
"Patient: Gavin Moreau \"Harinder\"    Procedure Summary       Date: 11/26/24 Room / Location: [facility] OR 18 / [facility] OR    Anesthesia Start: 0714 Anesthesia Stop: 1547    Procedure: MIS ROBOTIC MV REPAIR (Chest) Diagnosis:       Mitral valve insufficiency, unspecified etiology      (Mitral valve insufficiency, unspecified etiology [I34.0])    Surgeons: Simeon Goldberg MD, MS Responsible Provider: Ras Fernandez MD    Anesthesia Type: general ASA Status: 3            Anesthesia Type: general    Vitals Value Taken Time   BP 94/61 11/26/24 1532   Temp 36.4 °C (97.5 °F) 11/26/24 1532   Pulse 100 11/26/24 1600   Resp 19 11/26/24 1600   SpO2 98 % 11/26/24 1545       Anesthesia Post Evaluation    Patient location during evaluation: ICU  Patient participation: complete - patient cannot participate  Level of consciousness: sedated  Pain management: adequate  Airway patency: patent  Cardiovascular status: acceptable  Respiratory status: acceptable  Hydration status: acceptable  Postoperative Nausea and Vomiting: none        Encounter Notable Events   Notable Event Outcome Phase Comment   Cardiac arrest Resolved in Room Intraprocedure        "

## 2024-11-27 ENCOUNTER — APPOINTMENT (OUTPATIENT)
Dept: RADIOLOGY | Facility: HOSPITAL | Age: 54
End: 2024-11-27
Payer: COMMERCIAL

## 2024-11-27 LAB
ALBUMIN SERPL BCP-MCNC: 3.2 G/DL (ref 3.4–5)
ALBUMIN SERPL BCP-MCNC: 3.3 G/DL (ref 3.4–5)
ANION GAP BLDA CALCULATED.4IONS-SCNC: 11 MMO/L (ref 10–25)
ANION GAP BLDA CALCULATED.4IONS-SCNC: 12 MMO/L (ref 10–25)
ANION GAP BLDV CALCULATED.4IONS-SCNC: 10 MMOL/L (ref 10–25)
ANION GAP BLDV CALCULATED.4IONS-SCNC: 12 MMOL/L (ref 10–25)
ANION GAP BLDV CALCULATED.4IONS-SCNC: 9 MMOL/L (ref 10–25)
ANION GAP SERPL CALC-SCNC: 14 MMOL/L (ref 10–20)
ANION GAP SERPL CALC-SCNC: 14 MMOL/L (ref 10–20)
APTT PPP: 30 SECONDS (ref 27–38)
BASE EXCESS BLDA CALC-SCNC: -3.9 MMOL/L (ref -2–3)
BASE EXCESS BLDA CALC-SCNC: -4.5 MMOL/L (ref -2–3)
BASE EXCESS BLDV CALC-SCNC: -1.7 MMOL/L (ref -2–3)
BASE EXCESS BLDV CALC-SCNC: -3 MMOL/L (ref -2–3)
BASE EXCESS BLDV CALC-SCNC: 2.1 MMOL/L (ref -2–3)
BODY TEMPERATURE: 37 DEGREES CELSIUS
BUN SERPL-MCNC: 17 MG/DL (ref 6–23)
BUN SERPL-MCNC: 20 MG/DL (ref 6–23)
CA-I BLD-SCNC: 1.1 MMOL/L (ref 1.1–1.33)
CA-I BLD-SCNC: 1.13 MMOL/L (ref 1.1–1.33)
CA-I BLDA-SCNC: 1.13 MMOL/L (ref 1.1–1.33)
CA-I BLDA-SCNC: 1.16 MMOL/L (ref 1.1–1.33)
CA-I BLDV-SCNC: 1.15 MMOL/L (ref 1.1–1.33)
CA-I BLDV-SCNC: 1.16 MMOL/L (ref 1.1–1.33)
CA-I BLDV-SCNC: 1.16 MMOL/L (ref 1.1–1.33)
CALCIUM SERPL-MCNC: 7.8 MG/DL (ref 8.6–10.6)
CALCIUM SERPL-MCNC: 8.1 MG/DL (ref 8.6–10.6)
CHLORIDE BLDA-SCNC: 102 MMOL/L (ref 98–107)
CHLORIDE BLDA-SCNC: 110 MMOL/L (ref 98–107)
CHLORIDE BLDV-SCNC: 100 MMOL/L (ref 98–107)
CHLORIDE BLDV-SCNC: 101 MMOL/L (ref 98–107)
CHLORIDE BLDV-SCNC: 103 MMOL/L (ref 98–107)
CHLORIDE SERPL-SCNC: 104 MMOL/L (ref 98–107)
CHLORIDE SERPL-SCNC: 112 MMOL/L (ref 98–107)
CO2 SERPL-SCNC: 21 MMOL/L (ref 21–32)
CO2 SERPL-SCNC: 22 MMOL/L (ref 21–32)
CREAT SERPL-MCNC: 1.16 MG/DL (ref 0.5–1.3)
CREAT SERPL-MCNC: 1.19 MG/DL (ref 0.5–1.3)
EGFRCR SERPLBLD CKD-EPI 2021: 72 ML/MIN/1.73M*2
EGFRCR SERPLBLD CKD-EPI 2021: 74 ML/MIN/1.73M*2
EJECTION FRACTION: 48 %
ERYTHROCYTE [DISTWIDTH] IN BLOOD BY AUTOMATED COUNT: 12.6 % (ref 11.5–14.5)
ERYTHROCYTE [DISTWIDTH] IN BLOOD BY AUTOMATED COUNT: 12.8 % (ref 11.5–14.5)
GLUCOSE BLD MANUAL STRIP-MCNC: 106 MG/DL (ref 74–99)
GLUCOSE BLD MANUAL STRIP-MCNC: 108 MG/DL (ref 74–99)
GLUCOSE BLD MANUAL STRIP-MCNC: 122 MG/DL (ref 74–99)
GLUCOSE BLD MANUAL STRIP-MCNC: 163 MG/DL (ref 74–99)
GLUCOSE BLDA-MCNC: 132 MG/DL (ref 74–99)
GLUCOSE BLDA-MCNC: 188 MG/DL (ref 74–99)
GLUCOSE BLDV-MCNC: 132 MG/DL (ref 74–99)
GLUCOSE BLDV-MCNC: 134 MG/DL (ref 74–99)
GLUCOSE BLDV-MCNC: 140 MG/DL (ref 74–99)
GLUCOSE SERPL-MCNC: 138 MG/DL (ref 74–99)
GLUCOSE SERPL-MCNC: 177 MG/DL (ref 74–99)
HCO3 BLDA-SCNC: 20.3 MMOL/L (ref 22–26)
HCO3 BLDA-SCNC: 20.8 MMOL/L (ref 22–26)
HCO3 BLDV-SCNC: 23.2 MMOL/L (ref 22–26)
HCO3 BLDV-SCNC: 24.3 MMOL/L (ref 22–26)
HCO3 BLDV-SCNC: 27.3 MMOL/L (ref 22–26)
HCT VFR BLD AUTO: 37.7 % (ref 41–52)
HCT VFR BLD AUTO: 42.2 % (ref 41–52)
HCT VFR BLD EST: 37 % (ref 41–52)
HCT VFR BLD EST: 40 % (ref 41–52)
HCT VFR BLD EST: 41 % (ref 41–52)
HGB BLD-MCNC: 13.2 G/DL (ref 13.5–17.5)
HGB BLD-MCNC: 14.2 G/DL (ref 13.5–17.5)
HGB BLDA-MCNC: 13.5 G/DL (ref 13.5–17.5)
HGB BLDA-MCNC: 13.6 G/DL (ref 13.5–17.5)
HGB BLDV-MCNC: 12.2 G/DL (ref 13.5–17.5)
HGB BLDV-MCNC: 13.2 G/DL (ref 13.5–17.5)
HGB BLDV-MCNC: 13.6 G/DL (ref 13.5–17.5)
INHALED O2 CONCENTRATION: 21 %
INHALED O2 CONCENTRATION: 25 %
INHALED O2 CONCENTRATION: 30 %
INHALED O2 CONCENTRATION: 35 %
INHALED O2 CONCENTRATION: 36 %
INR PPP: 1.2 (ref 0.9–1.1)
LACTATE BLDA-SCNC: 1.5 MMOL/L (ref 0.4–2)
LACTATE BLDA-SCNC: 2.2 MMOL/L (ref 0.4–2)
LACTATE BLDV-SCNC: 0.7 MMOL/L (ref 0.4–2)
LACTATE BLDV-SCNC: 1.5 MMOL/L (ref 0.4–2)
LACTATE BLDV-SCNC: 2.7 MMOL/L (ref 0.4–2)
LACTATE BLDV-SCNC: 2.7 MMOL/L (ref 0.4–2)
MAGNESIUM SERPL-MCNC: 2.5 MG/DL (ref 1.6–2.4)
MAGNESIUM SERPL-MCNC: 2.9 MG/DL (ref 1.6–2.4)
MCH RBC QN AUTO: 29.7 PG (ref 26–34)
MCH RBC QN AUTO: 30.3 PG (ref 26–34)
MCHC RBC AUTO-ENTMCNC: 33.6 G/DL (ref 32–36)
MCHC RBC AUTO-ENTMCNC: 35 G/DL (ref 32–36)
MCV RBC AUTO: 87 FL (ref 80–100)
MCV RBC AUTO: 88 FL (ref 80–100)
NRBC BLD-RTO: 0 /100 WBCS (ref 0–0)
NRBC BLD-RTO: 0 /100 WBCS (ref 0–0)
OXYHGB MFR BLDA: 95.4 % (ref 94–98)
OXYHGB MFR BLDA: 96.9 % (ref 94–98)
OXYHGB MFR BLDV: 65 % (ref 45–75)
OXYHGB MFR BLDV: 66.6 % (ref 45–75)
OXYHGB MFR BLDV: 69.8 % (ref 45–75)
PCO2 BLDA: 36 MM HG (ref 38–42)
PCO2 BLDA: 36 MM HG (ref 38–42)
PCO2 BLDV: 44 MM HG (ref 41–51)
PCO2 BLDV: 45 MM HG (ref 41–51)
PCO2 BLDV: 45 MM HG (ref 41–51)
PH BLDA: 7.36 PH (ref 7.38–7.42)
PH BLDA: 7.37 PH (ref 7.38–7.42)
PH BLDV: 7.32 PH (ref 7.33–7.43)
PH BLDV: 7.34 PH (ref 7.33–7.43)
PH BLDV: 7.4 PH (ref 7.33–7.43)
PHOSPHATE SERPL-MCNC: 3.1 MG/DL (ref 2.5–4.9)
PHOSPHATE SERPL-MCNC: 4.9 MG/DL (ref 2.5–4.9)
PLATELET # BLD AUTO: 155 X10*3/UL (ref 150–450)
PLATELET # BLD AUTO: 159 X10*3/UL (ref 150–450)
PO2 BLDA: 111 MM HG (ref 85–95)
PO2 BLDA: 84 MM HG (ref 85–95)
PO2 BLDV: 38 MM HG (ref 35–45)
PO2 BLDV: 39 MM HG (ref 35–45)
PO2 BLDV: 40 MM HG (ref 35–45)
POTASSIUM BLDA-SCNC: 4.6 MMOL/L (ref 3.5–5.3)
POTASSIUM BLDA-SCNC: 5.2 MMOL/L (ref 3.5–5.3)
POTASSIUM BLDV-SCNC: 4.5 MMOL/L (ref 3.5–5.3)
POTASSIUM BLDV-SCNC: 4.7 MMOL/L (ref 3.5–5.3)
POTASSIUM BLDV-SCNC: 5 MMOL/L (ref 3.5–5.3)
POTASSIUM SERPL-SCNC: 5 MMOL/L (ref 3.5–5.3)
POTASSIUM SERPL-SCNC: 5.1 MMOL/L (ref 3.5–5.3)
PROTHROMBIN TIME: 13.8 SECONDS (ref 9.8–12.8)
RBC # BLD AUTO: 4.35 X10*6/UL (ref 4.5–5.9)
RBC # BLD AUTO: 4.78 X10*6/UL (ref 4.5–5.9)
SAO2 % BLDA: 100 % (ref 94–100)
SAO2 % BLDA: 97 % (ref 94–100)
SAO2 % BLDV: 67 % (ref 45–75)
SAO2 % BLDV: 68 % (ref 45–75)
SAO2 % BLDV: 71 % (ref 45–75)
SODIUM BLDA-SCNC: 130 MMOL/L (ref 136–145)
SODIUM BLDA-SCNC: 137 MMOL/L (ref 136–145)
SODIUM BLDV-SCNC: 129 MMOL/L (ref 136–145)
SODIUM BLDV-SCNC: 132 MMOL/L (ref 136–145)
SODIUM BLDV-SCNC: 134 MMOL/L (ref 136–145)
SODIUM SERPL-SCNC: 134 MMOL/L (ref 136–145)
SODIUM SERPL-SCNC: 143 MMOL/L (ref 136–145)
WBC # BLD AUTO: 21.2 X10*3/UL (ref 4.4–11.3)
WBC # BLD AUTO: 21.8 X10*3/UL (ref 4.4–11.3)

## 2024-11-27 PROCEDURE — 83605 ASSAY OF LACTIC ACID: CPT | Performed by: STUDENT IN AN ORGANIZED HEALTH CARE EDUCATION/TRAINING PROGRAM

## 2024-11-27 PROCEDURE — 2500000004 HC RX 250 GENERAL PHARMACY W/ HCPCS (ALT 636 FOR OP/ED): Performed by: STUDENT IN AN ORGANIZED HEALTH CARE EDUCATION/TRAINING PROGRAM

## 2024-11-27 PROCEDURE — 99291 CRITICAL CARE FIRST HOUR: CPT | Performed by: ANESTHESIOLOGY

## 2024-11-27 PROCEDURE — 84100 ASSAY OF PHOSPHORUS: CPT | Performed by: STUDENT IN AN ORGANIZED HEALTH CARE EDUCATION/TRAINING PROGRAM

## 2024-11-27 PROCEDURE — 2020000001 HC ICU ROOM DAILY

## 2024-11-27 PROCEDURE — 37799 UNLISTED PX VASCULAR SURGERY: CPT

## 2024-11-27 PROCEDURE — 2500000001 HC RX 250 WO HCPCS SELF ADMINISTERED DRUGS (ALT 637 FOR MEDICARE OP)

## 2024-11-27 PROCEDURE — 2500000005 HC RX 250 GENERAL PHARMACY W/O HCPCS: Performed by: STUDENT IN AN ORGANIZED HEALTH CARE EDUCATION/TRAINING PROGRAM

## 2024-11-27 PROCEDURE — 85018 HEMOGLOBIN: CPT

## 2024-11-27 PROCEDURE — 2500000004 HC RX 250 GENERAL PHARMACY W/ HCPCS (ALT 636 FOR OP/ED): Mod: JZ

## 2024-11-27 PROCEDURE — 71045 X-RAY EXAM CHEST 1 VIEW: CPT

## 2024-11-27 PROCEDURE — 84132 ASSAY OF SERUM POTASSIUM: CPT

## 2024-11-27 PROCEDURE — 97161 PT EVAL LOW COMPLEX 20 MIN: CPT | Mod: GP

## 2024-11-27 PROCEDURE — 85730 THROMBOPLASTIN TIME PARTIAL: CPT | Performed by: STUDENT IN AN ORGANIZED HEALTH CARE EDUCATION/TRAINING PROGRAM

## 2024-11-27 PROCEDURE — 83735 ASSAY OF MAGNESIUM: CPT | Performed by: STUDENT IN AN ORGANIZED HEALTH CARE EDUCATION/TRAINING PROGRAM

## 2024-11-27 PROCEDURE — 82947 ASSAY GLUCOSE BLOOD QUANT: CPT

## 2024-11-27 PROCEDURE — P9045 ALBUMIN (HUMAN), 5%, 250 ML: HCPCS | Mod: JZ

## 2024-11-27 PROCEDURE — 84132 ASSAY OF SERUM POTASSIUM: CPT | Performed by: STUDENT IN AN ORGANIZED HEALTH CARE EDUCATION/TRAINING PROGRAM

## 2024-11-27 PROCEDURE — 2500000005 HC RX 250 GENERAL PHARMACY W/O HCPCS

## 2024-11-27 PROCEDURE — 94640 AIRWAY INHALATION TREATMENT: CPT

## 2024-11-27 PROCEDURE — 85027 COMPLETE CBC AUTOMATED: CPT | Performed by: STUDENT IN AN ORGANIZED HEALTH CARE EDUCATION/TRAINING PROGRAM

## 2024-11-27 PROCEDURE — 82330 ASSAY OF CALCIUM: CPT | Performed by: STUDENT IN AN ORGANIZED HEALTH CARE EDUCATION/TRAINING PROGRAM

## 2024-11-27 PROCEDURE — 99291 CRITICAL CARE FIRST HOUR: CPT

## 2024-11-27 PROCEDURE — 97165 OT EVAL LOW COMPLEX 30 MIN: CPT | Mod: GO

## 2024-11-27 PROCEDURE — 93010 ELECTROCARDIOGRAM REPORT: CPT | Performed by: INTERNAL MEDICINE

## 2024-11-27 PROCEDURE — 2500000002 HC RX 250 W HCPCS SELF ADMINISTERED DRUGS (ALT 637 FOR MEDICARE OP, ALT 636 FOR OP/ED)

## 2024-11-27 PROCEDURE — 97530 THERAPEUTIC ACTIVITIES: CPT | Mod: GP

## 2024-11-27 PROCEDURE — 37799 UNLISTED PX VASCULAR SURGERY: CPT | Performed by: STUDENT IN AN ORGANIZED HEALTH CARE EDUCATION/TRAINING PROGRAM

## 2024-11-27 RX ORDER — COLCHICINE 0.6 MG/1
0.6 TABLET ORAL EVERY 12 HOURS
Status: DISCONTINUED | OUTPATIENT
Start: 2024-11-27 | End: 2024-12-09 | Stop reason: HOSPADM

## 2024-11-27 RX ORDER — INSULIN LISPRO 100 [IU]/ML
0-15 INJECTION, SOLUTION INTRAVENOUS; SUBCUTANEOUS
Status: DISCONTINUED | OUTPATIENT
Start: 2024-11-27 | End: 2024-12-02

## 2024-11-27 RX ORDER — EPINEPHRINE IN 0.9 % SOD CHLOR 4MG/250ML
0-.06 PLASTIC BAG, INJECTION (ML) INTRAVENOUS CONTINUOUS
Status: DISCONTINUED | OUTPATIENT
Start: 2024-11-27 | End: 2024-11-27

## 2024-11-27 RX ORDER — HYDROMORPHONE HYDROCHLORIDE 0.2 MG/ML
0.2 INJECTION INTRAMUSCULAR; INTRAVENOUS; SUBCUTANEOUS EVERY 2 HOUR PRN
Status: DISCONTINUED | OUTPATIENT
Start: 2024-11-27 | End: 2024-11-28

## 2024-11-27 RX ORDER — LIDOCAINE 560 MG/1
1 PATCH PERCUTANEOUS; TOPICAL; TRANSDERMAL EVERY 24 HOURS
Status: DISCONTINUED | OUTPATIENT
Start: 2024-11-27 | End: 2024-12-02

## 2024-11-27 RX ORDER — ALBUMIN HUMAN 50 G/1000ML
12.5 SOLUTION INTRAVENOUS ONCE
Status: COMPLETED | OUTPATIENT
Start: 2024-11-27 | End: 2024-11-27

## 2024-11-27 RX ORDER — ONDANSETRON HYDROCHLORIDE 2 MG/ML
4 INJECTION, SOLUTION INTRAVENOUS EVERY 4 HOURS PRN
Status: DISCONTINUED | OUTPATIENT
Start: 2024-11-27 | End: 2024-12-03

## 2024-11-27 RX ORDER — ASPIRIN 81 MG/1
81 TABLET ORAL DAILY
Status: DISCONTINUED | OUTPATIENT
Start: 2024-11-27 | End: 2024-12-09 | Stop reason: HOSPADM

## 2024-11-27 RX ORDER — EPINEPHRINE IN 0.9 % SOD CHLOR 4MG/250ML
0.03 PLASTIC BAG, INJECTION (ML) INTRAVENOUS CONTINUOUS
Status: DISCONTINUED | OUTPATIENT
Start: 2024-11-27 | End: 2024-11-28

## 2024-11-27 RX ORDER — MAGNESIUM SULFATE HEPTAHYDRATE 40 MG/ML
2 INJECTION, SOLUTION INTRAVENOUS ONCE
Status: COMPLETED | OUTPATIENT
Start: 2024-11-27 | End: 2024-11-27

## 2024-11-27 RX ORDER — HEPARIN SODIUM 5000 [USP'U]/ML
5000 INJECTION, SOLUTION INTRAVENOUS; SUBCUTANEOUS EVERY 8 HOURS
Status: DISCONTINUED | OUTPATIENT
Start: 2024-11-27 | End: 2024-12-02

## 2024-11-27 ASSESSMENT — PAIN SCALES - GENERAL
PAINLEVEL_OUTOF10: 6
PAINLEVEL_OUTOF10: 0 - NO PAIN
PAINLEVEL_OUTOF10: 7
PAINLEVEL_OUTOF10: 0 - NO PAIN
PAINLEVEL_OUTOF10: 8
PAINLEVEL_OUTOF10: 8
PAINLEVEL_OUTOF10: 10 - WORST POSSIBLE PAIN
PAINLEVEL_OUTOF10: 0 - NO PAIN
PAINLEVEL_OUTOF10: 4
PAINLEVEL_OUTOF10: 0 - NO PAIN
PAINLEVEL_OUTOF10: 8
PAINLEVEL_OUTOF10: 0 - NO PAIN

## 2024-11-27 ASSESSMENT — ACTIVITIES OF DAILY LIVING (ADL)
ADL_ASSISTANCE: INDEPENDENT
BATHING_ASSISTANCE: MINIMAL
ADL_ASSISTANCE: INDEPENDENT
ADLS_ADDRESSED: NO

## 2024-11-27 ASSESSMENT — PAIN - FUNCTIONAL ASSESSMENT
PAIN_FUNCTIONAL_ASSESSMENT: 0-10

## 2024-11-27 ASSESSMENT — COGNITIVE AND FUNCTIONAL STATUS - GENERAL
CLIMB 3 TO 5 STEPS WITH RAILING: A LITTLE
MOBILITY SCORE: 18
TURNING FROM BACK TO SIDE WHILE IN FLAT BAD: A LITTLE
MOVING TO AND FROM BED TO CHAIR: A LITTLE
HELP NEEDED FOR BATHING: A LITTLE
MOVING FROM LYING ON BACK TO SITTING ON SIDE OF FLAT BED WITH BEDRAILS: A LITTLE
WALKING IN HOSPITAL ROOM: A LITTLE
DAILY ACTIVITIY SCORE: 21
TOILETING: A LITTLE
STANDING UP FROM CHAIR USING ARMS: A LITTLE
DRESSING REGULAR LOWER BODY CLOTHING: A LITTLE

## 2024-11-27 ASSESSMENT — PAIN DESCRIPTION - DESCRIPTORS
DESCRIPTORS: ACHING
DESCRIPTORS: ACHING

## 2024-11-27 NOTE — PROGRESS NOTES
"CTICU Progress Note  Gavin Moreau/[id number]    Admit Date: 11/26/2024  Hospital Length of Stay: 1   ICU Length of Stay: 17h   CT SURGEON:     SUBJECTIVE:   Extubated  Passed swallow   Weaned off levo   Remained on epi 0.04mcg/kg/min    MEDICATIONS  Infusions:  EPINEPHrine, Last Rate: 0.04 mcg/kg/min (11/27/24 0800)  norepinephrine, Last Rate: Stopped (11/27/24 0340)      Scheduled:  acetaminophen, 650 mg, q6h  ceFAZolin, 2 g, q8h  fluticasone furoate-vilanteroL, 1 puff, Daily  insulin lispro, 0-15 Units, Before meals & nightly  mupirocin, , BID  oxygen, , Continuous - Inhalation  polyethylene glycol, 17 g, Daily  sennosides-docusate sodium, 2 tablet, BID      PRN:  alteplase, 2 mg, PRN  calcium gluconate, 1 g, q6h PRN  calcium gluconate, 2 g, q6h PRN  dextrose, 25 g, q15 min PRN   Or  glucagon, 1 mg, q15 min PRN  HYDROmorphone, 0.2 mg, q2h PRN  magnesium sulfate, 2 g, q6h PRN  magnesium sulfate, 4 g, q6h PRN  naloxone, 0.2 mg, q5 min PRN  ondansetron, 4 mg, q4h PRN  oxyCODONE, 10 mg, q4h PRN  oxyCODONE, 5 mg, q4h PRN  potassium chloride, 20 mEq, q6h PRN  potassium chloride, 40 mEq, q6h PRN        PHYSICAL EXAM:   Visit Vitals  BP 94/61 (BP Location: Right arm, Patient Position: Lying)   Pulse 59   Temp 36.8 °C (98.2 °F) (Temporal)   Resp 21   Ht 1.778 m (5' 10\")   Wt 84.5 kg (186 lb 4.6 oz)   SpO2 97%   BMI 26.73 kg/m²   Smoking Status Never   BSA 2.04 m²     Wt Readings from Last 5 Encounters:   11/26/24 84.5 kg (186 lb 4.6 oz)   10/16/24 85.3 kg (188 lb)   08/19/24 83.3 kg (183 lb 10.3 oz)   05/20/24 83.3 kg (183 lb 9.6 oz)     INTAKE/OUTPUT:  I/O last 3 completed shifts:  In: 9574.6 (113.3 mL/kg) [P.O.:960; I.V.:971.6 (11.5 mL/kg); Blood:750; IV Piggyback:4160]  Out: 5515 (65.3 mL/kg) [Urine:4525 (1.5 mL/kg/hr); Other:150; Blood:500; Chest Tube:340]  Weight: 84.5 kg          Vent settings:  Vent Mode: Assist control/Volume control plus  FiO2 (%):  [40 %-50 %] 40 %  S RR:  [16] 16  S VT:  [580 mL] 580 " mL  PEEP/CPAP (cm H2O):  [8 cm H20] 8 cm H20  MAP (cm H2O):  [12] 12    Physical Exam:   Physical Exam  Constitutional:       General: He is not in acute distress.     Appearance: Normal appearance. He is not ill-appearing.   HENT:      Head: Normocephalic.   Eyes:      Extraocular Movements: Extraocular movements intact.      Conjunctiva/sclera: Conjunctivae normal.   Cardiovascular:      Rate and Rhythm: Regular rhythm.      Pulses: Normal pulses.      Comments: Pacing VVI @ 90   Underlying rhythm: Accelerated junctional rate 60s   Appropriate MAPs in the 70s     Pulmonary:      Effort: Pulmonary effort is normal. No respiratory distress.      Comments: On 4L NC with appropriate oxygenation   RPL chest tube with serosanguinous output. No air leak noted.   Abdominal:      General: Abdomen is flat. There is no distension.      Palpations: Abdomen is soft.      Tenderness: There is no abdominal tenderness.   Genitourinary:     Comments: Brown in place with clear yellow urine   Musculoskeletal:         General: No swelling. Normal range of motion.      Right lower leg: No edema.      Left lower leg: No edema.   Skin:     General: Skin is warm and dry.      Capillary Refill: Capillary refill takes less than 2 seconds.      Comments: All wounds clean and dressed appropriately    Neurological:      General: No focal deficit present.      Mental Status: He is alert and oriented to person, place, and time.      Motor: No weakness.   Psychiatric:         Mood and Affect: Mood normal.         Behavior: Behavior normal.        Daily Risk Screen  Intubated: No  Central line: Yes, for parenteral medication administration   Brown: Yes, for accurate I/O monitoring in critically ill     Images: All images reviewed.     Invasive Hemodynamics:    Most Recent Range Past 24hrs   BP (Art) 109/57 Arterial Line BP 1  Min: 66/61  Max: 123/76   MAP(Art) 75 mmHg Arterial Line MAP 1 (mmHg)   Min: 58 mmHg  Max: 93 mmHg     Assessment/Plan    Assessment:  Gavin Moreau is a 54 y.o. with PMH/PSH of Mitral valve prolapse, asthma, GERD, rheumatic fever Presents to the CTICU from the OR s/p Robotic MVR via mini thoracotomy with Dr. Goldberg on 11/26. While attempting to transfer to the CTICU bed in OR patient R on T/VF arrested requiring CPR and shock x 2 with ROSC after 4 minutes.     Plan:  NEURO:  No hx. Acute post op pain. Patient is Aox3, CAM (-) following commands, and moves all extremities. Acute post op pain adequately controlled on current regimen. -->   - Serial neuro and pain assessments   - Scheduled Tylenol   - PRN oxycodone and Dilaudid for pain   - Lidocaine patches   - PT/OT Consult, OOB to chair as tolerated   - CAM ICU score qshift.    - Sleep/wake cycle hygiene      CV:  Hx of MV prolapse, Rheumatic fever, mild PHTN.  Now s/p robotic Mvr c/b R on T Vfib arrest. Pre LVEF 45%, moderated RV, post LVEF 50-55%, moderate RV. Pacing VVI @ 90, underlying rhythm junctional rate 60s with appropriate pressures so pacer turned to backup. Currently on epi 0.04mcg/kg/min. -->  - Continuous EKG and ABP monitoring  - Titrate vasopressors to maintain goal MAP > 65  - Wean intropes to goal ScVO2 > 60  - Maintain VVI @ 50   - Start ASA daily   - Start colchicine 0.6mg BID   - Holding home lisinopril 5 mg daily     PULM:  Hx of asthma, mild pHTN. Currently on 4L NC with appropriate oxygenation. RPL chest tube with no airleak and appropriate serosang output, to suction. CXR demonstrates acute cardiogenic pulmonary edema and atelectasis. -->  - Daily CXR   - Wean FiO2 maintaining SpO2 >92%.   - ABGs as needed  - Aggressive BPH, IS q1h and OOB to chair  - Continue breo ellipta   - Maintain chest tubes to wall suction, notify team for high output per unit protocol     GI:  Hx of GERD. Passed bedside swallow and started on regular diet. -->  - Discontinue PPI   - Continue regular diet   - Senna-Colace and miralax BID for bowel regimen post op     :  No hx,  baseline Cr 1.02. Arrival to ICU DENZEL risk score: low risk. Post-op creatinine stable. Rodriguez in place with appropriate UOP.  -->  - Continue rodriguez catheter for strict I/Os.    - Goal UOP 0.5ml/kg/hr  - RFP as clinically indicated.    - Replete electrolytes per CTICU protocol.     ENDO: No hx, A1c 5.2 11/26. Postoperative hyperglycemia well controlled with SSI. -->  - Maintain BG <180  - SSI q4 per CTICU protocol     HEME:  No active s/s of bleeding. Chest tube with appropriate serosanguinous drainage. Hemoglobin possibly concentrated at 14.2. -->    - Monitor drain output volume and characteristics  - CBC, coags, and fibrinogen as needed   - SCDs for DVT prophylaxis.    - Start ASA daily  - Start SQH q8h  - last type and screen: 11/26     ID:  MRSA negative. MSSA +.  Afebrile, no current indications of infection. -->  - Trend temp q4h  - Periop Ancef x48hrs  - Bactroban x 5 days     Skin:   Postoperative dressings CDI without s/s of infection.   - arrived to ICU from OR with preventative Mepilex dressings in place on sacrum and heels, to be changed per unit protocol and PRN  - every shift skin assessment per nursing and weekly ICU skin rounds  - moisture barrier to be applied with sincere care  - postoperative dressings removed/changed per protocol  - active skin problems addressed with nursing on daily rounds and wound care interventions in place as warranted     Proph:  SCDs  PPI  SQH     G:  Line  Right IJ MAC w mini MAC placed 11/26  Left brachial veena placed 11/26  Rodriguez placed 11/26  PIVs     Daily Risk Screen:  Intubated? Yes, planned for extubation later today  Central line? Yes, keep for hemodynamic monitoring and vasoactives  Rodriguez? Yes, keep for critical need for accurate I&O's      F: Family: will update family at bedside as able     A,B,C,D,E,F,G: reviewed     Dispo: CTICU      I have personally spent 60 minutes of critical care time, exclusive of time spent on any procedures, in evaluation and management  of this critically ill patient’s condition as above.     Safia Hansen PA-C  [facility] [phone]

## 2024-11-27 NOTE — SIGNIFICANT EVENT
11/26/24 2202   Daily Screen   Total RSBI 45.08   Weaning Parameters   Negative Inspiratory Force (NIF) -9.5   Spontaneous Minute Volume (MV) 9.28   Weaning Tidal Volume 488 mL   Respiratory Depth/Rhythm   (rr22)   Respiratory Effort Unlabored

## 2024-11-27 NOTE — OP NOTE
"MIS ROBOTIC MV REPAIR Operative Note     Date: 2024  OR Location: [facility] OR    Name: Gavin Moreau \"Harinder\", : 1970, Age: 54 y.o., MRN: [id number], Sex: male    Diagnosis  Pre-op Diagnosis      * Mitral valve insufficiency, unspecified etiology [I34.0] Post-op Diagnosis     * Mitral valve insufficiency, unspecified etiology [I34.0]     Procedures  Radical mitral valve repair with preshaped [place]-Carlo cords and 36 mm annuloplasty ring  Robotically assisted mitral valve repair  Right femoral arterial and venous cannulation for cardiopulmonary bypass    Surgeons      * Simeon Goldberg - Primary    Resident/Fellow/Other Assistant:  Surgeons and Role:     * Otoniel Prabhakar MD PhD - Assisting     * Narcisa Miranda MD - Assisting     * Delphine Treviño PA-C - [facility] First Assist  Ed Natalie Colunga was asked to assist because there was no qualified resident.  His role was in helping to implant the Washington-Carlo cords and implant the mitral valve annuloplasty ring.  Dr. Miranda was also asked to assist because there was no qualified resident.  Her role was as a tableside assistant, with management of the sutures and applying core knots for the annuloplasty ring.    Staff:   Circulator: Maeve Davis Person: Nena  Circulator: Jordan  Surgical Assistant: Brian  Surgical Assistant: Noé Richardson Circulator: Carmelita Quirozub Person: Gavin  Surgical Assistant: Ivan    Anesthesia Staff: Anesthesiologist: Renee Harrison MD; Ras Fernandez MD  Anesthesia Resident: Nitish Shankar MD  Perfusionist: Fred Dominguez  Frontline Breaker: Alfie Vogt MD    Procedure Summary  Anesthesia: General  ASA: III  Estimated Blood Loss: 50 mL  Intra-op Medications:   Administrations occurring from 0715 to 1155 on 24:   Medication Name Total Dose   sodium chloride 0.9 % irrigation solution 5,000 mL   ceFAZolin (Ancef) vial 1 g 2 g   dexmedeTOMIDine 4 mcg/mL in 100 mL NS " infusion 151.4 mcg   EPINEPHrine (Adrenalin) 4 mg/ 250 mL NS (16 mcg/mL) infusion 0.01 mg   fentaNYL (Sublimaze) injection 50 mcg/mL 200 mcg   glycopyrrolate (Robinul) injection 0.2 mg   heparin 1,000 units/mL 49,000 Units   insulin regular (HumuLIN R,NovoLIN R) 100 Units in sodium chloride 0.9% 100 mL (1 Units/mL) infusion 4.53 Units   insulin regular (HumuLIN R, NovoLIN R) bolus from bag 2 Units   ketamine (Ketalar) 500 mg in dextrose 5% 250 mL (2 mg/mL) infusion 151.4 mg   ketamine injection 50 mg/ 5 mL (10 mg/mL) 40 mg   lidocaine (Xylocaine) injection 2 % 60 mg   midazolam PF (Versed) injection 1 mg/mL 2 mg   norepinephrine (Levophed) 8 mg/250 mL D5W (premix) 0.55 mg   norepinephrine (Levophed) 16 mcg/mL IV bolus 32 mcg   perfusion prime builder 910 mL   propofol (Diprivan) injection 10 mg/mL 60 mg   rocuronium (ZeMuron) 50 mg/5 mL injection 180 mg   sodium bicarbonate injection 1 mEq/mL 50 mEq   NaCl 0.9 % bolus Cannot be calculated   tranexamic acid (Cyklokapron) 6,250 mg in sodium chloride 0.9% 312.5 mL (20 mg/mL) infusion 735.15 mg   tranexamic acid 1,000 mg/100 mL NS (premix) 1,267.5 mg              Anesthesia Record               Intraprocedure I/O Totals          Intake    Dexmedetomidine 0.00 mL    The total shown is the total volume documented since Anesthesia Start was filed.    Ketamine 0.00 mL    The total shown is the total volume documented since Anesthesia Start was filed.    NaCl 0.9 % bolus 250.00 mL    Norepinephrine Drip 0.00 mL    The total shown is the total volume documented since Anesthesia Start was filed.    Tranexamic Acid 0.00 mL    The total shown is the total volume documented since Anesthesia Start was filed.    Insulin Drip 0.00 mL    The total shown is the total volume documented since Anesthesia Start was filed.    Epinephrine Drip 0.00 mL    The total shown is the total volume documented since Anesthesia Start was filed.    Amiodarone Drip 0.00 mL    The total shown is the  total volume documented since Anesthesia Start was filed.    perfusion prime builder 910.00 mL    Cell Saver 750 mL    Total Intake 1910 mL       Output    Urine 355 mL    Est. Blood Loss 500 mL    Total Output 855 mL       Net    Net Volume 1055 mL          Specimen: No specimens collected              Drains and/or Catheters:   Chest Tube 1 Right Pleural (Active)   Function -20 cm H2O 11/27/24 0800   Chest Tube Air Leak No 11/27/24 0800   Patency Intervention Tip/tilt 11/27/24 0800   Drainage Description Serosanguineous 11/27/24 0800   Dressing Status Clean;Dry;Occlusive 11/27/24 0800   Site Assessment Clean;Dry;Intact 11/27/24 0800   Surrounding Skin Dry;Intact 11/27/24 0800   Output (mL) 0 mL 11/27/24 0800       Urethral Catheter Non-latex;Temperature probe 14 Fr. (Active)   Site Assessment Clean;Skin intact 11/27/24 0916   Collection Container Standard drainage bag 11/27/24 0000   Securement Method Securing device (Describe) 11/26/24 1545   Reason for Continuing Urinary Catheterization accurate hourly measurement of urine volume in a critically ill patient that cannot be assessed by other volumes and urine collection strategies 11/26/24 2200   Output (mL) 50 mL 11/27/24 0800       Tourniquet Times:         Implants:  Implants       Type Name Action Serial No.      Heart Valve Repair RING, MITRAL 36MM 5200 ANNULOPLASTY - [id number] - [id number] Implanted [id number]              Findings: The femoral artery and vein were excellent.  Placing the patient on cardiopulmonary bypass was uncomplicated.  We used the robot to assist with repair of the mitral valve.  There was prolapse of P2 and also P3 which was divided into 2 segments.  The P2 and P3 segments were fixed with a group of preshaped 16 mm Jerome-Carlo cords anchored to the posterior medial papillary muscle.  The repair was supported with a 36 mm annuloplasty ring, the final result on GISELLE was excellent with only trivial residual mitral regurgitation.  Ventricular  "function was excellent.  The conduct of the operation was excellent.  While the patient was being transferred from the operating room table to the stretcher, he went into ventricular fibrillation secondary to what appears to have been an R on T pacer spike.  He received CPR and resuscitative measures and was defibrillated and regained sinus rhythm.  A GISELLE was reintroduced, there was no evidence of bleeding, there was no evidence of mitral insufficiency and ventricular function was excellent.  He was watched in the operating room for some time and then transferred to the ICU in stable condition.    Indications: Gavin Moreau \"Luz" is an 54 y.o. male who is having surgery for Mitral valve insufficiency, unspecified etiology [I34.0].  The indication for surgery was severe mitral regurgitation with impaired ventricular function.    The patient was seen in the preoperative area. The risks, benefits, complications, treatment options, non-operative alternatives, expected recovery and outcomes were discussed with the patient. The possibilities of reaction to medication, pulmonary aspiration, injury to surrounding structures, bleeding, recurrent infection, the need for additional procedures, failure to diagnose a condition, and creating a complication requiring transfusion or operation were discussed with the patient. The patient concurred with the proposed plan, giving informed consent.  The site of surgery was properly noted/marked if necessary per policy. The patient has been actively warmed in preoperative area. Preoperative antibiotics have been ordered and given within 1 hours of incision. Venous thrombosis prophylaxis are not indicated.    Procedure Details: Under general anesthesia the patient was prepped and draped in the appropriate fashion.  The right femoral artery and vein were identified, the patient was heparinized.  The artery was cannulated with a 17 [employer] cannula, in the vein with a 25 [place] " cannula advanced into the SVC under GISELLE guidance.    A right minithoracotomy was performed.  A soft tissue retractor was inserted.  The right lung was deflated.  Ports for the robot were placed in the third intercostal space, the sixth intercostal space in the fourth intercostal space.  The robotic arms were docked.  The pericardium was opened and suspended.  The aorta was cannulated with an antegrade cardioplegia cannula with 2 Ethibond and pledgets.  The patient was placed on bypass.  The aorta was crossclamped and cold blood cardioplegia was given to the aortic root.  The heart was arrested in diastole.    The left atrium was opened.  We extended the incision upwards and part of the SVC was also opened.  There was no bleeding from this and it was later repaired with interrupted sutures of 4-0 Prolene.  The robotic arm retractor was inserted into the left atrium.  Exposure to the mitral valve was excellent.  The valve was morphologically normal but with a very large P2 and a very large P3 that had 2 lobes, both segments prolapsing into the left atrium.  We chose to repair this valve with [place]-Carlo cords.  We used preshaped 16mm cords and anchored it to the posterior medial papillary muscle with pledgeted sutures.  The leading edges of those 3 sets of needle cords were attached to the P2 segment, and the 2 lobes of the P3 segment.  Testing of the valve at that point revealed no residual mitral regurgitation.  We supplemented the repair with a 36 mm annuloplasty ring.  Multiple sutures of 2-0 Ethibond without pledgets were placed along the annulus.  These were passed through the sewing ring of the valve which was lowered into position and tied with core knots.  Testing of the valve again revealed no mitral regurgitation.  The left atrium was then closed in running sutures of 4-0 and 3-0 Prolene.  Internal hotshot was given and the cross-clamp was removed.  A pacing wire was placed on the underside of the right  ventricle.  1 chest tube was left in place.    The patient was weaned from bypass, without difficulty and with good biventricular function.  GISELLE interrogation of the valve revealed no mitral insufficiency.  We went back on bypass briefly to remove the cardioplegia vent line and placed a core knot to secure that.  There was no bleeding.  We came off bypass a second time, we remove the femoral cannulas.  We oversewed the sites with 5-0 Prolene.  Protamine was given to reverse the heparin effect.  The right groin was closed in standard fashion.  When bleeding was satisfactory the intercostal space was closed with an interrupted suture of #5 Ethibond.  The fascia was closed with 0 Vicryl subcutaneous tissues with 2-0 Vicryl and skin with Monocryl.  The sponge instrument counts were correct at the end the case.  The patient was brought back to the ICU in stable condition.  As the attending surgeon I was present or immediately available for all aspects of the operation.    Complications:  None; patient tolerated the procedure well.    Disposition: PACU - hemodynamically stable.  Condition: stable         Task Performed by RNFA or Surgical Assistant:  Closure of the right groin incision.  Closure of the soft tissues of the minithoracotomy incision.          Additional Details: As noted above the patient went into ventricular fibrillation at the end of the case as he was being transferred from the operating table to the stretcher.  A review of the rhythm strips suggest this was an R-on-T phenomenon.  He underwent a few minutes of CPR and was defibrillated successfully.  He regained normal vitals with good ventricular function and no valvular abnormality.  There was no evidence or suggestion of bleeding.  This operative note is being dictated the morning after surgery.  He is neurologically intact awake and extubated in the CTICU.    Attending Attestation: I was present and scrubbed for the entire procedure.    Simeon HERNANDEZ  [place]  Phone Number: [phone]

## 2024-11-27 NOTE — SIGNIFICANT EVENT
11/26/24 2236   Weaning Parameters   Weaning Vital Capacity (S)  535 mL   Negative Inspiratory Force (NIF) (S)  -24   Spontaneous Minute Volume (MV) (S)  10.01   Weaning Tidal Volume (S)  435 mL   Respiratory Depth/Rhythm (S)    (rr 23)     Rsbi 52

## 2024-11-27 NOTE — PROGRESS NOTES
Physical Therapy    Physical Therapy Evaluation & Treatment    Patient Name: Harinder Moreau  MRN: [id number]  Department: [facility] [place] [facility]  Room: 16/16-A  Today's Date: 11/27/2024   Time Calculation  Start Time: 0945  Stop Time: 1021  Time Calculation (min): 36 min    Assessment/Plan   PT Assessment  PT Assessment Results: Decreased strength, Decreased endurance, Impaired balance, Decreased mobility  Rehab Prognosis: Good  Barriers to Discharge: Hypotension with change in position, (+) symptoms  Evaluation/Treatment Tolerance: Treatment limited secondary to medical complications (Comment), Patient tolerated treatment well  Medical Staff Made Aware: Yes  End of Session Communication: Bedside nurse  Assessment Comment: Pt demonstrated ability to complete bed mobility, sit<>stand transfer and bed>chair transfer.  CGA-minAx1 provided for all mobility.  No instability noted.  Drop in MAP with change in position however recovery with movement.  End of Session Patient Position: Up in chair, Alarm off, not on at start of session   IP OR SWING BED PT PLAN  Inpatient or Swing Bed: Inpatient  PT Plan  Treatment/Interventions: Bed mobility, Transfer training, Gait training, Stair training, Balance training, Strengthening, Endurance training, Therapeutic exercise, Therapeutic activity  PT Plan: Ongoing PT  PT Frequency: 3 times per week  PT Discharge Recommendations: Low intensity level of continued care  PT Recommended Transfer Status: Assist x1, Contact guard  PT - OK to Discharge: Yes    Subjective     General Visit Information:  General  Reason for Referral: Robotic MVR via mini thoracotomy  Referred By: Dr. Goldberg  Past Medical History Relevant to Rehab: Mitral valve prolapse, asthma, GERD, rheumatic fever.  Family/Caregiver Present: No  Prior to Session Communication: Bedside nurse  Patient Position Received: Bed, 3 rail up, Alarm off, not on at start of session  Preferred Learning Style: auditory, verbal  General Comment: Pt  awake, alert and willing to participate in PT session  Home Living:  Home Living  Type of Home: House  Lives With: Alone (Friends or nephew with assist upon D/C)  Home Adaptive Equipment: None  Home Layout:  (4 XENA with railing, bed/bath - 2nd floor with railing, tub/shower)  Prior Level of Function:  Prior Function Per Pt/Caregiver Report  Level of [place]: Independent with ADLs and functional transfers, Independent with homemaking with ambulation  ADL Assistance: Independent  Homemaking Assistance: Independent  Ambulatory Assistance: Independent  Vocational: Full time employment ([employer])  Prior Function Comments: (+) drives, (-) falls  Precautions:  Precautions  Hearing/Visual Limitations: WFL  Medical Precautions: Cardiac precautions, Fall precautions, Oxygen therapy device and L/min, Chest tube  Precautions Comment: MAP >65, SpO2 >92%, VVI @ 50     11/27/24 0945 11/27/24 1021   Vital Signs   Vitals Session Pre PT Post PT   Heart Rate 61 63   Resp (!) 30 24   SpO2 97 % 97 %   /59 102/66   MAP (mmHg) 77 78   BP Method Arterial line Arterial line   Patient Position Lying Sitting   Vital Signs Comment  --  MAP dropped to 54 upon sitting with reports of dizziness.  Cues to complete APs with improvement in BP.  MAP dropped to 66 with transfer to chair (asymptomatic).     Objective   Pain:  Pain Assessment  Pain Assessment: 0-10  0-10 (Numeric) Pain Score: 0 - No pain  Cognition:  Cognition  Overall Cognitive Status: Within Functional Limits  Orientation Level: Oriented X4    General Assessments:  General Observation  General Observation: Tele, 3L, rodriguez, veena, CT x1, CVC< CVP, epi .04     Activity Tolerance  Endurance: Tolerates 10 - 20 min exercise with multiple rests  Early Mobility/Exercise Safety Screen: Proceed with mobilization - No exclusion criteria met  Activity Tolerance Comments: Limited mobility d/t drop in MAP with change in position, (+) symptoms    Sensation  Light Touch: No apparent  deficits    Strength  Strength Comments: At least 3/5 shown through functional mobility  Coordination  Movements are Fluid and Coordinated: Yes    Postural Control  Postural Control: Within Functional Limits    Static Sitting Balance  Static Sitting-Balance Support: Bilateral upper extremity supported, Feet supported  Static Sitting-Level of Assistance: Contact guard    Static Standing Balance  Static Standing-Balance Support: Bilateral upper extremity supported  Static Standing-Level of Assistance: Contact guard  Static Standing-Comment/Number of Minutes: B arm in arm  Functional Assessments:  ADL  ADL's Addressed: No    Bed Mobility  Bed Mobility: Yes  Bed Mobility 1  Bed Mobility 1: Supine to sitting  Level of Assistance 1: Minimum assistance (x1)  Bed Mobility Comments 1: HOB elevated, assistance with advancing trunk upright    Transfers  Transfer: Yes  Transfer 1  Transfer From 1: Sit to, Stand to  Transfer to 1: Sit, Stand  Technique 1: Sit to stand, Stand to sit  Transfer Device 1:  (B arm in arm)  Transfer Level of Assistance 1: Minimum assistance (x1)  Trials/Comments 1: Cues for hand placement  Transfers 2  Transfer From 2: Bed to  Transfer to 2: Chair with arms  Technique 2: Lateral, To right  Transfer Device 2:  (B arm in arm)  Transfer Level of Assistance 2: Minimum assistance (x1)  Trials/Comments 2: ~4-5 lateral steps to complete transfer    Ambulation/Gait Training  Ambulation/Gait Training Performed:  (Refer to transfer section for additional information.  Limited ambulation d/t drop in MAP with change in position and (+) symptoms)    Stairs  Stairs: No  Extremity/Trunk Assessments:  RUE   RUE : Within Functional Limits  LUE   LUE: Within Functional Limits  RLE   RLE : Within Functional Limits  LLE   LLE : Within Functional Limits  Treatments:  Therapeutic Exercise  Therapeutic Exercise Performed: Yes  Therapeutic Exercise Activity 1: 1x10 reps of incentive spirometer: </=750    Therapeutic  Activity  Therapeutic Activity Performed: Yes  Therapeutic Activity 1: Increased time for skilled ICU line/tube management for safe functional mobility  Therapeutic Activity 2: Pt sat EOB ~6 min with cues to complete APs/LAQs d/t drop in MAP with (+) symptoms.  Improved with movement and time upright.  Therapeutic Activity 3: Assisted with gown change at end of session d/t drainage from CT site.  RN made notified.  Outcome Measures:  [facility] Basic Mobility  Turning from your back to your side while in a flat bed without using bedrails: A little  Moving from lying on your back to sitting on the side of a flat bed without using bedrails: A little  Moving to and from bed to chair (including a wheelchair): A little  Standing up from a chair using your arms (e.g. wheelchair or bedside chair): A little  To walk in hospital room: A little  Climbing 3-5 steps with railing: A little  Basic Mobility - Total Score: 18    FSS-ICU  Ambulation: Unable to attempt due to weakness  Rolling: Supervision or set-up only  Sitting: Supervision or set-up only  Transfer Sit-to-Stand: Minimal assistance (performs 75% or more of task)  Transfer Supine-to-Sit: Minimal assistance (performs 75% or more of task)  Total Score: 18    Early Mobility/Exercise Safety Screen: Proceed with mobilization - No exclusion criteria met  ICU Mobility Scale: Transferring bed to chair [5]    Encounter Problems       Encounter Problems (Active)       Balance       Pt will demonstrated ability to score at least 24/28 on the Tinetti balance assessment tool to ensure safety upon D/C.  (Progressing)       Start:  11/27/24    Expected End:  12/11/24               Mobility       Pt will demonstrated ability to ambulate >/=300ft with proper form and no balance deficits for safe home going.   (Progressing)       Start:  11/27/24    Expected End:  12/11/24            Pt will demonstrate ability to ascend/descend 1 flight of stairs with unilateral rail and no balance  deficits for safe home going.  (Progressing)       Start:  11/27/24    Expected End:  12/11/24               PT Transfers       Pt will demonstrate ability to complete 5X STS in < 12 sec with good from and consistency between transfers for safe D/C.  (Progressing)       Start:  11/27/24    Expected End:  12/11/24                   Education Documentation  Body Mechanics, taught by Elaine Lyman, PT at 11/27/2024 12:32 PM.  Learner: Patient  Readiness: Acceptance  Method: Explanation, Demonstration  Response: Verbalizes Understanding, Demonstrated Understanding    Mobility Training, taught by Elaine Lyman, PT at 11/27/2024 12:32 PM.  Learner: Patient  Readiness: Acceptance  Method: Explanation, Demonstration  Response: Verbalizes Understanding, Demonstrated Understanding    Education Comments  No comments found.

## 2024-11-27 NOTE — CARE PLAN
Problem: Skin  Goal: Decreased wound size/increased tissue granulation at next dressing change  Outcome: Progressing  Flowsheets (Taken 11/27/2024 0233)  Decreased wound size/increased tissue granulation at next dressing change:   Promote sleep for wound healing   Utilize specialty bed per algorithm   Protective dressings over bony prominences  Goal: Participates in plan/prevention/treatment measures  Outcome: Progressing  Flowsheets (Taken 11/27/2024 0233)  Participates in plan/prevention/treatment measures:   Elevate heels   Increase activity/out of bed for meals  Goal: Prevent/manage excess moisture  Outcome: Progressing  Flowsheets (Taken 11/27/2024 0233)  Prevent/manage excess moisture:   Cleanse incontinence/protect with barrier cream   Monitor for/manage infection if present  Goal: Prevent/minimize sheer/friction injuries  Outcome: Progressing  Flowsheets (Taken 11/27/2024 0233)  Prevent/minimize sheer/friction injuries:   HOB 30 degrees or less   Turn/reposition every 2 hours/use positioning/transfer devices   Use pull sheet   Increase activity/out of bed for meals   Complete micro-shifts as needed if patient unable. Adjust patient position to relieve pressure points, not a full turn  Goal: Promote/optimize nutrition  Outcome: Progressing  Flowsheets (Taken 11/27/2024 0233)  Promote/optimize nutrition:   Discuss with provider if NPO > 2 days   Offer water/supplements/favorite foods   Monitor/record intake including meals  Goal: Promote skin healing  Outcome: Progressing  Flowsheets (Taken 11/27/2024 0233)  Promote skin healing:   Assess skin/pad under line(s)/device(s)   Protective dressings over bony prominences   Turn/reposition every 2 hours/use positioning/transfer devices   Rotate device position/do not position patient on device   Ensure correct size (line/device) and apply per  instructions

## 2024-11-27 NOTE — PROGRESS NOTES
"Occupational Therapy    Evaluation    Patient Name: Gavin Moreau \"Harinder\"  MRN: [id number]  Today's Date: 11/27/2024  Room: 07/07A  Time Calculation  Start Time: 1230  Stop Time: 1249  Time Calculation (min): 19 min    Assessment  IP OT Assessment  OT Assessment: Pt willing to engaged in OT session, walking 1/3 of unit with CGA and use of FWW. Demonstrated decreased endurance and strength, would benefit from continued skilled therapy to maximize level of independence.  Prognosis: Good  Barriers to Discharge: None  Evaluation/Treatment Tolerance: Patient tolerated treatment well  Medical Staff Made Aware: Yes  End of Session Communication: Bedside nurse  End of Session Patient Position: Up in chair, Alarm off, not on at start of session, Alarm off, caregiver present  Plan:  Inpatient Plan  Treatment Interventions: ADL retraining, Functional transfer training, Endurance training, Neuromuscular reeducation, Compensatory technique education  OT Frequency: 2 times per week  OT Discharge Recommendations: Low intensity level of continued care  Equipment Recommended upon Discharge: Wheeled walker  OT Recommended Transfer Status: Minimal assist  OT - OK to Discharge: Yes  OT Assessment  OT Assessment Results: Decreased ADL status, Decreased endurance, Decreased functional mobility, Decreased IADLs  Prognosis: Good  Barriers to Discharge: None  Evaluation/Treatment Tolerance: Patient tolerated treatment well  Medical Staff Made Aware: Yes    Subjective   Current Problem:  1. Mitral valve insufficiency, unspecified etiology  Type And Screen    Admit to inpatient    Full code    Type And Screen    Admit to inpatient    Full code    Inpatient consult to Respiratory Care    Inpatient consult to Respiratory Care    DISCONTINUED: lactated Ringer's infusion    CANCELED: NPO Diet Except: Sips with meds; Effective now    CANCELED: Height and weight    CANCELED: Insert and maintain peripheral IV    CANCELED: Saline lock IV    CANCELED: " Surgical preparation    CANCELED: Bath/Shower with Chlorhexidine Gluconate    CANCELED: NPO Diet Except: Sips with meds; Effective now    CANCELED: Height and weight    CANCELED: Insert and maintain peripheral IV    CANCELED: Saline lock IV    CANCELED: Surgical preparation    CANCELED: Bath/Shower with Chlorhexidine Gluconate      2. Mitral regurgitation, myxomatous  Anesthesia Intraoperative Transesophageal Echocardiogram    Anesthesia Intraoperative Transesophageal Echocardiogram      3. Nonrheumatic mitral valve regurgitation [I34.0]        4. Cardiac arrest [I46.9]        5. Diverticulitis [K57.92]        6. Essential hypertension [I10]        7. Gastroesophageal reflux disease without esophagitis [K21.9]        8. Mild intermittent asthma, uncomplicated (HHS-HCC) [J45.20]        9. Mixed hyperlipidemia [E78.2]        10. MVP (mitral valve prolapse) [I34.1]        11. Splenic artery aneurysm (CMS-HCC) [I72.8]        12. Rheumatic mitral valve disease, unspecified  Anesthesia Intraoperative Transesophageal Echocardiogram        General:  Reason for Referral: Robotic MVR via mini thoracotomy  Referred By: Dr. Goldberg  Past Medical History Relevant to Rehab: Mitral valve prolapse, asthma, GERD, rheumatic fever.  Prior to Session Communication: Bedside nurse  Patient Position Received: Bed, 3 rail up, Alarm off, not on at start of session  Family/Caregiver Present: Yes  Caregiver Feedback: sister and nephew  General Comment: Pt awake and willing to participate in OT evaluation. Engaged in functional mobility and transfer to wheelchair (4L O2 via NC. Epi 0.03)   Precautions:  Medical Precautions: Cardiac precautions, Fall precautions, Oxygen therapy device and L/min, Chest tube (x1 CT)  Precautions Comment: MAP >65, SpO2 >92%, VVI @ 50  Vital Signs:  Vital Signs (Past 2hrs)        Date/Time Vitals Session Patient Position Pulse Resp SpO2 BP MAP (mmHg)    11/27/24 1200 --  --  60  21  98 %  --  --     11/27/24 1230  Pre OT  Sitting  59  22  99 %  100/64  78     11/27/24 1249 Post OT  Sitting  56  --  98 %  101/65  77     11/27/24 1300 --  --  57  23  94 %  --  --     11/27/24 1315 --  --  57  23  99 %  --  --     11/27/24 1330 --  --  59  16  100 %  --  --                   Pain:  Pain Assessment  Pain Assessment: 0-10  0-10 (Numeric) Pain Score: 0 - No pain  Lines/Tubes/Drains:  CVC 11/26/24 Double lumen Right Internal jugular (Active)   Number of days: 1       Arterial Line 11/26/24 Left Brachial (Active)   Number of days: 1       Urethral Catheter Non-latex;Temperature probe 14 Fr. (Active)   Number of days: 1       Chest Tube 1 Right Pleural (Active)   Number of days: 1         Objective   Cognition:  Overall Cognitive Status: Within Functional Limits  Orientation Level: Oriented X4           Home Living:  Type of Home: House  Lives With: Alone  Home Adaptive Equipment: None  Home Layout: Two level, Able to live on main level with bedroom/bathroom  Home Access: Stairs to enter with rails  Entrance Stairs-Number of Steps: 4  Bathroom Shower/Tub: Tub/shower unit  Bathroom Equipment: None  Home Living Comments: friend able to live with if help needed upon d/c   Prior Function:  Level of [place]: Independent with ADLs and functional transfers, Independent with homemaking with ambulation  ADL Assistance: Independent  Homemaking Assistance: Independent  Ambulatory Assistance: Independent  Vocational: Full time employment ()  Leisure: walking dog  Prior Function Comments: (+) drives, (-) falls  IADL History:     ADL:  Eating Assistance: Stand by  Eating Deficit: Supervision/safety  Grooming Assistance: Stand by  Grooming Deficit: Supervision/safety  Bathing Assistance: Minimal  Bathing Deficit: Supervision/safety, Steadying  UE Dressing Assistance: Stand by  UE Dressing Deficit: Supervision/safety  LE Dressing Assistance: Minimal  LE Dressing Deficit: Pull up over hips  Toileting Assistance with Device: Stand  by  Toileting Deficit: Supervison/safety  Activity Tolerance:  Endurance: Tolerates 10 - 20 min exercise with multiple rests  Early Mobility/Exercise Safety Screen: Proceed with mobilization - No exclusion criteria met  Activity Tolerance Comments: lightheaded with ambulation, vitals stable  Balance:  Dynamic Sitting Balance  Dynamic Sitting-Balance Support: No upper extremity supported  Dynamic Sitting-Level of Assistance: Distant supervision  Static Sitting Balance  Static Sitting-Balance Support: No upper extremity supported  Static Sitting-Level of Assistance: Distant supervision  Static Standing Balance  Static Standing-Balance Support: No upper extremity supported  Static Standing-Level of Assistance: Contact guard  Static Standing-Comment/Number of Minutes: CGA for stability, safety  Bed Mobility/Transfers: Bed Mobility  Bed Mobility: No  Functional Mobility  Functional Mobility Performed: Yes  Functional Mobility 1  Surface 1: Level tile  Device 1: Rolling walker  Assistance 1: Contact guard  Comments 1: CGA for stability, safety. Pt ambulated 1/3 of unit with x3 standing rests, noted feeling tired and lightheaded.Vitals remained stable   and Transfers  Transfer: Yes  Transfer 1  Transfer From 1: Sit to, Stand to  Transfer to 1: Sit, Stand  Technique 1: Sit to stand, Stand to sit  Transfer Device 1: Walker  Transfer Level of Assistance 1: Contact guard  Trials/Comments 1: CGA for stability  IADL's:      Vision:     and    Sensation:  Light Touch: No apparent deficits  Strength:  Strength Comments: BUE at least 3+/5 as observed through task engagement and functional mobility/walker management  Perception:  Inattention/Neglect: Appears intact  Initiation: Appears intact  Motor Planning: Appears intact  Perseveration: Not present  Coordination:  Movements are Fluid and Coordinated: Yes   Hand Function:  Hand Function  Gross Grasp: Functional  Coordination: Functional  Extremities: RUE   RUE : Within Functional  Limits, LUE   LUE: Within Functional Limits,  , and      Outcome Measures: [facility] Daily Activity  Putting on and taking off regular lower body clothing: A little  Bathing (including washing, rinsing, drying): A little  Putting on and taking off regular upper body clothing: None  Toileting, which includes using toilet, bedpan or urinal: A little  Taking care of personal grooming such as brushing teeth: None  Eating Meals: None  Daily Activity - Total Score: 21        ICU Mobility Screen  Early Mobility/Exercise Safety Screen: Proceed with mobilization - No exclusion criteria met,          Education Documentation  Body Mechanics, taught by MARYA Leslie at 11/27/2024  1:08 PM.  Learner: Patient  Readiness: Acceptance  Method: Explanation  Response: Verbalizes Understanding, Demonstrated Understanding    Precautions, taught by MARYA Leslie at 11/27/2024  1:08 PM.  Learner: Patient  Readiness: Acceptance  Method: Explanation  Response: Verbalizes Understanding, Demonstrated Understanding    ADL Training, taught by MARYA Leslie at 11/27/2024  1:08 PM.  Learner: Patient  Readiness: Acceptance  Method: Explanation  Response: Verbalizes Understanding, Demonstrated Understanding    Education Comments  No comments found.        Goals:     Encounter Problems       Encounter Problems (Active)       ADLs       Pt will demonstrate increased independence by completing LB dressing at EOB with mod I       Start:  11/27/24    Expected End:  12/11/24            Pt will demonstrate increased ADL independence by completing toileting routine with mod I       Start:  11/27/24    Expected End:  12/11/24               EXERCISE/STRENGTHENING       Pt will increase endurance to tolerate 20 min of activity with no more than 1 rest break in order to increase ability to engage in ADL completion.        Start:  11/27/24    Expected End:  12/11/24               MOBILITY       Pt will increase functional mobility by  ambulating household distances with mod I and use of least restrictive mobility device.        Start:  11/27/24    Expected End:  12/11/24 11/27/24 at 1:46 PM   MARYA DO   Rehab Office: [phone]

## 2024-11-28 LAB
ALBUMIN SERPL BCP-MCNC: 3.3 G/DL (ref 3.4–5)
ANION GAP BLDV CALCULATED.4IONS-SCNC: 7 MMOL/L (ref 10–25)
ANION GAP BLDV CALCULATED.4IONS-SCNC: 8 MMOL/L (ref 10–25)
ANION GAP SERPL CALC-SCNC: 10 MMOL/L (ref 10–20)
APTT PPP: 31 SECONDS (ref 27–38)
BASE EXCESS BLDV CALC-SCNC: 2.3 MMOL/L (ref -2–3)
BASE EXCESS BLDV CALC-SCNC: 2.6 MMOL/L (ref -2–3)
BODY TEMPERATURE: 37 DEGREES CELSIUS
BODY TEMPERATURE: 37 DEGREES CELSIUS
BUN SERPL-MCNC: 17 MG/DL (ref 6–23)
CA-I BLD-SCNC: 1.17 MMOL/L (ref 1.1–1.33)
CA-I BLDV-SCNC: 1.14 MMOL/L (ref 1.1–1.33)
CA-I BLDV-SCNC: 1.15 MMOL/L (ref 1.1–1.33)
CALCIUM SERPL-MCNC: 7.9 MG/DL (ref 8.6–10.6)
CHLORIDE BLDV-SCNC: 100 MMOL/L (ref 98–107)
CHLORIDE BLDV-SCNC: 100 MMOL/L (ref 98–107)
CHLORIDE SERPL-SCNC: 102 MMOL/L (ref 98–107)
CO2 SERPL-SCNC: 28 MMOL/L (ref 21–32)
CREAT SERPL-MCNC: 0.97 MG/DL (ref 0.5–1.3)
EGFRCR SERPLBLD CKD-EPI 2021: >90 ML/MIN/1.73M*2
ERYTHROCYTE [DISTWIDTH] IN BLOOD BY AUTOMATED COUNT: 13.2 % (ref 11.5–14.5)
GLUCOSE BLD MANUAL STRIP-MCNC: 103 MG/DL (ref 74–99)
GLUCOSE BLD MANUAL STRIP-MCNC: 108 MG/DL (ref 74–99)
GLUCOSE BLD MANUAL STRIP-MCNC: 116 MG/DL (ref 74–99)
GLUCOSE BLD MANUAL STRIP-MCNC: 116 MG/DL (ref 74–99)
GLUCOSE BLD MANUAL STRIP-MCNC: 125 MG/DL (ref 74–99)
GLUCOSE BLD MANUAL STRIP-MCNC: 126 MG/DL (ref 74–99)
GLUCOSE BLD MANUAL STRIP-MCNC: 144 MG/DL (ref 74–99)
GLUCOSE BLD MANUAL STRIP-MCNC: 162 MG/DL (ref 74–99)
GLUCOSE BLDV-MCNC: 120 MG/DL (ref 74–99)
GLUCOSE BLDV-MCNC: 123 MG/DL (ref 74–99)
GLUCOSE SERPL-MCNC: 107 MG/DL (ref 74–99)
HCO3 BLDV-SCNC: 27.8 MMOL/L (ref 22–26)
HCO3 BLDV-SCNC: 28.4 MMOL/L (ref 22–26)
HCT VFR BLD AUTO: 35.5 % (ref 41–52)
HCT VFR BLD EST: 35 % (ref 41–52)
HCT VFR BLD EST: 36 % (ref 41–52)
HGB BLD-MCNC: 11.7 G/DL (ref 13.5–17.5)
HGB BLDV-MCNC: 11.8 G/DL (ref 13.5–17.5)
HGB BLDV-MCNC: 12.1 G/DL (ref 13.5–17.5)
INHALED O2 CONCENTRATION: 36 %
INHALED O2 CONCENTRATION: 36 %
INR PPP: 1.1 (ref 0.9–1.1)
LACTATE BLDV-SCNC: 0.7 MMOL/L (ref 0.4–2)
LACTATE BLDV-SCNC: 0.9 MMOL/L (ref 0.4–2)
MAGNESIUM SERPL-MCNC: 2.4 MG/DL (ref 1.6–2.4)
MCH RBC QN AUTO: 30.2 PG (ref 26–34)
MCHC RBC AUTO-ENTMCNC: 33 G/DL (ref 32–36)
MCV RBC AUTO: 92 FL (ref 80–100)
NRBC BLD-RTO: 0 /100 WBCS (ref 0–0)
OXYHGB MFR BLDV: 60.1 % (ref 45–75)
OXYHGB MFR BLDV: 68.5 % (ref 45–75)
PCO2 BLDV: 46 MM HG (ref 41–51)
PCO2 BLDV: 48 MM HG (ref 41–51)
PH BLDV: 7.38 PH (ref 7.33–7.43)
PH BLDV: 7.39 PH (ref 7.33–7.43)
PHOSPHATE SERPL-MCNC: 3.4 MG/DL (ref 2.5–4.9)
PLATELET # BLD AUTO: 97 X10*3/UL (ref 150–450)
PO2 BLDV: 32 MM HG (ref 35–45)
PO2 BLDV: 40 MM HG (ref 35–45)
POTASSIUM BLDV-SCNC: 4.5 MMOL/L (ref 3.5–5.3)
POTASSIUM BLDV-SCNC: 4.8 MMOL/L (ref 3.5–5.3)
POTASSIUM SERPL-SCNC: 4.8 MMOL/L (ref 3.5–5.3)
PROTHROMBIN TIME: 12.9 SECONDS (ref 9.8–12.8)
RBC # BLD AUTO: 3.88 X10*6/UL (ref 4.5–5.9)
SAO2 % BLDV: 61 % (ref 45–75)
SAO2 % BLDV: 70 % (ref 45–75)
SODIUM BLDV-SCNC: 131 MMOL/L (ref 136–145)
SODIUM BLDV-SCNC: 131 MMOL/L (ref 136–145)
SODIUM SERPL-SCNC: 135 MMOL/L (ref 136–145)
WBC # BLD AUTO: 16.6 X10*3/UL (ref 4.4–11.3)

## 2024-11-28 PROCEDURE — 2500000005 HC RX 250 GENERAL PHARMACY W/O HCPCS: Performed by: STUDENT IN AN ORGANIZED HEALTH CARE EDUCATION/TRAINING PROGRAM

## 2024-11-28 PROCEDURE — 82330 ASSAY OF CALCIUM: CPT | Performed by: STUDENT IN AN ORGANIZED HEALTH CARE EDUCATION/TRAINING PROGRAM

## 2024-11-28 PROCEDURE — 84132 ASSAY OF SERUM POTASSIUM: CPT | Performed by: STUDENT IN AN ORGANIZED HEALTH CARE EDUCATION/TRAINING PROGRAM

## 2024-11-28 PROCEDURE — 2500000004 HC RX 250 GENERAL PHARMACY W/ HCPCS (ALT 636 FOR OP/ED)

## 2024-11-28 PROCEDURE — 2500000004 HC RX 250 GENERAL PHARMACY W/ HCPCS (ALT 636 FOR OP/ED): Performed by: STUDENT IN AN ORGANIZED HEALTH CARE EDUCATION/TRAINING PROGRAM

## 2024-11-28 PROCEDURE — 82947 ASSAY GLUCOSE BLOOD QUANT: CPT

## 2024-11-28 PROCEDURE — 2500000004 HC RX 250 GENERAL PHARMACY W/ HCPCS (ALT 636 FOR OP/ED): Mod: JZ

## 2024-11-28 PROCEDURE — 99291 CRITICAL CARE FIRST HOUR: CPT | Performed by: STUDENT IN AN ORGANIZED HEALTH CARE EDUCATION/TRAINING PROGRAM

## 2024-11-28 PROCEDURE — 2500000001 HC RX 250 WO HCPCS SELF ADMINISTERED DRUGS (ALT 637 FOR MEDICARE OP)

## 2024-11-28 PROCEDURE — 85027 COMPLETE CBC AUTOMATED: CPT | Performed by: STUDENT IN AN ORGANIZED HEALTH CARE EDUCATION/TRAINING PROGRAM

## 2024-11-28 PROCEDURE — 83605 ASSAY OF LACTIC ACID: CPT

## 2024-11-28 PROCEDURE — 1200000002 HC GENERAL ROOM WITH TELEMETRY DAILY

## 2024-11-28 PROCEDURE — 2500000005 HC RX 250 GENERAL PHARMACY W/O HCPCS

## 2024-11-28 PROCEDURE — 85610 PROTHROMBIN TIME: CPT | Performed by: STUDENT IN AN ORGANIZED HEALTH CARE EDUCATION/TRAINING PROGRAM

## 2024-11-28 PROCEDURE — 37799 UNLISTED PX VASCULAR SURGERY: CPT

## 2024-11-28 PROCEDURE — 83735 ASSAY OF MAGNESIUM: CPT | Performed by: STUDENT IN AN ORGANIZED HEALTH CARE EDUCATION/TRAINING PROGRAM

## 2024-11-28 PROCEDURE — 80069 RENAL FUNCTION PANEL: CPT

## 2024-11-28 RX ORDER — METHOCARBAMOL 100 MG/ML
1000 INJECTION, SOLUTION INTRAMUSCULAR; INTRAVENOUS EVERY 8 HOURS
Status: DISCONTINUED | OUTPATIENT
Start: 2024-11-28 | End: 2024-11-30

## 2024-11-28 RX ORDER — MAGNESIUM SULFATE HEPTAHYDRATE 40 MG/ML
2 INJECTION, SOLUTION INTRAVENOUS ONCE
Status: COMPLETED | OUTPATIENT
Start: 2024-11-28 | End: 2024-11-28

## 2024-11-28 RX ORDER — METHOCARBAMOL 100 MG/ML
1000 INJECTION, SOLUTION INTRAMUSCULAR; INTRAVENOUS ONCE
Status: COMPLETED | OUTPATIENT
Start: 2024-11-28 | End: 2024-11-28

## 2024-11-28 RX ORDER — FUROSEMIDE 10 MG/ML
20 INJECTION INTRAMUSCULAR; INTRAVENOUS ONCE
Status: COMPLETED | OUTPATIENT
Start: 2024-11-28 | End: 2024-11-28

## 2024-11-28 ASSESSMENT — COGNITIVE AND FUNCTIONAL STATUS - GENERAL
PERSONAL GROOMING: A LITTLE
TOILETING: A LITTLE
MOBILITY SCORE: 17
EATING MEALS: A LITTLE
TURNING FROM BACK TO SIDE WHILE IN FLAT BAD: A LITTLE
STANDING UP FROM CHAIR USING ARMS: A LITTLE
CLIMB 3 TO 5 STEPS WITH RAILING: A LOT
DRESSING REGULAR UPPER BODY CLOTHING: A LITTLE
MOVING TO AND FROM BED TO CHAIR: A LITTLE
DAILY ACTIVITIY SCORE: 18
HELP NEEDED FOR BATHING: A LITTLE
MOVING FROM LYING ON BACK TO SITTING ON SIDE OF FLAT BED WITH BEDRAILS: A LITTLE
WALKING IN HOSPITAL ROOM: A LITTLE
DRESSING REGULAR LOWER BODY CLOTHING: A LITTLE

## 2024-11-28 ASSESSMENT — PAIN SCALES - GENERAL
PAINLEVEL_OUTOF10: 0 - NO PAIN
PAINLEVEL_OUTOF10: 7
PAINLEVEL_OUTOF10: 0 - NO PAIN
PAINLEVEL_OUTOF10: 5 - MODERATE PAIN
PAINLEVEL_OUTOF10: 7
PAINLEVEL_OUTOF10: 1

## 2024-11-28 ASSESSMENT — ACTIVITIES OF DAILY LIVING (ADL)
PATIENT'S MEMORY ADEQUATE TO SAFELY COMPLETE DAILY ACTIVITIES?: YES
JUDGMENT_ADEQUATE_SAFELY_COMPLETE_DAILY_ACTIVITIES: YES
ADEQUATE_TO_COMPLETE_ADL: YES

## 2024-11-28 ASSESSMENT — PAIN - FUNCTIONAL ASSESSMENT
PAIN_FUNCTIONAL_ASSESSMENT: 0-10

## 2024-11-28 ASSESSMENT — PAIN DESCRIPTION - LOCATION
LOCATION: SHOULDER
LOCATION: BACK

## 2024-11-28 NOTE — PROGRESS NOTES
Assessment:  Gavin Moreau is a 54 y.o. with PMH/PSH of Mitral valve prolapse, asthma, GERD, rheumatic fever Presents to the CTICU from the OR s/p Robotic MVR via mini thoracotomy with Dr. Goldberg on 11/26. While attempting to transfer to the CTICU bed in OR patient R on T/VF arrested requiring CPR and shock x 2 with ROSC after 4 minutes.     Plan:  NEURO:  No hx. Acute post op pain. Patient is Aox3, CAM (-) following commands, and moves all extremities. Acute post op pain adequately controlled on current regimen. -->   - Serial neuro and pain assessments   - Scheduled Tylenol   - PRN oxycodone and Dilaudid for pain   - Lidocaine patches   - PT/OT Consult, OOB to chair as tolerated   - CAM ICU score qshift.    - Sleep/wake cycle hygiene      CV:  Hx of MV prolapse, Rheumatic fever, mild PHTN.  Now s/p robotic Mvr c/b R on T Vfib arrest. Pre LVEF 45%, moderated RV, post LVEF 50-55%, moderate RV. Pacing VVI @ 90, underlying rhythm junctional rate 60s with appropriate pressures so pacer turned to backup. Currently on epi 0.04mcg/kg/min. -->  - Continuous EKG and ABP monitoring  - Titrate vasopressors to maintain goal MAP > 65  - Wean intropes to goal ScVO2 > 60  - Maintain VVI @ 50   - Start ASA daily   - Start colchicine 0.6mg BID   - Holding home lisinopril 5 mg daily     PULM:  Hx of asthma, mild pHTN. Currently on 4L NC with appropriate oxygenation. RPL chest tube with no airleak and appropriate serosang output, to suction. CXR demonstrates acute cardiogenic pulmonary edema and atelectasis. -->  - Daily CXR   - Wean FiO2 maintaining SpO2 >92%.   - ABGs as needed  - Aggressive BPH, IS q1h and OOB to chair  - Continue breo ellipta   - Maintain chest tubes to wall suction, notify team for high output per unit protocol     GI:  Hx of GERD. Passed bedside swallow and started on regular diet. -->  - Discontinue PPI   - Continue regular diet   - Senna-Colace and miralax BID for bowel regimen post op     :  No hx,  baseline Cr 1.02. Arrival to ICU DENZEL risk score: low risk. Post-op creatinine stable. Rodriguez in place with appropriate UOP.  -->  - Continue rodriguez catheter for strict I/Os.    - Goal UOP 0.5ml/kg/hr  - RFP as clinically indicated.    - Replete electrolytes per CTICU protocol.     ENDO: No hx, A1c 5.2 11/26. Postoperative hyperglycemia well controlled with SSI. -->  - Maintain BG <180  - SSI q4 per CTICU protocol     HEME:  No active s/s of bleeding. Chest tube with appropriate serosanguinous drainage. Hemoglobin possibly concentrated at 14.2. -->    - Monitor drain output volume and characteristics  - CBC, coags, and fibrinogen as needed   - SCDs for DVT prophylaxis.    - Start ASA daily  - Start SQH q8h  - last type and screen: 11/26     ID:  MRSA negative. MSSA +.  Afebrile, no current indications of infection. -->  - Trend temp q4h  - Periop Ancef x48hrs  - Bactroban x 5 days     Skin:   Postoperative dressings CDI without s/s of infection.   - arrived to ICU from OR with preventative Mepilex dressings in place on sacrum and heels, to be changed per unit protocol and PRN  - every shift skin assessment per nursing and weekly ICU skin rounds  - moisture barrier to be applied with sincere care  - postoperative dressings removed/changed per protocol  - active skin problems addressed with nursing on daily rounds and wound care interventions in place as warranted     Proph:  SCDs  PPI  SQH

## 2024-11-28 NOTE — PROGRESS NOTES
"Gavin Moreau \"Luz" is a 54 y.o. male on day 2 of admission presenting with Mitral valve insufficiency.    Subjective          Objective     Physical Exam    Last Recorded Vitals  Blood pressure 116/76, pulse 67, temperature 35.1 °C (95.2 °F), temperature source Temporal, resp. rate 25, height 1.778 m (5' 10\"), weight 84.9 kg (187 lb 2.7 oz), SpO2 99%.  Intake/Output last 3 Shifts:  I/O last 3 completed shifts:  In: 2218.9 (26.1 mL/kg) [P.O.:960; I.V.:617.2 (7.3 mL/kg); IV Piggyback:641.7]  Out: 1735 (20.4 mL/kg) [Urine:1465 (0.5 mL/kg/hr); Chest Tube:270]  Weight: 84.9 kg     Relevant Results               This patient has a central line   Reason for the central line remaining today?                  Assessment/Plan   Assessment & Plan  Mitral valve insufficiency    Mitral regurgitation, myxomatous    Mitral valve insufficiency, unspecified etiology    Cardiac arrest      Assessment:  Gvain Moreau is a 54 y.o. with PMH/PSH of Mitral valve prolapse, asthma, GERD, rheumatic fever Presents to the CTICU from the OR s/p Robotic MVR via mini thoracotomy with Dr. Goldberg on 11/26. While attempting to transfer to the CTICU bed in OR patient R on T/VF arrested requiring CPR and shock x 2 with ROSC after 4 minutes.     Plan:  NEURO:  No hx.  Acute post op pain.  Patient is currently intubated and sedated on propofol infusion.  No obvious focal deficits, GOLDEN x 4 spontaneously.  -->   - Serial neuro and pain assessments   - Reversed, switch propofol to precedex, then SAT per unit protocol but daily at minimum     - Scheduled Tylenol   - PRN oxycodone and Dilaudid for pain   - PT/OT Consult, OOB to chair as tolerated, chair position if not tolerated   - CAM ICU score qshift.    - Sleep/wake cycle hygiene      CV:  Hx of MV prolapse, Rheumatic fever, mild PHTN .  Now s/p robotic MVR. Pre & post cardiac function:pre LVEF 45%, moderated RV, post LVEF 50-55%, moderate RV, ECHO checked post arrest and showed some reduction in " LVEF with some dilation, moderate RV .  V wires set to VVI @ 50,  Junctional on monitor, HR 50's.  Arrived to ICU on epi at  0.08 and norepi 0.08. CVP 8, ScVO2 75. -->  - Continuous EKG and ABP monitoring  - Titrate vasopressors to maintain goal MAP > 65  - Wean intropes to goal ScVO2 > 60  - Received 1 L LR for elevated lactate  - Epi decreased to 0.06 2/2 up trending lactate  - Maintain pacer VVI 50 backup  - Address if needs ASA tomorrow   - Holding home lisinopril 5 mg daily     PULM:  Hx of asthma, mild pHTN.  Currently intubated on ventilator with appropriate oxygenation and ventilation.  Chest tube with no airleak and appropriate serosang output, to suction. Post op CXR shows right sided atelectasis vs edema likely from contusion from CPR. Has a double lumen ETT. -->  - F/u post op CXR and daily CXR while in ICU  - Begin CPAP trials and extubate when criteria met.    - Wean FiO2 maintaining SpO2 >92%.   - ABGs as needed  - Aggressive BPH, IS q1h and OOB to chair when extubated  - Maintain chest tubes to wall suction, notify team for high output per unit protocol  - Change over to single lumen ETT when appropriate  - Holding home breo-ellipta until extubated     GI:  Hx of GERD. NPO with OGT in place, minimal bilious output. -->  - Start PPI if remains intubated, DC once extubated  - NPO, swallow eval 4 hours post extubation.    - Senna-Colace and miralax BID for bowel regimen post op     :  No hx, baseline Cr 1.02.  Arrival to ICU DENZEL risk score:  low risk. Rodriguez in place with appropriate UOP.  -->  - Continue rodriguez catheter for strict I/Os.    - Goal UOP 0.5ml/kg/hr  - RFP as clinically indicated.    - Replete electrolytes per CTICU protocol.     ENDO: No hx, A1c 5.2 11/26.  Postoperative hyperglycemia well controlled with SSI. -->  - Maintain BG <180  - SSI q4 per CTICU protocol     HEME:  No hx.  Acute blood loss anemia without active s/s of bleeding. Chest tube with appropriate serosanguinous drainage.  Hemoglobin possibly concentrated at 15. -->    - Monitor drain output volume and characteristics  - CBC, coags, and fibrinogen post op and as clinically indicated  - SCDs for DVT prophylaxis.    - Hold SQH POD0 d/t increased risk of bleed.   - last type and screen: 11/26     ID:  MRSA negative. MSSA +.  Afebrile, no current indications of infection -->  - Trend temp q4h  - Periop Ancef x48hrs  - Bactroban x 5 days     Skin:   Postoperative dressings CDI without s/s of infection.   - arrived to ICU from OR with preventative Mepilex dressings in place on sacrum and heels, to be changed per unit protocol and PRN  - every shift skin assessment per nursing and weekly ICU skin rounds  - moisture barrier to be applied with sincere care  - postoperative dressings removed/changed per protocol  - active skin problems addressed with nursing on daily rounds and wound care interventions in place as warranted     Proph:  SCDs  PPI     G:  Line  Right IJ MAC w mini MAC placed 11/26  Left brachial veena placed 11/26  Brown placed 11/26  PIVs    Due to the high probability of life threatening clinical decompensation, the patient required critical care time evaluating and managing this patient.  Critical care time included obtaining a history, examining the patient, ordering and reviewing studies, discussing, developing, and implementing a management plan, evaluating the patient's response to treatment, and discussion with other care team providers. I saw and evaluated the patient myself. I reviewed the provider's documentation and discussed the patient with the provider. Critical care time was performed exclusive of billable procedures.    Critical Care Time: 43 minutes    Benigno Christensen MD  Critical Care     Ivan Christensen MD

## 2024-11-28 NOTE — PROGRESS NOTES
"Gavin Moreau \"Harinder\" is a 54 y.o. male on day 2 of admission presenting with Mitral valve insufficiency.    Subjective   Pt was weaned off Epi overnight. Remains HDS off pressors, HR jx 50-70 w/ MAPs 90. NAEON.        Objective     Physical Exam    Last Recorded Vitals  Blood pressure 128/77, pulse 66, temperature 36 °C (96.8 °F), resp. rate 25, height 1.778 m (5' 10\"), weight 84.9 kg (187 lb 2.7 oz), SpO2 99%.  Intake/Output last 3 Shifts:  I/O last 3 completed shifts:  In: 9963.9 (117.9 mL/kg) [P.O.:960; I.V.:1119.2 (13.2 mL/kg); Blood:750; IV Piggyback:4401.7]  Out: 6065 (71.8 mL/kg) [Urine:5005 (1.6 mL/kg/hr); Other:150; Blood:500; Chest Tube:410]  Weight: 84.5 kg     Relevant Results  Scheduled medications  acetaminophen, 650 mg, oral, q6h  aspirin, 81 mg, oral, Daily  ceFAZolin, 2 g, intravenous, q8h  colchicine, 0.6 mg, oral, q12h  fluticasone furoate-vilanteroL, 1 puff, inhalation, Daily  heparin, 5,000 Units, subcutaneous, q8h  insulin lispro, 0-15 Units, subcutaneous, Before meals & nightly  lidocaine, 1 patch, transdermal, q24h  mupirocin, , Topical, BID  oxygen, , inhalation, Continuous - Inhalation  polyethylene glycol, 17 g, oral, Daily  sennosides-docusate sodium, 2 tablet, oral, BID      Continuous medications  EPINEPHrine, 0.03 mcg/kg/min (Adjusted), Last Rate: Stopped (11/28/24 0120)      PRN medications  PRN medications: alteplase, calcium gluconate, calcium gluconate, dextrose **OR** glucagon, HYDROmorphone, magnesium sulfate, magnesium sulfate, naloxone, ondansetron, oxyCODONE, oxyCODONE, potassium chloride, potassium chloride     Results for orders placed or performed during the hospital encounter of 11/26/24 (from the past 24 hours)   POCT GLUCOSE   Result Value Ref Range    POCT Glucose 108 (H) 74 - 99 mg/dL   Blood Gas Arterial Full Panel   Result Value Ref Range    POCT pH, Arterial 7.36 (L) 7.38 - 7.42 pH    POCT pCO2, Arterial 36 (L) 38 - 42 mm Hg    POCT pO2, Arterial 84 (L) 85 - 95 mm " Hg    POCT SO2, Arterial 97 94 - 100 %    POCT Oxy Hemoglobin, Arterial 95.4 94.0 - 98.0 %    POCT Hematocrit Calculated, Arterial 41.0 41.0 - 52.0 %    POCT Sodium, Arterial 137 136 - 145 mmol/L    POCT Potassium, Arterial 4.6 3.5 - 5.3 mmol/L    POCT Chloride, Arterial 110 (H) 98 - 107 mmol/L    POCT Ionized Calcium, Arterial 1.16 1.10 - 1.33 mmol/L    POCT Glucose, Arterial 132 (H) 74 - 99 mg/dL    POCT Lactate, Arterial 1.5 0.4 - 2.0 mmol/L    POCT Base Excess, Arterial -4.5 (L) -2.0 - 3.0 mmol/L    POCT HCO3 Calculated, Arterial 20.3 (L) 22.0 - 26.0 mmol/L    POCT Hemoglobin, Arterial 13.5 13.5 - 17.5 g/dL    POCT Anion Gap, Arterial 11 10 - 25 mmo/L    Patient Temperature 37.0 degrees Celsius    FiO2 25 %   POCT GLUCOSE   Result Value Ref Range    POCT Glucose 122 (H) 74 - 99 mg/dL   BLOOD GAS VENOUS FULL PANEL   Result Value Ref Range    POCT pH, Venous 7.34 7.33 - 7.43 pH    POCT pCO2, Venous 45 41 - 51 mm Hg    POCT pO2, Venous 39 35 - 45 mm Hg    POCT SO2, Venous 67 45 - 75 %    POCT Oxy Hemoglobin, Venous 65.0 45.0 - 75.0 %    POCT Hematocrit Calculated, Venous 41.0 41.0 - 52.0 %    POCT Sodium, Venous 134 (L) 136 - 145 mmol/L    POCT Potassium, Venous 5.0 3.5 - 5.3 mmol/L    POCT Chloride, Venous 103 98 - 107 mmol/L    POCT Ionized Calicum, Venous 1.16 1.10 - 1.33 mmol/L    POCT Glucose, Venous 132 (H) 74 - 99 mg/dL    POCT Lactate, Venous 1.5 0.4 - 2.0 mmol/L    POCT Base Excess, Venous -1.7 -2.0 - 3.0 mmol/L    POCT HCO3 Calculated, Venous 24.3 22.0 - 26.0 mmol/L    POCT Hemoglobin, Venous 13.6 13.5 - 17.5 g/dL    POCT Anion Gap, Venous 12.0 10.0 - 25.0 mmol/L    Patient Temperature 37.0 degrees Celsius    FiO2 35 %   POCT GLUCOSE   Result Value Ref Range    POCT Glucose 163 (H) 74 - 99 mg/dL   Calcium, Ionized   Result Value Ref Range    POCT Calcium, Ionized 1.10 1.1 - 1.33 mmol/L   CBC   Result Value Ref Range    WBC 21.8 (H) 4.4 - 11.3 x10*3/uL    nRBC 0.0 0.0 - 0.0 /100 WBCs    RBC 4.35 (L) 4.50 -  5.90 x10*6/uL    Hemoglobin 13.2 (L) 13.5 - 17.5 g/dL    Hematocrit 37.7 (L) 41.0 - 52.0 %    MCV 87 80 - 100 fL    MCH 30.3 26.0 - 34.0 pg    MCHC 35.0 32.0 - 36.0 g/dL    RDW 12.8 11.5 - 14.5 %    Platelets 155 150 - 450 x10*3/uL   Magnesium   Result Value Ref Range    Magnesium 2.90 (H) 1.60 - 2.40 mg/dL   Renal Function Panel   Result Value Ref Range    Glucose 177 (H) 74 - 99 mg/dL    Sodium 134 (L) 136 - 145 mmol/L    Potassium 5.0 3.5 - 5.3 mmol/L    Chloride 104 98 - 107 mmol/L    Bicarbonate 21 21 - 32 mmol/L    Anion Gap 14 10 - 20 mmol/L    Urea Nitrogen 20 6 - 23 mg/dL    Creatinine 1.19 0.50 - 1.30 mg/dL    eGFR 72 >60 mL/min/1.73m*2    Calcium 8.1 (L) 8.6 - 10.6 mg/dL    Phosphorus 4.9 2.5 - 4.9 mg/dL    Albumin 3.3 (L) 3.4 - 5.0 g/dL   Blood Gas Arterial Full Panel   Result Value Ref Range    POCT pH, Arterial 7.37 (L) 7.38 - 7.42 pH    POCT pCO2, Arterial 36 (L) 38 - 42 mm Hg    POCT pO2, Arterial 111 (H) 85 - 95 mm Hg    POCT SO2, Arterial 100 94 - 100 %    POCT Oxy Hemoglobin, Arterial 96.9 94.0 - 98.0 %    POCT Hematocrit Calculated, Arterial 41.0 41.0 - 52.0 %    POCT Sodium, Arterial 130 (L) 136 - 145 mmol/L    POCT Potassium, Arterial 5.2 3.5 - 5.3 mmol/L    POCT Chloride, Arterial 102 98 - 107 mmol/L    POCT Ionized Calcium, Arterial 1.13 1.10 - 1.33 mmol/L    POCT Glucose, Arterial 188 (H) 74 - 99 mg/dL    POCT Lactate, Arterial 2.2 (H) 0.4 - 2.0 mmol/L    POCT Base Excess, Arterial -3.9 (L) -2.0 - 3.0 mmol/L    POCT HCO3 Calculated, Arterial 20.8 (L) 22.0 - 26.0 mmol/L    POCT Hemoglobin, Arterial 13.6 13.5 - 17.5 g/dL    POCT Anion Gap, Arterial 12 10 - 25 mmo/L    Patient Temperature 37.0 degrees Celsius    FiO2 30 %   Coagulation Screen   Result Value Ref Range    Protime 13.8 (H) 9.8 - 12.8 seconds    INR 1.2 (H) 0.9 - 1.1    aPTT 30 27 - 38 seconds   Blood Gas Lactic Acid, Venous   Result Value Ref Range    POCT Lactate, Venous 2.7 (H) 0.4 - 2.0 mmol/L   BLOOD GAS VENOUS FULL PANEL    Result Value Ref Range    POCT pH, Venous 7.32 (L) 7.33 - 7.43 pH    POCT pCO2, Venous 45 41 - 51 mm Hg    POCT pO2, Venous 40 35 - 45 mm Hg    POCT SO2, Venous 68 45 - 75 %    POCT Oxy Hemoglobin, Venous 66.6 45.0 - 75.0 %    POCT Hematocrit Calculated, Venous 40.0 (L) 41.0 - 52.0 %    POCT Sodium, Venous 129 (L) 136 - 145 mmol/L    POCT Potassium, Venous 4.7 3.5 - 5.3 mmol/L    POCT Chloride, Venous 101 98 - 107 mmol/L    POCT Ionized Calicum, Venous 1.15 1.10 - 1.33 mmol/L    POCT Glucose, Venous 140 (H) 74 - 99 mg/dL    POCT Lactate, Venous 2.7 (H) 0.4 - 2.0 mmol/L    POCT Base Excess, Venous -3.0 (L) -2.0 - 3.0 mmol/L    POCT HCO3 Calculated, Venous 23.2 22.0 - 26.0 mmol/L    POCT Hemoglobin, Venous 13.2 (L) 13.5 - 17.5 g/dL    POCT Anion Gap, Venous 10.0 10.0 - 25.0 mmol/L    Patient Temperature 37.0 degrees Celsius    FiO2 21 %   POCT GLUCOSE   Result Value Ref Range    POCT Glucose 106 (H) 74 - 99 mg/dL   BLOOD GAS VENOUS FULL PANEL   Result Value Ref Range    POCT pH, Venous 7.40 7.33 - 7.43 pH    POCT pCO2, Venous 44 41 - 51 mm Hg    POCT pO2, Venous 38 35 - 45 mm Hg    POCT SO2, Venous 71 45 - 75 %    POCT Oxy Hemoglobin, Venous 69.8 45.0 - 75.0 %    POCT Hematocrit Calculated, Venous 37.0 (L) 41.0 - 52.0 %    POCT Sodium, Venous 132 (L) 136 - 145 mmol/L    POCT Potassium, Venous 4.5 3.5 - 5.3 mmol/L    POCT Chloride, Venous 100 98 - 107 mmol/L    POCT Ionized Calicum, Venous 1.16 1.10 - 1.33 mmol/L    POCT Glucose, Venous 134 (H) 74 - 99 mg/dL    POCT Lactate, Venous 0.7 0.4 - 2.0 mmol/L    POCT Base Excess, Venous 2.1 -2.0 - 3.0 mmol/L    POCT HCO3 Calculated, Venous 27.3 (H) 22.0 - 26.0 mmol/L    POCT Hemoglobin, Venous 12.2 (L) 13.5 - 17.5 g/dL    POCT Anion Gap, Venous 9.0 (L) 10.0 - 25.0 mmol/L    Patient Temperature 37.0 degrees Celsius    FiO2 36 %   BLOOD GAS VENOUS FULL PANEL   Result Value Ref Range    POCT pH, Venous 7.38 7.33 - 7.43 pH    POCT pCO2, Venous 48 41 - 51 mm Hg    POCT pO2,  Venous 32 (L) 35 - 45 mm Hg    POCT SO2, Venous 61 45 - 75 %    POCT Oxy Hemoglobin, Venous 60.1 45.0 - 75.0 %    POCT Hematocrit Calculated, Venous 36.0 (L) 41.0 - 52.0 %    POCT Sodium, Venous 131 (L) 136 - 145 mmol/L    POCT Potassium, Venous 4.8 3.5 - 5.3 mmol/L    POCT Chloride, Venous 100 98 - 107 mmol/L    POCT Ionized Calicum, Venous 1.14 1.10 - 1.33 mmol/L    POCT Glucose, Venous 123 (H) 74 - 99 mg/dL    POCT Lactate, Venous 0.9 0.4 - 2.0 mmol/L    POCT Base Excess, Venous 2.6 -2.0 - 3.0 mmol/L    POCT HCO3 Calculated, Venous 28.4 (H) 22.0 - 26.0 mmol/L    POCT Hemoglobin, Venous 12.1 (L) 13.5 - 17.5 g/dL    POCT Anion Gap, Venous 7.0 (L) 10.0 - 25.0 mmol/L    Patient Temperature 37.0 degrees Celsius    FiO2 36 %   POCT GLUCOSE   Result Value Ref Range    POCT Glucose 116 (H) 74 - 99 mg/dL      XR chest 1 view   Final Result   1.  Right pleural effusion.   2. Mild bilateral and perihilar opacities, likely favoring   edema/atelectasis.   3. Medical devices as explained above.                  MACRO:   None        Signed by: Mohinder Keys 11/27/2024 4:11 PM   Dictation workstation:   [id number]      XR abdomen 1 view   Final Result   1. Nonobstructive bowel gas pattern.   2. Enteric tube distal tip and side port overlying the expected   location of the stomach.        I personally reviewed the images/study and I agree with the findings   as stated by Dr. Jhonatan Philip. This study was interpreted at   [facility], [place], [place].        MACRO:   none        Signed by: Mohinder Keys 11/26/2024 5:14 PM   Dictation workstation:   [id number]      XR chest 1 view   Final Result   1. Mild bibasilar and perihilar opacities, likely favoring   edema/atelectasis.   2. Medical devices as explained above.        I personally reviewed the images/study and I agree with the findings   as stated by Resident Catrachita Looney. This study was interpreted at   [facility]  [place], [place].        MACRO:   None        Signed by: Mohinder Keys 11/26/2024 5:06 PM   Dictation workstation:   [id number]      Anesthesia Intraoperative Transesophageal Echocardiogram   Final Result         Physical Exam:   Physical Exam  Constitutional:       General: He is not in acute distress.     Appearance: Normal appearance. He is not ill-appearing.   HENT:      Head: Normocephalic.   Eyes:      Extraocular Movements: Extraocular movements intact.      Conjunctiva/sclera: Conjunctivae normal.   Cardiovascular:      Rate and Rhythm: Regular rhythm.      Pulses: Normal pulses.      Comments: Pacing VVI @ 90   Underlying rhythm: Accelerated junctional rate 60s   Appropriate MAPs in the 70s      Pulmonary:      Effort: Pulmonary effort is normal. No respiratory distress.      Comments: On 4L NC with appropriate oxygenation   RPL chest tube with serosanguinous output. No air leak noted.   Abdominal:      General: Abdomen is flat. There is no distension.      Palpations: Abdomen is soft.      Tenderness: There is no abdominal tenderness.   Genitourinary:     Comments: Brown in place with clear yellow urine   Musculoskeletal:         General: No swelling. Normal range of motion.      Right lower leg: No edema.      Left lower leg: No edema.   Skin:     General: Skin is warm and dry.      Capillary Refill: Capillary refill takes less than 2 seconds.      Comments: All wounds clean and dressed appropriately    Neurological:      General: No focal deficit present.      Mental Status: He is alert and oriented to person, place, and time.      Motor: No weakness.   Psychiatric:         Mood and Affect: Mood normal.         Behavior: Behavior normal.         Daily Risk Screen  Intubated: No  Central line: Yes, for parenteral medication administration   Brown: Yes, for accurate I/O monitoring in critically ill     Assessment/Plan   Assessment & Plan  Mitral valve insufficiency    Mitral regurgitation,  myxomatous    Mitral valve insufficiency, unspecified etiology    Cardiac arrest    Assessment:  Gavin Moreau is a 54 y.o. with PMH/PSH of Mitral valve prolapse, asthma, GERD, rheumatic fever Presents to the CTICU from the OR s/p Robotic MVR via mini thoracotomy with Dr. Goldberg on 11/26. While attempting to transfer to the CTICU bed in OR patient R on T/VF arrested requiring CPR and shock x 2 with ROSC after 4 minutes.     Plan:  NEURO:  No hx. Acute post op pain. Patient is Aox3, CAM (-) following commands, and moves all extremities. Acute post op pain adequately controlled on current regimen.  - Serial neuro and pain assessments   - Scheduled Tylenol   - PRN oxycodone and Dilaudid for pain   - Lidocaine patches   - PT/OT Consult, OOB to chair as tolerated   - CAM ICU score qshift.    - Sleep/wake cycle hygiene      CV:  Hx of MV prolapse, Rheumatic fever, mild PHTN.  Now s/p robotic Mvr c/b R on T Vfib arrest. Pre LVEF 45%, moderated RV, post LVEF 50-55%, moderate RV. Pacing VVI @ 90, underlying rhythm junctional rate 60s with appropriate pressures so pacer turned to backup. Pt weaned off Epi overnight. Remains off pressors w/ MAPS 80-90s. Remains V-paced, VVI 90.  - Continuous EKG and ABP monitoring  - Titrate vasopressors to maintain goal MAP > 65  - Wean intropes to goal ScVO2 > 60  - Maintain VVI @ 50   - Start ASA daily   - Start colchicine 0.6mg BID   - Holding home lisinopril 5 mg daily     PULM:  Hx of asthma, mild pHTN. Currently on 4L NC with appropriate oxygenation. RPL chest tube with no airleak and appropriate serosang output, to suction. CXR demonstrates acute cardiogenic pulmonary edema and atelectasis. Min CT output 100/24hrs.   - Daily CXR   - Wean FiO2 maintaining SpO2 >92%.   - ABGs as needed  - Aggressive BPH, IS q1h and OOB to chair  - Continue breo ellipta   - Maintain chest tubes to wall suction, consider removal if output remains low when OOBTC     GI:  Hx of GERD. Passed bedside  swallow and started on regular diet.   - Discontinue PPI   - Continue regular diet   - Senna-Colace and miralax BID for bowel regimen post op     :  No hx, baseline Cr 1.02. Arrival to ICU DENZEL risk score: low risk. Post-op creatinine stable. Rodriguez in place with appropriate UOP. /24hrs and net -450.   - Continue rodriguez catheter for strict I/Os.    - Goal UOP 0.5ml/kg/hr  - RFP as clinically indicated.    - Replete electrolytes per CTICU protocol.     ENDO: No hx, A1c 5.2 11/26. Postoperative hyperglycemia well controlled with SSI.  - Maintain BG <180  - SSI q4 per CTICU protocol     HEME:  No active s/s of bleeding. Chest tube with appropriate serosanguinous drainage. Hemoglobin 11.8  - Monitor drain output volume and characteristics  - CBC, coags, and fibrinogen as needed   - SCDs for DVT prophylaxis.    - Start ASA daily  - Start SQH q8h  - last type and screen: 11/26     ID:  MRSA negative. MSSA +.  Afebrile, no current indications of infection.   - Trend temp q4h  - Periop Ancef x48hrs  - Bactroban x 5 days     Skin:   Postoperative dressings CDI without s/s of infection.   - arrived to ICU from OR with preventative Mepilex dressings in place on sacrum and heels, to be changed per unit protocol and PRN  - every shift skin assessment per nursing and weekly ICU skin rounds  - moisture barrier to be applied with sincere care  - postoperative dressings removed/changed per protocol  - active skin problems addressed with nursing on daily rounds and wound care interventions in place as warranted     Proph:  SCDs  PPI  SQH     G:  Line  Right IJ MAC w mini MAC placed 11/26  Left brachial veena placed 11/26  Rodriguez placed 11/26  PIVs     Daily Risk Screen:  Central line? No, can remove today  Rodriguez? Yes, keep for critical need for accurate I&O's      F: Family: will update family at bedside as able     A,B,C,D,E,F,G: reviewed     Dispo: Stable for floor transfer   Discussed with ICU Attending, Dr. Donnelly.     Faith  DAVID Gamez MD

## 2024-11-28 NOTE — SIGNIFICANT EVENT
Rapid Response Nurse Note: RADAR alert: 6    Pager time:   Arrival time: 170  Event end time:   Location: [facility] 3091  [x] Triage by phone or secure messaging    Rapid response initiated by:  [] Rapid response RN [] Family [] Nursing Supervisor [] Physician   [x] RADAR auto page [] Sepsis auto-page [] RN [] RT   [] NP/PA [] Other:     Primary reason for call:   [] BAT [] New CPAP/BiPAP [] Bleeding [] Change in mental status   [] Chest pain [] Code blue [] FiO2 >/= 50% [] HR </= 40 bpm   [] HR >/= 130 bpm [] Hyperglycemia [] Hypoglycemia [] RADAR    [] RR </= 8 bpm [] RR >/= 30 bpm [] SBP </= 90 mmHg [] SpO2 < 90%   [] Seizure [] Sepsis [] Shortness of breath  [] Staff concern: see comments     Initial VS and/or RADAR VS: T 36.2 °C; HR 65; RR 19; BP 93/60; SPO2 93%.    Interventions:  [x] None [] ABG/VBG [] Assist w/ICU transfer [] BAT paged    [] Bag mask [] Blood [] Cardioversion [] Code Blue   [] Code blue for intubation [] Code status changed [] Chest x-ray [] EKG   [] IV fluid/bolus [] KUB x-ray [] Labs/cultures [] Medication   [] Nebulizer treatment [] NIPPV (CPAP/BiPAP) [] Oxygen [] Oral airway   [] Peripheral IV [] Palliative care consult [] CT/MRI [] Sepsis protocol    [] Suctioned [] Other:     Outcome:  [] Coded and  [] Code blue for intubation [] Coded and transferred to ICU []  on division   [x] Remained on division (no change) [] Remained on division + additional monitoring [] Remained in ED [] Transferred to ED   [] Transferred to ICU [] Transferred to inpatient status [] Transferred for interventions (procedure) [] Transferred to ICU stepdown    [] Transferred to surgery [] Transferred to telemetry [] Sepsis protocol [] STEMI protocol   [] Stroke protocol [x] Bedside nurse instructed to page rapid response for any concerns or acute change in condition/VS     Additional Comments: RADAR alert received by Rapid Response Team.  RN notified via EPIC secure messaging system.  VS reviewed.   Per bedside RN Johanna, patient was a recent transfer from CICU.  Per RN, no acute changes in patient condition at present time, but patient remains in junctional rhythm which is the patient's new baseline since V. Fib arrest on 11/26.  RN encouraged to page Rapid Response if further concerns arise in patient status.

## 2024-11-29 ENCOUNTER — APPOINTMENT (OUTPATIENT)
Dept: RADIOLOGY | Facility: HOSPITAL | Age: 54
DRG: 219 | End: 2024-11-29
Payer: COMMERCIAL

## 2024-11-29 ENCOUNTER — APPOINTMENT (OUTPATIENT)
Dept: CARDIOLOGY | Facility: HOSPITAL | Age: 54
DRG: 219 | End: 2024-11-29
Payer: COMMERCIAL

## 2024-11-29 ENCOUNTER — APPOINTMENT (OUTPATIENT)
Dept: CARDIOLOGY | Facility: HOSPITAL | Age: 54
End: 2024-11-29
Payer: COMMERCIAL

## 2024-11-29 LAB
ALBUMIN SERPL BCP-MCNC: 3.4 G/DL (ref 3.4–5)
ANION GAP SERPL CALC-SCNC: 10 MMOL/L (ref 10–20)
AORTIC VALVE PEAK VELOCITY: 1.01 M/S
AV PEAK GRADIENT: 4 MMHG
AVA (PEAK VEL): 3.17 CM2
BLOOD EXPIRATION DATE: NORMAL
BUN SERPL-MCNC: 23 MG/DL (ref 6–23)
CALCIUM SERPL-MCNC: 8.4 MG/DL (ref 8.6–10.6)
CHLORIDE SERPL-SCNC: 97 MMOL/L (ref 98–107)
CO2 SERPL-SCNC: 29 MMOL/L (ref 21–32)
CREAT SERPL-MCNC: 1.02 MG/DL (ref 0.5–1.3)
DISPENSE STATUS: NORMAL
EGFRCR SERPLBLD CKD-EPI 2021: 86 ML/MIN/1.73M*2
EJECTION FRACTION APICAL 4 CHAMBER: 52.9
EJECTION FRACTION: 52 %
ERYTHROCYTE [DISTWIDTH] IN BLOOD BY AUTOMATED COUNT: 12.9 % (ref 11.5–14.5)
GLUCOSE BLD MANUAL STRIP-MCNC: 107 MG/DL (ref 74–99)
GLUCOSE BLD MANUAL STRIP-MCNC: 88 MG/DL (ref 74–99)
GLUCOSE BLD MANUAL STRIP-MCNC: 96 MG/DL (ref 74–99)
GLUCOSE SERPL-MCNC: 99 MG/DL (ref 74–99)
HCT VFR BLD AUTO: 34.3 % (ref 41–52)
HGB BLD-MCNC: 11.3 G/DL (ref 13.5–17.5)
INR PPP: 1.2 (ref 0.9–1.1)
LEFT VENTRICLE INTERNAL DIMENSION DIASTOLE: 5 CM (ref 3.5–6)
LEFT VENTRICULAR OUTFLOW TRACT DIAMETER: 2 CM
MAGNESIUM SERPL-MCNC: 2.54 MG/DL (ref 1.6–2.4)
MCH RBC QN AUTO: 29.8 PG (ref 26–34)
MCHC RBC AUTO-ENTMCNC: 32.9 G/DL (ref 32–36)
MCV RBC AUTO: 91 FL (ref 80–100)
MITRAL VALVE E/A RATIO: 3.63
NRBC BLD-RTO: 0 /100 WBCS (ref 0–0)
PHOSPHATE SERPL-MCNC: 2.8 MG/DL (ref 2.5–4.9)
PLATELET # BLD AUTO: 98 X10*3/UL (ref 150–450)
POTASSIUM SERPL-SCNC: 4.7 MMOL/L (ref 3.5–5.3)
PRODUCT BLOOD TYPE: 5100
PRODUCT CODE: NORMAL
PROTHROMBIN TIME: 13.1 SECONDS (ref 9.8–12.8)
RBC # BLD AUTO: 3.79 X10*6/UL (ref 4.5–5.9)
RIGHT VENTRICLE FREE WALL PEAK S': 10.3 CM/S
RIGHT VENTRICLE PEAK SYSTOLIC PRESSURE: 50.5 MMHG
SODIUM SERPL-SCNC: 131 MMOL/L (ref 136–145)
UNIT ABO: NORMAL
UNIT NUMBER: NORMAL
UNIT RH: NORMAL
UNIT VOLUME: 305
UNIT VOLUME: 350
WBC # BLD AUTO: 14.6 X10*3/UL (ref 4.4–11.3)
XM INTEP: NORMAL

## 2024-11-29 PROCEDURE — 2500000004 HC RX 250 GENERAL PHARMACY W/ HCPCS (ALT 636 FOR OP/ED)

## 2024-11-29 PROCEDURE — 80069 RENAL FUNCTION PANEL: CPT

## 2024-11-29 PROCEDURE — 85027 COMPLETE CBC AUTOMATED: CPT

## 2024-11-29 PROCEDURE — 82947 ASSAY GLUCOSE BLOOD QUANT: CPT

## 2024-11-29 PROCEDURE — 2500000001 HC RX 250 WO HCPCS SELF ADMINISTERED DRUGS (ALT 637 FOR MEDICARE OP)

## 2024-11-29 PROCEDURE — 93010 ELECTROCARDIOGRAM REPORT: CPT | Performed by: INTERNAL MEDICINE

## 2024-11-29 PROCEDURE — 93306 TTE W/DOPPLER COMPLETE: CPT | Performed by: INTERNAL MEDICINE

## 2024-11-29 PROCEDURE — 85610 PROTHROMBIN TIME: CPT

## 2024-11-29 PROCEDURE — 93005 ELECTROCARDIOGRAM TRACING: CPT

## 2024-11-29 PROCEDURE — 71045 X-RAY EXAM CHEST 1 VIEW: CPT | Performed by: RADIOLOGY

## 2024-11-29 PROCEDURE — 2500000005 HC RX 250 GENERAL PHARMACY W/O HCPCS

## 2024-11-29 PROCEDURE — 71045 X-RAY EXAM CHEST 1 VIEW: CPT

## 2024-11-29 PROCEDURE — C8929 TTE W OR WO FOL WCON,DOPPLER: HCPCS

## 2024-11-29 PROCEDURE — 94640 AIRWAY INHALATION TREATMENT: CPT

## 2024-11-29 PROCEDURE — 99232 SBSQ HOSP IP/OBS MODERATE 35: CPT

## 2024-11-29 PROCEDURE — 36415 COLL VENOUS BLD VENIPUNCTURE: CPT

## 2024-11-29 PROCEDURE — 1200000002 HC GENERAL ROOM WITH TELEMETRY DAILY

## 2024-11-29 PROCEDURE — 83735 ASSAY OF MAGNESIUM: CPT

## 2024-11-29 RX ORDER — KETOROLAC TROMETHAMINE 15 MG/ML
15 INJECTION, SOLUTION INTRAMUSCULAR; INTRAVENOUS EVERY 6 HOURS PRN
Status: DISPENSED | OUTPATIENT
Start: 2024-11-29 | End: 2024-12-01

## 2024-11-29 RX ORDER — MULTIVIT-MIN/IRON FUM/FOLIC AC 7.5 MG-4
1 TABLET ORAL DAILY
Status: DISCONTINUED | OUTPATIENT
Start: 2024-11-30 | End: 2024-12-08

## 2024-11-29 RX ORDER — FUROSEMIDE 10 MG/ML
40 INJECTION INTRAMUSCULAR; INTRAVENOUS EVERY 12 HOURS
Status: DISCONTINUED | OUTPATIENT
Start: 2024-11-29 | End: 2024-11-30

## 2024-11-29 ASSESSMENT — COGNITIVE AND FUNCTIONAL STATUS - GENERAL
STANDING UP FROM CHAIR USING ARMS: A LITTLE
MOVING FROM LYING ON BACK TO SITTING ON SIDE OF FLAT BED WITH BEDRAILS: A LITTLE
TURNING FROM BACK TO SIDE WHILE IN FLAT BAD: A LITTLE
CLIMB 3 TO 5 STEPS WITH RAILING: A LITTLE
DAILY ACTIVITIY SCORE: 19
MOBILITY SCORE: 18
HELP NEEDED FOR BATHING: A LITTLE
DRESSING REGULAR UPPER BODY CLOTHING: A LITTLE
TOILETING: A LITTLE
MOVING TO AND FROM BED TO CHAIR: A LITTLE
DRESSING REGULAR LOWER BODY CLOTHING: A LITTLE
WALKING IN HOSPITAL ROOM: A LITTLE
PERSONAL GROOMING: A LITTLE

## 2024-11-29 ASSESSMENT — PAIN SCALES - GENERAL
PAINLEVEL_OUTOF10: 5 - MODERATE PAIN
PAINLEVEL_OUTOF10: 6
PAINLEVEL_OUTOF10: 6

## 2024-11-29 ASSESSMENT — PAIN DESCRIPTION - LOCATION: LOCATION: CHEST

## 2024-11-29 ASSESSMENT — PAIN DESCRIPTION - ORIENTATION: ORIENTATION: RIGHT

## 2024-11-29 ASSESSMENT — PAIN - FUNCTIONAL ASSESSMENT: PAIN_FUNCTIONAL_ASSESSMENT: 0-10

## 2024-11-29 NOTE — NURSING NOTE
Patient unable to pee from the time the rodriguez catheter came out. Patient tried 3 times to pee but no success. He stood up on the edge of the bed and stated that he felt lightheaded. Patient's blood pressure has been in the mid 100's systolic. RN encourage the patient to drink more water, patient able to drink more. Bladder scanner showed 667 mls. Dr Narcisa Ramirez of CT Surgery team notified and ordered to straight cath the patient. Patient aware and agreed. RN was able to straight cath the patient and got 700 mls. Patient tolerated it well. Will continue to monitor patient's status.

## 2024-11-29 NOTE — PROGRESS NOTES
"Gavin Saulokatelin \"Harinder\" is a 54 y.o. male on day 3 of admission presenting with Mitral valve insufficiency.    Transitional Care Coordination Progress Note:   Patient discussed during interdisciplinary rounds.   Team members present: NP/PA, TCC   Plan per Medical/Surgical team: Maintain cardiac status   Discharge disposition: Home with HC   Status: Inpatient   Payer: [employer] [facility]   Potential Barriers: NMR  ADOD: 12/1 vs 12/2      "

## 2024-11-29 NOTE — PROGRESS NOTES
"CARDIAC SURGERY DAILY PROGRESS NOTE    Gavin Moreau is a 54 y.o. with PMH/PSH of Mitral valve prolapse, asthma, GERD, rheumatic fever Presents to the CTICU from the OR s/p Robotic MVR via mini thoracotomy with Dr. Goldberg on 11/26. While attempting to transfer to the CTICU bed in OR patient R on T/VF arrested requiring CPR and shock x 2 with ROSC after 4 minutes.     OPERATION/PROCEDURE: 11/26/24 with Simeon Goldberg MD, MS  1.Radical mitral valve repair with preshaped [place]-Carlo cords and 36 mm annuloplasty ring  2.Robotically assisted mitral valve repair  3.Right femoral arterial and venous cannulation for cardiopulmonary bypass     CTICU Course: Cardiac arrest in the immediate post-op. Once in CTICU, uncomplicated course     Transferred to T3: on 11/28    Interval History:   No significant events reported     SUBJECTIVE:  Patient seen and examined today. Complaining of generalize pain and some nausea. Tele: accelerated junctional hemodynamically stable. Case discussed with Dr. Goldberg    Objective   /73 (BP Location: Left arm, Patient Position: Lying)   Pulse 67   Temp 36.9 °C (98.4 °F) (Temporal)   Resp 20   Ht 1.778 m (5' 10\")   Wt 90.3 kg (199 lb 1.2 oz)   SpO2 95%   BMI 28.56 kg/m²   0-10 (Numeric) Pain Score: 6   3 Day Weight Change: 1.933 kg (4 lb 4.2 oz) per day    Intake and Output    Intake/Output Summary (Last 24 hours) at 11/29/2024 5123  Last data filed at 11/29/2024 1754  Gross per 24 hour   Intake 600 ml   Output 2000 ml   Net -1400 ml       Physical Exam  Physical Exam  Constitutional:       Appearance: Normal appearance.   HENT:      Head: Normocephalic and atraumatic.      Nose: Nose normal.      Mouth/Throat:      Mouth: Mucous membranes are dry.   Eyes:      Conjunctiva/sclera: Conjunctivae normal.   Cardiovascular:      Rate and Rhythm: Normal rate.      Pulses: Normal pulses.      Heart sounds: Normal heart sounds.      Comments: Tele: Accelerated junctional 70-80's  VVI 50, " mA 7   Pulmonary:      Effort: Pulmonary effort is normal.      Breath sounds: Normal breath sounds.      Comments: Diminish posterior bilateral lung sounds  Abdominal:      General: Bowel sounds are normal.      Palpations: Abdomen is soft.   Musculoskeletal:      Cervical back: Neck supple.      Right lower leg: Edema present.      Left lower leg: Edema present.   Skin:     General: Skin is warm and dry.      Capillary Refill: Capillary refill takes less than 2 seconds.      Comments: Right chest incision covered a dressing. Dressing is dry, intact and clean. No drainage noted, no erythema noted around dressing.   Neurological:      General: No focal deficit present.      Mental Status: He is alert and oriented to person, place, and time.   Psychiatric:         Mood and Affect: Mood normal.         Behavior: Behavior normal.         Medications  Scheduled medications  acetaminophen, 650 mg, oral, q6h  aspirin, 81 mg, oral, Daily  colchicine, 0.6 mg, oral, q12h  fluticasone furoate-vilanteroL, 1 puff, inhalation, Daily  furosemide, 40 mg, intravenous, q12h  heparin, 5,000 Units, subcutaneous, q8h  insulin lispro, 0-15 Units, subcutaneous, Before meals & nightly  lidocaine, 1 patch, transdermal, q24h  methocarbamol, 1,000 mg, intravenous, q8h  mupirocin, , Topical, BID  oxygen, , inhalation, Continuous - Inhalation  oxygen, , inhalation, Continuous - 02/gases  perflutren protein A microsphere, 0.5 mL, intravenous, Once in imaging  polyethylene glycol, 17 g, oral, Daily  sennosides-docusate sodium, 2 tablet, oral, BID  sulfur hexafluoride microsphr, 2 mL, intravenous, Once in imaging    Continuous medications   PRN medications  PRN medications: alteplase, dextrose **OR** glucagon, ketorolac, naloxone, ondansetron, oxyCODONE, oxyCODONE    Labs  Results for orders placed or performed during the hospital encounter of 11/26/24 (from the past 24 hours)   POCT GLUCOSE   Result Value Ref Range    POCT Glucose 108 (H) 74 -  99 mg/dL   Magnesium   Result Value Ref Range    Magnesium 2.54 (H) 1.60 - 2.40 mg/dL   CBC   Result Value Ref Range    WBC 14.6 (H) 4.4 - 11.3 x10*3/uL    nRBC 0.0 0.0 - 0.0 /100 WBCs    RBC 3.79 (L) 4.50 - 5.90 x10*6/uL    Hemoglobin 11.3 (L) 13.5 - 17.5 g/dL    Hematocrit 34.3 (L) 41.0 - 52.0 %    MCV 91 80 - 100 fL    MCH 29.8 26.0 - 34.0 pg    MCHC 32.9 32.0 - 36.0 g/dL    RDW 12.9 11.5 - 14.5 %    Platelets 98 (L) 150 - 450 x10*3/uL   Renal Function Panel   Result Value Ref Range    Glucose 99 74 - 99 mg/dL    Sodium 131 (L) 136 - 145 mmol/L    Potassium 4.7 3.5 - 5.3 mmol/L    Chloride 97 (L) 98 - 107 mmol/L    Bicarbonate 29 21 - 32 mmol/L    Anion Gap 10 10 - 20 mmol/L    Urea Nitrogen 23 6 - 23 mg/dL    Creatinine 1.02 0.50 - 1.30 mg/dL    eGFR 86 >60 mL/min/1.73m*2    Calcium 8.4 (L) 8.6 - 10.6 mg/dL    Phosphorus 2.8 2.5 - 4.9 mg/dL    Albumin 3.4 3.4 - 5.0 g/dL   Protime-INR   Result Value Ref Range    Protime 13.1 (H) 9.8 - 12.8 seconds    INR 1.2 (H) 0.9 - 1.1   POCT GLUCOSE   Result Value Ref Range    POCT Glucose 107 (H) 74 - 99 mg/dL   POCT GLUCOSE   Result Value Ref Range    POCT Glucose 96 74 - 99 mg/dL   POCT GLUCOSE   Result Value Ref Range    POCT Glucose 88 74 - 99 mg/dL   Transthoracic Echo (TTE) Complete   Result Value Ref Range    BSA 2.11 m2           IMAGING/ DIAGNOSTIC TESTIN/29/24 1V CXR  Pending final read    24 XR chest 1 view 2024  Impression  1.  Right pleural effusion.  2. Mild bilateral and perihilar opacities, likely favoring  edema/atelectasis.  3. Medical devices as explained above.    24 TTE complete:  Pending results     IMPRESSION & PLAN:  POD # 3 s/p Robotically assisted mitral valve repair with 36mm annuloplasty on 24  - Increase activity/ ambulation; PT/OT  - Encourage IS, C/DB; respiratory therapy; wean O2 as marialuisa   - Cardiac rehab referral   - Continue cardiac meds: ASA, statin   - Continue colchicine x1 month for robotic MidCAB procedure  (0.6mg daily for < 70kg, 0.6mg BID for > 70kg; if concurrently receiving amiodarone reduce dose by 50%)   - Pain and anticonstipation meds  - 2v CXR ordered for   - Postop echo ordered and completed on   - Cut epicardial wires prior to discharge   - Tele until discharge  - Optimize nutrition and electrolytes    Rhythm  - Tele: Accelerated junctional, will obtain EKG to confirm and check QTC  - Will reassess BB initiation in AM secondary to rhythm   - Adjust medications as tolerated    Acute Blood Loss Anemia - Stable  Recent Labs     24  0654 24  0419 24  1152 24  0119 24  1552 10/16/24  1559 24  0919   HGB 11.3* 11.7* 13.2* 14.2 15.0 16.4 16.6   HCT 34.3* 35.5* 37.7* 42.2 44.6 49.0 49.9   - MV, PO x1mo  - No Iron, Hematocrit > 34%  - Daily labs, transfuse as indicated    Thrombocytopenia - Improving  Recent Labs     24  0654 24  0419 24  1152 24  0119 24  1552 10/16/24  1559 24  0919   PLT 98* 97* 155 159 175 204 215   - Etiology likely postop/CPB related  - Continue to trend with daily CBCs    Volume/Electrolyte Status: Preop wt Weight: 84.5 kg (186 lb 4.6 oz)   Vitals:    24 0600   Weight: 90.3 kg (199 lb 1.2 oz)     - Weight: Pre-op 84.5,  90kg  - Adjust diuresis as needed for postop cardiac surgery hypervolemia  -  Start Lasix 40 mg IV bid   - Replete electrolytes for hypokalemia/hypomagnesemia/hypophosphatemia as needed   - Daily weights and strict I&Os  - Daily RFP while admitted    Leukocytosis - Improving  Recent Labs     24  0654 24  0419 24  1152 24  0119 24  1552 10/16/24  1559 24  0919   WBC 14.6* 16.6* 21.8* 21.2* 32.4* 7.3 7.1     Temp (36hrs), Av.2 °C (97.2 °F), Min:35.1 °C (95.2 °F), Max:36.9 °C (98.4 °F)     - aggressive pulmonary hygiene  - monitor for s/s infection  - likely atelectasis/ postoperative in etiology  - daily CBC to follow    Post-op Pain and  Nausea   -  On oxycodone per unit protocol, will toradol 15 mg IV q 6 hrs x 48 hours per Dr. Goldberg to back off narcotics and help alleviate nausea   - Cont Zofran as needed  - Cont bowel regimen   - If nausea persist then will check a KUB    Hypertension: home meds: Lisinopril 5mg qd  Systolic (24hrs), Av , Min:103 , Max:130    - currently on no antihypertensive, bp is well controlled  - additional antihypertensives as needed       VTE Prophylaxis: SCDs/TEDs, ambulation, SQ heparin  Code Status: Full Code    Dispo  - PT/OT recs  low intensity   - Would benefit from homecare RN for cardiac surgery carepath  - Anticipate discharge 2-4 days, pending post-op 2V CXR and TTE  - Will continue to assess discharge needs      FLORI Torres-CNP  Cardiac Surgery [facility]  [facility]  Team Phone [phone]    2024  6:53 PM

## 2024-11-30 ENCOUNTER — APPOINTMENT (OUTPATIENT)
Dept: RADIOLOGY | Facility: HOSPITAL | Age: 54
End: 2024-11-30
Payer: COMMERCIAL

## 2024-11-30 ENCOUNTER — APPOINTMENT (OUTPATIENT)
Dept: RADIOLOGY | Facility: HOSPITAL | Age: 54
DRG: 219 | End: 2024-11-30
Payer: COMMERCIAL

## 2024-11-30 LAB
ALBUMIN SERPL BCP-MCNC: 3.1 G/DL (ref 3.4–5)
ANION GAP SERPL CALC-SCNC: 11 MMOL/L (ref 10–20)
BUN SERPL-MCNC: 27 MG/DL (ref 6–23)
CALCIUM SERPL-MCNC: 8 MG/DL (ref 8.6–10.6)
CHLORIDE SERPL-SCNC: 96 MMOL/L (ref 98–107)
CO2 SERPL-SCNC: 28 MMOL/L (ref 21–32)
CREAT SERPL-MCNC: 0.89 MG/DL (ref 0.5–1.3)
EGFRCR SERPLBLD CKD-EPI 2021: >90 ML/MIN/1.73M*2
ERYTHROCYTE [DISTWIDTH] IN BLOOD BY AUTOMATED COUNT: 12.5 % (ref 11.5–14.5)
GLUCOSE BLD MANUAL STRIP-MCNC: 86 MG/DL (ref 74–99)
GLUCOSE BLD MANUAL STRIP-MCNC: 90 MG/DL (ref 74–99)
GLUCOSE BLD MANUAL STRIP-MCNC: 91 MG/DL (ref 74–99)
GLUCOSE SERPL-MCNC: 96 MG/DL (ref 74–99)
HCT VFR BLD AUTO: 32.3 % (ref 41–52)
HGB BLD-MCNC: 10.8 G/DL (ref 13.5–17.5)
INR PPP: 1.1 (ref 0.9–1.1)
MAGNESIUM SERPL-MCNC: 2.51 MG/DL (ref 1.6–2.4)
MCH RBC QN AUTO: 29.6 PG (ref 26–34)
MCHC RBC AUTO-ENTMCNC: 33.4 G/DL (ref 32–36)
MCV RBC AUTO: 89 FL (ref 80–100)
NRBC BLD-RTO: 0 /100 WBCS (ref 0–0)
PHOSPHATE SERPL-MCNC: 3.2 MG/DL (ref 2.5–4.9)
PLATELET # BLD AUTO: 136 X10*3/UL (ref 150–450)
POTASSIUM SERPL-SCNC: 3.9 MMOL/L (ref 3.5–5.3)
PROTHROMBIN TIME: 12.4 SECONDS (ref 9.8–12.8)
RBC # BLD AUTO: 3.65 X10*6/UL (ref 4.5–5.9)
SODIUM SERPL-SCNC: 131 MMOL/L (ref 136–145)
WBC # BLD AUTO: 12.2 X10*3/UL (ref 4.4–11.3)

## 2024-11-30 PROCEDURE — 2500000004 HC RX 250 GENERAL PHARMACY W/ HCPCS (ALT 636 FOR OP/ED)

## 2024-11-30 PROCEDURE — 2500000001 HC RX 250 WO HCPCS SELF ADMINISTERED DRUGS (ALT 637 FOR MEDICARE OP)

## 2024-11-30 PROCEDURE — 80069 RENAL FUNCTION PANEL: CPT

## 2024-11-30 PROCEDURE — 85610 PROTHROMBIN TIME: CPT

## 2024-11-30 PROCEDURE — 2500000005 HC RX 250 GENERAL PHARMACY W/O HCPCS

## 2024-11-30 PROCEDURE — 2500000001 HC RX 250 WO HCPCS SELF ADMINISTERED DRUGS (ALT 637 FOR MEDICARE OP): Performed by: STUDENT IN AN ORGANIZED HEALTH CARE EDUCATION/TRAINING PROGRAM

## 2024-11-30 PROCEDURE — 36415 COLL VENOUS BLD VENIPUNCTURE: CPT

## 2024-11-30 PROCEDURE — 71046 X-RAY EXAM CHEST 2 VIEWS: CPT | Performed by: STUDENT IN AN ORGANIZED HEALTH CARE EDUCATION/TRAINING PROGRAM

## 2024-11-30 PROCEDURE — 85027 COMPLETE CBC AUTOMATED: CPT

## 2024-11-30 PROCEDURE — 83735 ASSAY OF MAGNESIUM: CPT

## 2024-11-30 PROCEDURE — 99232 SBSQ HOSP IP/OBS MODERATE 35: CPT

## 2024-11-30 PROCEDURE — 82947 ASSAY GLUCOSE BLOOD QUANT: CPT

## 2024-11-30 PROCEDURE — 1200000002 HC GENERAL ROOM WITH TELEMETRY DAILY

## 2024-11-30 PROCEDURE — 2500000002 HC RX 250 W HCPCS SELF ADMINISTERED DRUGS (ALT 637 FOR MEDICARE OP, ALT 636 FOR OP/ED)

## 2024-11-30 PROCEDURE — 94640 AIRWAY INHALATION TREATMENT: CPT

## 2024-11-30 PROCEDURE — 71046 X-RAY EXAM CHEST 2 VIEWS: CPT

## 2024-11-30 RX ORDER — POTASSIUM CHLORIDE 20 MEQ/1
20 TABLET, EXTENDED RELEASE ORAL ONCE
Status: COMPLETED | OUTPATIENT
Start: 2024-11-30 | End: 2024-11-30

## 2024-11-30 RX ORDER — COLCHICINE 0.6 MG/1
0.6 TABLET ORAL 2 TIMES DAILY
Qty: 60 TABLET | Refills: 0 | Status: SHIPPED | OUTPATIENT
Start: 2024-11-30 | End: 2024-12-08

## 2024-11-30 RX ORDER — FUROSEMIDE 10 MG/ML
40 INJECTION INTRAMUSCULAR; INTRAVENOUS 2 TIMES DAILY
Status: DISCONTINUED | OUTPATIENT
Start: 2024-11-30 | End: 2024-12-03

## 2024-11-30 RX ORDER — METHOCARBAMOL 500 MG/1
500 TABLET, FILM COATED ORAL ONCE
Status: COMPLETED | OUTPATIENT
Start: 2024-11-30 | End: 2024-11-30

## 2024-11-30 RX ORDER — CALCIUM CARBONATE 200(500)MG
500 TABLET,CHEWABLE ORAL DAILY
Status: DISCONTINUED | OUTPATIENT
Start: 2024-11-30 | End: 2024-12-07

## 2024-11-30 ASSESSMENT — COGNITIVE AND FUNCTIONAL STATUS - GENERAL
WALKING IN HOSPITAL ROOM: A LITTLE
DRESSING REGULAR UPPER BODY CLOTHING: A LITTLE
HELP NEEDED FOR BATHING: A LITTLE
CLIMB 3 TO 5 STEPS WITH RAILING: A LITTLE
DRESSING REGULAR LOWER BODY CLOTHING: A LITTLE
MOBILITY SCORE: 22
DAILY ACTIVITIY SCORE: 21

## 2024-11-30 ASSESSMENT — PAIN SCALES - GENERAL
PAINLEVEL_OUTOF10: 0 - NO PAIN
PAINLEVEL_OUTOF10: 6

## 2024-11-30 ASSESSMENT — PAIN DESCRIPTION - LOCATION: LOCATION: CHEST

## 2024-11-30 ASSESSMENT — PAIN - FUNCTIONAL ASSESSMENT: PAIN_FUNCTIONAL_ASSESSMENT: 0-10

## 2024-11-30 ASSESSMENT — PAIN DESCRIPTION - ORIENTATION: ORIENTATION: RIGHT

## 2024-11-30 NOTE — PROGRESS NOTES
"CARDIAC SURGERY DAILY PROGRESS NOTE    Gavin Moreau is a 54 y.o. with PMH/PSH of Mitral valve prolapse, asthma, GERD, rheumatic fever Presents to the CTICU from the OR s/p Robotic MVR via mini thoracotomy with Dr. Goldberg on 11/26. While attempting to transfer to the CTICU bed in OR patient R on T/VF arrested requiring CPR and shock x 2 with ROSC after 4 minutes.     OPERATION/PROCEDURE: 11/26/24 with Simeon Goldberg MD, MS  1.Radical mitral valve repair with preshaped [place]-Carlo cords and 36 mm annuloplasty ring  2.Robotically assisted mitral valve repair  3.Right femoral arterial and venous cannulation for cardiopulmonary bypass     CTICU Course: Cardiac arrest in the immediate post-op. Once in CTICU, uncomplicated course     Transferred to T3: on 11/28    Interval History:   No significant events reported     SUBJECTIVE:  Patient seen and examined today. Complaining of generalize pain and some nausea. Tele: accelerated junctional hemodynamically stable. Case discussed with Dr. Goldberg    Objective   /61 (BP Location: Right arm, Patient Position: Sitting)   Pulse 64   Temp 36.1 °C (97 °F) (Temporal)   Resp 16   Ht 1.778 m (5' 10\")   Wt 88.1 kg (194 lb 4.8 oz)   SpO2 97%   BMI 27.88 kg/m²   0-10 (Numeric) Pain Score: 0 - No pain   3 Day Weight Change: Unable to Calculate    Intake and Output    Intake/Output Summary (Last 24 hours) at 11/30/2024 1504  Last data filed at 11/30/2024 1400  Gross per 24 hour   Intake 720 ml   Output 3050 ml   Net -2330 ml       Physical Exam  Physical Exam  Constitutional:       Appearance: Normal appearance.   HENT:      Head: Normocephalic and atraumatic.      Nose: Nose normal.      Mouth/Throat:      Mouth: Mucous membranes are moist.   Eyes:      Conjunctiva/sclera: Conjunctivae normal.   Cardiovascular:      Rate and Rhythm: Normal rate.      Pulses: Normal pulses.      Heart sounds: Normal heart sounds.      Comments: Tele: Accelerated junctional 60-70s  VVI " 50, mA 7   Pulmonary:      Effort: Pulmonary effort is normal.      Breath sounds: Normal breath sounds.      Comments: Diminish posterior bilateral lung sounds  Abdominal:      General: Bowel sounds are normal.      Palpations: Abdomen is soft.   Musculoskeletal:      Cervical back: Neck supple.      Right lower leg: Edema present.      Left lower leg: Edema present.   Skin:     General: Skin is warm and dry.      Capillary Refill: Capillary refill takes less than 2 seconds.      Comments: Right chest incision covered a dressing. Dressing is dry, intact and clean. No drainage noted, no erythema noted around dressing.   Neurological:      General: No focal deficit present.      Mental Status: He is alert and oriented to person, place, and time.   Psychiatric:         Mood and Affect: Mood normal.         Behavior: Behavior normal.         Medications  Scheduled medications  acetaminophen, 650 mg, oral, q6h  aspirin, 81 mg, oral, Daily  colchicine, 0.6 mg, oral, q12h  fluticasone furoate-vilanteroL, 1 puff, inhalation, Daily  furosemide, 40 mg, intravenous, BID  heparin, 5,000 Units, subcutaneous, q8h  insulin lispro, 0-15 Units, subcutaneous, Before meals & nightly  lidocaine, 1 patch, transdermal, q24h  multivitamin with minerals, 1 tablet, oral, Daily  mupirocin, , Topical, BID  oxygen, , inhalation, Continuous - Inhalation  perflutren protein A microsphere, 0.5 mL, intravenous, Once in imaging  polyethylene glycol, 17 g, oral, Daily  sennosides-docusate sodium, 2 tablet, oral, BID  sulfur hexafluoride microsphr, 2 mL, intravenous, Once in imaging    Continuous medications   PRN medications  PRN medications: alteplase, dextrose **OR** glucagon, ketorolac, naloxone, ondansetron, oxyCODONE, oxyCODONE    Labs  Results for orders placed or performed during the hospital encounter of 11/26/24 (from the past 24 hours)   POCT GLUCOSE   Result Value Ref Range    POCT Glucose 88 74 - 99 mg/dL   Transthoracic Echo (TTE)  Complete   Result Value Ref Range    AV pk [place] 1.01 m/s    LVOT diam 2.00 cm    MV E/A ratio 3.63     LV EF 52 %    RV free wall pk S' 10.30 cm/s    LVIDd 5.00 cm    RVSP 50.5 mmHg    Aortic Valve Area by Continuity of Peak Velocity 3.17 cm2    AV pk grad 4 mmHg    LV A4C EF 52.9    Magnesium   Result Value Ref Range    Magnesium 2.51 (H) 1.60 - 2.40 mg/dL   CBC   Result Value Ref Range    WBC 12.2 (H) 4.4 - 11.3 x10*3/uL    nRBC 0.0 0.0 - 0.0 /100 WBCs    RBC 3.65 (L) 4.50 - 5.90 x10*6/uL    Hemoglobin 10.8 (L) 13.5 - 17.5 g/dL    Hematocrit 32.3 (L) 41.0 - 52.0 %    MCV 89 80 - 100 fL    MCH 29.6 26.0 - 34.0 pg    MCHC 33.4 32.0 - 36.0 g/dL    RDW 12.5 11.5 - 14.5 %    Platelets 136 (L) 150 - 450 x10*3/uL   Renal Function Panel   Result Value Ref Range    Glucose 96 74 - 99 mg/dL    Sodium 131 (L) 136 - 145 mmol/L    Potassium 3.9 3.5 - 5.3 mmol/L    Chloride 96 (L) 98 - 107 mmol/L    Bicarbonate 28 21 - 32 mmol/L    Anion Gap 11 10 - 20 mmol/L    Urea Nitrogen 27 (H) 6 - 23 mg/dL    Creatinine 0.89 0.50 - 1.30 mg/dL    eGFR >90 >60 mL/min/1.73m*2    Calcium 8.0 (L) 8.6 - 10.6 mg/dL    Phosphorus 3.2 2.5 - 4.9 mg/dL    Albumin 3.1 (L) 3.4 - 5.0 g/dL   Protime-INR   Result Value Ref Range    Protime 12.4 9.8 - 12.8 seconds    INR 1.1 0.9 - 1.1   POCT GLUCOSE   Result Value Ref Range    POCT Glucose 91 74 - 99 mg/dL   POCT GLUCOSE   Result Value Ref Range    POCT Glucose 86 74 - 99 mg/dL         IMAGING/ DIAGNOSTIC TESTING:    XR chest 2 views 11/30/202  Impression  1. Improvement in mild pulmonary edema.  2. Similar mild bibasilar atelectasis with small pleural effusions.    XR chest 1 view 11/29/2024  Impression  1.Post cardiac valve replacement again noted.  2.Perihilar and basilar edema with small effusions grossly similar to  the prior study.    11/29/24 TTE complete with contrast:  CONCLUSIONS:  1. Poorly visualized anatomical structures due to suboptimal image quality.  2. The left ventricular systolic  function is low normal, with a Lambert's biplane calculated ejection fraction of 52%.  3. Spectral Doppler shows a Grade III (restrictive pattern) of left ventricular diastolic filling with an elevated left atrial pressure.  4. There is normal right ventricular global systolic function.  5. Status post mitral valve repair.  6. Moderately elevated right ventricular systolic pressure (RVSP 50.5mmhg)    IMPRESSION & PLAN:  POD # 4 s/p Robotically assisted mitral valve repair with 36mm annuloplasty on 11/26/24  - Increase activity/ ambulation; PT/OT  - Encourage IS, C/DB; respiratory therapy; wean O2 as marialuisa   - Cardiac rehab referral   - Continue cardiac meds: ASA, statin   - Continue colchicine x1 month for robotic MidCAB procedure (0.6mg daily for < 70kg, 0.6mg BID for > 70kg; if concurrently receiving amiodarone reduce dose by 50%)   - Pain and anticonstipation meds  - 2v CXR completed 11/30  - Postop completed on 11/29  - Cut epicardial wires prior to discharge   - Tele until discharge  - Optimize nutrition and electrolytes    Rhythm  - 11/29 Tele: Accelerated junctional, will obtain EKG to confirm and check QTC  - 11/29 Will reassess BB initiation in AM secondary to rhythm   - 11/30 No BB today, patient still accelerated junctional, reassess in am  - Adjust medications as tolerated    Acute Blood Loss Anemia - Stable  Recent Labs     11/30/24  0707 11/29/24  0654 11/28/24  0419 11/27/24  1152 11/27/24  0119 11/26/24  1552 10/16/24  1559   HGB 10.8* 11.3* 11.7* 13.2* 14.2 15.0 16.4   HCT 32.3* 34.3* 35.5* 37.7* 42.2 44.6 49.0   - MV, PO x1mo  - 11/29 No Iron, Hematocrit > 34%  - Daily labs, transfuse as indicated    Thrombocytopenia - Improving  Recent Labs     11/30/24  0707 11/29/24  0654 11/28/24  0419 11/27/24  1152 11/27/24  0119 11/26/24  1552 10/16/24  1559   * 98* 97* 155 159 175 204   - Etiology likely postop/CPB related  - Continue to trend with daily CBCs    Volume/Electrolyte Status: Preop wt  Weight: 84.5 kg (186 lb 4.6 oz)   Vitals:    24 0331   Weight: 88.1 kg (194 lb 4.8 oz)     - Weight: Pre-op 84.5,  90kg,  88kg  - Adjust diuresis as needed for postop cardiac surgery hypervolemia  -  Start Lasix 40 mg IV bid   -  Cont lasix 40 mg IV bid (0900 and 1800)  - Replete electrolytes for hypokalemia/hypomagnesemia/hypophosphatemia as needed   -  replaced potassium today  - Daily weights and strict I&Os  - Daily RFP while admitted    Leukocytosis - Improving  Recent Labs     24  0707 24  0654 24  0419 24  1152 24  0119 24  1552 10/16/24  1559   WBC 12.2* 14.6* 16.6* 21.8* 21.2* 32.4* 7.3     Temp (36hrs), Av.4 °C (97.6 °F), Min:36.1 °C (97 °F), Max:36.9 °C (98.4 °F)     - aggressive pulmonary hygiene  - monitor for s/s infection  - likely atelectasis/ postoperative in etiology  - daily CBC to follow    Post-op Pain and Nausea   -  On oxycodone per unit protocol, will toradol 15 mg IV q 6 hrs x 48 hours per Dr. Goldberg to back off narcotics and help alleviate nausea   - Cont Zofran as needed  - Cont bowel regimen   - If nausea persist then will check a KUB  - Pain improving with Toradol. Nausea has significant improved.    Hypertension: home meds: Lisinopril 5mg qd  Systolic (24hrs), Av , Min:103 , Max:144    - currently on no antihypertensive, bp is well controlled  - additional antihypertensives as needed       VTE Prophylaxis: SCDs/TEDs, ambulation, SQ heparin  Code Status: Full Code    Dispo  - PT/OT recs  low intensity   - Would benefit from homecare RN for cardiac surgery carepath  - Anticipate discharge 2-4 days, pending diuresis and clinical course (colchicine sent to meds to bed on )  - Will continue to assess discharge needs    FLORI Torres-CNP  Cardiac Surgery [facility]  [facility]  Team Phone [phone]    2024  3:04 PM

## 2024-11-30 NOTE — PROGRESS NOTES
11/30/24 1308   Discharge Planning   Who is requesting discharge planning? Patient   Home or Post Acute Services In home services   Type of Home Care Services Home PT;Home OT;Home nursing visits   Expected Discharge Disposition Home H     Covering TCC aware patient will need home care services at discharge. Pt needed blanket referral sent to nearby agencies; sent this am. Only one agency has accepted at this time: [facility]. Pt will need an external referral at discharge and TCC will send HC orders to agency when available.    Addendum 12-1 @ [address] [place] [address] liaison confirmed they will not be able to provide services in pt's area and have closed the referral they have received. Pt will need an external referral for [facility].   Claudine Recinos RN  (Weekend Transitional Care Coordinator-TCC, send Epic message)

## 2024-12-01 ENCOUNTER — APPOINTMENT (OUTPATIENT)
Dept: CARDIOLOGY | Facility: HOSPITAL | Age: 54
End: 2024-12-01
Payer: COMMERCIAL

## 2024-12-01 ENCOUNTER — DOCUMENTATION (OUTPATIENT)
Dept: HOME HEALTH SERVICES | Facility: HOME HEALTH | Age: 54
End: 2024-12-01
Payer: COMMERCIAL

## 2024-12-01 VITALS
TEMPERATURE: 97.9 F | OXYGEN SATURATION: 95 % | WEIGHT: 194.5 LBS | RESPIRATION RATE: 18 BRPM | SYSTOLIC BLOOD PRESSURE: 120 MMHG | HEIGHT: 70 IN | BODY MASS INDEX: 27.84 KG/M2 | HEART RATE: 70 BPM | DIASTOLIC BLOOD PRESSURE: 72 MMHG

## 2024-12-01 LAB
ALBUMIN SERPL BCP-MCNC: 3.1 G/DL (ref 3.4–5)
ANION GAP SERPL CALC-SCNC: 11 MMOL/L (ref 10–20)
BUN SERPL-MCNC: 24 MG/DL (ref 6–23)
CALCIUM SERPL-MCNC: 8.1 MG/DL (ref 8.6–10.6)
CHLORIDE SERPL-SCNC: 93 MMOL/L (ref 98–107)
CO2 SERPL-SCNC: 29 MMOL/L (ref 21–32)
CREAT SERPL-MCNC: 0.73 MG/DL (ref 0.5–1.3)
EGFRCR SERPLBLD CKD-EPI 2021: >90 ML/MIN/1.73M*2
ERYTHROCYTE [DISTWIDTH] IN BLOOD BY AUTOMATED COUNT: 12.6 % (ref 11.5–14.5)
GLUCOSE BLD MANUAL STRIP-MCNC: 146 MG/DL (ref 74–99)
GLUCOSE BLD MANUAL STRIP-MCNC: 89 MG/DL (ref 74–99)
GLUCOSE BLD MANUAL STRIP-MCNC: 93 MG/DL (ref 74–99)
GLUCOSE SERPL-MCNC: 91 MG/DL (ref 74–99)
HCT VFR BLD AUTO: 32.7 % (ref 41–52)
HGB BLD-MCNC: 11.2 G/DL (ref 13.5–17.5)
MAGNESIUM SERPL-MCNC: 2.29 MG/DL (ref 1.6–2.4)
MCH RBC QN AUTO: 29.8 PG (ref 26–34)
MCHC RBC AUTO-ENTMCNC: 34.3 G/DL (ref 32–36)
MCV RBC AUTO: 87 FL (ref 80–100)
NRBC BLD-RTO: 0 /100 WBCS (ref 0–0)
PHOSPHATE SERPL-MCNC: 2.7 MG/DL (ref 2.5–4.9)
PLATELET # BLD AUTO: 183 X10*3/UL (ref 150–450)
POTASSIUM SERPL-SCNC: 3.4 MMOL/L (ref 3.5–5.3)
RBC # BLD AUTO: 3.76 X10*6/UL (ref 4.5–5.9)
SODIUM SERPL-SCNC: 130 MMOL/L (ref 136–145)
WBC # BLD AUTO: 10.1 X10*3/UL (ref 4.4–11.3)

## 2024-12-01 PROCEDURE — 80069 RENAL FUNCTION PANEL: CPT

## 2024-12-01 PROCEDURE — 83735 ASSAY OF MAGNESIUM: CPT

## 2024-12-01 PROCEDURE — 93005 ELECTROCARDIOGRAM TRACING: CPT

## 2024-12-01 PROCEDURE — 2500000001 HC RX 250 WO HCPCS SELF ADMINISTERED DRUGS (ALT 637 FOR MEDICARE OP)

## 2024-12-01 PROCEDURE — 85027 COMPLETE CBC AUTOMATED: CPT

## 2024-12-01 PROCEDURE — 1200000002 HC GENERAL ROOM WITH TELEMETRY DAILY

## 2024-12-01 PROCEDURE — 2500000002 HC RX 250 W HCPCS SELF ADMINISTERED DRUGS (ALT 637 FOR MEDICARE OP, ALT 636 FOR OP/ED)

## 2024-12-01 PROCEDURE — 2500000004 HC RX 250 GENERAL PHARMACY W/ HCPCS (ALT 636 FOR OP/ED)

## 2024-12-01 PROCEDURE — 82947 ASSAY GLUCOSE BLOOD QUANT: CPT

## 2024-12-01 PROCEDURE — 93010 ELECTROCARDIOGRAM REPORT: CPT | Performed by: INTERNAL MEDICINE

## 2024-12-01 PROCEDURE — 94640 AIRWAY INHALATION TREATMENT: CPT

## 2024-12-01 PROCEDURE — 2500000005 HC RX 250 GENERAL PHARMACY W/O HCPCS

## 2024-12-01 RX ORDER — TRAMADOL HYDROCHLORIDE 50 MG/1
50 TABLET ORAL EVERY 6 HOURS PRN
Status: DISCONTINUED | OUTPATIENT
Start: 2024-12-01 | End: 2024-12-09 | Stop reason: HOSPADM

## 2024-12-01 RX ORDER — POTASSIUM CHLORIDE 20 MEQ/1
40 TABLET, EXTENDED RELEASE ORAL ONCE
Status: COMPLETED | OUTPATIENT
Start: 2024-12-01 | End: 2024-12-01

## 2024-12-01 ASSESSMENT — COGNITIVE AND FUNCTIONAL STATUS - GENERAL
HELP NEEDED FOR BATHING: A LITTLE
DRESSING REGULAR LOWER BODY CLOTHING: A LITTLE
WALKING IN HOSPITAL ROOM: A LITTLE
MOBILITY SCORE: 22
DAILY ACTIVITIY SCORE: 21
DRESSING REGULAR UPPER BODY CLOTHING: A LITTLE
CLIMB 3 TO 5 STEPS WITH RAILING: A LITTLE

## 2024-12-01 ASSESSMENT — PAIN SCALES - GENERAL
PAINLEVEL_OUTOF10: 3
PAINLEVEL_OUTOF10: 5 - MODERATE PAIN
PAINLEVEL_OUTOF10: 7
PAINLEVEL_OUTOF10: 7

## 2024-12-01 ASSESSMENT — PAIN - FUNCTIONAL ASSESSMENT
PAIN_FUNCTIONAL_ASSESSMENT: 0-10

## 2024-12-01 NOTE — PROGRESS NOTES
"CARDIAC SURGERY DAILY PROGRESS NOTE    Gavin Moreau is a 54 y.o. with PMH/PSH of Mitral valve prolapse, asthma, GERD, rheumatic fever Presents to the CTICU from the OR s/p Robotic MVR via mini thoracotomy with Dr. Goldberg on 11/26. While attempting to transfer to the CTICU bed in OR patient R on T/VF arrested requiring CPR and shock x 2 with ROSC after 4 minutes.     OPERATION/PROCEDURE: 11/26/24 with Simeon Goldberg MD, MS  1.Radical mitral valve repair with preshaped Charleroi-Carlo cords and 36 mm annuloplasty ring  2.Robotically assisted mitral valve repair  3.Right femoral arterial and venous cannulation for cardiopulmonary bypass     CTICU Course: Cardiac arrest in the immediate post-op. Once in CTICU, uncomplicated course     Transferred to T3: on 11/28    Interval History:   No significant events reported     SUBJECTIVE:  Patient seen and examined today. Complaining of generalize pain and some nausea. Hiccups this Am not resolved with Tums. Tele: accelerated junctional, HR 60s, hemodynamically stable. EKG this AM for Qtc.    Objective   /77 (BP Location: Right arm, Patient Position: Sitting)   Pulse 59   Temp 36.3 °C (97.3 °F) (Temporal)   Resp 18   Ht 1.778 m (5' 10\")   Wt 88.2 kg (194 lb 8 oz)   SpO2 93%   BMI 27.91 kg/m²   0-10 (Numeric) Pain Score: 5 - Moderate pain   3 Day Weight Change: 1.108 kg (2 lb 7.1 oz) per day    Intake and Output    Intake/Output Summary (Last 24 hours) at 12/1/2024 1212  Last data filed at 12/1/2024 0937  Gross per 24 hour   Intake 600 ml   Output 2250 ml   Net -1650 ml     Physical Exam  Physical Exam  Constitutional:       Appearance: Normal appearance.   HENT:      Head: Normocephalic and atraumatic.      Nose: Nose normal.      Mouth/Throat:      Mouth: Mucous membranes are moist.   Eyes:      Conjunctiva/sclera: Conjunctivae normal.   Cardiovascular:      Rate and Rhythm: Normal rate.      Pulses: Normal pulses.      Heart sounds: Normal heart sounds.      " Comments: Tele: Accelerated junctional 60-70s  VVI 50, mA 7   Pulmonary:      Effort: Pulmonary effort is normal.      Breath sounds: Normal breath sounds.      Comments: Diminish posterior bilateral lung sounds  Abdominal:      General: Bowel sounds are normal.      Palpations: Abdomen is soft.   Musculoskeletal:      Cervical back: Neck supple.      Right lower leg: Edema present.      Left lower leg: Edema present.   Skin:     General: Skin is warm and dry.      Capillary Refill: Capillary refill takes less than 2 seconds.      Comments: Right chest incision covered a dressing. Dressing is dry, intact and clean. No drainage noted, no erythema noted around dressing.   Neurological:      General: No focal deficit present.      Mental Status: He is alert and oriented to person, place, and time.   Psychiatric:         Mood and Affect: Mood normal.         Behavior: Behavior normal.       Medications  Scheduled medications  acetaminophen, 650 mg, oral, q6h  aspirin, 81 mg, oral, Daily  calcium carbonate, 500 mg, oral, Daily  colchicine, 0.6 mg, oral, q12h  fluticasone furoate-vilanteroL, 1 puff, inhalation, Daily  furosemide, 40 mg, intravenous, BID  heparin, 5,000 Units, subcutaneous, q8h  insulin lispro, 0-15 Units, subcutaneous, Before meals & nightly  lidocaine, 1 patch, transdermal, q24h  multivitamin with minerals, 1 tablet, oral, Daily  oxygen, , inhalation, Continuous - Inhalation  perflutren protein A microsphere, 0.5 mL, intravenous, Once in imaging  polyethylene glycol, 17 g, oral, Daily  sennosides-docusate sodium, 2 tablet, oral, BID  sulfur hexafluoride microsphr, 2 mL, intravenous, Once in imaging    Continuous medications   PRN medications  PRN medications: dextromethorphan-guaifenesin, dextrose **OR** glucagon, ketorolac, naloxone, ondansetron, oxyCODONE, oxyCODONE    Labs  Results for orders placed or performed during the hospital encounter of 11/26/24 (from the past 24 hours)   POCT GLUCOSE   Result  Value Ref Range    POCT Glucose 90 74 - 99 mg/dL   Magnesium   Result Value Ref Range    Magnesium 2.29 1.60 - 2.40 mg/dL   CBC   Result Value Ref Range    WBC 10.1 4.4 - 11.3 x10*3/uL    nRBC 0.0 0.0 - 0.0 /100 WBCs    RBC 3.76 (L) 4.50 - 5.90 x10*6/uL    Hemoglobin 11.2 (L) 13.5 - 17.5 g/dL    Hematocrit 32.7 (L) 41.0 - 52.0 %    MCV 87 80 - 100 fL    MCH 29.8 26.0 - 34.0 pg    MCHC 34.3 32.0 - 36.0 g/dL    RDW 12.6 11.5 - 14.5 %    Platelets 183 150 - 450 x10*3/uL   Renal Function Panel   Result Value Ref Range    Glucose 91 74 - 99 mg/dL    Sodium 130 (L) 136 - 145 mmol/L    Potassium 3.4 (L) 3.5 - 5.3 mmol/L    Chloride 93 (L) 98 - 107 mmol/L    Bicarbonate 29 21 - 32 mmol/L    Anion Gap 11 10 - 20 mmol/L    Urea Nitrogen 24 (H) 6 - 23 mg/dL    Creatinine 0.73 0.50 - 1.30 mg/dL    eGFR >90 >60 mL/min/1.73m*2    Calcium 8.1 (L) 8.6 - 10.6 mg/dL    Phosphorus 2.7 2.5 - 4.9 mg/dL    Albumin 3.1 (L) 3.4 - 5.0 g/dL   POCT GLUCOSE   Result Value Ref Range    POCT Glucose 89 74 - 99 mg/dL   POCT GLUCOSE   Result Value Ref Range    POCT Glucose 146 (H) 74 - 99 mg/dL         IMAGING/ DIAGNOSTIC TESTING:    XR chest 2 views 11/30/202  Impression  1. Improvement in mild pulmonary edema.  2. Similar mild bibasilar atelectasis with small pleural effusions.    XR chest 1 view 11/29/2024  Impression  1.Post cardiac valve replacement again noted.  2.Perihilar and basilar edema with small effusions grossly similar to  the prior study.    11/29/24 TTE complete with contrast:  CONCLUSIONS:  1. Poorly visualized anatomical structures due to suboptimal image quality.  2. The left ventricular systolic function is low normal, with a Lambert's biplane calculated ejection fraction of 52%.  3. Spectral Doppler shows a Grade III (restrictive pattern) of left ventricular diastolic filling with an elevated left atrial pressure.  4. There is normal right ventricular global systolic function.  5. Status post mitral valve repair.  6.  Moderately elevated right ventricular systolic pressure (RVSP 50.5mmhg)    IMPRESSION & PLAN:  POD # 4 s/p Robotically assisted mitral valve repair with 36mm annuloplasty on 11/26/24  - Increase activity/ ambulation; PT/OT  - Encourage IS, C/DB; respiratory therapy; wean O2 as marialuisa   - Cardiac rehab referral   - Continue cardiac meds: ASA, statin   - Continue colchicine x1 month for robotic MidCAB procedure (0.6mg daily for < 70kg, 0.6mg BID for > 70kg; if concurrently receiving amiodarone reduce dose by 50%)   - Pain and anticonstipation meds  - 2v CXR completed 11/30  - Postop completed on 11/29  - Cut epicardial wires prior to discharge   - Tele until discharge  - Optimize nutrition and electrolytes    Rhythm  - 11/29 Tele: Accelerated junctional, will obtain EKG to confirm and check QTC  - 11/29 Will reassess BB initiation in AM secondary to rhythm   - 11/30 No BB today, patient still accelerated junctional, reassess in AM  - 12/01: EKG this AM, still accelerated junctional rhythm. Consider EP consult within 1-2 days if no recovery. No BB  - Adjust medications as tolerated    Acute Blood Loss Anemia - Stable  Recent Labs     12/01/24  0648 11/30/24  0707 11/29/24  0654 11/28/24  0419 11/27/24  1152 11/27/24  0119 11/26/24  1552   HGB 11.2* 10.8* 11.3* 11.7* 13.2* 14.2 15.0   HCT 32.7* 32.3* 34.3* 35.5* 37.7* 42.2 44.6   - MV, PO x1mo  - 11/29 No Iron, Hematocrit > 34%  - Daily labs, transfuse as indicated    Thrombocytopenia - Improving  Recent Labs     12/01/24  0648 11/30/24  0707 11/29/24  0654 11/28/24  0419 11/27/24  1152 11/27/24  0119 11/26/24  1552    136* 98* 97* 155 159 175   - Etiology likely postop/CPB related  - Continue to trend with daily CBCs    Volume/Electrolyte Status: Preop wt Weight: 84.5 kg (186 lb 4.6 oz)   Vitals:    12/01/24 0018   Weight: 88.2 kg (194 lb 8 oz)     - Weight: Pre-op 84.5, 12/01 88.2kg  - Adjust diuresis as needed for postop cardiac surgery hypervolemia  - 12/01 Cont  lasix 40 mg IV bid (0900 and 1800)  - Replete electrolytes for hypokalemia/hypomagnesemia/hypophosphatemia as needed   - Daily weights and strict I&Os  - Daily RFP while admitted      Contains abnormal data Renal Function Panel    Status: Final result        Component  Ref Range & Units 24 0648   Glucose  74 - 99 mg/dL 91   Sodium  136 - 145 mmol/L 130 Low    Potassium  3.5 - 5.3 mmol/L 3.4 Low    Chloride  98 - 107 mmol/L 93 Low    Bicarbonate  21 - 32 mmol/L 29   Anion Gap  10 - 20 mmol/L 11   Urea Nitrogen  6 - 23 mg/dL 24 High    Creatinine  0.50 - 1.30 mg/dL 0.73   eGFR  >60 mL/min/1.73m*2 >90   Comment: Calculations of estimated GFR are performed using the  CKD-EPI Study Refit equation without the race variable for the IDMS-Traceable creatinine methods.  https://jasn.asnjournals.org/content/early//ASN.0887255454   Calcium  8.6 - 10.6 mg/dL 8.1 Low    Phosphorus  2.5 - 4.9 mg/dL 2.7   Comment: The performance characteristics of phosphorus testing in heparinized plasma have been validated by the individual  laboratory site where testing is performed. Testing on heparinized plasma is not approved by the FDA; however, such approval is not necessary.   Albumin  3.4 - 5.0 g/dL 3.1 Low    Resulting Agency [facility]        Specimen Collected: 24 0648 Last Resulted: 24 0810 View Other Order Details        Leukocytosis - Resolved  Recent Labs     24  0648 24  0707 24  0654 24  0419 24  1152 24  0119 24  1552   WBC 10.1 12.2* 14.6* 16.6* 21.8* 21.2* 32.4*     Temp (36hrs), Av.6 °C (97.9 °F), Min:36.1 °C (97 °F), Max:37.1 °C (98.8 °F)     - aggressive pulmonary hygiene  - monitor for s/s infection  - likely atelectasis/ postoperative in etiology  - daily CBC to follow    Post-op Pain and Nausea   -  On oxycodone per unit protocol, will toradol 15 mg IV q 6 hrs x 48 hours per Dr. Goldberg to back off narcotics and help alleviate nausea   -  Cont Zofran as needed  - Cont bowel regimen   - If nausea persist then will check a KUB  - Pain improving with Toradol. Nausea has significant improved.    Hypertension: home meds: Lisinopril 5mg qd  Systolic (24hrs), Av , Min:103 , Max:147   - currently on no antihypertensive, bp is well controlled  - additional antihypertensives as needed     VTE Prophylaxis: SCDs/TEDs, ambulation, SQ heparin  Code Status: Full Code    Dispo  - PT/OT recs  low intensity   - Would benefit from homecare RN for cardiac surgery carepath  - Anticipate discharge 2-4 days, pending diuresis and clinical course (colchicine sent to meds to bed on )  - Will continue to assess discharge needs    Narcisa Ramirez DO  Cardiac Surgery Resident  [facility]  Team Phone [phone]    2024  12:12 PM

## 2024-12-02 ENCOUNTER — APPOINTMENT (OUTPATIENT)
Dept: CARDIOLOGY | Facility: HOSPITAL | Age: 54
End: 2024-12-02
Payer: COMMERCIAL

## 2024-12-02 ENCOUNTER — APPOINTMENT (OUTPATIENT)
Dept: CARDIOLOGY | Facility: HOSPITAL | Age: 54
DRG: 219 | End: 2024-12-02
Payer: COMMERCIAL

## 2024-12-02 LAB
ALBUMIN SERPL BCP-MCNC: 3.2 G/DL (ref 3.4–5)
ANION GAP SERPL CALC-SCNC: 13 MMOL/L (ref 10–20)
ATRIAL RATE: 66 BPM
ATRIAL RATE: 67 BPM
BUN SERPL-MCNC: 18 MG/DL (ref 6–23)
CALCIUM SERPL-MCNC: 8.2 MG/DL (ref 8.6–10.6)
CHLORIDE SERPL-SCNC: 92 MMOL/L (ref 98–107)
CO2 SERPL-SCNC: 27 MMOL/L (ref 21–32)
CREAT SERPL-MCNC: 0.69 MG/DL (ref 0.5–1.3)
EGFRCR SERPLBLD CKD-EPI 2021: >90 ML/MIN/1.73M*2
ERYTHROCYTE [DISTWIDTH] IN BLOOD BY AUTOMATED COUNT: 12.7 % (ref 11.5–14.5)
GLUCOSE BLD MANUAL STRIP-MCNC: 123 MG/DL (ref 74–99)
GLUCOSE BLD MANUAL STRIP-MCNC: 128 MG/DL (ref 74–99)
GLUCOSE BLD MANUAL STRIP-MCNC: 86 MG/DL (ref 74–99)
GLUCOSE BLD MANUAL STRIP-MCNC: 97 MG/DL (ref 74–99)
GLUCOSE SERPL-MCNC: 91 MG/DL (ref 74–99)
HCT VFR BLD AUTO: 34.1 % (ref 41–52)
HGB BLD-MCNC: 11.8 G/DL (ref 13.5–17.5)
MAGNESIUM SERPL-MCNC: 2.1 MG/DL (ref 1.6–2.4)
MAGNESIUM SERPL-MCNC: 2.11 MG/DL (ref 1.6–2.4)
MCH RBC QN AUTO: 30.2 PG (ref 26–34)
MCHC RBC AUTO-ENTMCNC: 34.6 G/DL (ref 32–36)
MCV RBC AUTO: 87 FL (ref 80–100)
NRBC BLD-RTO: 0 /100 WBCS (ref 0–0)
P AXIS: -63 DEGREES
P OFFSET: 201 MS
P OFFSET: 210 MS
P ONSET: 136 MS
P ONSET: 169 MS
PHOSPHATE SERPL-MCNC: 2.7 MG/DL (ref 2.5–4.9)
PLATELET # BLD AUTO: 208 X10*3/UL (ref 150–450)
POTASSIUM SERPL-SCNC: 3.5 MMOL/L (ref 3.5–5.3)
POTASSIUM SERPL-SCNC: 3.9 MMOL/L (ref 3.5–5.3)
PR INTERVAL: 114 MS
PR INTERVAL: 162 MS
Q ONSET: 217 MS
Q ONSET: 226 MS
QRS COUNT: 11 BEATS
QRS COUNT: 11 BEATS
QRS DURATION: 88 MS
QRS DURATION: 90 MS
QT INTERVAL: 460 MS
QT INTERVAL: 476 MS
QTC CALCULATION(BAZETT): 486 MS
QTC CALCULATION(BAZETT): 499 MS
QTC FREDERICIA: 477 MS
QTC FREDERICIA: 491 MS
R AXIS: 13 DEGREES
R AXIS: 6 DEGREES
RBC # BLD AUTO: 3.91 X10*6/UL (ref 4.5–5.9)
SODIUM SERPL-SCNC: 128 MMOL/L (ref 136–145)
T AXIS: -10 DEGREES
T AXIS: -2 DEGREES
T OFFSET: 455 MS
T OFFSET: 456 MS
VENTRICULAR RATE: 66 BPM
VENTRICULAR RATE: 67 BPM
WBC # BLD AUTO: 10.3 X10*3/UL (ref 4.4–11.3)

## 2024-12-02 PROCEDURE — 2500000001 HC RX 250 WO HCPCS SELF ADMINISTERED DRUGS (ALT 637 FOR MEDICARE OP)

## 2024-12-02 PROCEDURE — 1200000002 HC GENERAL ROOM WITH TELEMETRY DAILY

## 2024-12-02 PROCEDURE — 2500000004 HC RX 250 GENERAL PHARMACY W/ HCPCS (ALT 636 FOR OP/ED)

## 2024-12-02 PROCEDURE — 2500000005 HC RX 250 GENERAL PHARMACY W/O HCPCS

## 2024-12-02 PROCEDURE — 2500000001 HC RX 250 WO HCPCS SELF ADMINISTERED DRUGS (ALT 637 FOR MEDICARE OP): Performed by: STUDENT IN AN ORGANIZED HEALTH CARE EDUCATION/TRAINING PROGRAM

## 2024-12-02 PROCEDURE — 83735 ASSAY OF MAGNESIUM: CPT

## 2024-12-02 PROCEDURE — 93005 ELECTROCARDIOGRAM TRACING: CPT

## 2024-12-02 PROCEDURE — 93010 ELECTROCARDIOGRAM REPORT: CPT | Performed by: INTERNAL MEDICINE

## 2024-12-02 PROCEDURE — 83735 ASSAY OF MAGNESIUM: CPT | Performed by: NURSE PRACTITIONER

## 2024-12-02 PROCEDURE — 80069 RENAL FUNCTION PANEL: CPT

## 2024-12-02 PROCEDURE — 2500000004 HC RX 250 GENERAL PHARMACY W/ HCPCS (ALT 636 FOR OP/ED): Performed by: STUDENT IN AN ORGANIZED HEALTH CARE EDUCATION/TRAINING PROGRAM

## 2024-12-02 PROCEDURE — 2500000002 HC RX 250 W HCPCS SELF ADMINISTERED DRUGS (ALT 637 FOR MEDICARE OP, ALT 636 FOR OP/ED): Performed by: PHYSICIAN ASSISTANT

## 2024-12-02 PROCEDURE — 2500000001 HC RX 250 WO HCPCS SELF ADMINISTERED DRUGS (ALT 637 FOR MEDICARE OP): Performed by: PHYSICIAN ASSISTANT

## 2024-12-02 PROCEDURE — 82947 ASSAY GLUCOSE BLOOD QUANT: CPT

## 2024-12-02 PROCEDURE — 99231 SBSQ HOSP IP/OBS SF/LOW 25: CPT | Performed by: PHYSICIAN ASSISTANT

## 2024-12-02 PROCEDURE — 94640 AIRWAY INHALATION TREATMENT: CPT

## 2024-12-02 PROCEDURE — 84132 ASSAY OF SERUM POTASSIUM: CPT | Performed by: NURSE PRACTITIONER

## 2024-12-02 PROCEDURE — 85027 COMPLETE CBC AUTOMATED: CPT

## 2024-12-02 PROCEDURE — 2500000004 HC RX 250 GENERAL PHARMACY W/ HCPCS (ALT 636 FOR OP/ED): Performed by: PHYSICIAN ASSISTANT

## 2024-12-02 RX ORDER — POTASSIUM CHLORIDE 20 MEQ/1
40 TABLET, EXTENDED RELEASE ORAL ONCE
Status: COMPLETED | OUTPATIENT
Start: 2024-12-02 | End: 2024-12-02

## 2024-12-02 RX ORDER — ENOXAPARIN SODIUM 100 MG/ML
40 INJECTION SUBCUTANEOUS EVERY 24 HOURS
Status: DISCONTINUED | OUTPATIENT
Start: 2024-12-02 | End: 2024-12-03

## 2024-12-02 RX ORDER — BACLOFEN 5 MG/1
5 TABLET ORAL 3 TIMES DAILY
Status: DISCONTINUED | OUTPATIENT
Start: 2024-12-02 | End: 2024-12-02

## 2024-12-02 RX ORDER — MAGNESIUM SULFATE HEPTAHYDRATE 40 MG/ML
2 INJECTION, SOLUTION INTRAVENOUS ONCE
Status: COMPLETED | OUTPATIENT
Start: 2024-12-02 | End: 2024-12-02

## 2024-12-02 RX ORDER — METOPROLOL TARTRATE 25 MG/1
12.5 TABLET, FILM COATED ORAL 2 TIMES DAILY
Status: DISCONTINUED | OUTPATIENT
Start: 2024-12-02 | End: 2024-12-03

## 2024-12-02 ASSESSMENT — COGNITIVE AND FUNCTIONAL STATUS - GENERAL
MOBILITY SCORE: 23
CLIMB 3 TO 5 STEPS WITH RAILING: A LITTLE
DAILY ACTIVITIY SCORE: 24

## 2024-12-02 ASSESSMENT — PAIN SCALES - WONG BAKER
WONGBAKER_NUMERICALRESPONSE: HURTS LITTLE MORE
WONGBAKER_NUMERICALRESPONSE: HURTS LITTLE BIT

## 2024-12-02 ASSESSMENT — PAIN SCALES - GENERAL
PAINLEVEL_OUTOF10: 6
PAINLEVEL_OUTOF10: 5 - MODERATE PAIN
PAINLEVEL_OUTOF10: 5 - MODERATE PAIN

## 2024-12-02 ASSESSMENT — PAIN - FUNCTIONAL ASSESSMENT: PAIN_FUNCTIONAL_ASSESSMENT: 0-10

## 2024-12-02 NOTE — PROGRESS NOTES
"CARDIAC SURGERY DAILY PROGRESS NOTE    Gavin Moreau is a 54 y.o. with PMH/PSH of Mitral valve prolapse, asthma, GERD, rheumatic fever Presents to the CTICU from the OR s/p Robotic MVR via mini thoracotomy with Dr. Goldberg on 11/26. While attempting to transfer to the CTICU bed in OR patient R on T/VF arrested requiring CPR and shock x 2 with ROSC after 4 minutes.     OPERATION/PROCEDURE: 11/26/24 with Simeon Goldberg MD, MS  1.Radical mitral valve repair with preshaped Taylor-Carlo cords and 36 mm annuloplasty ring  2.Robotically assisted mitral valve repair  3.Right femoral arterial and venous cannulation for cardiopulmonary bypass     CTICU Course: Cardiac arrest in the immediate post-op. Once in CTICU, uncomplicated course     Transferred to T3: on 11/28    Interval History:   No significant events reported     SUBJECTIVE:  Continues to have hiccups today, having left shoulder blade area and neck reproducible pain this Am not resolved with Tums.    Objective   /63 (BP Location: Left arm)   Pulse 74   Temp 36 °C (96.8 °F) (Temporal)   Resp 17   Ht 1.778 m (5' 10\")   Wt 87 kg (191 lb 14.4 oz)   SpO2 97%   BMI 27.53 kg/m²   0-10 (Numeric) Pain Score: 5 - Moderate pain   3 Day Weight Change: -1.085 kg (-2 lb 6.3 oz) per day    Intake and Output    Intake/Output Summary (Last 24 hours) at 12/2/2024 1142  Last data filed at 12/2/2024 1042  Gross per 24 hour   Intake 1160 ml   Output 4000 ml   Net -2840 ml     Physical Exam  Physical Exam  Constitutional:       Appearance: Normal appearance.      Comments: Hiccups    HENT:      Head: Normocephalic and atraumatic.      Nose: Nose normal.      Mouth/Throat:      Mouth: Mucous membranes are moist.   Eyes:      Conjunctiva/sclera: Conjunctivae normal.   Cardiovascular:      Rate and Rhythm: Normal rate.      Pulses: Normal pulses.      Heart sounds: Normal heart sounds.      Comments: SR today in 60s  VVI backup 40  Pulmonary:      Effort: Pulmonary effort is " normal.      Breath sounds: Normal breath sounds.      Comments: Diminish posterior bilateral lung sounds  Abdominal:      General: Bowel sounds are normal.      Palpations: Abdomen is soft.   Musculoskeletal:      Cervical back: Neck supple.      Right lower leg: Edema (+1) present.      Left lower leg: Edema (+1) present.   Skin:     General: Skin is warm and dry.      Capillary Refill: Capillary refill takes less than 2 seconds.      Comments: Right thorocotmy incision well approximated without erythema or drainage. All port sites without erythema or drainage   Neurological:      General: No focal deficit present.      Mental Status: He is alert and oriented to person, place, and time.   Psychiatric:         Mood and Affect: Mood normal.         Behavior: Behavior normal.       Medications  Scheduled medications  acetaminophen, 650 mg, oral, q6h  aspirin, 81 mg, oral, Daily  baclofen, 5 mg, oral, TID  calcium carbonate, 500 mg, oral, Daily  colchicine, 0.6 mg, oral, q12h  fluticasone furoate-vilanteroL, 1 puff, inhalation, Daily  furosemide, 40 mg, intravenous, BID  heparin, 5,000 Units, subcutaneous, q8h  insulin lispro, 0-15 Units, subcutaneous, Before meals & nightly  lidocaine, 1 patch, transdermal, q24h  multivitamin with minerals, 1 tablet, oral, Daily  oxygen, , inhalation, Continuous - Inhalation  perflutren protein A microsphere, 0.5 mL, intravenous, Once in imaging  polyethylene glycol, 17 g, oral, Daily  potassium chloride CR, 40 mEq, oral, Once  sennosides-docusate sodium, 2 tablet, oral, BID  sulfur hexafluoride microsphr, 2 mL, intravenous, Once in imaging    Continuous medications   PRN medications  PRN medications: dextromethorphan-guaifenesin, dextrose **OR** glucagon, naloxone, ondansetron, traMADol    Labs  Results for orders placed or performed during the hospital encounter of 11/26/24 (from the past 24 hours)   POCT GLUCOSE   Result Value Ref Range    POCT Glucose 146 (H) 74 - 99 mg/dL   ECG  12 lead   Result Value Ref Range    Ventricular Rate 67 BPM    Atrial Rate 67 BPM    MN Interval 114 ms    QRS Duration 88 ms    QT Interval 460 ms    QTC Calculation(Bazett) 486 ms    R Axis 6 degrees    T Axis -2 degrees    QRS Count 11 beats    Q Onset 226 ms    P Onset 169 ms    P Offset 210 ms    T Offset 456 ms    QTC Fredericia 477 ms   POCT GLUCOSE   Result Value Ref Range    POCT Glucose 93 74 - 99 mg/dL   Magnesium   Result Value Ref Range    Magnesium 2.10 1.60 - 2.40 mg/dL   CBC   Result Value Ref Range    WBC 10.3 4.4 - 11.3 x10*3/uL    nRBC 0.0 0.0 - 0.0 /100 WBCs    RBC 3.91 (L) 4.50 - 5.90 x10*6/uL    Hemoglobin 11.8 (L) 13.5 - 17.5 g/dL    Hematocrit 34.1 (L) 41.0 - 52.0 %    MCV 87 80 - 100 fL    MCH 30.2 26.0 - 34.0 pg    MCHC 34.6 32.0 - 36.0 g/dL    RDW 12.7 11.5 - 14.5 %    Platelets 208 150 - 450 x10*3/uL   Renal Function Panel   Result Value Ref Range    Glucose 91 74 - 99 mg/dL    Sodium 128 (L) 136 - 145 mmol/L    Potassium 3.5 3.5 - 5.3 mmol/L    Chloride 92 (L) 98 - 107 mmol/L    Bicarbonate 27 21 - 32 mmol/L    Anion Gap 13 10 - 20 mmol/L    Urea Nitrogen 18 6 - 23 mg/dL    Creatinine 0.69 0.50 - 1.30 mg/dL    eGFR >90 >60 mL/min/1.73m*2    Calcium 8.2 (L) 8.6 - 10.6 mg/dL    Phosphorus 2.7 2.5 - 4.9 mg/dL    Albumin 3.2 (L) 3.4 - 5.0 g/dL   POCT GLUCOSE   Result Value Ref Range    POCT Glucose 97 74 - 99 mg/dL   ECG 12 Lead   Result Value Ref Range    Ventricular Rate 66 BPM    Atrial Rate 66 BPM    MN Interval 162 ms    QRS Duration 90 ms    QT Interval 476 ms    QTC Calculation(Bazett) 499 ms    P Axis -63 degrees    R Axis 13 degrees    T Axis -10 degrees    QRS Count 11 beats    Q Onset 217 ms    P Onset 136 ms    P Offset 201 ms    T Offset 455 ms    QTC Fredericia 491 ms         IMAGING/ DIAGNOSTIC TESTING:    XR chest 2 views 11/30/202  Impression  1. Improvement in mild pulmonary edema.  2. Similar mild bibasilar atelectasis with small pleural effusions.    XR chest 1 view  11/29/2024  Impression  1.Post cardiac valve replacement again noted.  2.Perihilar and basilar edema with small effusions grossly similar to  the prior study.    11/29/24 TTE complete with contrast:  CONCLUSIONS:  1. Poorly visualized anatomical structures due to suboptimal image quality.  2. The left ventricular systolic function is low normal, with a Lambert's biplane calculated ejection fraction of 52%.  3. Spectral Doppler shows a Grade III (restrictive pattern) of left ventricular diastolic filling with an elevated left atrial pressure.  4. There is normal right ventricular global systolic function.  5. Status post mitral valve repair.  6. Moderately elevated right ventricular systolic pressure (RVSP 50.5mmhg)    IMPRESSION & PLAN:  POD # 5 s/p Robotically assisted mitral valve repair with 36mm annuloplasty on 11/26/24  - Increase activity/ ambulation; PT/OT  - Encourage IS, C/DB; respiratory therapy; wean O2 as marialuisa   - Cardiac rehab referral   - Continue cardiac meds: ASA, statin   - Continue colchicine x1 month for robotic MidCAB procedure (0.6mg daily for < 70kg, 0.6mg BID for > 70kg; if concurrently receiving amiodarone reduce dose by 50%) (sent to meds to beds 11/30)  - Pain and anticonstipation meds  - 2v CXR completed 11/30  - Postop ECHO completed on 11/29 with trace MR  - Cut epicardial wires prior to discharge   - Tele until discharge  - Optimize nutrition and electrolytes    Hiccups  - Start baclofen today    Rhythm  - 11/29 Tele: Accelerated junctional, will obtain EKG to confirm and check QTC  - 11/29 Will reassess BB initiation in AM secondary to rhythm   - 11/30 No BB today, patient still accelerated junctional, reassess in AM  - 12/01: EKG this AM, still accelerated junctional rhythm. Consider EP consult within 1-2 days if no recovery. No BB  - Adjust medications as tolerated  - 12/2 ECG sinus node appears to be recovering, NSR this morning    Acute Blood Loss Anemia - Stable  Recent Labs      24  0740 24  0648 24  0707 24  0654 24  0419 24  1152 24  0119   HGB 11.8* 11.2* 10.8* 11.3* 11.7* 13.2* 14.2   HCT 34.1* 32.7* 32.3* 34.3* 35.5* 37.7* 42.2   - MV  -  No Iron, Hematocrit > 34%  - Daily labs, transfuse as indicated    Thrombocytopenia - resolved  Recent Labs     24  0740 24  0648 24  0707 24  0654 24  0419 24  1152 24  0119    183 136* 98* 97* 155 159   - Etiology likely postop/CPB related  - Continue to trend with daily CBCs    Volume/Electrolyte Status: Preop wt Weight: 84.5 kg (186 lb 4.6 oz)   Hyponatremia likely in setting of volume overload  Vitals:    24 0516   Weight: 87 kg (191 lb 14.4 oz)     - Weight: Pre-op 84.5,  88.2kg  - Cont lasix 40 mg IV bid   - Replete electrolytes for hypokalemia/hypomagnesemia/hypophosphatemia as needed   - Daily weights and strict I&Os  - Daily RFP while admitted     Leukocytosis - Resolved  Recent Labs     24  0740 24  0648 24  0707 24  0654 24  0419 24  1152 24  0119   WBC 10.3 10.1 12.2* 14.6* 16.6* 21.8* 21.2*     Temp (36hrs), Av.4 °C (97.5 °F), Min:36 °C (96.8 °F), Max:36.7 °C (98.1 °F)      Post-op Pain and Nausea - resolved  -  On oxycodone per unit protocol, will toradol 15 mg IV q 6 hrs x 48 hours per Dr. Goldberg to back off narcotics and help alleviate nausea   - Cont Zofran as needed  - Cont bowel regimen   - Pain improving with Toradol. Nausea has significant improved    Hypertension: home meds: Lisinopril 5mg qd  Systolic (24hrs), Av , Min:108 , Max:122   - currently on no antihypertensive, bp is well controlled  - additional antihypertensives as needed     VTE Prophylaxis: SCDs/TEDs, ambulation, change heparin to lovenox today  Code Status: Full Code    Dispo  - PT/OT recs  low intensity   - Would benefit from homecare RN for cardiac surgery carepath  - Anticipate discharge  2-4 days, pending diuresis and as well as sinus node recovery as well as hiccups control    Tang Hilton PA-C  Cardiac Surgery Resident  [facility]  Team Phone [phone]    12/2/2024  11:42 AM

## 2024-12-03 ENCOUNTER — APPOINTMENT (OUTPATIENT)
Dept: CARDIOLOGY | Facility: HOSPITAL | Age: 54
DRG: 219 | End: 2024-12-03
Payer: COMMERCIAL

## 2024-12-03 LAB
ALBUMIN SERPL BCP-MCNC: 2.9 G/DL (ref 3.4–5)
ALBUMIN SERPL BCP-MCNC: 3.1 G/DL (ref 3.4–5)
ANION GAP SERPL CALC-SCNC: 13 MMOL/L (ref 10–20)
ANION GAP SERPL CALC-SCNC: 14 MMOL/L (ref 10–20)
ANION GAP SERPL CALC-SCNC: 15 MMOL/L (ref 10–20)
ATRIAL RATE: 125 BPM
ATRIAL RATE: 150 BPM
ATRIAL RATE: 55 BPM
ATRIAL RATE: 61 BPM
BUN SERPL-MCNC: 17 MG/DL (ref 6–23)
CALCIUM SERPL-MCNC: 7.9 MG/DL (ref 8.6–10.6)
CALCIUM SERPL-MCNC: 8 MG/DL (ref 8.6–10.6)
CALCIUM SERPL-MCNC: 8 MG/DL (ref 8.6–10.6)
CHLORIDE SERPL-SCNC: 88 MMOL/L (ref 98–107)
CHLORIDE SERPL-SCNC: 90 MMOL/L (ref 98–107)
CHLORIDE SERPL-SCNC: 90 MMOL/L (ref 98–107)
CHLORIDE UR-SCNC: <15 MMOL/L
CHLORIDE/CREATININE (MMOL/G) IN URINE: NORMAL
CO2 SERPL-SCNC: 25 MMOL/L (ref 21–32)
CO2 SERPL-SCNC: 27 MMOL/L (ref 21–32)
CO2 SERPL-SCNC: 28 MMOL/L (ref 21–32)
CREAT SERPL-MCNC: 0.7 MG/DL (ref 0.5–1.3)
CREAT SERPL-MCNC: 0.73 MG/DL (ref 0.5–1.3)
CREAT SERPL-MCNC: 0.83 MG/DL (ref 0.5–1.3)
CREAT UR-MCNC: 108.7 MG/DL (ref 20–370)
EGFRCR SERPLBLD CKD-EPI 2021: >90 ML/MIN/1.73M*2
ERYTHROCYTE [DISTWIDTH] IN BLOOD BY AUTOMATED COUNT: 12.5 % (ref 11.5–14.5)
GLUCOSE BLD MANUAL STRIP-MCNC: 96 MG/DL (ref 74–99)
GLUCOSE SERPL-MCNC: 106 MG/DL (ref 74–99)
GLUCOSE SERPL-MCNC: 133 MG/DL (ref 74–99)
GLUCOSE SERPL-MCNC: 169 MG/DL (ref 74–99)
HCT VFR BLD AUTO: 36 % (ref 41–52)
HGB BLD-MCNC: 12.6 G/DL (ref 13.5–17.5)
MAGNESIUM SERPL-MCNC: 2.21 MG/DL (ref 1.6–2.4)
MAGNESIUM SERPL-MCNC: 2.35 MG/DL (ref 1.6–2.4)
MCH RBC QN AUTO: 30.1 PG (ref 26–34)
MCHC RBC AUTO-ENTMCNC: 35 G/DL (ref 32–36)
MCV RBC AUTO: 86 FL (ref 80–100)
NRBC BLD-RTO: 0 /100 WBCS (ref 0–0)
OSMOLALITY SERPL: 259 MOSM/KG (ref 280–300)
OSMOLALITY UR: 689 MOSM/KG (ref 200–1200)
P AXIS: 4 DEGREES
P OFFSET: 191 MS
P ONSET: 151 MS
PHOSPHATE SERPL-MCNC: 3.2 MG/DL (ref 2.5–4.9)
PHOSPHATE SERPL-MCNC: 3.6 MG/DL (ref 2.5–4.9)
PLATELET # BLD AUTO: 275 X10*3/UL (ref 150–450)
POTASSIUM SERPL-SCNC: 3.2 MMOL/L (ref 3.5–5.3)
POTASSIUM SERPL-SCNC: 3.4 MMOL/L (ref 3.5–5.3)
POTASSIUM SERPL-SCNC: 4.3 MMOL/L (ref 3.5–5.3)
POTASSIUM UR-SCNC: 55 MMOL/L
POTASSIUM/CREAT UR-RTO: 51 MMOL/G CREAT
PR INTERVAL: 130 MS
Q ONSET: 216 MS
Q ONSET: 219 MS
Q ONSET: 220 MS
Q ONSET: 227 MS
QRS COUNT: 10 BEATS
QRS COUNT: 11 BEATS
QRS COUNT: 12 BEATS
QRS COUNT: 23 BEATS
QRS DURATION: 88 MS
QRS DURATION: 88 MS
QRS DURATION: 90 MS
QRS DURATION: 94 MS
QT INTERVAL: 332 MS
QT INTERVAL: 422 MS
QT INTERVAL: 466 MS
QT INTERVAL: 476 MS
QTC CALCULATION(BAZETT): 438 MS
QTC CALCULATION(BAZETT): 469 MS
QTC CALCULATION(BAZETT): 512 MS
QTC CALCULATION(BAZETT): 535 MS
QTC FREDERICIA: 433 MS
QTC FREDERICIA: 443 MS
QTC FREDERICIA: 468 MS
QTC FREDERICIA: 514 MS
R AXIS: 16 DEGREES
R AXIS: 22 DEGREES
R AXIS: 25 DEGREES
R AXIS: 57 DEGREES
RBC # BLD AUTO: 4.18 X10*6/UL (ref 4.5–5.9)
SODIUM SERPL-SCNC: 125 MMOL/L (ref 136–145)
SODIUM SERPL-SCNC: 127 MMOL/L (ref 136–145)
SODIUM SERPL-SCNC: 128 MMOL/L (ref 136–145)
SODIUM UR-SCNC: 12 MMOL/L
SODIUM/CREAT UR-RTO: 11 MMOL/G CREAT
T AXIS: -1 DEGREES
T AXIS: -11 DEGREES
T AXIS: -12 DEGREES
T AXIS: -6 DEGREES
T OFFSET: 385 MS
T OFFSET: 431 MS
T OFFSET: 449 MS
T OFFSET: 465 MS
URATE SERPL-MCNC: 4.7 MG/DL (ref 4–7.5)
URATE UR-MCNC: 136 MG/DL
URIC ACID/CREATININE (MG/G CREAT) IN URINE: 1251 MG/G CREAT
VENTRICULAR RATE: 143 BPM
VENTRICULAR RATE: 61 BPM
VENTRICULAR RATE: 65 BPM
VENTRICULAR RATE: 76 BPM
WBC # BLD AUTO: 9.4 X10*3/UL (ref 4.4–11.3)

## 2024-12-03 PROCEDURE — 83930 ASSAY OF BLOOD OSMOLALITY: CPT

## 2024-12-03 PROCEDURE — 82947 ASSAY GLUCOSE BLOOD QUANT: CPT

## 2024-12-03 PROCEDURE — 80048 BASIC METABOLIC PNL TOTAL CA: CPT | Performed by: STUDENT IN AN ORGANIZED HEALTH CARE EDUCATION/TRAINING PROGRAM

## 2024-12-03 PROCEDURE — 2500000001 HC RX 250 WO HCPCS SELF ADMINISTERED DRUGS (ALT 637 FOR MEDICARE OP)

## 2024-12-03 PROCEDURE — 84300 ASSAY OF URINE SODIUM: CPT

## 2024-12-03 PROCEDURE — 84133 ASSAY OF URINE POTASSIUM: CPT

## 2024-12-03 PROCEDURE — 97110 THERAPEUTIC EXERCISES: CPT | Mod: GP,CQ

## 2024-12-03 PROCEDURE — 83735 ASSAY OF MAGNESIUM: CPT

## 2024-12-03 PROCEDURE — 83935 ASSAY OF URINE OSMOLALITY: CPT

## 2024-12-03 PROCEDURE — 80069 RENAL FUNCTION PANEL: CPT

## 2024-12-03 PROCEDURE — 80048 BASIC METABOLIC PNL TOTAL CA: CPT | Mod: CCI

## 2024-12-03 PROCEDURE — 1200000002 HC GENERAL ROOM WITH TELEMETRY DAILY

## 2024-12-03 PROCEDURE — 2500000002 HC RX 250 W HCPCS SELF ADMINISTERED DRUGS (ALT 637 FOR MEDICARE OP, ALT 636 FOR OP/ED)

## 2024-12-03 PROCEDURE — 84560 ASSAY OF URINE/URIC ACID: CPT

## 2024-12-03 PROCEDURE — 2500000004 HC RX 250 GENERAL PHARMACY W/ HCPCS (ALT 636 FOR OP/ED): Performed by: PHYSICIAN ASSISTANT

## 2024-12-03 PROCEDURE — 2500000002 HC RX 250 W HCPCS SELF ADMINISTERED DRUGS (ALT 637 FOR MEDICARE OP, ALT 636 FOR OP/ED): Performed by: STUDENT IN AN ORGANIZED HEALTH CARE EDUCATION/TRAINING PROGRAM

## 2024-12-03 PROCEDURE — 94640 AIRWAY INHALATION TREATMENT: CPT

## 2024-12-03 PROCEDURE — 2500000004 HC RX 250 GENERAL PHARMACY W/ HCPCS (ALT 636 FOR OP/ED): Performed by: STUDENT IN AN ORGANIZED HEALTH CARE EDUCATION/TRAINING PROGRAM

## 2024-12-03 PROCEDURE — 84550 ASSAY OF BLOOD/URIC ACID: CPT

## 2024-12-03 PROCEDURE — 93010 ELECTROCARDIOGRAM REPORT: CPT | Performed by: INTERNAL MEDICINE

## 2024-12-03 PROCEDURE — 97112 NEUROMUSCULAR REEDUCATION: CPT | Mod: GP,CQ

## 2024-12-03 PROCEDURE — 2500000001 HC RX 250 WO HCPCS SELF ADMINISTERED DRUGS (ALT 637 FOR MEDICARE OP): Performed by: STUDENT IN AN ORGANIZED HEALTH CARE EDUCATION/TRAINING PROGRAM

## 2024-12-03 PROCEDURE — 99221 1ST HOSP IP/OBS SF/LOW 40: CPT | Performed by: STUDENT IN AN ORGANIZED HEALTH CARE EDUCATION/TRAINING PROGRAM

## 2024-12-03 PROCEDURE — 82436 ASSAY OF URINE CHLORIDE: CPT

## 2024-12-03 PROCEDURE — 85027 COMPLETE CBC AUTOMATED: CPT

## 2024-12-03 PROCEDURE — 93005 ELECTROCARDIOGRAM TRACING: CPT

## 2024-12-03 PROCEDURE — 99232 SBSQ HOSP IP/OBS MODERATE 35: CPT

## 2024-12-03 PROCEDURE — 83735 ASSAY OF MAGNESIUM: CPT | Performed by: STUDENT IN AN ORGANIZED HEALTH CARE EDUCATION/TRAINING PROGRAM

## 2024-12-03 RX ORDER — POTASSIUM CHLORIDE 20 MEQ/1
40 TABLET, EXTENDED RELEASE ORAL ONCE
Status: COMPLETED | OUTPATIENT
Start: 2024-12-03 | End: 2024-12-03

## 2024-12-03 RX ORDER — POTASSIUM CHLORIDE 20 MEQ/1
20 TABLET, EXTENDED RELEASE ORAL ONCE
Status: COMPLETED | OUTPATIENT
Start: 2024-12-03 | End: 2024-12-03

## 2024-12-03 RX ORDER — METOPROLOL TARTRATE 25 MG/1
12.5 TABLET, FILM COATED ORAL EVERY 12 HOURS
Status: DISCONTINUED | OUTPATIENT
Start: 2024-12-03 | End: 2024-12-03

## 2024-12-03 RX ORDER — METOPROLOL TARTRATE 25 MG/1
12.5 TABLET, FILM COATED ORAL 2 TIMES DAILY
Status: DISCONTINUED | OUTPATIENT
Start: 2024-12-03 | End: 2024-12-03

## 2024-12-03 RX ORDER — POTASSIUM CHLORIDE 14.9 MG/ML
20 INJECTION INTRAVENOUS ONCE
Status: COMPLETED | OUTPATIENT
Start: 2024-12-03 | End: 2024-12-03

## 2024-12-03 RX ORDER — TALC
3 POWDER (GRAM) TOPICAL NIGHTLY PRN
Status: DISCONTINUED | OUTPATIENT
Start: 2024-12-03 | End: 2024-12-09 | Stop reason: HOSPADM

## 2024-12-03 RX ORDER — METOPROLOL TARTRATE 25 MG/1
12.5 TABLET, FILM COATED ORAL EVERY 6 HOURS
Status: DISCONTINUED | OUTPATIENT
Start: 2024-12-03 | End: 2024-12-03

## 2024-12-03 ASSESSMENT — PAIN SCALES - GENERAL
PAINLEVEL_OUTOF10: 7
PAINLEVEL_OUTOF10: 0 - NO PAIN
PAINLEVEL_OUTOF10: 4
PAINLEVEL_OUTOF10: 4
PAINLEVEL_OUTOF10: 2
PAINLEVEL_OUTOF10: 4

## 2024-12-03 ASSESSMENT — COGNITIVE AND FUNCTIONAL STATUS - GENERAL
WALKING IN HOSPITAL ROOM: A LITTLE
MOBILITY SCORE: 23
CLIMB 3 TO 5 STEPS WITH RAILING: A LITTLE
MOVING FROM LYING ON BACK TO SITTING ON SIDE OF FLAT BED WITH BEDRAILS: A LITTLE
TURNING FROM BACK TO SIDE WHILE IN FLAT BAD: A LITTLE
DAILY ACTIVITIY SCORE: 24
MOBILITY SCORE: 20
CLIMB 3 TO 5 STEPS WITH RAILING: A LITTLE

## 2024-12-03 ASSESSMENT — PAIN DESCRIPTION - LOCATION: LOCATION: CHEST

## 2024-12-03 ASSESSMENT — PAIN SCALES - WONG BAKER
WONGBAKER_NUMERICALRESPONSE: HURTS LITTLE MORE
WONGBAKER_NUMERICALRESPONSE: HURTS LITTLE BIT

## 2024-12-03 ASSESSMENT — PAIN DESCRIPTION - ORIENTATION: ORIENTATION: MID

## 2024-12-03 ASSESSMENT — PAIN - FUNCTIONAL ASSESSMENT
PAIN_FUNCTIONAL_ASSESSMENT: 0-10
PAIN_FUNCTIONAL_ASSESSMENT: 0-10

## 2024-12-03 NOTE — PROGRESS NOTES
"CARDIAC SURGERY DAILY PROGRESS NOTE    Gavin Moreau is a 54 y.o. with PMH/PSH of Mitral valve prolapse, asthma, GERD, rheumatic fever Presents to the CTICU from the OR s/p Robotic MVR via mini thoracotomy with Dr. Goldberg on 11/26. While attempting to transfer to the CTICU bed in OR patient R on T/VF arrested secondary to pacer spike requiring CPR and shock x 2 with ROSC after 4 minutes.     OPERATION/PROCEDURE: 11/26/24 with Simeon Goldberg MD, MS  1.Radical mitral valve repair with preshaped [place]-Car[place] cords and 36 mm annuloplasty ring  2.Robotically assisted mitral valve repair  3.Right femoral arterial and venous cannulation for cardiopulmonary bypass     CTICU Course: Cardiac arrest in the immediate post-op. Once in CTICU, uncomplicated course     Transferred to T3: on 11/28    Interval History:   AF RVR placed on amiodarone drip    SUBJECTIVE:  Patient seen and examined. Continues to have hiccups today. Tele: SR <-> junctional. Current labs reviewed.    Objective   /69 (BP Location: Left arm, Patient Position: Sitting)   Pulse 64   Temp 36.4 °C (97.5 °F) (Temporal)   Resp 16   Ht 1.778 m (5' 10\")   Wt 86.9 kg (191 lb 8 oz)   SpO2 96%   BMI 27.48 kg/m²   0-10 (Numeric) Pain Score: 0 - No pain  Son-Baker FACES Pain Rating: Hurts little bit   3 Day Weight Change: -0.423 kg (-14.9 oz) per day    Intake and Output    Intake/Output Summary (Last 24 hours) at 12/3/2024 1805  Last data filed at 12/3/2024 1751  Gross per 24 hour   Intake 1408.8 ml   Output 2425 ml   Net -1016.2 ml     Physical Exam  Physical Exam  Constitutional:       Appearance: Normal appearance.      Comments: Hiccups    HENT:      Head: Normocephalic and atraumatic.      Nose: Nose normal.      Mouth/Throat:      Mouth: Mucous membranes are moist.   Eyes:      Conjunctiva/sclera: Conjunctivae normal.   Cardiovascular:      Rate and Rhythm: Normal rate.      Pulses: Normal pulses.      Heart sounds: Normal heart sounds.      " Comments: SR today in 60s  VVI backup 40  Pulmonary:      Effort: Pulmonary effort is normal.      Breath sounds: Normal breath sounds.      Comments: Diminish posterior bilateral lung sounds  Abdominal:      General: Bowel sounds are normal.      Palpations: Abdomen is soft.   Musculoskeletal:      Cervical back: Neck supple.      Right lower leg: Edema (+1) present.      Left lower leg: Edema (+1) present.   Skin:     General: Skin is warm and dry.      Capillary Refill: Capillary refill takes less than 2 seconds.      Comments: Right thorocotmy incision well approximated without erythema or drainage. All port sites without erythema or drainage   Neurological:      General: No focal deficit present.      Mental Status: He is alert and oriented to person, place, and time.   Psychiatric:         Mood and Affect: Mood normal.         Behavior: Behavior normal.       Medications  Scheduled medications  acetaminophen, 650 mg, oral, q6h  [START ON 12/4/2024] apixaban, 5 mg, oral, BID  aspirin, 81 mg, oral, Daily  calcium carbonate, 500 mg, oral, Daily  colchicine, 0.6 mg, oral, q12h  fluticasone furoate-vilanteroL, 1 puff, inhalation, Daily  multivitamin with minerals, 1 tablet, oral, Daily  oxygen, , inhalation, Continuous - Inhalation  polyethylene glycol, 17 g, oral, Daily  sennosides-docusate sodium, 2 tablet, oral, BID    Continuous medications   PRN medications  PRN medications: dextromethorphan-guaifenesin, dextrose **OR** glucagon, melatonin, naloxone, traMADol    Labs  Results for orders placed or performed during the hospital encounter of 11/26/24 (from the past 24 hours)   POCT GLUCOSE   Result Value Ref Range    POCT Glucose 123 (H) 74 - 99 mg/dL   POCT GLUCOSE   Result Value Ref Range    POCT Glucose 128 (H) 74 - 99 mg/dL   ECG 12 Lead   Result Value Ref Range    Ventricular Rate 143 BPM    Atrial Rate 150 BPM    QRS Duration 88 ms    QT Interval 332 ms    QTC Calculation(Bazett) 512 ms    R Axis 57 degrees     T Axis -12 degrees    QRS Count 23 beats    Q Onset 219 ms    T Offset 385 ms    QTC Fredericia 443 ms   CBC   Result Value Ref Range    WBC 9.4 4.4 - 11.3 x10*3/uL    nRBC 0.0 0.0 - 0.0 /100 WBCs    RBC 4.18 (L) 4.50 - 5.90 x10*6/uL    Hemoglobin 12.6 (L) 13.5 - 17.5 g/dL    Hematocrit 36.0 (L) 41.0 - 52.0 %    MCV 86 80 - 100 fL    MCH 30.1 26.0 - 34.0 pg    MCHC 35.0 32.0 - 36.0 g/dL    RDW 12.5 11.5 - 14.5 %    Platelets 275 150 - 450 x10*3/uL   Basic metabolic panel   Result Value Ref Range    Glucose 106 (H) 74 - 99 mg/dL    Sodium 127 (L) 136 - 145 mmol/L    Potassium 3.2 (L) 3.5 - 5.3 mmol/L    Chloride 90 (L) 98 - 107 mmol/L    Bicarbonate 27 21 - 32 mmol/L    Anion Gap 13 10 - 20 mmol/L    Urea Nitrogen 17 6 - 23 mg/dL    Creatinine 0.83 0.50 - 1.30 mg/dL    eGFR >90 >60 mL/min/1.73m*2    Calcium 8.0 (L) 8.6 - 10.6 mg/dL   Magnesium   Result Value Ref Range    Magnesium 2.35 1.60 - 2.40 mg/dL   ECG 12 Lead   Result Value Ref Range    Ventricular Rate 61 BPM    Atrial Rate 61 BPM    NJ Interval 130 ms    QRS Duration 94 ms    QT Interval 466 ms    QTC Calculation(Bazett) 469 ms    P Axis 4 degrees    R Axis 16 degrees    T Axis -6 degrees    QRS Count 10 beats    Q Onset 216 ms    P Onset 151 ms    P Offset 191 ms    T Offset 449 ms    QTC Fredericia 468 ms   Magnesium   Result Value Ref Range    Magnesium 2.21 1.60 - 2.40 mg/dL   Renal Function Panel   Result Value Ref Range    Glucose 169 (H) 74 - 99 mg/dL    Sodium 128 (L) 136 - 145 mmol/L    Potassium 3.4 (L) 3.5 - 5.3 mmol/L    Chloride 88 (L) 98 - 107 mmol/L    Bicarbonate 28 21 - 32 mmol/L    Anion Gap 15 10 - 20 mmol/L    Urea Nitrogen 17 6 - 23 mg/dL    Creatinine 0.70 0.50 - 1.30 mg/dL    eGFR >90 >60 mL/min/1.73m*2    Calcium 8.0 (L) 8.6 - 10.6 mg/dL    Phosphorus 3.2 2.5 - 4.9 mg/dL    Albumin 2.9 (L) 3.4 - 5.0 g/dL   POCT GLUCOSE   Result Value Ref Range    POCT Glucose 96 74 - 99 mg/dL   Renal function panel   Result Value Ref Range     Glucose 133 (H) 74 - 99 mg/dL    Sodium 125 (L) 136 - 145 mmol/L    Potassium 4.3 3.5 - 5.3 mmol/L    Chloride 90 (L) 98 - 107 mmol/L    Bicarbonate 25 21 - 32 mmol/L    Anion Gap 14 10 - 20 mmol/L    Urea Nitrogen 17 6 - 23 mg/dL    Creatinine 0.73 0.50 - 1.30 mg/dL    eGFR >90 >60 mL/min/1.73m*2    Calcium 7.9 (L) 8.6 - 10.6 mg/dL    Phosphorus 3.6 2.5 - 4.9 mg/dL    Albumin 3.1 (L) 3.4 - 5.0 g/dL         IMAGING/ DIAGNOSTIC TESTING:    XR chest 2 views 11/30/202  Impression  1. Improvement in mild pulmonary edema.  2. Similar mild bibasilar atelectasis with small pleural effusions.    XR chest 1 view 11/29/2024  Impression  1.Post cardiac valve replacement again noted.  2.Perihilar and basilar edema with small effusions grossly similar to  the prior study.    11/29/24 TTE complete with contrast:  CONCLUSIONS:  1. Poorly visualized anatomical structures due to suboptimal image quality.  2. The left ventricular systolic function is low normal, with a Lambert's biplane calculated ejection fraction of 52%.  3. Spectral Doppler shows a Grade III (restrictive pattern) of left ventricular diastolic filling with an elevated left atrial pressure.  4. There is normal right ventricular global systolic function.  5. Status post mitral valve repair.  6. Moderately elevated right ventricular systolic pressure (RVSP 50.5mmhg)    IMPRESSION & PLAN:  POD # 6 s/p Robotically assisted mitral valve repair with 36mm annuloplasty on 11/26/24  - Increase activity/ ambulation; PT/OT  - Encourage IS, C/DB; respiratory therapy; wean O2 as mairaluisa   - Cardiac rehab referral   - Continue cardiac meds: ASA, statin   - Continue colchicine x1 month for robotic MidCAB procedure (0.6mg daily for < 70kg, 0.6mg BID for > 70kg; if concurrently receiving amiodarone reduce dose by 50%) (sent to meds to beds 11/30)  - Pain and anticonstipation meds  - 2v CXR completed 11/30  - Postop TTE completed on 11/29 with trace MR, EF 52%, RVSP 50  - Cut epicardial  wires prior to discharge   - Tele until discharge  - Optimize nutrition and electrolytes    Hiccups  - 12/2 Start baclofen today -> stopped secondary to QTC > 500    Rhythm  - 11/29 Tele: Accelerated junctional, will obtain EKG to confirm and check QTC  - 11/29 Will reassess BB initiation in AM secondary to rhythm   - 11/30 No BB today, patient still accelerated junctional, reassess in AM  - 12/01: EKG this AM, still accelerated junctional rhythm. Consider EP consult within 1-2 days if no recovery. No BB  - Adjust medications as tolerated  - 12/2 ECG sinus node appears to be recovering, NSR this morning  - 12/2 Patient developed multiple episodes of NSVT and AF RVR. EP consulted  - 12/3 Tele: SR <-> junctional. Case discussed with EP. Will stop BB and AAD to allow sinus node to recovery. If patient remains junctional, pt may need a PPM  - 12/3 Will start Eliquis 5 mg bid for AF in AM (Ok to start DOAC per Dr Goldberg)    Acute Blood Loss Anemia - Stable  Recent Labs     12/03/24  0402 12/02/24  0740 12/01/24  0648 11/30/24  0707 11/29/24  0654 11/28/24  0419 11/27/24  1152   HGB 12.6* 11.8* 11.2* 10.8* 11.3* 11.7* 13.2*   HCT 36.0* 34.1* 32.7* 32.3* 34.3* 35.5* 37.7*   - MV  - 11/29 No Iron, Hematocrit > 34%  - Daily labs, transfuse as indicated    Thrombocytopenia - resolved  Recent Labs     12/03/24  0402 12/02/24  0740 12/01/24  0648 11/30/24  0707 11/29/24  0654 11/28/24  0419 11/27/24  1152    208 183 136* 98* 97* 155   - Etiology likely postop/CPB related  - Continue to trend with daily CBCs    Volume/Electrolyte Status: Preop wt Weight: 84.5 kg (186 lb 4.6 oz)   Hyponatremia likely in setting of volume overload  Vitals:    12/03/24 0600   Weight: 86.9 kg (191 lb 8 oz)     - Weight: Pre-op 84.5, 12/3 86.9 kg  - 12/3 Lasix 40 mg IV bid has been on hold since 12/2 PM  - Replete electrolytes for hypokalemia/hypomagnesemia/hypophosphatemia as needed   - Daily weights and strict I&Os  - Daily RFP while  admitted    Hyponatremia  - Baseline Na on  normal at 143  -  Na 135,  Na 130,  Na 128, 12/3 127->128->125  - 12/3 Will check urine and serum studies   - 12/3 Will consult nephrology (case discussed with nephrology)     Leukocytosis - Resolved  Recent Labs     24  0402 24  0740 24  0648 24  0707 24  0654 24  0419 24  1152   WBC 9.4 10.3 10.1 12.2* 14.6* 16.6* 21.8*     Temp (36hrs), Av.6 °C (97.8 °F), Min:36 °C (96.8 °F), Max:37.1 °C (98.8 °F)      Post-op Pain and Nausea - resolved  -  On oxycodone per unit protocol, will toradol 15 mg IV q 6 hrs x 48 hours per Dr. Goldberg to back off narcotics and help alleviate nausea   - Cont Zofran as needed  - Cont bowel regimen   - Pain improving with Toradol. Nausea has significant improved    Hypertension: home meds: Lisinopril 5mg qd  Systolic (24hrs), Av , Min:104 , Max:127   - currently on no antihypertensive, bp is well controlled  - additional antihypertensives as needed     VTE Prophylaxis: SCDs/TEDs, ambulation, change heparin to lovenox today  Code Status: Full Code    Dispo  - PT/OT recs  low intensity   - Would benefit from homecare RN for cardiac surgery carepath  - Anticipate discharge 3-4 days, pending sinus node recovery as well as hiccups control and Na normalization     FLORI Torres-CNP  Cardiac Surgery Resident  [facility]  Team Phone [phone]    12/3/2024  6:05 PM

## 2024-12-03 NOTE — CONSULTS
Inpatient consult to Electrophysiology  Consult performed by: Denisha Lopez MD  Consult ordered by: Tang Hilton PA-C      Inpatient EP Consult Note    Reason for consult: VF arrest     HPI:   Gavin Hernandez a 54 y.o. with PMH/PSH of Mitral valve prolapse, asthma, GERD, rheumatic fever Presents to the CTICU from the OR s/p Robotic MVR via mini thoracotomy with Dr. Goldberg on . While attempting to transfer to the CTICU bed in OR patient R on T/VF arrested requiring CPR and shock x 2 with ROSC after 4 minutes.     EP consulted for post VF arrest.     Spoke with patient and his sister at bedside. He denied CP, SOB, LH, dizziness.   He denied FH of CAD, SCD, long QT, cardiomyopathy,  His only meds prior to admission were lisinopril and symbicort, zantac PRN allergies.     Tele strip from VF arrest available:      Media Information              All system reviewed and negative unless mentioned above.     Cardiac studies:   [id number]: Junctional rhythm, nonspec ST changes.     Cardiac Testing:   GISELLE:24    Left Ventricle: Normal left ventricular systolic function with a   visually estimated EF of ~60%. Left ventricle size is normal. Normal wall   thickness.  LVESD 37 cm. Normal wall motion.     Right Ventricle: Right ventricle size is normal. Normal systolic   function.    Mitral Valve: Severe leaflet prolapse noted of the posterior leaflet   (P1, P2 and P3) P2 has severe prolapse compared to other segments. Severe   regurgitation with an eccentrically directed jet that is wall hugging with   choanda effect and may underestimate severity ( images 97-99). Systolic   blunting of the pulmonary veins. No stenosis noted. MV area by PHT is 4.4   cm2.    Tricuspid Valve: Normal RVSP. The estimated RVSP is 25 mmHg.     Left Atrium: Normal sized appendage. No left atrial appendage thrombus   noted. No left atrial appendage mass noted.     Interatrial Septum: No interatrial shunt visualized with color  Doppler.   Agitated saline study was negative with and without provocation.     Pericardium: No pericardial effusion.     Image quality is adequate.         R & LHC: 24  CONCLUSIONS:   1. No significant CAD with minimal luminal irregularities in a right dominant system.   2. Mildly elevated left and right heart filling pressures.   3. Mild pulmonary hypertension.   4. Normal CO/CI.   5. No evidence of intracardiac shunt.   6. No evidence of aortic stenosis.      Current Facility-Administered Medications:     acetaminophen (Tylenol) tablet 650 mg, 650 mg, oral, q6h, Safia Hansen PA-C, 650 mg at 24 0549    [] amiodarone (Nexterone) 360 mg in dextrose,iso-osm 200 mL (1.8 mg/mL) infusion (premix), 1 mg/min, intravenous, Continuous, Last Rate: 33.3 mL/hr at 24 0118, 1 mg/min at 24 0118 **FOLLOWED BY** amiodarone (Nexterone) 360 mg in dextrose,iso-osm 200 mL (1.8 mg/mL) infusion (premix), 1 mg/min, intravenous, Continuous, Coretta Rivera MD, Last Rate: 33.3 mL/hr at 24 0659, 1 mg/min at 24 0659    aspirin EC tablet 81 mg, 81 mg, oral, Daily, Safia Hansen PA-C, 81 mg at 24 0916    calcium carbonate (Tums) chewable tablet 500 mg, 500 mg, oral, Daily, Raza Fong MD, 500 mg at 24 0917    colchicine tablet 0.6 mg, 0.6 mg, oral, q12h, Safia Hansen PA-C, 0.6 mg at 24 0916    dextromethorphan-guaifenesin (Mucinex DM)  mg per 12 hr tablet 1 tablet, 1 tablet, oral, BID PRN, Narcisa Ramirez DO    dextrose 50 % injection 25 g, 25 g, intravenous, q15 min PRN **OR** glucagon (Glucagen) injection 1 mg, 1 mg, intramuscular, q15 min PRN, Safia Hansen PA-C    enoxaparin (Lovenox) syringe 40 mg, 40 mg, subcutaneous, q24h, Tang Hilton PA-C, 40 mg at 24 1702    fluticasone furoate-vilanteroL (Breo Ellipta) 100-25 mcg/dose inhaler 1 puff, 1 puff, inhalation, Daily, Safia Hansen PA-C, 1 puff at 24 0946    [Held by provider] furosemide (Lasix) injection 40 mg,  40 mg, intravenous, BID, KIET Torres, 40 mg at 12/02/24 1702    metoprolol tartrate (Lopressor) tablet 12.5 mg, 12.5 mg, oral, q12h, Coretta Rivera MD, 12.5 mg at 12/03/24 0916    multivitamin with minerals 1 tablet, 1 tablet, oral, Daily, KIET Torres, 1 tablet at 12/03/24 0916    naloxone (Narcan) injection 0.2 mg, 0.2 mg, intravenous, q5 min PRN, Safia Hansen PA-C    [Held by provider] ondansetron (Zofran) injection 4 mg, 4 mg, intravenous, q4h PRN, Safia Eddir PA-C, 4 mg at 12/02/24 1702    oxygen (O2) therapy, , inhalation, Continuous - Inhalation, Safia Hansen PA-C, 21 percent at 12/02/24 0944    polyethylene glycol (Glycolax, Miralax) packet 17 g, 17 g, oral, Daily, Safia Hansen PA-C, 17 g at 11/28/24 0815    potassium chloride CR (Klor-Con M20) ER tablet 20 mEq, 20 mEq, oral, Once, KIET Torres    sennosides-docusate sodium (Taniya-Colace) 8.6-50 mg per tablet 2 tablet, 2 tablet, oral, BID, HOMERO Cueva-C, 2 tablet at 12/02/24 0945    traMADol (Ultram) tablet 50 mg, 50 mg, oral, q6h PRN, Narcisa Ramirez DO, 50 mg at 12/03/24 0238    Objective:    Vitals:    12/03/24 0947   BP: 127/84   Pulse: 59   Resp: 18   Temp: 36.5 °C (97.7 °F)   SpO2: 98%       Exam:  General - well appearing   Neck - no JVD   Chest - CTAB   Cardiac - S1, S2, no murmur heard   Lower ext - No LE edema     Labs/Imaging:     Results from last 72 hours   Lab Units 12/03/24  0701   SODIUM mmol/L 128*   POTASSIUM mmol/L 3.4*   CO2 mmol/L 28   BUN mg/dL 17   CREATININE mg/dL 0.70   MAGNESIUM mg/dL 2.21   PHOSPHORUS mg/dL 3.2      Results from last 72 hours   Lab Units 12/03/24  0402   WBC AUTO x10*3/uL 9.4   HEMOGLOBIN g/dL 12.6*   PLATELETS AUTO x10*3/uL 275        Assessment and Plan:   Gavin ng 54 y.o. with PMH/PSH of Mitral valve prolapse, asthma, GERD, rheumatic fever Presents to the CTICU from the OR s/p Robotic MVR via mini thoracotomy with Dr. Goldberg on 11/26.  "While attempting to transfer to the CTICU bed in OR patient R on T/VF arrested requiring CPR and shock x 2 with ROSC after 4 minutes.     Per OP NOTE \" While the patient was being transferred from the operating room table to the stretcher, he went into ventricular fibrillation secondary to what appears to have been an R on T pacer spike. He received CPR and resuscitative measures and was defibrillated and regained sinus rhythm. \" Tele strip as above.     #V fib arrest (R on T from epicardial pacer undersensing)   #Sinus arrest with junctional escape rhythm   #paroxysmal A fib   -Start anticoagulation whenever is safe from surgical standpoint for a fib   -Allow permissive tachycardia 110-130  -Avoid BB and amiodarone given c/f PMVT/VF related to R on T  -Sensitivity increased on pacer box from 1 to 2 mV to avoid undersensing and R on T phenomenon.   -No indication for 2ry prevention ICD at this point, given above tele strip. However, should monitor for resolution of junctional rhythm. If not, may require PPM.   -Please do not resume amio to allow for sinus node recovery  -Can use metop PRN for A fib RVR only if >130 or symptomatic.     Recommendations are preliminary until note is co-signed by an attending.     Denisha Lopez  Cardiology Fellow   PGY5     "

## 2024-12-03 NOTE — CARE PLAN
Problem: Skin  Goal: Decreased wound size/increased tissue granulation at next dressing change  Outcome: Progressing  Goal: Participates in plan/prevention/treatment measures  Outcome: Progressing  Goal: Prevent/manage excess moisture  Outcome: Progressing  Goal: Prevent/minimize sheer/friction injuries  Outcome: Progressing  Goal: Promote/optimize nutrition  Outcome: Progressing  Goal: Promote skin healing  Outcome: Progressing     Problem: Fall/Injury  Goal: Not fall by end of shift  Outcome: Progressing  Goal: Be free from injury by end of the shift  Outcome: Progressing  Goal: Verbalize understanding of personal risk factors for fall in the hospital  Outcome: Progressing  Goal: Verbalize understanding of risk factor reduction measures to prevent injury from fall in the home  Outcome: Progressing  Goal: Use assistive devices by end of the shift  Outcome: Progressing  Goal: Pace activities to prevent fatigue by end of the shift  Outcome: Progressing     Problem: Pain - Adult  Goal: Verbalizes/displays adequate comfort level or baseline comfort level  Outcome: Progressing     Problem: Safety - Adult  Goal: Free from fall injury  Outcome: Progressing     Problem: Discharge Planning  Goal: Discharge to home or other facility with appropriate resources  Outcome: Progressing     Problem: Chronic Conditions and Co-morbidities  Goal: Patient's chronic conditions and co-morbidity symptoms are monitored and maintained or improved  Outcome: Progressing     Problem: Pain  Goal: Takes deep breaths with improved pain control throughout the shift  Outcome: Progressing  Goal: Turns in bed with improved pain control throughout the shift  Outcome: Progressing  Goal: Walks with improved pain control throughout the shift  Outcome: Progressing  Goal: Performs ADL's with improved pain control throughout shift  Outcome: Progressing  Goal: Participates in PT with improved pain control throughout the shift  Outcome: Progressing  Goal:  Free from opioid side effects throughout the shift  Outcome: Progressing  Goal: Free from acute confusion related to pain meds throughout the shift  Outcome: Progressing      The clinical goals for the shift include pt will remain HDS

## 2024-12-03 NOTE — PROGRESS NOTES
"  Physical Therapy Treatment    Patient Name: Gavin Moreau \"Harinder\"  MRN: [id number]  Today's Date: 12/3/2024  Room: [address] [place] [address]A  Time Calculation  Start Time: 1427  Stop Time: 1451  Time Calculation (min): 24 min       Assessment/Plan   PT Assessment  PT Assessment Results: Decreased strength, Decreased endurance  Rehab Prognosis: Good  Barriers to Discharge: Hypotension with change in position, (+) symptoms  Evaluation/Treatment Tolerance: Patient tolerated treatment well  Medical Staff Made Aware: Yes  Strengths: Ability to acquire knowledge, Attitude of self, Support of extended family/friends, Premorbid level of function  End of Session Communication: Bedside nurse  End of Session Patient Position: Up in chair, Alarm off, not on at start of session, Alarm off, caregiver present     PT Plan  Treatment/Interventions: Bed mobility, Transfer training, Gait training, Stair training, Balance training, Strengthening, Endurance training, Therapeutic exercise, Therapeutic activity  PT Plan: Ongoing PT  PT Frequency: 3 times per week  PT Discharge Recommendations: Low intensity level of continued care  PT Recommended Transfer Status: Assist x1, Contact guard  PT - OK to Discharge: Yes  Assessment: Patient is progressing Well with therapy this date. Pt. Completed Tinetti balance assessment, 5x STS test, there-ex, and ambulation with good tolerance. Vitals remained stable during session. Would continue to benefit from continued skilled PT to address all mobility deficits; Patient remains appropriate for LOW intensity therapy when medically appropriate for discharge from acute stay.  Will continue to follow.     General Visit Information:   PT  Visit  PT Received On: 12/03/24  Response to Previous Treatment: Patient with no complaints from previous session.  Prior to Session Communication: Bedside nurse  Patient Position Received: Bed, 2 rail up, Alarm off, not on at start of session  Family/Caregiver Present: Yes  Caregiver " Feedback: sister at end of session     Subjective   Subjective: pt. Agreeable to PT treatment  Precautions:  Precautions  Medical Precautions: Cardiac precautions, Fall precautions  Vital Signs:  Vital Signs (Past 2hrs)        Date/Time Vitals Session Patient Position Pulse Resp SpO2 BP MAP (mmHg)    12/03/24 1332 --  --  54  17  95 %  127/76  93     12/03/24 1427 Pre PT  Sitting  63  --  --  --  --                     Objective   Pain:  Pain Assessment  Pain Assessment: 0-10  0-10 (Numeric) Pain Score: 0 - No pain  Cognition:  Cognition  Overall Cognitive Status: Within Functional Limits  Orientation Level: Oriented X4  Balance/Neuromuscular Re-Education  Balance/Neuromuscular Re-Education Activity Performed: Yes  Balance/Neuromuscular Re-Education Activity 1: 5xSTS assessment  Balance/Neuromuscular Re-Education Activity 2: Tinetti balance assessment  Static Sitting balance:  Static Sitting Balance  Static Sitting-Balance Support: Bilateral upper extremity supported, Feet supported  Static Sitting-Level of Assistance: Distant supervision  Static Standing Balance:  Static Standing Balance  Static Standing-Balance Support: No upper extremity supported  Static Standing-Level of Assistance: Close supervision  Dynamic Sitting Balance:  Dynamic Sitting Balance  Dynamic Sitting-Balance Support: Bilateral upper extremity supported, Feet supported  Dynamic Sitting-Level of Assistance: Distant supervision  Dynamic Sitting-Balance: Trunk control activities (during seated ther-ex)  Dynamic Standing Balance:  Dynamic Standing Balance  Dynamic Standing-Balance Support: No upper extremity supported  Dynamic Standing-Level of Assistance: Close supervision  Dynamic Standing-Balance: Turning    Lines/Tubes/Drains:  Pacer Wires (Active)   Number of days: 3       PT Treatments:  Therapeutic Exercise  Therapeutic Exercise Performed: Yes  Therapeutic Exercise Activity 1: seated hip flexion B LE x12 reps  Therapeutic Exercise Activity 2:  seated B LE LAQ x12 reps  Therapeutic Exercise Activity 3: seated B LE hip ADD x12 with 5 s holds (pillow between knees)  Therapeutic Exercise Activity 4: seated hip ABD B LE x12 reps with 5s holds (manual resistance)     Bed Mobility  Bed Mobility: No  Ambulation/Gait Training  Ambulation/Gait Training Performed: Yes  Ambulation/Gait Training 1  Surface 1: Level tile  Device 1: No device  Assistance 1: Close supervision  Quality of Gait 1: Decreased step length  Comments/Distance (ft) 1: x1 trials; 50ft; assist with line management; unable to continue amb due to low battery on IV and patient receiving medication through PIV.  Transfers  Transfer: Yes  Transfer 1  Transfer From 1: Sit to, Stand to  Transfer to 1: Sit, Stand  Technique 1: Sit to stand, Stand to sit  Transfer Device 1:  (no device)  Transfer Level of Assistance 1: Close supervision  Trials/Comments 1: x12 trials  Stairs  Stairs: No          Activity tolerance:  Activity Tolerance  Endurance: Tolerates 10 - 20 min exercise with multiple rests    Outcome Measures:  [facility] Basic Mobility  Turning from your back to your side while in a flat bed without using bedrails: A little  Moving from lying on your back to sitting on the side of a flat bed without using bedrails: A little  Moving to and from bed to chair (including a wheelchair): None  Standing up from a chair using your arms (e.g. wheelchair or bedside chair): None  To walk in hospital room: A little  Climbing 3-5 steps with railing: A little  Basic Mobility - Total Score: 20       Tinetti  Sitting Balance: Steady, safe  Arises: Able without using arms  Attempts to Arise: Able to arise, one attempt  Immediate Standing Balance (First 5 Seconds): Steady without walker or other support  Standing Balance: Narrow stance without support  Nudged: Steady without walker or other support  Eyes Closed: Steady  Turned 360 Degrees: Steadiness: Steady  Turned 360 Degrees: Continuity of Steps: Continuous  Sitting  Down: Safe, smooth motion  Balance Score: 16  Initiation of Gait: No hesitancy  Step Height: R Swing Foot: Right foot complete clears floor  Step Length: R Swing Foot: Passes left stance foot  Step Height: L Swing Foot: Left foot complete clears floor  Step Length: L Swing Foot: Passes right stance foot  Step Symmetry: Right and left step appear equal  Step Continuity: Steps appear continuous  Path: Straight without walking aid  Trunk: No sway, no flexion, no use of arms, no walking aid  Walking Time: Heels almost touching while walking  Gait Score: 12  Total Score: 28     Other Measures  5x Sit to Stand: 16 seconds to complete    Education Documentation  Body Mechanics, taught by KELLEY Lees at 12/3/2024  3:10 PM.  Learner: Patient  Readiness: Acceptance  Method: Explanation  Response: Verbalizes Understanding  Comment: reviewed PT rehab POC    Mobility Training, taught by KELLEY Lees at 12/3/2024  3:10 PM.  Learner: Patient  Readiness: Acceptance  Method: Explanation  Response: Verbalizes Understanding  Comment: reviewed PT rehab POC    Education Comments  No comments found.      OP EDUCATION:       Encounter Problems       Encounter Problems (Active)       Mobility       Pt will demonstrated ability to ambulate >/=300ft with proper form and no balance deficits for safe home going.   (Progressing)       Start:  11/27/24    Expected End:  12/11/24            Pt will demonstrate ability to ascend/descend 1 flight of stairs with unilateral rail and no balance deficits for safe home going.  (Progressing)       Start:  11/27/24    Expected End:  12/11/24               PT Transfers       Pt will demonstrate ability to complete 5X STS in < 12 sec with good from and consistency between transfers for safe D/C.  (Progressing)       Start:  11/27/24    Expected End:  12/11/24               Pain - Adult             Encounter Problems (Resolved)       Balance       Pt will demonstrated ability to score at  least 24/28 on the Tinetti balance assessment tool to ensure safety upon D/C.  (Met)       Start:  11/27/24    Expected End:  12/11/24    Resolved:  12/03/24

## 2024-12-04 ENCOUNTER — APPOINTMENT (OUTPATIENT)
Dept: CARDIOLOGY | Facility: HOSPITAL | Age: 54
DRG: 219 | End: 2024-12-04
Payer: COMMERCIAL

## 2024-12-04 LAB
ALBUMIN SERPL BCP-MCNC: 3.2 G/DL (ref 3.4–5)
ALBUMIN SERPL BCP-MCNC: 3.2 G/DL (ref 3.4–5)
ANION GAP SERPL CALC-SCNC: 10 MMOL/L (ref 10–20)
ANION GAP SERPL CALC-SCNC: 12 MMOL/L (ref 10–20)
ATRIAL RATE: 68 BPM
BUN SERPL-MCNC: 15 MG/DL (ref 6–23)
BUN SERPL-MCNC: 16 MG/DL (ref 6–23)
CALCIUM SERPL-MCNC: 8 MG/DL (ref 8.6–10.6)
CALCIUM SERPL-MCNC: 8.3 MG/DL (ref 8.6–10.6)
CHLORIDE SERPL-SCNC: 89 MMOL/L (ref 98–107)
CHLORIDE SERPL-SCNC: 92 MMOL/L (ref 98–107)
CO2 SERPL-SCNC: 27 MMOL/L (ref 21–32)
CO2 SERPL-SCNC: 28 MMOL/L (ref 21–32)
CREAT SERPL-MCNC: 0.66 MG/DL (ref 0.5–1.3)
CREAT SERPL-MCNC: 0.68 MG/DL (ref 0.5–1.3)
EGFRCR SERPLBLD CKD-EPI 2021: >90 ML/MIN/1.73M*2
EGFRCR SERPLBLD CKD-EPI 2021: >90 ML/MIN/1.73M*2
ERYTHROCYTE [DISTWIDTH] IN BLOOD BY AUTOMATED COUNT: 12.9 % (ref 11.5–14.5)
GLUCOSE SERPL-MCNC: 89 MG/DL (ref 74–99)
GLUCOSE SERPL-MCNC: 97 MG/DL (ref 74–99)
HCT VFR BLD AUTO: 38.1 % (ref 41–52)
HGB BLD-MCNC: 13.3 G/DL (ref 13.5–17.5)
MAGNESIUM SERPL-MCNC: 2.18 MG/DL (ref 1.6–2.4)
MCH RBC QN AUTO: 30 PG (ref 26–34)
MCHC RBC AUTO-ENTMCNC: 34.9 G/DL (ref 32–36)
MCV RBC AUTO: 86 FL (ref 80–100)
NRBC BLD-RTO: 0 /100 WBCS (ref 0–0)
PHOSPHATE SERPL-MCNC: 3.5 MG/DL (ref 2.5–4.9)
PHOSPHATE SERPL-MCNC: 3.7 MG/DL (ref 2.5–4.9)
PLATELET # BLD AUTO: 321 X10*3/UL (ref 150–450)
POTASSIUM SERPL-SCNC: 3.6 MMOL/L (ref 3.5–5.3)
POTASSIUM SERPL-SCNC: 3.9 MMOL/L (ref 3.5–5.3)
Q ONSET: 224 MS
QRS COUNT: 11 BEATS
QRS DURATION: 162 MS
QT INTERVAL: 496 MS
QTC CALCULATION(BAZETT): 511 MS
QTC FREDERICIA: 506 MS
R AXIS: 25 DEGREES
RBC # BLD AUTO: 4.43 X10*6/UL (ref 4.5–5.9)
SODIUM SERPL-SCNC: 123 MMOL/L (ref 136–145)
SODIUM SERPL-SCNC: 127 MMOL/L (ref 136–145)
T AXIS: 3 DEGREES
T OFFSET: 472 MS
VENTRICULAR RATE: 64 BPM
WBC # BLD AUTO: 11.5 X10*3/UL (ref 4.4–11.3)

## 2024-12-04 PROCEDURE — 93005 ELECTROCARDIOGRAM TRACING: CPT

## 2024-12-04 PROCEDURE — 80069 RENAL FUNCTION PANEL: CPT

## 2024-12-04 PROCEDURE — 99232 SBSQ HOSP IP/OBS MODERATE 35: CPT

## 2024-12-04 PROCEDURE — 94640 AIRWAY INHALATION TREATMENT: CPT

## 2024-12-04 PROCEDURE — 2500000005 HC RX 250 GENERAL PHARMACY W/O HCPCS

## 2024-12-04 PROCEDURE — 83735 ASSAY OF MAGNESIUM: CPT

## 2024-12-04 PROCEDURE — 2500000001 HC RX 250 WO HCPCS SELF ADMINISTERED DRUGS (ALT 637 FOR MEDICARE OP)

## 2024-12-04 PROCEDURE — 85027 COMPLETE CBC AUTOMATED: CPT

## 2024-12-04 PROCEDURE — 1200000002 HC GENERAL ROOM WITH TELEMETRY DAILY

## 2024-12-04 PROCEDURE — 2500000002 HC RX 250 W HCPCS SELF ADMINISTERED DRUGS (ALT 637 FOR MEDICARE OP, ALT 636 FOR OP/ED)

## 2024-12-04 PROCEDURE — 2500000004 HC RX 250 GENERAL PHARMACY W/ HCPCS (ALT 636 FOR OP/ED)

## 2024-12-04 PROCEDURE — 80069 RENAL FUNCTION PANEL: CPT | Performed by: STUDENT IN AN ORGANIZED HEALTH CARE EDUCATION/TRAINING PROGRAM

## 2024-12-04 RX ORDER — FUROSEMIDE 40 MG/1
40 TABLET ORAL
Status: DISCONTINUED | OUTPATIENT
Start: 2024-12-04 | End: 2024-12-07

## 2024-12-04 RX ORDER — POTASSIUM CHLORIDE 20 MEQ/1
40 TABLET, EXTENDED RELEASE ORAL ONCE
Status: COMPLETED | OUTPATIENT
Start: 2024-12-04 | End: 2024-12-04

## 2024-12-04 RX ORDER — FUROSEMIDE 10 MG/ML
40 INJECTION INTRAMUSCULAR; INTRAVENOUS 2 TIMES DAILY
Status: DISCONTINUED | OUTPATIENT
Start: 2024-12-04 | End: 2024-12-04

## 2024-12-04 ASSESSMENT — COGNITIVE AND FUNCTIONAL STATUS - GENERAL
DAILY ACTIVITIY SCORE: 24
MOBILITY SCORE: 24

## 2024-12-04 ASSESSMENT — PAIN - FUNCTIONAL ASSESSMENT
PAIN_FUNCTIONAL_ASSESSMENT: 0-10

## 2024-12-04 ASSESSMENT — PAIN SCALES - GENERAL
PAINLEVEL_OUTOF10: 3
PAINLEVEL_OUTOF10: 3
PAINLEVEL_OUTOF10: 4
PAINLEVEL_OUTOF10: 0 - NO PAIN
PAINLEVEL_OUTOF10: 3

## 2024-12-04 ASSESSMENT — PAIN DESCRIPTION - DESCRIPTORS: DESCRIPTORS: SORE

## 2024-12-04 NOTE — PROGRESS NOTES
Transitional Care Coordination Progress Note:  Patient was discussed during interdisciplinary rounds.  Team members present: Cardiac surgery NP's, CONTRERAS and TCC  Plan per surgical team: Hyponatremic. EP consult, Nephrology consult  Payer: [facility]  Status: Inpatient  Discharge disposition: Home w [facility]  Potential barriers: none  ADOD: Fri/Sat  Bette Dean RN

## 2024-12-04 NOTE — CONSULTS
Gavin Moreau   54 y.o.    @WT@  MRN/Room: [id number]/3091/3091-A     Mr Gavin Moreau is s/p Robotic MVR via mini thoracotomy with Dr. Goldberg on 11/26 transitioned to CTICU where he coded secondary to pacer spike requiring CPR and shock x 2 with ROSC after 4 minutes. He got fluids initially later they gave lasix 40mg BID starting from 11/29 and last dose given 12/2. Lr 500 ml given 12/3. History of pain controlled with ketorolac, baclofen. History of nausea controlled with zofran.      Subjective:      Patient was seen and evaluated at bedside. Noticed that there has been swelling in lower extremities.      Hyponatremia which developed over 4 days with in hospital stay. baseline sodium 143 on 11/27 and it started to trend down on same day and to 125 12/3. BP has been stable. at RA. His weight trend pre op wt 84 that went up to 90 and after diuresis it is 86kg. Overall during hospital stay he is net negative by 7L. Has been making good UOP of 2.5-3L in last 3-4 days.      TTE from 11/29: EF is 50% with the inferior vena cava appears dilated, with IVC inspiratory collapse less than 50%. Bicarb was elevated to 28-29 in last few days. Serum osmolarity is 259. urine osmolarity is 689, urine sodium 12.     Objective:      Meds:   acetaminophen, 650 mg, q6h  apixaban, 5 mg, BID  aspirin, 81 mg, Daily  calcium carbonate, 500 mg, Daily  colchicine, 0.6 mg, q12h  fluticasone furoate-vilanteroL, 1 puff, Daily  furosemide, 40 mg, BID  multivitamin with minerals, 1 tablet, Daily  oxygen, , Continuous - Inhalation  polyethylene glycol, 17 g, Daily  sennosides-docusate sodium, 2 tablet, BID        dextromethorphan-guaifenesin, 1 tablet, BID PRN  dextrose, 25 g, q15 min PRN   Or  glucagon, 1 mg, q15 min PRN  melatonin, 3 mg, Nightly PRN  naloxone, 0.2 mg, q5 min PRN  traMADol, 50 mg, q6h PRN               Vitals:     12/04/24 0948   BP: 120/70   Pulse: 68   Resp: 16   Temp: 36.4 °C (97.5 °F)   SpO2: 97%            Intake/Output  Summary (Last 24 hours) at 12/4/2024 1117  Last data filed at 12/4/2024 0948      Gross per 24 hour   Intake 594.21 ml   Output 2600 ml   Net -2005.79 ml         Physical Exam:   Physical Exam  Constitutional:       Appearance: Normal appearance.   Cardiovascular:      Rate and Rhythm: Normal rate and regular rhythm.   Pulmonary:      Effort: Pulmonary effort is normal.      Breath sounds: Normal breath sounds.   Abdominal:      General: Abdomen is flat.      Palpations: Abdomen is soft.   Skin:     Comments: 2+ pitting edema in lower extremities   Neurological:      Mental Status: He is alert.            Blood Labs:        Results for orders placed or performed during the hospital encounter of 11/26/24 (from the past 24 hours)   Renal function panel   Result Value Ref Range     Glucose 133 (H) 74 - 99 mg/dL     Sodium 125 (L) 136 - 145 mmol/L     Potassium 4.3 3.5 - 5.3 mmol/L     Chloride 90 (L) 98 - 107 mmol/L     Bicarbonate 25 21 - 32 mmol/L     Anion Gap 14 10 - 20 mmol/L     Urea Nitrogen 17 6 - 23 mg/dL     Creatinine 0.73 0.50 - 1.30 mg/dL     eGFR >90 >60 mL/min/1.73m*2     Calcium 7.9 (L) 8.6 - 10.6 mg/dL     Phosphorus 3.6 2.5 - 4.9 mg/dL     Albumin 3.1 (L) 3.4 - 5.0 g/dL   Osmolality   Result Value Ref Range     Osmolality, Serum 259 (L) 280 - 300 mOsm/kg   Uric Acid   Result Value Ref Range     Uric Acid 4.7 4.0 - 7.5 mg/dL   Osmolality, urine   Result Value Ref Range     Osmolality, Urine Random 689 200 - 1,200 mOsm/kg   Potassium, Urine Random   Result Value Ref Range     Potassium, Urine Random 55 mmol/L     Creatinine, Urine Random 108.7 20.0 - 370.0 mg/dL     Potassium/Creatinine Ratio 51 Not established mmol/g Creat   Sodium, Urine Random   Result Value Ref Range     Sodium, Urine Random 12 mmol/L     Creatinine, Urine Random 108.7 20.0 - 370.0 mg/dL     Sodium/Creatinine Ratio 11 Not established. mmol/g Creat   Chloride, Urine Random   Result Value Ref Range     Chloride, Urine Random <15  mmol/L     Creatinine, Urine Random 108.7 20.0 - 370.0 mg/dL     Chloride/Creatinine Ratio       Uric Acid, Urine Random   Result Value Ref Range     Uric Acid, Urine Random 136 mg/dL     Creatinine, Urine Random 108.7 20.0 - 370.0 mg/dL     Uric Acid/Creatinine Ratio 1,251 Not established. mg/g creat   Renal function panel   Result Value Ref Range     Glucose 97 74 - 99 mg/dL     Sodium 123 (L) 136 - 145 mmol/L     Potassium 3.9 3.5 - 5.3 mmol/L     Chloride 89 (L) 98 - 107 mmol/L     Bicarbonate 28 21 - 32 mmol/L     Anion Gap 10 10 - 20 mmol/L     Urea Nitrogen 16 6 - 23 mg/dL     Creatinine 0.68 0.50 - 1.30 mg/dL     eGFR >90 >60 mL/min/1.73m*2     Calcium 8.0 (L) 8.6 - 10.6 mg/dL     Phosphorus 3.5 2.5 - 4.9 mg/dL     Albumin 3.2 (L) 3.4 - 5.0 g/dL   Magnesium   Result Value Ref Range     Magnesium 2.18 1.60 - 2.40 mg/dL   CBC   Result Value Ref Range     WBC 11.5 (H) 4.4 - 11.3 x10*3/uL     nRBC 0.0 0.0 - 0.0 /100 WBCs     RBC 4.43 (L) 4.50 - 5.90 x10*6/uL     Hemoglobin 13.3 (L) 13.5 - 17.5 g/dL     Hematocrit 38.1 (L) 41.0 - 52.0 %     MCV 86 80 - 100 fL     MCH 30.0 26.0 - 34.0 pg     MCHC 34.9 32.0 - 36.0 g/dL     RDW 12.9 11.5 - 14.5 %     Platelets 321 150 - 450 x10*3/uL   Renal Function Panel   Result Value Ref Range     Glucose 89 74 - 99 mg/dL     Sodium 127 (L) 136 - 145 mmol/L     Potassium 3.6 3.5 - 5.3 mmol/L     Chloride 92 (L) 98 - 107 mmol/L     Bicarbonate 27 21 - 32 mmol/L     Anion Gap 12 10 - 20 mmol/L     Urea Nitrogen 15 6 - 23 mg/dL     Creatinine 0.66 0.50 - 1.30 mg/dL     eGFR >90 >60 mL/min/1.73m*2     Calcium 8.3 (L) 8.6 - 10.6 mg/dL     Phosphorus 3.7 2.5 - 4.9 mg/dL     Albumin 3.2 (L) 3.4 - 5.0 g/dL            ASSESSMENT:  Mr Gavin Moreau is s/p Robotic MVR via mini thoracotomy with Dr. Goldberg on 11/26 transitioned to CTICU where he coded secondary to pacer spike requiring CPR and shock x 2 with ROSC after 4 minutes. He got fluids initially later they gave lasix 40mg BID  starting from 11/29 and last dose given 12/2. Lr 500 ml given 12/3. History of pain controlled with ketorolac, baclofen. History of nausea controlled with zofran.      # hypervolemic hypotonic hyponatremia   :: baseline sodium 143 on 11/27 and it started to trend down on same day and to 125 12/3   :: TTE from 11/29: EF is 50% with the inferior vena cava appears dilated, with IVC inspiratory collapse less than 50%.   :: Serum osmolarity is 259. urine osmolarity is 689, urine sodium 12.   :: Orthostatics negative   :: Pitting edema present on physical exam         Recommendations:  - Fluid restriction to 1.2L per day + lasix 40 mg PO BID. Fluid restriction to keep him net negative by 1L or more   - stop NSAID ( ketorolac) and baclofen that may cause hyponatremia as well. Pain control with other meds.   - RFP BID   - strict Is/Os  - Renal dosing for medications for latest eGFR, follow medication trough as appropriate  - Avoid hypotension/rapid fluctuations in BPs  - Will continue to follow      Oli Olvera DO        Discussed with attending nephrologist     Oli Olvera DO

## 2024-12-04 NOTE — PROGRESS NOTES
NEPHROLOGY FOLLOW UP NOTE    Gavin Moreau   54 y.o.    @WT@  MRN/Room: [id number]/3091-A    Mr Gavin Moreau is s/p Robotic MVR via mini thoracotomy with Dr. Goldberg on 11/26 transitioned to CTICU where he coded secondary to pacer spike requiring CPR and shock x 2 with ROSC after 4 minutes. He got fluids initially later they gave lasix 40mg BID starting from 11/29 and last dose given 12/2. Lr 500 ml given 12/3. History of pain controlled with ketorolac, baclofen. History of nausea controlled with zofran.     Subjective:      Patient was seen and evaluated at bedside. Noticed that there has been swelling in lower extremities.     Objective:     Meds:   acetaminophen, 650 mg, q6h  apixaban, 5 mg, BID  aspirin, 81 mg, Daily  calcium carbonate, 500 mg, Daily  colchicine, 0.6 mg, q12h  fluticasone furoate-vilanteroL, 1 puff, Daily  furosemide, 40 mg, BID  multivitamin with minerals, 1 tablet, Daily  oxygen, , Continuous - Inhalation  polyethylene glycol, 17 g, Daily  sennosides-docusate sodium, 2 tablet, BID         dextromethorphan-guaifenesin, 1 tablet, BID PRN  dextrose, 25 g, q15 min PRN   Or  glucagon, 1 mg, q15 min PRN  melatonin, 3 mg, Nightly PRN  naloxone, 0.2 mg, q5 min PRN  traMADol, 50 mg, q6h PRN        Vitals:    12/04/24 0948   BP: 120/70   Pulse: 68   Resp: 16   Temp: 36.4 °C (97.5 °F)   SpO2: 97%          Intake/Output Summary (Last 24 hours) at 12/4/2024 1117  Last data filed at 12/4/2024 0948  Gross per 24 hour   Intake 594.21 ml   Output 2600 ml   Net -2005.79 ml       Physical Exam:   Physical Exam  Constitutional:       Appearance: Normal appearance.   Cardiovascular:      Rate and Rhythm: Normal rate and regular rhythm.   Pulmonary:      Effort: Pulmonary effort is normal.      Breath sounds: Normal breath sounds.   Abdominal:      General: Abdomen is flat.      Palpations: Abdomen is soft.   Skin:     Comments: 2+ pitting edema in lower extremities   Neurological:      Mental Status: He is  alert.          Blood Labs:  Results for orders placed or performed during the hospital encounter of 11/26/24 (from the past 24 hours)   Renal function panel   Result Value Ref Range    Glucose 133 (H) 74 - 99 mg/dL    Sodium 125 (L) 136 - 145 mmol/L    Potassium 4.3 3.5 - 5.3 mmol/L    Chloride 90 (L) 98 - 107 mmol/L    Bicarbonate 25 21 - 32 mmol/L    Anion Gap 14 10 - 20 mmol/L    Urea Nitrogen 17 6 - 23 mg/dL    Creatinine 0.73 0.50 - 1.30 mg/dL    eGFR >90 >60 mL/min/1.73m*2    Calcium 7.9 (L) 8.6 - 10.6 mg/dL    Phosphorus 3.6 2.5 - 4.9 mg/dL    Albumin 3.1 (L) 3.4 - 5.0 g/dL   Osmolality   Result Value Ref Range    Osmolality, Serum 259 (L) 280 - 300 mOsm/kg   Uric Acid   Result Value Ref Range    Uric Acid 4.7 4.0 - 7.5 mg/dL   Osmolality, urine   Result Value Ref Range    Osmolality, Urine Random 689 200 - 1,200 mOsm/kg   Potassium, Urine Random   Result Value Ref Range    Potassium, Urine Random 55 mmol/L    Creatinine, Urine Random 108.7 20.0 - 370.0 mg/dL    Potassium/Creatinine Ratio 51 Not established mmol/g Creat   Sodium, Urine Random   Result Value Ref Range    Sodium, Urine Random 12 mmol/L    Creatinine, Urine Random 108.7 20.0 - 370.0 mg/dL    Sodium/Creatinine Ratio 11 Not established. mmol/g Creat   Chloride, Urine Random   Result Value Ref Range    Chloride, Urine Random <15 mmol/L    Creatinine, Urine Random 108.7 20.0 - 370.0 mg/dL    Chloride/Creatinine Ratio     Uric Acid, Urine Random   Result Value Ref Range    Uric Acid, Urine Random 136 mg/dL    Creatinine, Urine Random 108.7 20.0 - 370.0 mg/dL    Uric Acid/Creatinine Ratio 1,251 Not established. mg/g creat   Renal function panel   Result Value Ref Range    Glucose 97 74 - 99 mg/dL    Sodium 123 (L) 136 - 145 mmol/L    Potassium 3.9 3.5 - 5.3 mmol/L    Chloride 89 (L) 98 - 107 mmol/L    Bicarbonate 28 21 - 32 mmol/L    Anion Gap 10 10 - 20 mmol/L    Urea Nitrogen 16 6 - 23 mg/dL    Creatinine 0.68 0.50 - 1.30 mg/dL    eGFR >90 >60  mL/min/1.73m*2    Calcium 8.0 (L) 8.6 - 10.6 mg/dL    Phosphorus 3.5 2.5 - 4.9 mg/dL    Albumin 3.2 (L) 3.4 - 5.0 g/dL   Magnesium   Result Value Ref Range    Magnesium 2.18 1.60 - 2.40 mg/dL   CBC   Result Value Ref Range    WBC 11.5 (H) 4.4 - 11.3 x10*3/uL    nRBC 0.0 0.0 - 0.0 /100 WBCs    RBC 4.43 (L) 4.50 - 5.90 x10*6/uL    Hemoglobin 13.3 (L) 13.5 - 17.5 g/dL    Hematocrit 38.1 (L) 41.0 - 52.0 %    MCV 86 80 - 100 fL    MCH 30.0 26.0 - 34.0 pg    MCHC 34.9 32.0 - 36.0 g/dL    RDW 12.9 11.5 - 14.5 %    Platelets 321 150 - 450 x10*3/uL   Renal Function Panel   Result Value Ref Range    Glucose 89 74 - 99 mg/dL    Sodium 127 (L) 136 - 145 mmol/L    Potassium 3.6 3.5 - 5.3 mmol/L    Chloride 92 (L) 98 - 107 mmol/L    Bicarbonate 27 21 - 32 mmol/L    Anion Gap 12 10 - 20 mmol/L    Urea Nitrogen 15 6 - 23 mg/dL    Creatinine 0.66 0.50 - 1.30 mg/dL    eGFR >90 >60 mL/min/1.73m*2    Calcium 8.3 (L) 8.6 - 10.6 mg/dL    Phosphorus 3.7 2.5 - 4.9 mg/dL    Albumin 3.2 (L) 3.4 - 5.0 g/dL          ASSESSMENT:  Mr Gavin oMreau is s/p Robotic MVR via mini thoracotomy with Dr. Goldberg on 11/26 transitioned to CTICU where he coded secondary to pacer spike requiring CPR and shock x 2 with ROSC after 4 minutes. He got fluids initially later they gave lasix 40mg BID starting from 11/29 and last dose given 12/2. Lr 500 ml given 12/3. History of pain controlled with ketorolac, baclofen. History of nausea controlled with zofran.     # hypervolemic hypotonic hyponatremia   :: baseline sodium 143 on 11/27 and it started to trend down on same day and to 125 12/3   :: TTE from 11/29: EF is 50% with the inferior vena cava appears dilated, with IVC inspiratory collapse less than 50%.   :: Serum osmolarity is 259. urine osmolarity is 689, urine sodium 12.   :: Orthostatics negative   :: Pitting edema present on physical exam       Recommendations:  - Fluid restriction to 1.2L per day + lasix 40 mg PO BID. Fluid restriction to keep him net  negative by 1L or more   - stop NSAID ( ketorolac) and baclofen that may cause hyponatremia as well. Pain control with other meds.   - RFP BID   - strict Is/Os  - Renal dosing for medications for latest eGFR, follow medication trough as appropriate  - Avoid hypotension/rapid fluctuations in BPs  - Will continue to follow     Oli Olvera DO      Discussed with attending nephrologist       Oli Olvera DO

## 2024-12-04 NOTE — PROGRESS NOTES
"CARDIAC SURGERY DAILY PROGRESS NOTE    Gavin Moreau is a 54 y.o. with PMH/PSH of Mitral valve prolapse, asthma, GERD, rheumatic fever Presents to the CTICU from the OR s/p Robotic MVR via mini thoracotomy with Dr. Goldberg on 11/26. While attempting to transfer to the CTICU bed in OR patient R on T/VF arrested secondary to pacer spike requiring CPR and shock x 2 with ROSC after 4 minutes.     OPERATION/PROCEDURE: 11/26/24 with Simeon Goldberg MD, MS  1.Radical mitral valve repair with preshaped Lenoir-Carlo cords and 36 mm annuloplasty ring  2.Robotically assisted mitral valve repair  3.Right femoral arterial and venous cannulation for cardiopulmonary bypass     CTICU Course: Cardiac arrest in the immediate post-op. Once in CTICU, uncomplicated course     Transferred to T3: on 11/28    Interval History:   No acute events reported. Na decreased to 123 at midnight    SUBJECTIVE:  Patient seen and examined. Tele: SR <-> junctional. One episode of AF RVR lasting a few minutes. Current labs reviewed.    Objective   BP 99/63 (BP Location: Left arm, Patient Position: Sitting)   Pulse 72   Temp 36 °C (96.8 °F) (Temporal)   Resp 16   Ht 1.778 m (5' 10\")   Wt 88 kg (193 lb 14.4 oz)   SpO2 98%   BMI 27.82 kg/m²   0-10 (Numeric) Pain Score: 4   3 Day Weight Change: -0.091 kg (-3.2 oz) per day    Intake and Output    Intake/Output Summary (Last 24 hours) at 12/4/2024 1627  Last data filed at 12/4/2024 1536  Gross per 24 hour   Intake 742.08 ml   Output 3525 ml   Net -2782.92 ml     Physical Exam  Physical Exam  Constitutional:       Appearance: Normal appearance.      Comments: Hiccups    HENT:      Head: Normocephalic and atraumatic.      Nose: Nose normal.      Mouth/Throat:      Mouth: Mucous membranes are moist.   Eyes:      Conjunctiva/sclera: Conjunctivae normal.   Cardiovascular:      Rate and Rhythm: Normal rate.      Pulses: Normal pulses.      Heart sounds: Normal heart sounds.      Comments: Tele: Sr <-> " junctional and one episode of AF RVR lasting a few minutes before converting to junctional   VVI backup 40  Pulmonary:      Effort: Pulmonary effort is normal.      Breath sounds: Normal breath sounds.      Comments: Diminish posterior bilateral lung sounds  Abdominal:      General: Bowel sounds are normal.      Palpations: Abdomen is soft.   Musculoskeletal:      Cervical back: Neck supple.      Right lower leg: Edema (+1) present.      Left lower leg: Edema (+1) present.   Skin:     General: Skin is warm and dry.      Capillary Refill: Capillary refill takes less than 2 seconds.      Comments: Right thorocotmy incision well approximated without erythema or drainage. All port sites without erythema or drainage   Neurological:      General: No focal deficit present.      Mental Status: He is alert and oriented to person, place, and time.   Psychiatric:         Mood and Affect: Mood normal.         Behavior: Behavior normal.       Medications  Scheduled medications  acetaminophen, 650 mg, oral, q6h  apixaban, 5 mg, oral, BID  aspirin, 81 mg, oral, Daily  calcium carbonate, 500 mg, oral, Daily  colchicine, 0.6 mg, oral, q12h  fluticasone furoate-vilanteroL, 1 puff, inhalation, Daily  furosemide, 40 mg, oral, BID  multivitamin with minerals, 1 tablet, oral, Daily  oxygen, , inhalation, Continuous - Inhalation  polyethylene glycol, 17 g, oral, Daily  sennosides-docusate sodium, 2 tablet, oral, BID    Continuous medications   PRN medications  PRN medications: dextromethorphan-guaifenesin, dextrose **OR** glucagon, melatonin, naloxone, traMADol    Labs  Results for orders placed or performed during the hospital encounter of 11/26/24 (from the past 24 hours)   Osmolality   Result Value Ref Range    Osmolality, Serum 259 (L) 280 - 300 mOsm/kg   Uric Acid   Result Value Ref Range    Uric Acid 4.7 4.0 - 7.5 mg/dL   Osmolality, urine   Result Value Ref Range    Osmolality, Urine Random 689 200 - 1,200 mOsm/kg   Potassium,  Urine Random   Result Value Ref Range    Potassium, Urine Random 55 mmol/L    Creatinine, Urine Random 108.7 20.0 - 370.0 mg/dL    Potassium/Creatinine Ratio 51 Not established mmol/g Creat   Sodium, Urine Random   Result Value Ref Range    Sodium, Urine Random 12 mmol/L    Creatinine, Urine Random 108.7 20.0 - 370.0 mg/dL    Sodium/Creatinine Ratio 11 Not established. mmol/g Creat   Chloride, Urine Random   Result Value Ref Range    Chloride, Urine Random <15 mmol/L    Creatinine, Urine Random 108.7 20.0 - 370.0 mg/dL    Chloride/Creatinine Ratio     Uric Acid, Urine Random   Result Value Ref Range    Uric Acid, Urine Random 136 mg/dL    Creatinine, Urine Random 108.7 20.0 - 370.0 mg/dL    Uric Acid/Creatinine Ratio 1,251 Not established. mg/g creat   Renal function panel   Result Value Ref Range    Glucose 97 74 - 99 mg/dL    Sodium 123 (L) 136 - 145 mmol/L    Potassium 3.9 3.5 - 5.3 mmol/L    Chloride 89 (L) 98 - 107 mmol/L    Bicarbonate 28 21 - 32 mmol/L    Anion Gap 10 10 - 20 mmol/L    Urea Nitrogen 16 6 - 23 mg/dL    Creatinine 0.68 0.50 - 1.30 mg/dL    eGFR >90 >60 mL/min/1.73m*2    Calcium 8.0 (L) 8.6 - 10.6 mg/dL    Phosphorus 3.5 2.5 - 4.9 mg/dL    Albumin 3.2 (L) 3.4 - 5.0 g/dL   ECG 12 Lead   Result Value Ref Range    Ventricular Rate 64 BPM    Atrial Rate 68 BPM    QRS Duration 162 ms    QT Interval 496 ms    QTC Calculation(Bazett) 511 ms    R Axis 25 degrees    T Axis 3 degrees    QRS Count 11 beats    Q Onset 224 ms    T Offset 472 ms    QTC Fredericia 506 ms   Magnesium   Result Value Ref Range    Magnesium 2.18 1.60 - 2.40 mg/dL   CBC   Result Value Ref Range    WBC 11.5 (H) 4.4 - 11.3 x10*3/uL    nRBC 0.0 0.0 - 0.0 /100 WBCs    RBC 4.43 (L) 4.50 - 5.90 x10*6/uL    Hemoglobin 13.3 (L) 13.5 - 17.5 g/dL    Hematocrit 38.1 (L) 41.0 - 52.0 %    MCV 86 80 - 100 fL    MCH 30.0 26.0 - 34.0 pg    MCHC 34.9 32.0 - 36.0 g/dL    RDW 12.9 11.5 - 14.5 %    Platelets 321 150 - 450 x10*3/uL   Renal Function  Panel   Result Value Ref Range    Glucose 89 74 - 99 mg/dL    Sodium 127 (L) 136 - 145 mmol/L    Potassium 3.6 3.5 - 5.3 mmol/L    Chloride 92 (L) 98 - 107 mmol/L    Bicarbonate 27 21 - 32 mmol/L    Anion Gap 12 10 - 20 mmol/L    Urea Nitrogen 15 6 - 23 mg/dL    Creatinine 0.66 0.50 - 1.30 mg/dL    eGFR >90 >60 mL/min/1.73m*2    Calcium 8.3 (L) 8.6 - 10.6 mg/dL    Phosphorus 3.7 2.5 - 4.9 mg/dL    Albumin 3.2 (L) 3.4 - 5.0 g/dL         IMAGING/ DIAGNOSTIC TESTING:    XR chest 2 views 11/30/202  Impression  1. Improvement in mild pulmonary edema.  2. Similar mild bibasilar atelectasis with small pleural effusions.    XR chest 1 view 11/29/2024  Impression  1.Post cardiac valve replacement again noted.  2.Perihilar and basilar edema with small effusions grossly similar to  the prior study.    11/29/24 TTE complete with contrast:  CONCLUSIONS:  1. Poorly visualized anatomical structures due to suboptimal image quality.  2. The left ventricular systolic function is low normal, with a Lambert's biplane calculated ejection fraction of 52%.  3. Spectral Doppler shows a Grade III (restrictive pattern) of left ventricular diastolic filling with an elevated left atrial pressure.  4. There is normal right ventricular global systolic function.  5. Status post mitral valve repair.  6. Moderately elevated right ventricular systolic pressure (RVSP 50.5mmhg)    IMPRESSION & PLAN:  POD # 7 s/p Robotically assisted mitral valve repair with 36mm annuloplasty on 11/26/24  - Increase activity/ ambulation; PT/OT  - Encourage IS, C/DB; respiratory therapy; wean O2 as marialuisa   - Cardiac rehab referral   - Continue cardiac meds: ASA, statin   - Continue colchicine x1 month for robotic MidCAB procedure (0.6mg daily for < 70kg, 0.6mg BID for > 70kg; if concurrently receiving amiodarone reduce dose by 50%) (sent to meds to beds 11/30)  - Pain and anticonstipation meds  - Postop TTE completed on 11/29 with trace MR, EF 52%, RVSP 50  - 2v CXR  completed 11/30  - Cut epicardial wires prior to discharge   - Tele until discharge  - Optimize nutrition and electrolytes    Hiccups  - 12/2 Start baclofen today -> stopped secondary to QTC > 500    Rhythm  - 11/29 Tele: Accelerated junctional, will obtain EKG to confirm and check QTC  - 11/29 Will reassess BB initiation in AM secondary to rhythm   - 11/30 No BB today, patient still accelerated junctional, reassess in AM  - 12/01: EKG this AM, still accelerated junctional rhythm. Consider EP consult within 1-2 days if no recovery. No BB  - Adjust medications as tolerated  - 12/2 ECG sinus node appears to be recovering, NSR this morning  - 12/2 Patient developed multiple episodes of NSVT and AF RVR. EP consulted  - 12/3 Tele: SR <-> junctional. Case discussed with EP. Will stop BB and AAD to allow sinus node to recovery. If patient remains junctional, pt may need a PPM  - 12/3 EP recommendations:  -Start anticoagulation whenever is safe from surgical standpoint for a fib   -Allow permissive tachycardia 110-130  -Avoid BB and amiodarone given c/f PMVT/VF related to R on T  -Sensitivity increased on pacer box from 1 to 2 mV to avoid undersensing and R on T phenomenon.   -No indication for 2ry prevention ICD at this point, given above tele strip. However, should monitor for resolution of junctional rhythm. If not, may require PPM.   -Please do not resume amio to allow for sinus node recovery  -Can use metop PRN for A fib RVR only if >130 or symptomatic.   - 12/3 Will start Eliquis 5 mg bid for AF in AM (Ok to start DOAC per Dr Goldberg)  - 12/4  Tele: SR <-> junctional. Patient had one episode of AF RVR lasting a few minutes with subsequent conversion to junctional     Acute Blood Loss Anemia - Stable  Recent Labs     12/04/24  0710 12/03/24  0402 12/02/24  0740 12/01/24  0648 11/30/24  0707 11/29/24  0654 11/28/24  0419   HGB 13.3* 12.6* 11.8* 11.2* 10.8* 11.3* 11.7*   HCT 38.1* 36.0* 34.1* 32.7* 32.3* 34.3* 35.5*    - MV  -  No Iron, Hematocrit > 34%  - Daily labs, transfuse as indicated    Thrombocytopenia - resolved  Recent Labs     24  0710 24  0402 24  0740 24  0648 24  0707 24  0654 24  0419    275 208 183 136* 98* 97*   - Etiology likely postop/CPB related  - Continue to trend with daily CBCs    Volume/Electrolyte Status: Preop wt Weight: 84.5 kg (186 lb 4.6 oz)   Hyponatremia likely in setting of volume overload  Vitals:    24 0042   Weight: 88 kg (193 lb 14.4 oz)     - Weight: Pre-op 84.5,  86.9 kg  - 12/3 Lasix 40 mg IV bid has been on hold since  night  - Replete electrolytes for hypokalemia/hypomagnesemia/hypophosphatemia as needed  -  Replaced potassium today   - Daily weights and strict I&Os  - Daily RFP while admitted    Hyponatremia  - Baseline Na on  normal at 143  -  Na 135,  Na 130,  Na 128, 12/3 127->128->125  - 12/3 Will check urine and serum studies   - 12/3 Will consult nephrology (case discussed with nephrology)  -  Nephrology recommendations:  - Fluid restriction to 1.2L per day + lasix 40 mg PO BID. Fluid restriction to keep him net negative by 1L or more   - stop NSAID ( ketorolac) and baclofen that may cause hyponatremia as well. Pain control with other meds.   - RFP BID   - strict Is/Os  - Renal dosing for medications for latest eGFR, follow medication trough as appropriate  - Avoid hypotension/rapid fluctuations in BPs  - Will continue to follow   - Start oral Lasix 40 mg bid and change current fluid restriction of 1.5L to 1.2L  - Change daily RFP to RFP bid (0700, 1700)     Leukocytosis - Improving  Recent Labs     24  0710 24  0402 24  0740 24  0648 24  0707 24  0654 24  0419   WBC 11.5* 9.4 10.3 10.1 12.2* 14.6* 16.6*     Temp (36hrs), Av.6 °C (97.8 °F), Min:36 °C (96.8 °F), Max:37.1 °C (98.8 °F)    Post-op Pain and Nausea - resolved  -  On  oxycodone per unit protocol, will toradol 15 mg IV q 6 hrs x 48 hours per Dr. Goldberg to back off narcotics and help alleviate nausea   - Cont Zofran as needed  - Cont bowel regimen   - Pain improving with Toradol. Nausea has significant improved    Hypertension: home meds: Lisinopril 5mg qd  Systolic (24hrs), Av , Min:99 , Max:126   - currently on no antihypertensive, bp is well controlled  - additional antihypertensives as needed     VTE Prophylaxis: SCDs/TEDs, ambulation, On Eliquis  Code Status: Full Code    Dispo  - PT/OT recs  low intensity   - Would benefit from homecare RN for cardiac surgery carepath  - Anticipate discharge 3-4 days, pending sinus node recovery as well as hiccups control and Na normalization     FLORI Torres-CNP  Cardiac Surgery Resident  [facility]  Team Phone [phone]    2024  4:27 PM

## 2024-12-04 NOTE — CARE PLAN
Problem: Skin  Goal: Decreased wound size/increased tissue granulation at next dressing change  Outcome: Progressing  Goal: Participates in plan/prevention/treatment measures  Outcome: Progressing  Goal: Prevent/manage excess moisture  Outcome: Progressing  Goal: Prevent/minimize sheer/friction injuries  Outcome: Progressing  Goal: Promote/optimize nutrition  Outcome: Progressing  Goal: Promote skin healing  Outcome: Progressing     Problem: Fall/Injury  Goal: Not fall by end of shift  Outcome: Progressing  Goal: Be free from injury by end of the shift  Outcome: Progressing  Goal: Verbalize understanding of personal risk factors for fall in the hospital  Outcome: Progressing  Goal: Verbalize understanding of risk factor reduction measures to prevent injury from fall in the home  Outcome: Progressing  Goal: Use assistive devices by end of the shift  Outcome: Progressing  Goal: Pace activities to prevent fatigue by end of the shift  Outcome: Progressing     Problem: Pain - Adult  Goal: Verbalizes/displays adequate comfort level or baseline comfort level  Outcome: Progressing     Problem: Safety - Adult  Goal: Free from fall injury  Outcome: Progressing     Problem: Discharge Planning  Goal: Discharge to home or other facility with appropriate resources  Outcome: Progressing  Flowsheets (Taken 12/3/2024 2101)  Discharge to home or other facility with appropriate resources: Arrange for needed discharge resources and transportation as appropriate     Problem: Chronic Conditions and Co-morbidities  Goal: Patient's chronic conditions and co-morbidity symptoms are monitored and maintained or improved  Outcome: Progressing     Problem: Pain  Goal: Takes deep breaths with improved pain control throughout the shift  Outcome: Progressing  Goal: Turns in bed with improved pain control throughout the shift  Outcome: Progressing  Goal: Walks with improved pain control throughout the shift  Outcome: Progressing  Goal: Performs  ADL's with improved pain control throughout shift  Outcome: Progressing  Goal: Participates in PT with improved pain control throughout the shift  Outcome: Progressing  Goal: Free from opioid side effects throughout the shift  Outcome: Progressing  Goal: Free from acute confusion related to pain meds throughout the shift  Outcome: Progressing    The clinical goals for the shift include Patient will remain HDS this shift

## 2024-12-04 NOTE — CARE PLAN
Nephrology Care Plan:    Consult is for hyponatremia    Mr Gavin Moreau is s/p Robotic MVR via mini thoracotomy with Dr. Goldberg on 11/26 transitioned to CTICU where he coded secondary to pacer spike requiring CPR and shock x 2 with ROSC after 4 minutes. He got fluids initially later they gave lasix 40mg BID starting from 11/29 and last dose given 12/2. Lr 500 ml given in AM today. History of pain controlled with ketorolac, baclofen. History of nausea controlled with zofran.     Hyponatremia which developed over 4 days with in hospital stay. baseline sodium 143 on 11/27 and it started to trend down on same day and to 125 today. BP has been stable. at RA. His weight trend pre op wt 84 that went up to 90 and after diuresis it is 86kg. Overall during hospital stay he is net negative by 7L. Has been making good UOP of 2.5-3L in last 3-4 days.     TTE from 11/29: EF is 50% with the inferior vena cava appears dilated, with IVC inspiratory collapse less than 50%. Bicarb was elevated to 28-29 in last few days. Serum osmolarity is 259. urine osmolarity is 689, urine sodium 12.     #asymptomatic hypotonic hyponatremia    PLAN:  - check orthostats, repeat RFP in 6 hrs  - stop NSAID ( ketorolac) and baclofen that may cause hyponatremia as well. Pain control with other meds.   - will follow the patient     D/w the attending    Yulia Duenas MD  Nephrology Fellow   Daytime / Weekend Renal Pager [phone]  After 7 pm Emergencies [phone] Pager [phone]

## 2024-12-05 ENCOUNTER — APPOINTMENT (OUTPATIENT)
Dept: RADIOLOGY | Facility: HOSPITAL | Age: 54
DRG: 219 | End: 2024-12-05
Payer: COMMERCIAL

## 2024-12-05 LAB
ALBUMIN SERPL BCP-MCNC: 3.3 G/DL (ref 3.4–5)
ALBUMIN SERPL BCP-MCNC: 3.3 G/DL (ref 3.4–5)
ALBUMIN SERPL BCP-MCNC: 3.5 G/DL (ref 3.4–5)
ANION GAP SERPL CALC-SCNC: 11 MMOL/L (ref 10–20)
ANION GAP SERPL CALC-SCNC: 14 MMOL/L (ref 10–20)
ANION GAP SERPL CALC-SCNC: 14 MMOL/L (ref 10–20)
BUN SERPL-MCNC: 16 MG/DL (ref 6–23)
BUN SERPL-MCNC: 16 MG/DL (ref 6–23)
BUN SERPL-MCNC: 17 MG/DL (ref 6–23)
CALCIUM SERPL-MCNC: 8.1 MG/DL (ref 8.6–10.6)
CALCIUM SERPL-MCNC: 8.3 MG/DL (ref 8.6–10.6)
CALCIUM SERPL-MCNC: 8.4 MG/DL (ref 8.6–10.6)
CHLORIDE SERPL-SCNC: 91 MMOL/L (ref 98–107)
CHLORIDE SERPL-SCNC: 92 MMOL/L (ref 98–107)
CHLORIDE SERPL-SCNC: 93 MMOL/L (ref 98–107)
CO2 SERPL-SCNC: 25 MMOL/L (ref 21–32)
CO2 SERPL-SCNC: 27 MMOL/L (ref 21–32)
CO2 SERPL-SCNC: 27 MMOL/L (ref 21–32)
CREAT SERPL-MCNC: 0.74 MG/DL (ref 0.5–1.3)
CREAT SERPL-MCNC: 0.77 MG/DL (ref 0.5–1.3)
CREAT SERPL-MCNC: 0.87 MG/DL (ref 0.5–1.3)
EGFRCR SERPLBLD CKD-EPI 2021: >90 ML/MIN/1.73M*2
ERYTHROCYTE [DISTWIDTH] IN BLOOD BY AUTOMATED COUNT: 13.2 % (ref 11.5–14.5)
GLUCOSE SERPL-MCNC: 106 MG/DL (ref 74–99)
GLUCOSE SERPL-MCNC: 61 MG/DL (ref 74–99)
GLUCOSE SERPL-MCNC: 82 MG/DL (ref 74–99)
HCT VFR BLD AUTO: 38.3 % (ref 41–52)
HGB BLD-MCNC: 12.8 G/DL (ref 13.5–17.5)
MAGNESIUM SERPL-MCNC: 2.43 MG/DL (ref 1.6–2.4)
MCH RBC QN AUTO: 29.8 PG (ref 26–34)
MCHC RBC AUTO-ENTMCNC: 33.4 G/DL (ref 32–36)
MCV RBC AUTO: 89 FL (ref 80–100)
NRBC BLD-RTO: 0 /100 WBCS (ref 0–0)
PHOSPHATE SERPL-MCNC: 3.4 MG/DL (ref 2.5–4.9)
PHOSPHATE SERPL-MCNC: 3.5 MG/DL (ref 2.5–4.9)
PHOSPHATE SERPL-MCNC: 3.8 MG/DL (ref 2.5–4.9)
PLATELET # BLD AUTO: 374 X10*3/UL (ref 150–450)
POTASSIUM SERPL-SCNC: 3.3 MMOL/L (ref 3.5–5.3)
POTASSIUM SERPL-SCNC: 3.5 MMOL/L (ref 3.5–5.3)
POTASSIUM SERPL-SCNC: 4.2 MMOL/L (ref 3.5–5.3)
RBC # BLD AUTO: 4.29 X10*6/UL (ref 4.5–5.9)
SODIUM SERPL-SCNC: 127 MMOL/L (ref 136–145)
SODIUM SERPL-SCNC: 128 MMOL/L (ref 136–145)
SODIUM SERPL-SCNC: 128 MMOL/L (ref 136–145)
WBC # BLD AUTO: 10.4 X10*3/UL (ref 4.4–11.3)

## 2024-12-05 PROCEDURE — 2500000001 HC RX 250 WO HCPCS SELF ADMINISTERED DRUGS (ALT 637 FOR MEDICARE OP)

## 2024-12-05 PROCEDURE — 94640 AIRWAY INHALATION TREATMENT: CPT

## 2024-12-05 PROCEDURE — 97530 THERAPEUTIC ACTIVITIES: CPT | Mod: GO

## 2024-12-05 PROCEDURE — 2500000005 HC RX 250 GENERAL PHARMACY W/O HCPCS

## 2024-12-05 PROCEDURE — 97535 SELF CARE MNGMENT TRAINING: CPT | Mod: GO

## 2024-12-05 PROCEDURE — 99232 SBSQ HOSP IP/OBS MODERATE 35: CPT

## 2024-12-05 PROCEDURE — 71045 X-RAY EXAM CHEST 1 VIEW: CPT

## 2024-12-05 PROCEDURE — 80069 RENAL FUNCTION PANEL: CPT

## 2024-12-05 PROCEDURE — 99221 1ST HOSP IP/OBS SF/LOW 40: CPT | Performed by: STUDENT IN AN ORGANIZED HEALTH CARE EDUCATION/TRAINING PROGRAM

## 2024-12-05 PROCEDURE — 1200000002 HC GENERAL ROOM WITH TELEMETRY DAILY

## 2024-12-05 PROCEDURE — 83735 ASSAY OF MAGNESIUM: CPT

## 2024-12-05 PROCEDURE — 71045 X-RAY EXAM CHEST 1 VIEW: CPT | Performed by: RADIOLOGY

## 2024-12-05 PROCEDURE — 2500000002 HC RX 250 W HCPCS SELF ADMINISTERED DRUGS (ALT 637 FOR MEDICARE OP, ALT 636 FOR OP/ED)

## 2024-12-05 PROCEDURE — 85027 COMPLETE CBC AUTOMATED: CPT

## 2024-12-05 RX ORDER — POTASSIUM CHLORIDE 20 MEQ/1
40 TABLET, EXTENDED RELEASE ORAL ONCE
Status: COMPLETED | OUTPATIENT
Start: 2024-12-05 | End: 2024-12-05

## 2024-12-05 RX ORDER — METOPROLOL TARTRATE 1 MG/ML
INJECTION, SOLUTION INTRAVENOUS
Status: DISPENSED
Start: 2024-12-05 | End: 2024-12-05

## 2024-12-05 RX ORDER — AMOXICILLIN 250 MG
2 CAPSULE ORAL AS NEEDED
Status: DISCONTINUED | OUTPATIENT
Start: 2024-12-05 | End: 2024-12-09 | Stop reason: HOSPADM

## 2024-12-05 RX ORDER — POTASSIUM CHLORIDE 20 MEQ/1
20 TABLET, EXTENDED RELEASE ORAL ONCE
Status: COMPLETED | OUTPATIENT
Start: 2024-12-05 | End: 2024-12-05

## 2024-12-05 RX ORDER — POTASSIUM CHLORIDE 1.5 G/1.58G
40 POWDER, FOR SOLUTION ORAL ONCE
Status: COMPLETED | OUTPATIENT
Start: 2024-12-06 | End: 2024-12-06

## 2024-12-05 RX ORDER — MAGNESIUM SULFATE HEPTAHYDRATE 40 MG/ML
2 INJECTION, SOLUTION INTRAVENOUS ONCE
Status: COMPLETED | OUTPATIENT
Start: 2024-12-06 | End: 2024-12-06

## 2024-12-05 RX ORDER — POLYETHYLENE GLYCOL 3350 17 G/17G
17 POWDER, FOR SOLUTION ORAL AS NEEDED
Status: DISCONTINUED | OUTPATIENT
Start: 2024-12-05 | End: 2024-12-09 | Stop reason: HOSPADM

## 2024-12-05 ASSESSMENT — COGNITIVE AND FUNCTIONAL STATUS - GENERAL
MOBILITY SCORE: 24
HELP NEEDED FOR BATHING: A LITTLE
TOILETING: A LITTLE
DRESSING REGULAR LOWER BODY CLOTHING: A LITTLE
DAILY ACTIVITIY SCORE: 24
DAILY ACTIVITIY SCORE: 21

## 2024-12-05 ASSESSMENT — PAIN DESCRIPTION - DESCRIPTORS: DESCRIPTORS: ACHING

## 2024-12-05 ASSESSMENT — PAIN - FUNCTIONAL ASSESSMENT
PAIN_FUNCTIONAL_ASSESSMENT: 0-10

## 2024-12-05 ASSESSMENT — PAIN SCALES - GENERAL
PAINLEVEL_OUTOF10: 0 - NO PAIN
PAINLEVEL_OUTOF10: 3
PAINLEVEL_OUTOF10: 3
PAINLEVEL_OUTOF10: 2
PAINLEVEL_OUTOF10: 3

## 2024-12-05 ASSESSMENT — ACTIVITIES OF DAILY LIVING (ADL): HOME_MANAGEMENT_TIME_ENTRY: 15

## 2024-12-05 NOTE — PROGRESS NOTES
"Occupational Therapy    Occupational Therapy Treatment    Name: Gavin Moreau \"Harinder\"  MRN: [id number]  : 1970  Date: 24  Room: [address] [place] [address]A      Time Calculation  Start Time: 1334  Stop Time: 1404  Time Calculation (min): 30 min    Assessment:  OT Assessment: Pt made good progress towards therapeutic goals this session. Pt demo'd good standing balance and increased tolerance to activity during IADL task of changing bed sheets. Pt only required one seated rest break between ambulation and activity managment. At this time pt remains appropriate for LOW intensity OT services.  Prognosis: Good  Barriers to Discharge: None  Evaluation/Treatment Tolerance: Patient tolerated treatment well  Medical Staff Made Aware: Yes  End of Session Communication: Bedside nurse  End of Session Patient Position: Up in chair, Alarm off, not on at start of session  Plan:  Treatment Interventions: ADL retraining, Functional transfer training, Endurance training, Compensatory technique education  OT Frequency: 2 times per week  OT Discharge Recommendations: Low intensity level of continued care  Equipment Recommended upon Discharge: Wheeled walker  OT Recommended Transfer Status: Assist of 1, Stand by assist  OT - OK to Discharge: Yes    Subjective   General:  OT Last Visit  OT Received On: 24  Reason for Referral: Robotic MVR via mini thoracotomy  Past Medical History Relevant to Rehab: Mitral valve prolapse, asthma, GERD, rheumatic fever.  Prior to Session Communication: Bedside nurse  Patient Position Received: Up in room  Family/Caregiver Present: No  General Comment: Pt up walking around zhang upon arrival. Pleasant and agreeable to OT tx. Pt reporting \"I couldn't have done this a couple days ago\"   Precautions:  Medical Precautions: Cardiac precautions, Fall precautions  Vitals:  Vital Signs (Past 2hrs)        Date/Time Vitals Session Patient Position Pulse Resp SpO2 BP MAP (mmHg)    24 1416 --  --  77  17  96 %  " 103/68  80                   Lines/Tubes/Drains:  Pacer Wires (Active)   Number of days: 5       Cognition:  Overall Cognitive Status: Within Functional Limits  Arousal/Alertness: Appropriate responses to stimuli  Orientation Level: Oriented X4  Following Commands: Follows all commands and directions without difficulty  Safety Judgment: Good awareness of safety precautions  Problem Solving: Able to problem solve independently  Cognition Comments: flat affect  Insight: Within function limits  Impulsive: Within functional limits  Processing Speed: Within funtional limits    Pain Assessment:  Pain Assessment  Pain Assessment: 0-10  0-10 (Numeric) Pain Score: 2  Pain Type: Surgical pain  Pain Location: Incision  Pain Interventions: Repositioned, Ambulation/increased activity, Relaxation technique, Rest  Response to Interventions: No change in pain     Objective   Activities of Daily Living:     UE Dressing  UE Dressing Comments: Pt doffed and donned gown with Jose mainly due to line management and threading    LE Dressing  LE Dressing: Yes  LE Dressing Comments: Pt doffed pants while standing and donned pants while seated before standing to pull up over hips all with close SBA for safety. Pt doffed and donned bilateral socks using figure four technique with SBA while seated in chair.        12/05/24 1334   Light Housekeeping   Light Housekeeping Pt ambulated to linen cart to gather fresh linens before returning to room. Patient took 2 minute seated rest break and then doffed and donned sheets with SBA for safety and line management. Pt required increased time to complete but demonstrated good standing balance reaching and leaning forward, laterally. Did not require rest break during IADL task of changing bed sheet.     Functional Standing Tolerance:  Functional Standing Tolerance  Time: ~25 minutes  Activity: Dressing, ambulation, gathering linens/clothes, changing bed sheets  Functional Standing Tolerance Comments:  Good standing balance with SBA for safety/line managment  Functional Mobility  Functional Mobility Performed: Yes  Functional Mobility 1  Comments 1: Pt ambulated MAX household distance using no AD and with IND, slow but safe  Bed Mobility/Transfers:   Bed Mobility  Bed Mobility: No  Transfers  Transfer: Yes  Transfer 1  Transfer From 1: Stand to, Chair with arms to  Transfer to 1: Chair with arms, Stand  Technique 1: Stand to sit, Sit to stand  Transfer Level of Assistance 1: Close supervision, Minimal verbal cues  Trials/Comments 1: x4 trials, SBA and VCs for safety and hand placement    Balance:  Dynamic Sitting Balance  Dynamic Sitting-Balance Support: Feet supported, No upper extremity supported  Dynamic Sitting-Level of Assistance: Distant supervision  Dynamic Sitting-Balance: Forward lean, Reaching for objects  Dynamic Sitting-Comments: LB dressing, figure four technique, reaching laterally and forward  Dynamic Standing Balance  Dynamic Standing-Balance Support: No upper extremity supported  Dynamic Standing-Level of Assistance: Close supervision  Dynamic Standing-Balance: Lateral lean, Forward lean, Reaching for objects, Turning, Reaching across midline  Dynamic Standing-Comments: Good dynamic standing balance  Static Sitting Balance  Static Sitting-Balance Support: Feet supported, No upper extremity supported  Static Sitting-Level of Assistance: Independent  Static Standing Balance  Static Standing-Balance Support: No upper extremity supported  Static Standing-Level of Assistance: Independent    Therapy/Activity:      Therapeutic Activity  Therapeutic Activity Performed: Yes  Therapeutic Activity 1: Functional mobility and transfer training as noted requiring skilled supervision for safety, training, and line management  Therapeutic Activity 2: Dynamic balance activities as noted  Therapeutic Activity 3: Educated on energy conservation strategies and fatigue management scale       Outcome Measures:  [facility]  Daily Activity  Putting on and taking off regular lower body clothing: A little  Bathing (including washing, rinsing, drying): A little  Putting on and taking off regular upper body clothing: None  Toileting, which includes using toilet, bedpan or urinal: A little  Taking care of personal grooming such as brushing teeth: None  Eating Meals: None  Daily Activity - Total Score: 21     Education Documentation  Body Mechanics, taught by Thiago Hyman OT at 12/5/2024  3:32 PM.  Learner: Patient  Readiness: Acceptance  Method: Explanation, Demonstration  Response: Verbalizes Understanding, Demonstrated Understanding  Comment: energy conservation, fatigue management scale, benefits of OT    Precautions, taught by Thiago Hyman OT at 12/5/2024  3:32 PM.  Learner: Patient  Readiness: Acceptance  Method: Explanation, Demonstration  Response: Verbalizes Understanding, Demonstrated Understanding  Comment: energy conservation, fatigue management scale, benefits of OT    ADL Training, taught by Thiago Hyman OT at 12/5/2024  3:32 PM.  Learner: Patient  Readiness: Acceptance  Method: Explanation, Demonstration  Response: Verbalizes Understanding, Demonstrated Understanding  Comment: energy conservation, fatigue management scale, benefits of OT    Education Comments  No comments found.        Goals:  Encounter Problems       Encounter Problems (Active)       ADLs       Pt will demonstrate increased independence by completing LB dressing at EOB with mod I (Progressing)       Start:  11/27/24    Expected End:  12/11/24            Pt will demonstrate increased ADL independence by completing toileting routine with mod I (Progressing)       Start:  11/27/24    Expected End:  12/11/24                  Encounter Problems (Resolved)       EXERCISE/STRENGTHENING       Pt will increase endurance to tolerate 20 min of activity with no more than 1 rest break in order to increase ability to engage in ADL completion.   (Met)       Start:  11/27/24    Expected End:  12/11/24    Resolved:  12/05/24            MOBILITY       Pt will increase functional mobility by ambulating household distances with mod I and use of least restrictive mobility device.  (Met)       Start:  11/27/24    Expected End:  12/11/24    Resolved:  12/05/24 12/05/24 at 3:39 PM   Thiago Hyman, OT   [phone]

## 2024-12-05 NOTE — CARE PLAN
Problem: Safety - Medical Restraint  Goal: Remains free of injury from restraints (Restraint for Interference with Medical Device)  Outcome: Progressing  Goal: Free from restraint(s) (Restraint for Interference with Medical Device)  Outcome: Progressing     Problem: Skin  Goal: Decreased wound size/increased tissue granulation at next dressing change  Outcome: Progressing  Goal: Participates in plan/prevention/treatment measures  Outcome: Progressing  Goal: Prevent/manage excess moisture  Outcome: Progressing  Goal: Prevent/minimize sheer/friction injuries  Outcome: Progressing  Goal: Promote/optimize nutrition  Outcome: Progressing  Goal: Promote skin healing  Outcome: Progressing     Problem: Fall/Injury  Goal: Not fall by end of shift  Outcome: Progressing  Goal: Be free from injury by end of the shift  Outcome: Progressing  Goal: Verbalize understanding of personal risk factors for fall in the hospital  Outcome: Progressing  Goal: Verbalize understanding of risk factor reduction measures to prevent injury from fall in the home  Outcome: Progressing  Goal: Use assistive devices by end of the shift  Outcome: Progressing  Goal: Pace activities to prevent fatigue by end of the shift  Outcome: Progressing     Problem: Pain - Adult  Goal: Verbalizes/displays adequate comfort level or baseline comfort level  Outcome: Progressing     Problem: Safety - Adult  Goal: Free from fall injury  Outcome: Progressing     Problem: Discharge Planning  Goal: Discharge to home or other facility with appropriate resources  Outcome: Progressing     Problem: Chronic Conditions and Co-morbidities  Goal: Patient's chronic conditions and co-morbidity symptoms are monitored and maintained or improved  Outcome: Progressing     Problem: Pain  Goal: Takes deep breaths with improved pain control throughout the shift  Outcome: Progressing  Goal: Turns in bed with improved pain control throughout the shift  Outcome: Progressing  Goal: Walks  with improved pain control throughout the shift  Outcome: Progressing  Goal: Performs ADL's with improved pain control throughout shift  Outcome: Progressing  Goal: Participates in PT with improved pain control throughout the shift  Outcome: Progressing  Goal: Free from opioid side effects throughout the shift  Outcome: Progressing  Goal: Free from acute confusion related to pain meds throughout the shift  Outcome: Progressing   The patient's goals for the shift include      The clinical goals for the shift include Patient will remain HDS this shift

## 2024-12-05 NOTE — CARE PLAN
Mr Gavin Moreau is s/p Robotic MVR via mini thoracotomy with Dr. Goldberg on 11/26 transitioned to CTICU where he coded secondary to pacer spike requiring CPR and shock x 2 with ROSC after 4 minutes. He got fluids initially later they gave lasix 40mg BID starting from 11/29 and last dose given 12/2. Lr 500 ml given 12/3. History of pain controlled with ketorolac, baclofen. History of nausea controlled with zofran. Started on 1.2L fluid restriction with lasix 40 mg BID PO 12/4. Na improving  (123 -> 127 -> 128)      # hypervolemic hypotonic hyponatremia   :: baseline sodium 143 on 11/27 and it started to trend down on same day and to 125 12/3   :: TTE from 11/29: EF is 50% with the inferior vena cava appears dilated, with IVC inspiratory collapse less than 50%.   :: Serum osmolarity is 259. urine osmolarity is 689, urine sodium 12.   :: Orthostatics negative   :: Pitting edema present on physical exam   :: Na improving  (123 -> 127 -> 128)      Recommendations:  - Continue with Fluid restriction to 1.2L per day + lasix 40 mg PO BID until hyponatremia returns to baseline. Fluid restriction to keep him net negative by 1L or more   - stop NSAID ( ketorolac) and baclofen that may cause hyponatremia as well. Pain control with other meds.   - RFP BID   - strict Is/Os  - Renal dosing for medications for latest eGFR, follow medication trough as appropriate  - Avoid hypotension/rapid fluctuations in BPs  - Nephrology will sign off at this time. If any further questions, please [place] at [phone].

## 2024-12-05 NOTE — HOSPITAL COURSE
Gavin Moreau is a 54 y.o. with PMH/PSH of Mitral valve prolapse, asthma, GERD, rheumatic fever Presents to the CTICU from the OR s/p Robotic MVR via mini thoracotomy with Dr. Goldberg on 11/26. While attempting to transfer to the CTICU bed in OR patient R on T/VF arrested requiring CPR and shock x 2 with ROSC after 4 minutes.    Operations and Procedures with Dr. Goldberg on 11/26   1. MIS ROBOTIC MV REPAIR  2. Right Femoral Venous and Arterial Cannulation     CTICU course: CPR was performed after transportatin to CTICU bed, prior to extubation.  Attendings notified and present, defibrillation was delivered X 2, transferred back to OR bed, converted back to NSR with pacing at 100 Bpm. After the initial arrest patient had an uneventful cours    Transferred to LT3: 11/28   ========================================  Floor Course:  - Patient was diuresed for fluid volume overload post cardiac surgery; Preop weight: 84.5 kg, discharge wt: ***kg  - On ASA, statin, BB, by discharge  - Epicardial wires {cut / pull epicardial wires:19967} on ***  - Telemetry at discharge ***  - 2v CXR done 12/1  - Postop echo done 12/1  - Cardiac rehab referral was placed  - PT recs low intensity therapy  - Anticipate discharge to home with homecare    Discharged on ***    On day of discharge, vital signs were stable and no acute distress was noted. All questions were answered. After VS and labs were reviewed it was determined the patient was stable for discharge.   Hospital day of discharge management- spent >30 minutes coordinating the discharge and counseling/educating patient and family regarding discharge instructions.   ========================================

## 2024-12-05 NOTE — PROGRESS NOTES
"CARDIAC SURGERY DAILY PROGRESS NOTE    Gavin Moreau is a 54 y.o. with PMH/PSH of Mitral valve prolapse, asthma, GERD, rheumatic fever Presents to the CTICU from the OR s/p Robotic MVR via mini thoracotomy with Dr. Goldberg on 11/26. While attempting to transfer to the CTICU bed in OR patient R on T/VF arrested secondary to pacer spike requiring CPR and shock x 2 with ROSC after 4 minutes.     OPERATION/PROCEDURE: 11/26/24 with Simeon Goldberg MD, MS  1.Radical mitral valve repair with preshaped [place] cords and 36 mm annuloplasty ring  2.Robotically assisted mitral valve repair  3.Right femoral arterial and venous cannulation for cardiopulmonary bypass     CTICU Course: Cardiac arrest in the immediate post-op. Once in CTICU, uncomplicated course     Transferred to T3: on 11/28    Interval History:   Transient episodes of AF RVR that converted to junctional. Na slowly improving    SUBJECTIVE:  Patient seen and examined. Tele: SR <-> junctional. One episode of AF RVR lasting a few minutes this morning and last PM. Current labs reviewed. Discussed case with Dr. Goldberg, patient is cleared to be discharged in AM on 12/6 if Na is 130 or greater and no sustained AF.    Objective   /76 (BP Location: Left arm, Patient Position: Sitting)   Pulse 77   Temp 36 °C (96.8 °F) (Temporal)   Resp 16   Ht 1.778 m (5' 10\")   Wt 84.6 kg (186 lb 8.2 oz)   SpO2 97%   BMI 26.76 kg/m²   0-10 (Numeric) Pain Score: 3   3 Day Weight Change: -0.815 kg (-1 lb 12.8 oz) per day    Intake and Output    Intake/Output Summary (Last 24 hours) at 12/5/2024 1113  Last data filed at 12/5/2024 1057  Gross per 24 hour   Intake 660 ml   Output 1875 ml   Net -1215 ml     Physical Exam  Physical Exam  Constitutional:       Appearance: Normal appearance.      Comments: Hiccups    HENT:      Head: Normocephalic and atraumatic.      Nose: Nose normal.      Mouth/Throat:      Mouth: Mucous membranes are moist.   Eyes:      " Conjunctiva/sclera: Conjunctivae normal.   Cardiovascular:      Rate and Rhythm: Normal rate.      Pulses: Normal pulses.      Heart sounds: Normal heart sounds.      Comments: Tele: Sr <-> junctional with transient episodes of AF RVR lasting a few minutes before converting to junctional.  VVI backup rate 40, mA 7  Pulmonary:      Effort: Pulmonary effort is normal.      Breath sounds: Normal breath sounds.      Comments: Diminish posterior bilateral lung sounds  Abdominal:      General: Bowel sounds are normal.      Palpations: Abdomen is soft.   Musculoskeletal:      Cervical back: Neck supple.      Right lower leg: Edema (+1) present.      Left lower leg: Edema (+1) present.   Skin:     General: Skin is warm and dry.      Capillary Refill: Capillary refill takes less than 2 seconds.      Comments: Right thorocotmy incision well approximated without erythema or drainage. All port sites without erythema or drainage   Neurological:      General: No focal deficit present.      Mental Status: He is alert and oriented to person, place, and time.   Psychiatric:         Mood and Affect: Mood normal.         Behavior: Behavior normal.       Medications  Scheduled medications  acetaminophen, 650 mg, oral, q6h  apixaban, 5 mg, oral, BID  aspirin, 81 mg, oral, Daily  calcium carbonate, 500 mg, oral, Daily  colchicine, 0.6 mg, oral, q12h  fluticasone furoate-vilanteroL, 1 puff, inhalation, Daily  furosemide, 40 mg, oral, BID  metoprolol tartrate, , ,   multivitamin with minerals, 1 tablet, oral, Daily  oxygen, , inhalation, Continuous - Inhalation  potassium chloride CR, 20 mEq, oral, Once    Continuous medications   PRN medications  PRN medications: dextromethorphan-guaifenesin, dextrose **OR** glucagon, melatonin, metoprolol tartrate, naloxone, polyethylene glycol, sennosides-docusate sodium, traMADol    Labs  Results for orders placed or performed during the hospital encounter of 11/26/24 (from the past 24 hours)   Renal  function panel   Result Value Ref Range    Glucose 61 (L) 74 - 99 mg/dL    Sodium 127 (L) 136 - 145 mmol/L    Potassium 4.2 3.5 - 5.3 mmol/L    Chloride 92 (L) 98 - 107 mmol/L    Bicarbonate 25 21 - 32 mmol/L    Anion Gap 14 10 - 20 mmol/L    Urea Nitrogen 17 6 - 23 mg/dL    Creatinine 0.77 0.50 - 1.30 mg/dL    eGFR >90 >60 mL/min/1.73m*2    Calcium 8.1 (L) 8.6 - 10.6 mg/dL    Phosphorus 3.8 2.5 - 4.9 mg/dL    Albumin 3.3 (L) 3.4 - 5.0 g/dL   Magnesium   Result Value Ref Range    Magnesium 2.43 (H) 1.60 - 2.40 mg/dL   CBC   Result Value Ref Range    WBC 10.4 4.4 - 11.3 x10*3/uL    nRBC 0.0 0.0 - 0.0 /100 WBCs    RBC 4.29 (L) 4.50 - 5.90 x10*6/uL    Hemoglobin 12.8 (L) 13.5 - 17.5 g/dL    Hematocrit 38.3 (L) 41.0 - 52.0 %    MCV 89 80 - 100 fL    MCH 29.8 26.0 - 34.0 pg    MCHC 33.4 32.0 - 36.0 g/dL    RDW 13.2 11.5 - 14.5 %    Platelets 374 150 - 450 x10*3/uL   Renal function panel   Result Value Ref Range    Glucose 82 74 - 99 mg/dL    Sodium 128 (L) 136 - 145 mmol/L    Potassium 3.3 (L) 3.5 - 5.3 mmol/L    Chloride 93 (L) 98 - 107 mmol/L    Bicarbonate 27 21 - 32 mmol/L    Anion Gap 11 10 - 20 mmol/L    Urea Nitrogen 16 6 - 23 mg/dL    Creatinine 0.74 0.50 - 1.30 mg/dL    eGFR >90 >60 mL/min/1.73m*2    Calcium 8.3 (L) 8.6 - 10.6 mg/dL    Phosphorus 3.5 2.5 - 4.9 mg/dL    Albumin 3.3 (L) 3.4 - 5.0 g/dL         IMAGING/ DIAGNOSTIC TESTING:    XR chest 2 views 11/30/202  Impression  1. Improvement in mild pulmonary edema.  2. Similar mild bibasilar atelectasis with small pleural effusions.    XR chest 1 view 11/29/2024  Impression  1.Post cardiac valve replacement again noted.  2.Perihilar and basilar edema with small effusions grossly similar to  the prior study.    11/29/24 TTE complete with contrast:  CONCLUSIONS:  1. Poorly visualized anatomical structures due to suboptimal image quality.  2. The left ventricular systolic function is low normal, with a Lambert's biplane calculated ejection fraction of 52%.  3.  Spectral Doppler shows a Grade III (restrictive pattern) of left ventricular diastolic filling with an elevated left atrial pressure.  4. There is normal right ventricular global systolic function.  5. Status post mitral valve repair.  6. Moderately elevated right ventricular systolic pressure (RVSP 50.5mmhg)    IMPRESSION & PLAN:  POD # 8 s/p Robotically assisted mitral valve repair with 36mm annuloplasty on 11/26/24  - Increase activity/ ambulation; PT/OT  - Encourage IS, C/DB; respiratory therapy; wean O2 as marialuisa   - Cardiac rehab referral   - Continue cardiac meds: ASA, statin   - Continue colchicine x1 month for robotic MidCAB procedure (0.6mg daily for < 70kg, 0.6mg BID for > 70kg; if concurrently receiving amiodarone reduce dose by 50%) (sent to meds to beds 11/30)  - Pain and anticonstipation meds  - Postop TTE completed on 11/29 with trace MR, EF 52%, RVSP 50  - 2v CXR completed 11/30  - Cut epicardial wires prior to discharge   - Tele until discharge  - Optimize nutrition and electrolytes  - 12/5 Reports SOB, will check 1v CXR today  - 12/5 Case discussed with Dr. Goldberg, patient will be cleared to be discharged in AM on 12/6 if Na is 130 or greater and no sustained AF    Hiccups  - 12/2 Start baclofen today -> stopped secondary to QTC > 500  - 12/5 Still present with less intensity than before     Rhythm  - 11/29 Tele: Accelerated junctional, will obtain EKG to confirm and check QTC  - 11/29 Will reassess BB initiation in AM secondary to rhythm   - 11/30 No BB today, patient still accelerated junctional, reassess in AM  - 12/01: EKG this AM, still accelerated junctional rhythm. Consider EP consult within 1-2 days if no recovery. No BB  - Adjust medications as tolerated  - 12/2 ECG sinus node appears to be recovering, NSR this morning  - 12/2 Patient developed multiple episodes of NSVT and AF RVR. EP consulted  - 12/3 Tele: SR <-> junctional. Case discussed with EP. Will stop BB and AAD to allow sinus  node to recovery. If patient remains junctional, pt may need a PPM  - 12/3 Will start Eliquis 5 mg bid for AF in AM (Ok to start DOAC per Dr Goldberg)  - 12/4  Tele: SR <-> junctional. Patient had one episode of AF RVR lasting a few minutes with subsequent conversion to junctional  - 12/5 EP recommendations:   -Continue eliquis 5 BID   -Allow permissive tachycardia 110-130  -Avoid BB and amiodarone given c/f PMVT/VF related to R on T  -Sensitivity increased on pacer box from 1 to 2 mV to avoid undersensing and R on T phenomenon.   -No indication for 2ry prevention ICD at this point, given above tele strip. Now, junctional rhythm is resolved and NSR is restored, likely was related to post-op inflammation.   -Please do not resume amio to allow for sinus node recovery  -Can use metop PRN for A fib RVR only if >130 or symptomatic.   -Ziopatch on discharge and please arrange for outpt Cardiology follow up on discharge.    Recommendations are preliminary until note is co-signed by an attending.   - 12/5 Tele: SR <-> junctional with a few transient episodes of AF RVR that quickly convert to accelerated junctional    - 12/5 Dicussed case with EP, cont to hold any AVB agent at discharge, EP will sign off today       Acute Blood Loss Anemia - Stable  Recent Labs     12/05/24  0705 12/04/24  0710 12/03/24  0402 12/02/24  0740 12/01/24  0648 11/30/24  0707 11/29/24  0654   HGB 12.8* 13.3* 12.6* 11.8* 11.2* 10.8* 11.3*   HCT 38.3* 38.1* 36.0* 34.1* 32.7* 32.3* 34.3*   - MV  - 11/29 No Iron, Hematocrit > 34%  - Daily labs, transfuse as indicated    Thrombocytopenia - resolved  Recent Labs     12/05/24  0705 12/04/24  0710 12/03/24  0402 12/02/24  0740 12/01/24  0648 11/30/24  0707 11/29/24  0654    321 275 208 183 136* 98*   - Etiology likely postop/CPB related  - Continue to trend with daily CBCs    Volume/Electrolyte Status: Preop wt Weight: 84.5 kg (186 lb 4.6 oz)   Hyponatremia likely in setting of volume  overload  Vitals:    24 0443   Weight: 84.6 kg (186 lb 8.2 oz)     - Weight: Pre-op 84.5 -> 84.6 kg, last 24 hr overall fluid status -1615  - 12/3 Lasix 40 mg IV bid has been on hold since  night  - Replete electrolytes for hypokalemia/hypomagnesemia/hypophosphatemia as needed  -  Replaced potassium today and   -  Start oral Lasix 40 mg bid per nephrology   - Daily weights and strict I&Os  - Daily RFP while admitted    Hyponatremia  - Baseline Na on  normal at 143  -  Na 135,  Na 130,  Na 128, 12/3 Na 127->128->125,  Na 123 ->127,  128  - 12/3 Will check urine and serum studies   - 12/3 Will consult nephrology (case discussed with nephrology)  - Start oral Lasix 40 mg bid and change current fluid restriction of 1.5L to 1.2L  - Change daily RFP to RFP bid (0700, 1700)  -  Nephrology recommendations:  - Continue with Fluid restriction to 1.2L per day + lasix 40 mg PO BID until hyponatremia returns to baseline. Fluid restriction to keep him net negative by 1L or more   - stop NSAID ( ketorolac) and baclofen that may cause hyponatremia as well. Pain control with other meds.   - RFP BID   - strict Is/Os  - Renal dosing for medications for latest eGFR, follow medication trough as appropriate  - Avoid hypotension/rapid fluctuations in BPs  - Nephrology will sign off at this time  -  Renal recommendations noted and appreciated     Leukocytosis - Resolved  Recent Labs     24  0705 24  0710 24  0402 24  0740 24  0648 24  0707 24  0654   WBC 10.4 11.5* 9.4 10.3 10.1 12.2* 14.6*     Temp (36hrs), Av.3 °C (97.3 °F), Min:36 °C (96.8 °F), Max:36.7 °C (98.1 °F)    Post-op Pain and Nausea - resolved  -  On oxycodone per unit protocol, will toradol 15 mg IV q 6 hrs x 48 hours per Dr. Goldberg to back off narcotics and help alleviate nausea   - Cont Zofran as needed  - Cont bowel regimen   - Pain improving with  Toradol. Nausea has significant improved  -  Post-op pain significantly improved, last prn pain medication given on 12/3. Currently on schedule tylenol      Hypertension: home meds: Lisinopril 5mg qd  Systolic (24hrs), Av , Min:99 , Max:130   - currently on no antihypertensive, bp is well controlled  - additional antihypertensives as needed     VTE Prophylaxis: SCDs/TEDs, ambulation, On Eliquis  Code Status: Full Code    Dispo  - PT/OT recs  low intensity   - Would benefit from homecare RN for cardiac surgery carepath  - Anticipate discharge 1-2 days, pending sinus node recovery and Na normalization. Will need a ziopatch on discharge per EP. Per Dr Goldberg if Na is 130 or greater in AM and no sustained AF, patient is cleared to be discharge in AM on     Isabel Brasher APRN-CNP  Cardiac Surgery Resident  [facility]  Team Phone [phone]    2024  11:13 AM

## 2024-12-05 NOTE — PROGRESS NOTES
Subjective Data:  Feels well, no CP or SOB  Tele review: Now in NSR. Resolution of junctional rhythm. Few eps of NSVT. Paroxysmal a fib with RVR asymptomatic.      Objective Data:  Last Recorded Vitals:  Vitals:    12/05/24 0544 12/05/24 0551 12/05/24 0823 12/05/24 1034   BP: 120/71 105/67  127/76   BP Location: Left arm   Left arm   Patient Position: Lying   Sitting   Pulse: (!) 152 68  77   Resp: 19   16   Temp:    36 °C (96.8 °F)   TempSrc:    Temporal   SpO2: 97%  98% 97%   Weight:       Height:           Last Labs:  CBC - 12/5/2024:  7:05 AM  10.4 12.8 374    38.3      CMP - 12/5/2024:  8:15 AM  8.3 _ _ --- _   3.5 3.3 _ _      PTT - 11/28/2024:  4:19 AM  1.1   12.4 31     HGBA1C   Date/Time Value Ref Range Status   11/26/2024 04:02 PM 5.2 See comment % Final     LDLCALC   Date/Time Value Ref Range Status   11/26/2024 03:52 PM 54 <=99 mg/dL Final     Comment:                                 Near   Borderline      AGE      Desirable  Optimal    High     High     Very High     0-19 Y     0 - 109     ---    110-129   >/= 130     ----    20-24 Y     0 - 119     ---    120-159   >/= 160     ----      >24 Y     0 -  99   100-129  130-159   160-189     >/=190       VLDL   Date/Time Value Ref Range Status   11/26/2024 03:52 PM 18 0 - 40 mg/dL Final      Last I/O:  I/O last 3 completed shifts:  In: 660 (7.8 mL/kg) [P.O.:660]  Out: 3475 (41.1 mL/kg) [Urine:3475 (1.1 mL/kg/hr)]  Weight: 84.6 kg     Past Cardiology Tests (Last 3 Years):  EKG:  ECG 12 Lead 12/04/2024 (Preliminary)      ECG 12 Lead 12/03/2024      ECG 12 Lead 12/03/2024      ECG 12 Lead 12/02/2024      ECG 12 lead 12/02/2024 (Preliminary)      ECG 12 Lead  (Preliminary)      ECG 12 lead 12/01/2024 (Preliminary)      ECG 12 Lead 11/29/2024 (Preliminary)      ECG 12 Lead 11/26/2024 (Preliminary)      ECG 12 Lead 11/26/2024 (Preliminary)    Echo:  Transthoracic Echo (TTE) Complete 11/29/2024      Anesthesia Intraoperative Transesophageal Echocardiogram  "11/26/2024    Ejection Fractions:  EF   Date/Time Value Ref Range Status   11/29/2024 04:52 PM 52 %    11/26/2024 06:22 AM 48 %      Cath:  Cardiac Catheterization Procedure 08/19/2024    Stress Test:  No results found for this or any previous visit from the past 1095 days.    Cardiac Imaging:  No results found for this or any previous visit from the past 1095 days.      Inpatient Medications:  Scheduled medications   Medication Dose Route Frequency    acetaminophen  650 mg oral q6h    apixaban  5 mg oral BID    aspirin  81 mg oral Daily    calcium carbonate  500 mg oral Daily    colchicine  0.6 mg oral q12h    fluticasone furoate-vilanteroL  1 puff inhalation Daily    furosemide  40 mg oral BID    metoprolol tartrate        multivitamin with minerals  1 tablet oral Daily    oxygen   inhalation Continuous - Inhalation    potassium chloride CR  20 mEq oral Once     PRN medications   Medication    dextromethorphan-guaifenesin    dextrose    Or    glucagon    melatonin    metoprolol tartrate    naloxone    polyethylene glycol    sennosides-docusate sodium    traMADol     Continuous Medications   Medication Dose Last Rate       Physical Exam:  GEN: NAD  HEENT: ATNC,  anicteric, no JVD  CV: RRR,  Pulm: CTAB  Ext: warm, no LE edema noted  Neuro: A+Ox3  Psych: appropriate       Assessment/Plan     Gavin Hernandez a 54 y.o. with PMH/PSH of Mitral valve prolapse, asthma, GERD, rheumatic fever Presents to the CTICU from the OR s/p Robotic MVR via mini thoracotomy with Dr. Goldberg on 11/26. While attempting to transfer to the CTICU bed in OR patient R on T/VF arrested requiring CPR and shock x 2 with ROSC after 4 minutes.      Per OP NOTE \" While the patient was being transferred from the operating room table to the stretcher, he went into ventricular fibrillation secondary to what appears to have been an R on T pacer spike. He received CPR and resuscitative measures and was defibrillated and regained sinus rhythm. \" Tele " strip as above.      #V fib arrest (R on T from epicardial pacer undersensing)   #Sinus arrest with junctional escape rhythm   #paroxysmal A fib   -Continue eliquis 5 BID   -Allow permissive tachycardia 110-130  -Avoid BB and amiodarone given c/f PMVT/VF related to R on T  -Sensitivity increased on pacer box from 1 to 2 mV to avoid undersensing and R on T phenomenon.   -No indication for 2ry prevention ICD at this point, given above tele strip. Now, junctional rhythm is resolved and NSR is restored, likely was related to post-op inflammation.   -Please do not resume amio to allow for sinus node recovery  -Can use metop PRN for A fib RVR only if >130 or symptomatic.   -Ziopatch on discharge and please arrange for outpt Cardiology follow up on discharge.      Recommendations are preliminary until note is co-signed by an attending.      Denisha Lopez  Cardiology Fellow   PGY5  Denisha Lopez MD

## 2024-12-06 LAB
ALBUMIN SERPL BCP-MCNC: 3.2 G/DL (ref 3.4–5)
ANION GAP SERPL CALC-SCNC: 9 MMOL/L (ref 10–20)
ATRIAL RATE: 55 BPM
ATRIAL RATE: 56 BPM
ATRIAL RATE: 69 BPM
BUN SERPL-MCNC: 13 MG/DL (ref 6–23)
CALCIUM SERPL-MCNC: 8.2 MG/DL (ref 8.6–10.6)
CHLORIDE SERPL-SCNC: 95 MMOL/L (ref 98–107)
CO2 SERPL-SCNC: 29 MMOL/L (ref 21–32)
CREAT SERPL-MCNC: 0.82 MG/DL (ref 0.5–1.3)
EGFRCR SERPLBLD CKD-EPI 2021: >90 ML/MIN/1.73M*2
ERYTHROCYTE [DISTWIDTH] IN BLOOD BY AUTOMATED COUNT: 13.2 % (ref 11.5–14.5)
GLUCOSE SERPL-MCNC: 80 MG/DL (ref 74–99)
HCT VFR BLD AUTO: 35.3 % (ref 41–52)
HGB BLD-MCNC: 11.9 G/DL (ref 13.5–17.5)
MAGNESIUM SERPL-MCNC: 2.5 MG/DL (ref 1.6–2.4)
MCH RBC QN AUTO: 29.5 PG (ref 26–34)
MCHC RBC AUTO-ENTMCNC: 33.7 G/DL (ref 32–36)
MCV RBC AUTO: 88 FL (ref 80–100)
NRBC BLD-RTO: 0 /100 WBCS (ref 0–0)
PHOSPHATE SERPL-MCNC: 3.3 MG/DL (ref 2.5–4.9)
PLATELET # BLD AUTO: 352 X10*3/UL (ref 150–450)
POTASSIUM SERPL-SCNC: 3.4 MMOL/L (ref 3.5–5.3)
Q ONSET: 222 MS
Q ONSET: 223 MS
Q ONSET: 226 MS
QRS COUNT: 11 BEATS
QRS COUNT: 8 BEATS
QRS COUNT: 9 BEATS
QRS DURATION: 102 MS
QRS DURATION: 82 MS
QRS DURATION: 94 MS
QT INTERVAL: 418 MS
QT INTERVAL: 454 MS
QT INTERVAL: 480 MS
QTC CALCULATION(BAZETT): 420 MS
QTC CALCULATION(BAZETT): 430 MS
QTC CALCULATION(BAZETT): 442 MS
QTC FREDERICIA: 420 MS
QTC FREDERICIA: 438 MS
QTC FREDERICIA: 455 MS
R AXIS: 24 DEGREES
R AXIS: 70 DEGREES
R AXIS: 77 DEGREES
RBC # BLD AUTO: 4.03 X10*6/UL (ref 4.5–5.9)
SODIUM SERPL-SCNC: 130 MMOL/L (ref 136–145)
T AXIS: 1 DEGREES
T AXIS: 69 DEGREES
T AXIS: 94 DEGREES
T OFFSET: 432 MS
T OFFSET: 453 MS
T OFFSET: 462 MS
VENTRICULAR RATE: 51 BPM
VENTRICULAR RATE: 54 BPM
VENTRICULAR RATE: 61 BPM
WBC # BLD AUTO: 11.8 X10*3/UL (ref 4.4–11.3)

## 2024-12-06 PROCEDURE — 85027 COMPLETE CBC AUTOMATED: CPT

## 2024-12-06 PROCEDURE — 94640 AIRWAY INHALATION TREATMENT: CPT

## 2024-12-06 PROCEDURE — 2500000005 HC RX 250 GENERAL PHARMACY W/O HCPCS

## 2024-12-06 PROCEDURE — 99231 SBSQ HOSP IP/OBS SF/LOW 25: CPT | Performed by: PHYSICIAN ASSISTANT

## 2024-12-06 PROCEDURE — 97116 GAIT TRAINING THERAPY: CPT | Mod: GP,CQ

## 2024-12-06 PROCEDURE — 2500000004 HC RX 250 GENERAL PHARMACY W/ HCPCS (ALT 636 FOR OP/ED): Performed by: NURSE PRACTITIONER

## 2024-12-06 PROCEDURE — 2500000001 HC RX 250 WO HCPCS SELF ADMINISTERED DRUGS (ALT 637 FOR MEDICARE OP)

## 2024-12-06 PROCEDURE — 83735 ASSAY OF MAGNESIUM: CPT

## 2024-12-06 PROCEDURE — 1200000002 HC GENERAL ROOM WITH TELEMETRY DAILY

## 2024-12-06 PROCEDURE — 2500000001 HC RX 250 WO HCPCS SELF ADMINISTERED DRUGS (ALT 637 FOR MEDICARE OP): Performed by: PHYSICIAN ASSISTANT

## 2024-12-06 PROCEDURE — 2500000001 HC RX 250 WO HCPCS SELF ADMINISTERED DRUGS (ALT 637 FOR MEDICARE OP): Performed by: NURSE PRACTITIONER

## 2024-12-06 PROCEDURE — 97530 THERAPEUTIC ACTIVITIES: CPT | Mod: GP,CQ

## 2024-12-06 PROCEDURE — 2500000001 HC RX 250 WO HCPCS SELF ADMINISTERED DRUGS (ALT 637 FOR MEDICARE OP): Performed by: STUDENT IN AN ORGANIZED HEALTH CARE EDUCATION/TRAINING PROGRAM

## 2024-12-06 PROCEDURE — 94760 N-INVAS EAR/PLS OXIMETRY 1: CPT

## 2024-12-06 PROCEDURE — 80069 RENAL FUNCTION PANEL: CPT

## 2024-12-06 RX ORDER — POTASSIUM CHLORIDE 1.5 G/1.58G
60 POWDER, FOR SOLUTION ORAL ONCE
Status: COMPLETED | OUTPATIENT
Start: 2024-12-06 | End: 2024-12-06

## 2024-12-06 RX ORDER — METOPROLOL TARTRATE 25 MG/1
12.5 TABLET, FILM COATED ORAL 2 TIMES DAILY
Status: DISCONTINUED | OUTPATIENT
Start: 2024-12-06 | End: 2024-12-08

## 2024-12-06 RX ORDER — POTASSIUM CHLORIDE 20 MEQ/1
60 TABLET, EXTENDED RELEASE ORAL ONCE
Status: DISCONTINUED | OUTPATIENT
Start: 2024-12-06 | End: 2024-12-08

## 2024-12-06 ASSESSMENT — PAIN DESCRIPTION - DESCRIPTORS: DESCRIPTORS: ACHING

## 2024-12-06 ASSESSMENT — COGNITIVE AND FUNCTIONAL STATUS - GENERAL
DAILY ACTIVITIY SCORE: 24
MOBILITY SCORE: 24
MOBILITY SCORE: 24

## 2024-12-06 ASSESSMENT — PAIN SCALES - GENERAL
PAINLEVEL_OUTOF10: 4
PAINLEVEL_OUTOF10: 0 - NO PAIN
PAINLEVEL_OUTOF10: 0 - NO PAIN

## 2024-12-06 ASSESSMENT — PAIN - FUNCTIONAL ASSESSMENT
PAIN_FUNCTIONAL_ASSESSMENT: 0-10
PAIN_FUNCTIONAL_ASSESSMENT: 0-10

## 2024-12-06 NOTE — PROGRESS NOTES
"CARDIAC SURGERY DAILY PROGRESS NOTE    Gavin Moreau is a 54 y.o. with PMH/PSH of Mitral valve prolapse, asthma, GERD, rheumatic fever Presents to the CTICU from the OR s/p Robotic MVR via mini thoracotomy with Dr. Goldberg on 11/26. While attempting to transfer to the CTICU bed in OR patient R on T/VF arrested secondary to pacer spike requiring CPR and shock x 2 with ROSC after 4 minutes.     OPERATION/PROCEDURE: 11/26/24 with Simeon Goldberg MD, MS  1.Radical mitral valve repair with preshaped [place] cords and 36 mm annuloplasty ring  2.Robotically assisted mitral valve repair  3.Right femoral arterial and venous cannulation for cardiopulmonary bypass     CTICU Course: Cardiac arrest in the immediate post-op. Once in CTICU, uncomplicated course     Transferred to T3: on 11/28    Interval History:   Sustained run of Afib RVR over night ~20 minutes and a few sustained runs this morning    SUBJECTIVE:  Feeling well no specific concerns    Objective   /73 (BP Location: Right arm, Patient Position: Lying)   Pulse 81   Temp 36.2 °C (97.2 °F) (Temporal)   Resp 16   Ht 1.778 m (5' 10\")   Wt 82.8 kg (182 lb 9.6 oz)   SpO2 95%   BMI 26.20 kg/m²   0-10 (Numeric) Pain Score: 0 - No pain   3 Day Weight Change: -1.346 kg (-2 lb 15.5 oz) per day    Intake and Output    Intake/Output Summary (Last 24 hours) at 12/6/2024 1227  Last data filed at 12/6/2024 1130  Gross per 24 hour   Intake 840 ml   Output 2675 ml   Net -1835 ml     Physical Exam  Physical Exam  Constitutional:       Appearance: Normal appearance.   HENT:      Head: Normocephalic and atraumatic.      Nose: Nose normal.      Mouth/Throat:      Mouth: Mucous membranes are moist.   Eyes:      Conjunctiva/sclera: Conjunctivae normal.   Cardiovascular:      Rate and Rhythm: Normal rate.      Pulses: Normal pulses.      Heart sounds: Normal heart sounds.      Comments: Tele: Sr <-> junctional with transient episodes of AF RVR starting to last longer then " self converting to junctional vs sinus.  VVI backup rate 40, mA 7  Pulmonary:      Effort: Pulmonary effort is normal.      Breath sounds: Normal breath sounds.      Comments: Diminish posterior bilateral lung sounds  Abdominal:      General: Bowel sounds are normal.      Palpations: Abdomen is soft.   Musculoskeletal:      Cervical back: Neck supple.      Right lower leg: Edema (+2) present.      Left lower leg: Edema (+2) present.   Skin:     General: Skin is warm and dry.      Capillary Refill: Capillary refill takes less than 2 seconds.      Comments: Right thorocotmy incision well approximated without erythema or drainage. All port sites without erythema or drainage   Neurological:      General: No focal deficit present.      Mental Status: He is alert and oriented to person, place, and time.   Psychiatric:         Mood and Affect: Mood normal.         Behavior: Behavior normal.       Medications  Scheduled medications  acetaminophen, 650 mg, oral, q6h  apixaban, 5 mg, oral, BID  aspirin, 81 mg, oral, Daily  calcium carbonate, 500 mg, oral, Daily  colchicine, 0.6 mg, oral, q12h  fluticasone furoate-vilanteroL, 1 puff, inhalation, Daily  furosemide, 40 mg, oral, BID  metoprolol tartrate, 12.5 mg, oral, BID  multivitamin with minerals, 1 tablet, oral, Daily  oxygen, , inhalation, Continuous - Inhalation  potassium chloride CR, 60 mEq, oral, Once    Continuous medications   PRN medications  PRN medications: dextromethorphan-guaifenesin, dextrose **OR** glucagon, melatonin, naloxone, polyethylene glycol, sennosides-docusate sodium, traMADol    Labs  Results for orders placed or performed during the hospital encounter of 11/26/24 (from the past 24 hours)   Renal function panel   Result Value Ref Range    Glucose 106 (H) 74 - 99 mg/dL    Sodium 128 (L) 136 - 145 mmol/L    Potassium 3.5 3.5 - 5.3 mmol/L    Chloride 91 (L) 98 - 107 mmol/L    Bicarbonate 27 21 - 32 mmol/L    Anion Gap 14 10 - 20 mmol/L    Urea Nitrogen  16 6 - 23 mg/dL    Creatinine 0.87 0.50 - 1.30 mg/dL    eGFR >90 >60 mL/min/1.73m*2    Calcium 8.4 (L) 8.6 - 10.6 mg/dL    Phosphorus 3.4 2.5 - 4.9 mg/dL    Albumin 3.5 3.4 - 5.0 g/dL   Magnesium   Result Value Ref Range    Magnesium 2.50 (H) 1.60 - 2.40 mg/dL   CBC   Result Value Ref Range    WBC 11.8 (H) 4.4 - 11.3 x10*3/uL    nRBC 0.0 0.0 - 0.0 /100 WBCs    RBC 4.03 (L) 4.50 - 5.90 x10*6/uL    Hemoglobin 11.9 (L) 13.5 - 17.5 g/dL    Hematocrit 35.3 (L) 41.0 - 52.0 %    MCV 88 80 - 100 fL    MCH 29.5 26.0 - 34.0 pg    MCHC 33.7 32.0 - 36.0 g/dL    RDW 13.2 11.5 - 14.5 %    Platelets 352 150 - 450 x10*3/uL   Renal function panel   Result Value Ref Range    Glucose 80 74 - 99 mg/dL    Sodium 130 (L) 136 - 145 mmol/L    Potassium 3.4 (L) 3.5 - 5.3 mmol/L    Chloride 95 (L) 98 - 107 mmol/L    Bicarbonate 29 21 - 32 mmol/L    Anion Gap 9 (L) 10 - 20 mmol/L    Urea Nitrogen 13 6 - 23 mg/dL    Creatinine 0.82 0.50 - 1.30 mg/dL    eGFR >90 >60 mL/min/1.73m*2    Calcium 8.2 (L) 8.6 - 10.6 mg/dL    Phosphorus 3.3 2.5 - 4.9 mg/dL    Albumin 3.2 (L) 3.4 - 5.0 g/dL         IMAGING/ DIAGNOSTIC TESTING:    XR chest 2 views 11/30/202  Impression  1. Improvement in mild pulmonary edema.  2. Similar mild bibasilar atelectasis with small pleural effusions.    XR chest 1 view 11/29/2024  Impression  1.Post cardiac valve replacement again noted.  2.Perihilar and basilar edema with small effusions grossly similar to  the prior study.    11/29/24 TTE complete with contrast:  CONCLUSIONS:  1. Poorly visualized anatomical structures due to suboptimal image quality.  2. The left ventricular systolic function is low normal, with a Lambert's biplane calculated ejection fraction of 52%.  3. Spectral Doppler shows a Grade III (restrictive pattern) of left ventricular diastolic filling with an elevated left atrial pressure.  4. There is normal right ventricular global systolic function.  5. Status post mitral valve repair.  6. Moderately  elevated right ventricular systolic pressure (RVSP 50.5mmhg)    IMPRESSION & PLAN:  POD # 9 s/p Robotically assisted mitral valve repair with 36mm annuloplasty on 11/26/24  - Increase activity/ ambulation; PT/OT  - Encourage IS, C/DB; respiratory therapy; wean O2 as marialuisa   - Cardiac rehab referral   - Continue cardiac meds: ASA, statin   - Continue colchicine x1 month for robotic MidCAB procedure (0.6mg daily for < 70kg, 0.6mg BID for > 70kg; if concurrently receiving amiodarone reduce dose by 50%) (sent to meds to beds 11/30)  - Pain and anticonstipation meds  - Postop TTE completed on 11/29 with trace MR, EF 52%, RVSP 50  - 2v CXR completed 11/30  - Cut epicardial wires prior to discharge   - Tele until discharge  - Optimize nutrition and electrolytes  - 12/5 Case discussed with Dr. Goldberg, patient will be cleared to be discharged in AM on 12/6 if Na is 130 or greater and no sustained AF    Hiccups- resolved today  - 12/2 Start baclofen today -> stopped secondary to QTC > 500  - 12/5 Still present with less intensity than before     Rhythm  - 11/29 Tele: Accelerated junctional, will obtain EKG to confirm and check QTC  - 11/29 Will reassess BB initiation in AM secondary to rhythm   - 11/30 No BB today, patient still accelerated junctional, reassess in AM  - 12/01: EKG this AM, still accelerated junctional rhythm. Consider EP consult within 1-2 days if no recovery. No BB  - Adjust medications as tolerated  - 12/2 ECG sinus node appears to be recovering, NSR this morning  - 12/2 Patient developed multiple episodes of NSVT and AF RVR. EP consulted  - 12/3 Tele: SR <-> junctional. Case discussed with EP. Will stop BB and AAD to allow sinus node to recovery. If patient remains junctional, pt may need a PPM  - 12/3 Will start Eliquis 5 mg bid for AF in AM (Ok to start DOAC per Dr Goldberg)  - 12/4  Tele: SR <-> junctional. Patient had one episode of AF RVR lasting a few minutes with subsequent conversion to  junctional  - 12/5 EP recommendations:   -Continue eliquis 5 BID   -Allow permissive tachycardia 110-130  -Avoid BB and amiodarone given c/f PMVT/VF related to R on T  -Sensitivity increased on pacer box from 1 to 2 mV to avoid undersensing and R on T phenomenon.   -No indication for 2ry prevention ICD at this point, given above tele strip. Now, junctional rhythm is resolved and NSR is restored, likely was related to post-op inflammation.   -Please do not resume amio to allow for sinus node recovery  -Can use metop PRN for A fib RVR only if >130 or symptomatic.   -Ziopatch on discharge and please arrange for outpt Cardiology follow up on discharge.    Recommendations are preliminary until note is co-signed by an attending.   - 12/5 Tele: SR <-> junctional with a few transient episodes of AF RVR that quickly convert to accelerated junctional    - 12/5 Dicussed case with EP, cont to hold any AVB agent at discharge, EP will sign off today  - Discussed with EP today--> given significant run overnight and increased number of episodes starting low dose metoprolol 12.5mg BID       Acute Blood Loss Anemia - Stable  Recent Labs     12/06/24  0710 12/05/24  0705 12/04/24  0710 12/03/24  0402 12/02/24  0740 12/01/24  0648 11/30/24  0707   HGB 11.9* 12.8* 13.3* 12.6* 11.8* 11.2* 10.8*   HCT 35.3* 38.3* 38.1* 36.0* 34.1* 32.7* 32.3*   - MV  - 11/29 No Iron, Hematocrit > 34%  - Daily labs, transfuse as indicated    Thrombocytopenia - resolved  Recent Labs     12/06/24  0710 12/05/24  0705 12/04/24  0710 12/03/24  0402 12/02/24  0740 12/01/24  0648 11/30/24  0707    374 321 275 208 183 136*   - Etiology likely postop/CPB related  - Continue to trend with daily CBCs    Volume/Electrolyte Status: Preop wt Weight: 84.5 kg (186 lb 4.6 oz)   Hyponatremia likely in setting of volume overload  Hypokalemia  Vitals:    12/06/24 0558   Weight: 82.8 kg (182 lb 9.6 oz)     - Weight: Pre-op 84.5 ->12/5 84.6 kg, last 24 hr overall fluid  status -1615  - 12/3 Lasix 40 mg IV bid has been on hold since  night  - Replete electrolytes for hypokalemia/hypomagnesemia/hypophosphatemia as needed  -  Replaced potassium today and   -  Start oral Lasix 40 mg bid per nephrology   - Daily weights and strict I&Os  - Daily RFP while admitted  - Replete K+ today 60 meq    Hyponatremia- improving  - Baseline Na on  normal at 143  -  Na 135,  Na 130,  Na 128, 12/3 Na 127->128->125,  Na 123 ->127,  128  - 12/3 Will check urine and serum studies   - 12/3 Will consult nephrology (case discussed with nephrology)  - Start oral Lasix 40 mg bid and change current fluid restriction of 1.5L to 1.2L  - Change daily RFP to RFP bid (0700, 1700)  -  Nephrology recommendations:  - Continue with Fluid restriction to 1.2L per day + lasix 40 mg PO BID until hyponatremia returns to baseline. Fluid restriction to keep him net negative by 1L or more   - stop NSAID ( ketorolac) and baclofen that may cause hyponatremia as well. Pain control with other meds.   - strict Is/Os  - Renal dosing for medications for latest eGFR, follow medication trough as appropriate  - Avoid hypotension/rapid fluctuations in BPs  - Nephrology will sign off at this time  -  Renal recommendations noted and appreciated     Leukocytosis - Resolved  Recent Labs     24  0710 24  0705 24  0710 24  0402 24  0740 24  0648 24  0707   WBC 11.8* 10.4 11.5* 9.4 10.3 10.1 12.2*     Temp (36hrs), Av.1 °C (97 °F), Min:36 °C (96.8 °F), Max:36.3 °C (97.3 °F)    Post-op Pain and Nausea - resolved  -  On oxycodone per unit protocol, will toradol 15 mg IV q 6 hrs x 48 hours per Dr. Goldberg to back off narcotics and help alleviate nausea   - Cont Zofran as needed  - Cont bowel regimen   - Pain improving with Toradol. Nausea has significant improved  -  Post-op pain significantly improved, last prn pain medication  given on 12/3. Currently on schedule tylenol      Hypertension: home meds: Lisinopril 5mg qd  Systolic (24hrs), Av , Min:101 , Max:130   - currently on no antihypertensive, bp is well controlled  - additional antihypertensives as needed     VTE Prophylaxis: SCDs/TEDs, ambulation, Eliquis  Code Status: Full Code    Dispo  - PT/OT recs  low intensity   - Would benefit from homecare RN for cardiac surgery carepath  - Anticipate discharge 1-2 days, pending sinus node recovery and Na normalization. Will need a ziopatch on discharge per EP. Per Dr Goldberg if Na is 130 or greater in AM and no sustained AF will be ok to discharge. Will need to folllow up with Dr Franz cardiologist to read stevie Hilton PA-C  Cardiac Surgery Resident  [facility]  Team Phone [phone]    2024  12:27 PM

## 2024-12-06 NOTE — PROGRESS NOTES
"  Physical Therapy Treatment    Patient Name: Gavin Moreau \"Harinder\"  MRN: [id number]  Today's Date: 12/6/2024  Room: [address] [place], [place] [address]A  Time Calculation  Start Time: 1013  Stop Time: 1046  Time Calculation (min): 33 min       Assessment/Plan   PT Assessment  PT Assessment Results: Decreased strength  Rehab Prognosis: Good  Barriers to Discharge: none  Evaluation/Treatment Tolerance: Patient tolerated treatment well  Medical Staff Made Aware: Yes  Strengths: Ability to acquire knowledge, Attitude of self, Premorbid level of function  End of Session Communication: Bedside nurse  End of Session Patient Position: Bed, 2 rail up, Alarm off, not on at start of session (seated EOB)     PT Plan  Treatment/Interventions: Bed mobility, Transfer training, Gait training, Stair training, Balance training, Strengthening, Endurance training, Therapeutic exercise, Therapeutic activity  PT Plan: Ongoing PT  PT Frequency: 3 times per week  PT Discharge Recommendations: cardiac rehab  PT Recommended Transfer Status: independent  Equipment to discharge: none  PT - OK to Discharge: Yes  Assessment: Patient is progressing Well with therapy this date. Pt. Decreased time for 5x STS, climbed 3 1/2 flights of stairs, and ambulated ~950ft with no LOB, good form, and decreased speed. Would benefit from OP cardiac rehab post DC. Communicated with PT who is in agreement on discharging from in house therapy and DC rec change.  General Visit Information:   PT  Visit  PT Received On: 12/06/24  Response to Previous Treatment: Patient with no complaints from previous session.  Prior to Session Communication: Bedside nurse  Patient Position Received: Bed, 2 rail up, Alarm off, not on at start of session (seated EOB)  Family/Caregiver Present: No     Subjective   Subjective: Pt. reported no pain this date.  Precautions:  Precautions  Medical Precautions: Cardiac precautions, Fall precautions      Objective   Pain:  Pain Assessment  Pain Assessment: 0-10  0-10 " (Numeric) Pain Score: 0 - No pain  Cognition:  Cognition  Overall Cognitive Status: Within Functional Limits  Orientation Level: Oriented X4     Static Sitting balance:  Static Sitting Balance  Static Sitting-Balance Support: No upper extremity supported  Static Sitting-Level of Assistance: Independent  Static Standing Balance:  Static Standing Balance  Static Standing-Balance Support: No upper extremity supported  Static Standing-Level of Assistance: Independent  Dynamic Sitting Balance:  Dynamic Sitting Balance  Dynamic Sitting-Balance Support: No upper extremity supported  Dynamic Sitting-Level of Assistance: Independent  Dynamic Sitting-Balance: Trunk control activities (during 5xSTS)  Dynamic Standing Balance:  Dynamic Standing Balance  Dynamic Standing-Balance Support: No upper extremity supported  Dynamic Standing-Level of Assistance: Independent  Dynamic Standing-Balance: Turning (amb)    PT Treatments:     Therapeutic Activity  Therapeutic Activity Performed: Yes  Therapeutic Activity 1: stair training  Therapeutic Activity 2: 6 MWT  Therapeutic Activity 3: 5xSTS     Bed Mobility  Bed Mobility: No  Ambulation/Gait Training  Ambulation/Gait Training Performed: Yes  Ambulation/Gait Training 1  Surface 1: Level tile  Device 1: No device  Assistance 1: Independent  Quality of Gait 1: Decreased step length  Comments/Distance (ft) 1: ~950ft total  Transfers  Transfer: Yes  Transfer 1  Transfer From 1: Sit to, Stand to  Transfer to 1: Stand, Sit  Technique 1: Stand to sit, Sit to stand  Transfer Device 1:  (no device)  Transfer Level of Assistance 1: Independent  Trials/Comments 1: x7 trials  Stairs  Stairs: Yes  Stairs  Rails 1: Left (L ascending; R descending)  Curb Step 1: No  Device 1: No device  Assistance 1: Independent  Comment/Number of Steps 1: x2 trials; 1 1/2 flight; 2 flights          Activity tolerance:  Activity Tolerance  Endurance: Endurance does not limit participation in activity    Outcome  Measures:  [facility] Basic Mobility  Turning from your back to your side while in a flat bed without using bedrails: None  Moving from lying on your back to sitting on the side of a flat bed without using bedrails: None  Moving to and from bed to chair (including a wheelchair): None  Standing up from a chair using your arms (e.g. wheelchair or bedside chair): None  To walk in hospital room: None  Climbing 3-5 steps with railing: None  Basic Mobility - Total Score: 24     Other Measures  5x Sit to Stand: 14.1s to complete  6 min Walk Test: 825ft    Education Documentation  Body Mechanics, taught by KELLEY Lees at 12/6/2024 12:24 PM.  Learner: Patient  Readiness: Acceptance  Method: Explanation  Response: Verbalizes Understanding, Demonstrated Understanding  Comment: reviewed PT rehab POC    Mobility Training, taught by KELLEY Lees at 12/6/2024 12:24 PM.  Learner: Patient  Readiness: Acceptance  Method: Explanation  Response: Verbalizes Understanding, Demonstrated Understanding  Comment: reviewed PT rehab POC    Education Comments  No comments found.          OP EDUCATION:       Encounter Problems       Encounter Problems (Active)       PT Transfers       Pt will demonstrate ability to complete 5X STS in < 12 sec with good from and consistency between transfers for safe D/C.  (Progressing)       Start:  11/27/24    Expected End:  12/11/24               Pain - Adult             Encounter Problems (Resolved)       Balance       Pt will demonstrated ability to score at least 24/28 on the Tinetti balance assessment tool to ensure safety upon D/C.  (Met)       Start:  11/27/24    Expected End:  12/11/24    Resolved:  12/03/24            Mobility       Pt will demonstrated ability to ambulate >/=300ft with proper form and no balance deficits for safe home going.   (Met)       Start:  11/27/24    Expected End:  12/11/24    Resolved:  12/06/24         Pt will demonstrate ability to ascend/descend 1 flight of  stairs with unilateral rail and no balance deficits for safe home going.  (Met)       Start:  11/27/24    Expected End:  12/11/24    Resolved:  12/06/24

## 2024-12-06 NOTE — CARE PLAN
Problem: Safety - Medical Restraint  Goal: Remains free of injury from restraints (Restraint for Interference with Medical Device)  Outcome: Progressing  Goal: Free from restraint(s) (Restraint for Interference with Medical Device)  Outcome: Progressing     Problem: Skin  Goal: Decreased wound size/increased tissue granulation at next dressing change  Outcome: Progressing  Goal: Participates in plan/prevention/treatment measures  Outcome: Progressing  Goal: Prevent/manage excess moisture  Outcome: Progressing  Goal: Prevent/minimize sheer/friction injuries  Outcome: Progressing  Goal: Promote/optimize nutrition  Outcome: Progressing  Goal: Promote skin healing  Outcome: Progressing     Problem: Fall/Injury  Goal: Not fall by end of shift  Outcome: Progressing  Goal: Be free from injury by end of the shift  Outcome: Progressing  Goal: Verbalize understanding of personal risk factors for fall in the hospital  Outcome: Progressing  Goal: Verbalize understanding of risk factor reduction measures to prevent injury from fall in the home  Outcome: Progressing  Goal: Use assistive devices by end of the shift  Outcome: Progressing  Goal: Pace activities to prevent fatigue by end of the shift  Outcome: Progressing     Problem: Pain - Adult  Goal: Verbalizes/displays adequate comfort level or baseline comfort level  Outcome: Progressing     Problem: Safety - Adult  Goal: Free from fall injury  Outcome: Progressing     Problem: Discharge Planning  Goal: Discharge to home or other facility with appropriate resources  Outcome: Progressing     Problem: Chronic Conditions and Co-morbidities  Goal: Patient's chronic conditions and co-morbidity symptoms are monitored and maintained or improved  Outcome: Progressing     Problem: Pain  Goal: Takes deep breaths with improved pain control throughout the shift  Outcome: Progressing  Goal: Turns in bed with improved pain control throughout the shift  Outcome: Progressing  Goal: Walks  with improved pain control throughout the shift  Outcome: Progressing  Goal: Performs ADL's with improved pain control throughout shift  Outcome: Progressing  Goal: Participates in PT with improved pain control throughout the shift  Outcome: Progressing  Goal: Free from opioid side effects throughout the shift  Outcome: Progressing  Goal: Free from acute confusion related to pain meds throughout the shift  Outcome: Progressing   The patient's goals for the shift include      The clinical goals for the shift include pt will increase activity this shift

## 2024-12-07 ENCOUNTER — APPOINTMENT (OUTPATIENT)
Dept: CARDIOLOGY | Facility: HOSPITAL | Age: 54
DRG: 219 | End: 2024-12-07
Payer: COMMERCIAL

## 2024-12-07 LAB
ALBUMIN SERPL BCP-MCNC: 3.2 G/DL (ref 3.4–5)
ANION GAP SERPL CALC-SCNC: 14 MMOL/L (ref 10–20)
BUN SERPL-MCNC: 12 MG/DL (ref 6–23)
CALCIUM SERPL-MCNC: 8.4 MG/DL (ref 8.6–10.6)
CHLORIDE SERPL-SCNC: 96 MMOL/L (ref 98–107)
CO2 SERPL-SCNC: 24 MMOL/L (ref 21–32)
CREAT SERPL-MCNC: 0.77 MG/DL (ref 0.5–1.3)
EGFRCR SERPLBLD CKD-EPI 2021: >90 ML/MIN/1.73M*2
ERYTHROCYTE [DISTWIDTH] IN BLOOD BY AUTOMATED COUNT: 13.2 % (ref 11.5–14.5)
GLUCOSE BLD MANUAL STRIP-MCNC: 116 MG/DL (ref 74–99)
GLUCOSE BLD MANUAL STRIP-MCNC: 119 MG/DL (ref 74–99)
GLUCOSE SERPL-MCNC: 61 MG/DL (ref 74–99)
HCT VFR BLD AUTO: 35.4 % (ref 41–52)
HGB BLD-MCNC: 12.4 G/DL (ref 13.5–17.5)
MAGNESIUM SERPL-MCNC: 2.53 MG/DL (ref 1.6–2.4)
MCH RBC QN AUTO: 29.7 PG (ref 26–34)
MCHC RBC AUTO-ENTMCNC: 35 G/DL (ref 32–36)
MCV RBC AUTO: 85 FL (ref 80–100)
NRBC BLD-RTO: 0 /100 WBCS (ref 0–0)
PHOSPHATE SERPL-MCNC: 3.1 MG/DL (ref 2.5–4.9)
PLATELET # BLD AUTO: 421 X10*3/UL (ref 150–450)
POTASSIUM SERPL-SCNC: 3.8 MMOL/L (ref 3.5–5.3)
RBC # BLD AUTO: 4.17 X10*6/UL (ref 4.5–5.9)
SODIUM SERPL-SCNC: 130 MMOL/L (ref 136–145)
WBC # BLD AUTO: 15.4 X10*3/UL (ref 4.4–11.3)

## 2024-12-07 PROCEDURE — 93005 ELECTROCARDIOGRAM TRACING: CPT

## 2024-12-07 PROCEDURE — 83735 ASSAY OF MAGNESIUM: CPT

## 2024-12-07 PROCEDURE — 80069 RENAL FUNCTION PANEL: CPT | Performed by: PHYSICIAN ASSISTANT

## 2024-12-07 PROCEDURE — 2500000001 HC RX 250 WO HCPCS SELF ADMINISTERED DRUGS (ALT 637 FOR MEDICARE OP)

## 2024-12-07 PROCEDURE — 99232 SBSQ HOSP IP/OBS MODERATE 35: CPT

## 2024-12-07 PROCEDURE — 94640 AIRWAY INHALATION TREATMENT: CPT

## 2024-12-07 PROCEDURE — 2500000002 HC RX 250 W HCPCS SELF ADMINISTERED DRUGS (ALT 637 FOR MEDICARE OP, ALT 636 FOR OP/ED)

## 2024-12-07 PROCEDURE — 85027 COMPLETE CBC AUTOMATED: CPT

## 2024-12-07 PROCEDURE — 1200000002 HC GENERAL ROOM WITH TELEMETRY DAILY

## 2024-12-07 PROCEDURE — 2500000001 HC RX 250 WO HCPCS SELF ADMINISTERED DRUGS (ALT 637 FOR MEDICARE OP): Performed by: NURSE PRACTITIONER

## 2024-12-07 PROCEDURE — 2500000001 HC RX 250 WO HCPCS SELF ADMINISTERED DRUGS (ALT 637 FOR MEDICARE OP): Performed by: PHYSICIAN ASSISTANT

## 2024-12-07 PROCEDURE — 2500000004 HC RX 250 GENERAL PHARMACY W/ HCPCS (ALT 636 FOR OP/ED): Performed by: NURSE PRACTITIONER

## 2024-12-07 PROCEDURE — 2500000004 HC RX 250 GENERAL PHARMACY W/ HCPCS (ALT 636 FOR OP/ED)

## 2024-12-07 PROCEDURE — 82947 ASSAY GLUCOSE BLOOD QUANT: CPT

## 2024-12-07 RX ORDER — METOPROLOL TARTRATE 1 MG/ML
INJECTION, SOLUTION INTRAVENOUS
Status: COMPLETED
Start: 2024-12-07 | End: 2024-12-07

## 2024-12-07 RX ORDER — METOPROLOL TARTRATE 1 MG/ML
5 INJECTION, SOLUTION INTRAVENOUS ONCE
Status: COMPLETED | OUTPATIENT
Start: 2024-12-07 | End: 2024-12-07

## 2024-12-07 RX ORDER — ALUMINUM HYDROXIDE, MAGNESIUM HYDROXIDE, AND SIMETHICONE 1200; 120; 1200 MG/30ML; MG/30ML; MG/30ML
10 SUSPENSION ORAL 4 TIMES DAILY PRN
Status: DISCONTINUED | OUTPATIENT
Start: 2024-12-07 | End: 2024-12-09 | Stop reason: HOSPADM

## 2024-12-07 RX ORDER — FUROSEMIDE 40 MG/1
40 TABLET ORAL DAILY
Status: DISCONTINUED | OUTPATIENT
Start: 2024-12-08 | End: 2024-12-09 | Stop reason: HOSPADM

## 2024-12-07 RX ORDER — CALCIUM CARBONATE 200(500)MG
500 TABLET,CHEWABLE ORAL 4 TIMES DAILY PRN
Status: DISCONTINUED | OUTPATIENT
Start: 2024-12-07 | End: 2024-12-09 | Stop reason: HOSPADM

## 2024-12-07 RX ORDER — MAGNESIUM SULFATE HEPTAHYDRATE 40 MG/ML
2 INJECTION, SOLUTION INTRAVENOUS ONCE
Status: COMPLETED | OUTPATIENT
Start: 2024-12-07 | End: 2024-12-07

## 2024-12-07 RX ORDER — POTASSIUM CHLORIDE 20 MEQ/1
40 TABLET, EXTENDED RELEASE ORAL ONCE
Status: COMPLETED | OUTPATIENT
Start: 2024-12-07 | End: 2024-12-07

## 2024-12-07 RX ORDER — METOPROLOL TARTRATE 1 MG/ML
2.5 INJECTION, SOLUTION INTRAVENOUS ONCE
Status: COMPLETED | OUTPATIENT
Start: 2024-12-07 | End: 2024-12-07

## 2024-12-07 RX ORDER — METOPROLOL TARTRATE 1 MG/ML
5 INJECTION, SOLUTION INTRAVENOUS EVERY 10 MIN PRN
Status: DISCONTINUED | OUTPATIENT
Start: 2024-12-07 | End: 2024-12-07

## 2024-12-07 RX ORDER — POTASSIUM CHLORIDE 1.5 G/1.58G
40 POWDER, FOR SOLUTION ORAL ONCE
Status: COMPLETED | OUTPATIENT
Start: 2024-12-07 | End: 2024-12-07

## 2024-12-07 ASSESSMENT — PAIN - FUNCTIONAL ASSESSMENT
PAIN_FUNCTIONAL_ASSESSMENT: 0-10
PAIN_FUNCTIONAL_ASSESSMENT: 0-10

## 2024-12-07 ASSESSMENT — COGNITIVE AND FUNCTIONAL STATUS - GENERAL
DAILY ACTIVITIY SCORE: 24
MOBILITY SCORE: 24
DAILY ACTIVITIY SCORE: 24
MOBILITY SCORE: 24

## 2024-12-07 ASSESSMENT — PAIN SCALES - GENERAL
PAINLEVEL_OUTOF10: 0 - NO PAIN
PAINLEVEL_OUTOF10: 0 - NO PAIN

## 2024-12-07 NOTE — PROGRESS NOTES
"CARDIAC SURGERY DAILY PROGRESS NOTE    Gavin Moreau is a 54 y.o. with PMH/PSH of Mitral valve prolapse, asthma, GERD, rheumatic fever Presents to the CTICU from the OR s/p Robotic MVR via mini thoracotomy with Dr. Goldberg on 11/26. While attempting to transfer to the CTICU bed in OR patient R on T/VF arrested secondary to pacer spike requiring CPR and shock x 2 with ROSC after 4 minutes.     OPERATION/PROCEDURE: 11/26/24 with Simeon Goldberg MD, MS  1.Radical mitral valve repair with preshaped [place] cords and 36 mm annuloplasty ring  2.Robotically assisted mitral valve repair  3.Right femoral arterial and venous cannulation for cardiopulmonary bypass     CTICU Course: Cardiac arrest in the immediate post-op. Once in CTICU, uncomplicated course     Transferred to T3: on 11/28    Interval History:   Sustained run of Afib RVR over night ~20 minutes and a few sustained runs this morning    SUBJECTIVE:  Feeling well no specific concerns    Objective   /67 (BP Location: Left arm, Patient Position: Lying)   Pulse (!) 122   Temp 36.5 °C (97.7 °F) (Temporal)   Resp 17   Ht 1.778 m (5' 10\")   Wt 82.4 kg (181 lb 9.6 oz)   SpO2 96%   BMI 26.06 kg/m²   0-10 (Numeric) Pain Score: 0 - No pain   3 Day Weight Change: -1.86 kg (-4 lb 1.6 oz) per day    Intake and Output    Intake/Output Summary (Last 24 hours) at 12/7/2024 0755  Last data filed at 12/6/2024 2144  Gross per 24 hour   Intake 780 ml   Output 1325 ml   Net -545 ml     Physical Exam  Physical Exam  Constitutional:       Appearance: Normal appearance.   HENT:      Head: Normocephalic and atraumatic.      Nose: Nose normal.      Mouth/Throat:      Mouth: Mucous membranes are moist.   Eyes:      Conjunctiva/sclera: Conjunctivae normal.   Cardiovascular:      Rate and Rhythm: Rhythm irregular.      Pulses: Normal pulses.      Heart sounds: Normal heart sounds.      Comments: Tele: AF RVR, 130 's. V wire capped  Pulmonary:      Effort: Pulmonary effort is " normal.      Breath sounds: Normal breath sounds.      Comments: Diminish bilateral bases    Abdominal:      General: Bowel sounds are normal.      Palpations: Abdomen is soft.      Comments: C/o diarrhea   Musculoskeletal:      Cervical back: Neck supple.      Right lower leg: Edema (+2) present.      Left lower leg: Edema (+2) present.   Skin:     General: Skin is warm and dry.      Capillary Refill: Capillary refill takes less than 2 seconds.      Comments: Right thorocotmy incision well approximated without erythema or drainage. All port sites without erythema or drainage. Right groin incision intact, well approximated   Neurological:      General: No focal deficit present.      Mental Status: He is alert and oriented to person, place, and time.   Psychiatric:         Mood and Affect: Mood normal.         Behavior: Behavior normal.       Medications  Scheduled medications  acetaminophen, 650 mg, oral, q6h  apixaban, 5 mg, oral, BID  aspirin, 81 mg, oral, Daily  colchicine, 0.6 mg, oral, q12h  fluticasone furoate-vilanteroL, 1 puff, inhalation, Daily  furosemide, 40 mg, oral, BID  metoprolol tartrate, 12.5 mg, oral, BID  multivitamin with minerals, 1 tablet, oral, Daily  oxygen, , inhalation, Continuous - Inhalation  potassium chloride CR, 60 mEq, oral, Once    Continuous medications   PRN medications  PRN medications: calcium carbonate, dextromethorphan-guaifenesin, dextrose **OR** glucagon, melatonin, naloxone, polyethylene glycol, sennosides-docusate sodium, traMADol    Labs  No results found for this or any previous visit (from the past 24 hours).        IMAGING/ DIAGNOSTIC TESTING:    XR chest 2 views 11/30/202  Impression  1. Improvement in mild pulmonary edema.  2. Similar mild bibasilar atelectasis with small pleural effusions.    XR chest 1 view 11/29/2024  Impression  1.Post cardiac valve replacement again noted.  2.Perihilar and basilar edema with small effusions grossly similar to  the prior  study.    11/29/24 TTE complete with contrast:  CONCLUSIONS:  1. Poorly visualized anatomical structures due to suboptimal image quality.  2. The left ventricular systolic function is low normal, with a Lambert's biplane calculated ejection fraction of 52%.  3. Spectral Doppler shows a Grade III (restrictive pattern) of left ventricular diastolic filling with an elevated left atrial pressure.  4. There is normal right ventricular global systolic function.  5. Status post mitral valve repair.  6. Moderately elevated right ventricular systolic pressure (RVSP 50.5mmhg)    IMPRESSION & PLAN:  POD # 11 s/p Robotically assisted mitral valve repair with 36mm annuloplasty on 11/26/24  - Increase activity/ ambulation; PT/OT  - Encourage IS, C/DB; respiratory therapy; wean O2 as marialuisa   - Cardiac rehab referral   - Continue cardiac meds: ASA, statin   - Continue colchicine x1 month for robotic MidCAB procedure (0.6mg daily for < 70kg, 0.6mg BID for > 70kg; if concurrently receiving amiodarone reduce dose by 50%) (sent to meds to beds 11/30)  - Pain and anticonstipation meds  - Postop TTE completed on 11/29 with trace MR, EF 52%, RVSP 50  - 2v CXR completed 11/30  - Cut epicardial wires prior to discharge   - Tele until discharge  - Optimize nutrition and electrolytes  - 12/5 Case discussed with Dr. Goldberg, patient will be cleared to be discharged in AM on 12/6 if Na is 130 or greater and no sustained AF. Will reconsider possible discharge on Sunday if AF RVR resolves    Hiccups- resolved today  - 12/2 Start baclofen today -> stopped secondary to QTC > 500  - 12/5-12/7 Still present with less intensity than before     Rhythm  - 11/29 Tele: Accelerated junctional, will obtain EKG to confirm and check QTC  - 11/29 Will reassess BB initiation in AM secondary to rhythm   - 11/30 No BB today, patient still accelerated junctional, reassess in AM  - 12/01: EKG this AM, still accelerated junctional rhythm. Consider EP consult within  1-2 days if no recovery. No BB  - Adjust medications as tolerated  - 12/2 ECG sinus node appears to be recovering, NSR this morning  - 12/2 Patient developed multiple episodes of NSVT and AF RVR. EP consulted  - 12/3 Tele: SR <-> junctional. Case discussed with EP. Will stop BB and AAD to allow sinus node to recovery. If patient remains junctional, pt may need a PPM  - 12/3 Will start Eliquis 5 mg bid for AF in AM (Ok to start DOAC per Dr Goldberg)  - 12/4  Tele: SR <-> junctional. Patient had one episode of AF RVR lasting a few minutes with subsequent conversion to junctional  - 12/5 EP recommendations:   -Continue eliquis 5 BID   -Allow permissive tachycardia 110-130  -Avoid BB and amiodarone given c/f PMVT/VF related to R on T  -Sensitivity increased on pacer box from 1 to 2 mV to avoid undersensing and R on T phenomenon.   -No indication for 2ry prevention ICD at this point, given above tele strip. Now, junctional rhythm is resolved and NSR is restored, likely was related to post-op inflammation.   -Please do not resume amio to allow for sinus node recovery  -Can use metop PRN for A fib RVR only if >130 or symptomatic.   -Ziopatch on discharge and please arrange for outpt Cardiology follow up on discharge.    Recommendations are preliminary until note is co-signed by an attending.   - 12/5 Tele: SR <-> junctional with a few transient episodes of AF RVR that quickly convert to accelerated junctional    - 12/5 Dicussed case with EP, cont to hold any AVB agent at discharge, EP will sign off today  - Discussed with EP 12/6--> given significant run overnight and increased number of episodes starting low dose metoprolol 12.5mg BID.  - 12/7 Patient in AF RVR with small bursts of v tach overnight. Patient given magnesium & potassium overnight with reduced bursts of v tach. EKG obtained, atrial flutter with 2:1 AV conduction. Touched base with EP, continue current plan.        Acute Blood Loss Anemia - Stable  Recent  Labs     12/06/24  0710 12/05/24  0705 12/04/24  0710 12/03/24  0402 12/02/24  0740 12/01/24  0648 11/30/24  0707   HGB 11.9* 12.8* 13.3* 12.6* 11.8* 11.2* 10.8*   HCT 35.3* 38.3* 38.1* 36.0* 34.1* 32.7* 32.3*   - MV  - 11/29 No Iron, Hematocrit > 34%  - Daily labs, transfuse as indicated    Thrombocytopenia - resolved  Recent Labs     12/06/24  0710 12/05/24  0705 12/04/24  0710 12/03/24  0402 12/02/24  0740 12/01/24  0648 11/30/24  0707    374 321 275 208 183 136*   - Etiology likely postop/CPB related  - Continue to trend with daily CBCs    Volume/Electrolyte Status: Preop wt Weight: 84.5 kg (186 lb 4.6 oz)   Hyponatremia likely in setting of volume overload  Hypokalemia  Vitals:    12/07/24 0235   Weight: 82.4 kg (181 lb 9.6 oz)     - Weight: Pre-op 84.5 ->12/7 82.4 kg, last 24 hr overall fluid status -545 ml  - 12/3 Lasix 40 mg IV bid has been on hold since 12/2 night  - Replete electrolytes for hypokalemia/hypomagnesemia/hypophosphatemia as needed  - 12/4 Replaced potassium today and 12/5, 12/6  - 12/4 Start oral Lasix 40 mg bid per nephrology   - Daily weights and strict I&Os  - Daily RFP while admitted  - Replete K+ today 80 meq (got 40 mg overnight & 40 mg on day shift)    Hyponatremia- improving  - Baseline Na on 11/26 normal at 143  - 11/28 Na 135, 12/1 Na 130, 12/2 Na 128, 12/3 Na 127->128->125, 12/4 Na 123 ->127, 12/5 128  - 12/3 Will check urine and serum studies   - 12/3 Will consult nephrology (case discussed with nephrology)  -12/4 Start oral Lasix 40 mg bid and change current fluid restriction of 1.5L to 1.2L  -12/4 Change daily RFP to RFP bid (0700, 1700)  - 12/5 Nephrology recommendations:  - Continue with Fluid restriction to 1.2L per day + lasix 40 mg PO BID until hyponatremia returns to baseline. Fluid restriction to keep him net negative by 1L or more   - stop NSAID ( ketorolac) and baclofen that may cause hyponatremia as well. Pain control with other meds.   - strict Is/Os  - Renal  dosing for medications for latest eGFR, follow medication trough as appropriate  - Avoid hypotension/rapid fluctuations in BPs  - Nephrology will sign off at this time  -  Renal recommendations noted and appreciated     Leukocytosis - Resolved  Recent Labs     24  0710 24  0705 24  0710 24  0402 24  0740 24  0648 24  0707   WBC 11.8* 10.4 11.5* 9.4 10.3 10.1 12.2*     Temp (36hrs), Av.2 °C (97.2 °F), Min:36 °C (96.8 °F), Max:36.5 °C (97.7 °F)    Post-op Pain and Nausea - resolved  -  On oxycodone per unit protocol, will toradol 15 mg IV q 6 hrs x 48 hours per Dr. Goldberg to back off narcotics and help alleviate nausea   - Cont Zofran as needed  - Cont bowel regimen   - Pain improving with Toradol. Nausea has significant improved  -  Post-op pain significantly improved, last prn pain medication given on 12/3. Currently on scheduled tylenol      Hypertension: home meds: Lisinopril 5mg qd  Systolic (24hrs), Av , Min:105 , Max:129   - currently on no antihypertensive, bp is well controlled  - additional antihypertensives as needed     VTE Prophylaxis: SCDs/TEDs, ambulation, Eliquis  Code Status: Full Code    Dispo  - PT/OT recs  low intensity   - Would benefit from homecare RN for cardiac surgery carepath  - Anticipate discharge 1-2 days, pending sinus node recovery and Na normalization. Will need a ziopatch on discharge per EP. Per Dr Goldberg if Na is 130 or greater in AM and no sustained AF will be ok to discharge. Will need to folllow up with Dr Franz cardiologist to read FLORI Mercer-CNP  Cardiac Surgery Resident  [facility]  Team Phone [phone]    2024  7:59 AM

## 2024-12-07 NOTE — CARE PLAN
The patient's goals for the shift include      The clinical goals for the shift include Pt will remain HDS during shift    Over the shift, the patient did not make progress toward the following goals. Barriers to progression include episode of afib rvr with heart rate sitting at 125. Recommendations to address these barriers include update team and optimize meds

## 2024-12-08 VITALS
OXYGEN SATURATION: 97 % | HEART RATE: 75 BPM | RESPIRATION RATE: 20 BRPM | TEMPERATURE: 97 F | SYSTOLIC BLOOD PRESSURE: 112 MMHG | DIASTOLIC BLOOD PRESSURE: 70 MMHG | WEIGHT: 177.47 LBS | BODY MASS INDEX: 25.41 KG/M2 | HEIGHT: 70 IN

## 2024-12-08 LAB
ALBUMIN SERPL BCP-MCNC: 3.5 G/DL (ref 3.4–5)
ANION GAP SERPL CALC-SCNC: 17 MMOL/L (ref 10–20)
BUN SERPL-MCNC: 14 MG/DL (ref 6–23)
CALCIUM SERPL-MCNC: 8.7 MG/DL (ref 8.6–10.6)
CHLORIDE SERPL-SCNC: 95 MMOL/L (ref 98–107)
CO2 SERPL-SCNC: 26 MMOL/L (ref 21–32)
CREAT SERPL-MCNC: 0.92 MG/DL (ref 0.5–1.3)
EGFRCR SERPLBLD CKD-EPI 2021: >90 ML/MIN/1.73M*2
ERYTHROCYTE [DISTWIDTH] IN BLOOD BY AUTOMATED COUNT: 13.6 % (ref 11.5–14.5)
GLUCOSE BLD MANUAL STRIP-MCNC: 117 MG/DL (ref 74–99)
GLUCOSE SERPL-MCNC: 40 MG/DL (ref 74–99)
HCT VFR BLD AUTO: 40 % (ref 41–52)
HGB BLD-MCNC: 12.9 G/DL (ref 13.5–17.5)
MAGNESIUM SERPL-MCNC: 2.13 MG/DL (ref 1.6–2.4)
MCH RBC QN AUTO: 29.5 PG (ref 26–34)
MCHC RBC AUTO-ENTMCNC: 32.3 G/DL (ref 32–36)
MCV RBC AUTO: 92 FL (ref 80–100)
NRBC BLD-RTO: 0 /100 WBCS (ref 0–0)
PHOSPHATE SERPL-MCNC: 3.9 MG/DL (ref 2.5–4.9)
PLATELET # BLD AUTO: 446 X10*3/UL (ref 150–450)
POTASSIUM SERPL-SCNC: 3.8 MMOL/L (ref 3.5–5.3)
RBC # BLD AUTO: 4.37 X10*6/UL (ref 4.5–5.9)
SODIUM SERPL-SCNC: 134 MMOL/L (ref 136–145)
WBC # BLD AUTO: 12.4 X10*3/UL (ref 4.4–11.3)

## 2024-12-08 PROCEDURE — 82947 ASSAY GLUCOSE BLOOD QUANT: CPT

## 2024-12-08 PROCEDURE — 2500000001 HC RX 250 WO HCPCS SELF ADMINISTERED DRUGS (ALT 637 FOR MEDICARE OP)

## 2024-12-08 PROCEDURE — 2500000005 HC RX 250 GENERAL PHARMACY W/O HCPCS

## 2024-12-08 PROCEDURE — 99231 SBSQ HOSP IP/OBS SF/LOW 25: CPT | Performed by: PHYSICIAN ASSISTANT

## 2024-12-08 PROCEDURE — 85027 COMPLETE CBC AUTOMATED: CPT

## 2024-12-08 PROCEDURE — 2500000001 HC RX 250 WO HCPCS SELF ADMINISTERED DRUGS (ALT 637 FOR MEDICARE OP): Performed by: PHYSICIAN ASSISTANT

## 2024-12-08 PROCEDURE — 82374 ASSAY BLOOD CARBON DIOXIDE: CPT | Performed by: PHYSICIAN ASSISTANT

## 2024-12-08 PROCEDURE — 2500000002 HC RX 250 W HCPCS SELF ADMINISTERED DRUGS (ALT 637 FOR MEDICARE OP, ALT 636 FOR OP/ED): Performed by: PHYSICIAN ASSISTANT

## 2024-12-08 PROCEDURE — 94640 AIRWAY INHALATION TREATMENT: CPT

## 2024-12-08 PROCEDURE — 83735 ASSAY OF MAGNESIUM: CPT

## 2024-12-08 PROCEDURE — 1200000002 HC GENERAL ROOM WITH TELEMETRY DAILY

## 2024-12-08 PROCEDURE — 2500000001 HC RX 250 WO HCPCS SELF ADMINISTERED DRUGS (ALT 637 FOR MEDICARE OP): Performed by: NURSE PRACTITIONER

## 2024-12-08 RX ORDER — METOPROLOL TARTRATE 25 MG/1
25 TABLET, FILM COATED ORAL 2 TIMES DAILY
Status: DISCONTINUED | OUTPATIENT
Start: 2024-12-08 | End: 2024-12-09 | Stop reason: HOSPADM

## 2024-12-08 RX ORDER — COLCHICINE 0.6 MG/1
0.6 TABLET ORAL 2 TIMES DAILY
Qty: 60 TABLET | Refills: 0 | Status: SHIPPED | OUTPATIENT
Start: 2024-12-08 | End: 2025-01-08

## 2024-12-08 RX ORDER — POTASSIUM CHLORIDE 20 MEQ/1
40 TABLET, EXTENDED RELEASE ORAL DAILY
Status: DISCONTINUED | OUTPATIENT
Start: 2024-12-09 | End: 2024-12-09 | Stop reason: HOSPADM

## 2024-12-08 RX ORDER — POTASSIUM CHLORIDE 20 MEQ/1
40 TABLET, EXTENDED RELEASE ORAL ONCE
Status: COMPLETED | OUTPATIENT
Start: 2024-12-08 | End: 2024-12-08

## 2024-12-08 ASSESSMENT — COGNITIVE AND FUNCTIONAL STATUS - GENERAL
MOBILITY SCORE: 24
DAILY ACTIVITIY SCORE: 24
MOBILITY SCORE: 24
DAILY ACTIVITIY SCORE: 24

## 2024-12-08 ASSESSMENT — PAIN - FUNCTIONAL ASSESSMENT
PAIN_FUNCTIONAL_ASSESSMENT: 0-10

## 2024-12-08 ASSESSMENT — PAIN SCALES - GENERAL
PAINLEVEL_OUTOF10: 0 - NO PAIN

## 2024-12-08 NOTE — PROGRESS NOTES
"CARDIAC SURGERY DAILY PROGRESS NOTE    Gavin Moreau is a 54 y.o. with PMH/PSH of Mitral valve prolapse, asthma, GERD, rheumatic fever Presents to the CTICU from the OR s/p Robotic MVR via mini thoracotomy with Dr. Goldberg on 11/26. While attempting to transfer to the CTICU bed in OR patient R on T/VF arrested secondary to pacer spike requiring CPR and shock x 2 with ROSC after 4 minutes.     OPERATION/PROCEDURE: 11/26/24 with Simeon Goldberg MD, MS  1.Radical mitral valve repair with preshaped [place] cords and 36 mm annuloplasty ring  2.Robotically assisted mitral valve repair  3.Right femoral arterial and venous cannulation for cardiopulmonary bypass     CTICU Course: Cardiac arrest in the immediate post-op. Once in CTICU, uncomplicated course     Transferred to T3: on 11/28    Interval History:   Continues to have somewhat sustained runs of Afib RVR, 12/7 received metoprolol 5mg IV x1 dose and converted to SR    SUBJECTIVE:  Feeling well no specific concerns    Objective   /67 (BP Location: Left arm, Patient Position: Sitting)   Pulse 75   Temp 36.5 °C (97.7 °F) (Temporal)   Resp 16   Ht 1.778 m (5' 10\")   Wt 80.5 kg (177 lb 7.5 oz)   SpO2 96%   BMI 25.46 kg/m²   0-10 (Numeric) Pain Score: 0 - No pain   3 Day Weight Change: -1.367 kg (-3 lb 0.2 oz) per day    Intake and Output    Intake/Output Summary (Last 24 hours) at 12/8/2024 1232  Last data filed at 12/8/2024 0832  Gross per 24 hour   Intake 720 ml   Output 950 ml   Net -230 ml     Physical Exam  Physical Exam  Constitutional:       Appearance: Normal appearance.   HENT:      Head: Normocephalic and atraumatic.      Nose: Nose normal.      Mouth/Throat:      Mouth: Mucous membranes are moist.   Eyes:      Conjunctiva/sclera: Conjunctivae normal.   Cardiovascular:      Rate and Rhythm: Normal rate and regular rhythm.      Pulses: Normal pulses.      Heart sounds: Normal heart sounds.      Comments: V wire capped  Pulmonary:      Effort: " Pulmonary effort is normal.      Breath sounds: Normal breath sounds.      Comments: Diminish bilateral bases    Abdominal:      General: Bowel sounds are normal.      Palpations: Abdomen is soft.   Musculoskeletal:      Cervical back: Neck supple.      Right lower leg: Edema (+1 and improving) present.      Left lower leg: Edema (+1 and improving) present.   Skin:     General: Skin is warm and dry.      Capillary Refill: Capillary refill takes less than 2 seconds.      Comments: Right thorocotmy incision well approximated without erythema or drainage. All port sites without erythema or drainage. Right groin incision intact, well approximated   Neurological:      General: No focal deficit present.      Mental Status: He is alert and oriented to person, place, and time.   Psychiatric:         Mood and Affect: Mood normal.         Behavior: Behavior normal.       Medications  Scheduled medications  acetaminophen, 650 mg, oral, q6h  apixaban, 5 mg, oral, BID  aspirin, 81 mg, oral, Daily  colchicine, 0.6 mg, oral, q12h  fluticasone furoate-vilanteroL, 1 puff, inhalation, Daily  furosemide, 40 mg, oral, Daily  metoprolol tartrate, 25 mg, oral, BID  multivitamin with minerals, 1 tablet, oral, Daily  potassium chloride CR, 40 mEq, oral, Once  [START ON 12/9/2024] potassium chloride CR, 40 mEq, oral, Daily    Continuous medications   PRN medications  PRN medications: alum-mag hydroxide-simeth, calcium carbonate, dextromethorphan-guaifenesin, dextrose **OR** glucagon, melatonin, naloxone, polyethylene glycol, sennosides-docusate sodium, traMADol    Labs  Results for orders placed or performed during the hospital encounter of 11/26/24 (from the past 24 hours)   POCT GLUCOSE   Result Value Ref Range    POCT Glucose 116 (H) 74 - 99 mg/dL   Magnesium   Result Value Ref Range    Magnesium 2.13 1.60 - 2.40 mg/dL   CBC   Result Value Ref Range    WBC 12.4 (H) 4.4 - 11.3 x10*3/uL    nRBC 0.0 0.0 - 0.0 /100 WBCs    RBC 4.37 (L) 4.50  - 5.90 x10*6/uL    Hemoglobin 12.9 (L) 13.5 - 17.5 g/dL    Hematocrit 40.0 (L) 41.0 - 52.0 %    MCV 92 80 - 100 fL    MCH 29.5 26.0 - 34.0 pg    MCHC 32.3 32.0 - 36.0 g/dL    RDW 13.6 11.5 - 14.5 %    Platelets 446 150 - 450 x10*3/uL   Renal function panel   Result Value Ref Range    Glucose 40 (LL) 74 - 99 mg/dL    Sodium 134 (L) 136 - 145 mmol/L    Potassium 3.8 3.5 - 5.3 mmol/L    Chloride 95 (L) 98 - 107 mmol/L    Bicarbonate 26 21 - 32 mmol/L    Anion Gap 17 10 - 20 mmol/L    Urea Nitrogen 14 6 - 23 mg/dL    Creatinine 0.92 0.50 - 1.30 mg/dL    eGFR >90 >60 mL/min/1.73m*2    Calcium 8.7 8.6 - 10.6 mg/dL    Phosphorus 3.9 2.5 - 4.9 mg/dL    Albumin 3.5 3.4 - 5.0 g/dL           IMAGING/ DIAGNOSTIC TESTING:    XR chest 2 views 11/30/202  Impression  1. Improvement in mild pulmonary edema.  2. Similar mild bibasilar atelectasis with small pleural effusions.    XR chest 1 view 11/29/2024  Impression  1.Post cardiac valve replacement again noted.  2.Perihilar and basilar edema with small effusions grossly similar to  the prior study.    11/29/24 TTE complete with contrast:  CONCLUSIONS:  1. Poorly visualized anatomical structures due to suboptimal image quality.  2. The left ventricular systolic function is low normal, with a Lambert's biplane calculated ejection fraction of 52%.  3. Spectral Doppler shows a Grade III (restrictive pattern) of left ventricular diastolic filling with an elevated left atrial pressure.  4. There is normal right ventricular global systolic function.  5. Status post mitral valve repair.  6. Moderately elevated right ventricular systolic pressure (RVSP 50.5mmhg)    IMPRESSION & PLAN:  s/p Robotically assisted mitral valve repair with 36mm annuloplasty on 11/26/24  - Increase activity/ ambulation; PT/OT  - Encourage IS, C/DB; respiratory therapy; wean O2 as marialuisa   - Cardiac rehab referral   - Continue cardiac meds: ASA, statin   - Continue colchicine x1 month for robotic MidCAB procedure  (0.6mg daily for < 70kg, 0.6mg BID for > 70kg; if concurrently receiving amiodarone reduce dose by 50%) (sent to meds to beds 11/30)  - Pain and anticonstipation meds  - Postop TTE completed on 11/29 with trace MR, EF 52%, RVSP 50  - 2v CXR completed 11/30  - Cut epicardial wires prior to discharge   - Tele until discharge  - Optimize nutrition and electrolytes  - 12/5 Case discussed with Dr. Goldberg, patient will be cleared to be discharged in AM on 12/6 if Na is 130 or greater and no sustained AF. Will reconsider possible discharge on Sunday if AF RVR resolves    Hiccups- resolved today  - 12/2 Start baclofen today -> stopped secondary to QTC > 500  - 12/5-12/7 Still present with less intensity than before     Rhythm  - 11/29 Tele: Accelerated junctional, will obtain EKG to confirm and check QTC  - 11/29 Will reassess BB initiation in AM secondary to rhythm   - 11/30 No BB today, patient still accelerated junctional, reassess in AM  - 12/01: EKG this AM, still accelerated junctional rhythm. Consider EP consult within 1-2 days if no recovery. No BB  - Adjust medications as tolerated  - 12/2 ECG sinus node appears to be recovering, NSR this morning  - 12/2 Patient developed multiple episodes of NSVT and AF RVR. EP consulted  - 12/3 Tele: SR <-> junctional. Case discussed with EP. Will stop BB and AAD to allow sinus node to recovery. If patient remains junctional, pt may need a PPM  - 12/3 Will start Eliquis 5 mg bid for AF in AM (Ok to start DOAC per Dr Goldberg)  - 12/4  Tele: SR <-> junctional. Patient had one episode of AF RVR lasting a few minutes with subsequent conversion to junctional  - 12/5 EP recommendations:   -Continue eliquis 5 BID   -Allow permissive tachycardia 110-130  -Avoid BB and amiodarone given c/f PMVT/VF related to R on T  -Sensitivity increased on pacer box from 1 to 2 mV to avoid undersensing and R on T phenomenon.   -No indication for 2ry prevention ICD at this point, given above tele  strip. Now, junctional rhythm is resolved and NSR is restored, likely was related to post-op inflammation.   -Please do not resume amio to allow for sinus node recovery  -Can use metop PRN for A fib RVR only if >130 or symptomatic.   -Ziopatch on discharge and please arrange for outpt Cardiology follow up on discharge.    Recommendations are preliminary until note is co-signed by an attending.   - 12/5 Tele: SR <-> junctional with a few transient episodes of AF RVR that quickly convert to accelerated junctional    - 12/5 Dicussed case with EP, cont to hold any AVB agent at discharge, EP will sign off today  - Discussed with EP 12/6--> given significant run overnight and increased number of episodes starting low dose metoprolol 12.5mg BID.  - 12/7 Patient in AF RVR with small bursts of v tach overnight. Patient given magnesium & potassium overnight with reduced bursts of v tach. EKG obtained, atrial flutter with 2:1 AV conduction. Touched base with EP, continue current plan.   - Received 1 dose of metoprolol 5mg IV 12/7 and converted to NSR  - Increase metoprolol tartrate to 25mg BID today       Acute Blood Loss Anemia - Stable  Recent Labs     12/08/24  0605 12/07/24  0647 12/06/24  0710 12/05/24  0705 12/04/24  0710 12/03/24  0402 12/02/24  0740   HGB 12.9* 12.4* 11.9* 12.8* 13.3* 12.6* 11.8*   HCT 40.0* 35.4* 35.3* 38.3* 38.1* 36.0* 34.1*   - MV  - 11/29 No Iron, Hematocrit > 34%  - Daily labs, transfuse as indicated    Thrombocytopenia - resolved  Recent Labs     12/08/24  0605 12/07/24  0647 12/06/24  0710 12/05/24  0705 12/04/24  0710 12/03/24  0402 12/02/24  0740    421 352 374 321 275 208   - Etiology likely postop/CPB related  - Continue to trend with daily CBCs    Volume/Electrolyte Status: Preop wt Weight: 84.5 kg (186 lb 4.6 oz)   Hyponatremia likely in setting of volume overload  Hypokalemia  Vitals:    12/08/24 0443   Weight: 80.5 kg (177 lb 7.5 oz)     - Weight: Pre-op 84.5 ->12/7 82.4 kg, last  24 hr overall fluid status -545 ml  - 12/3 Lasix 40 mg IV bid has been on hold since  night  - Replete electrolytes for hypokalemia/hypomagnesemia/hypophosphatemia as needed  -  Replaced potassium today and ,   -  Start oral Lasix 40 mg bid per nephrology   - Daily weights and strict I&Os  - Daily RFP while admitted  - Replete K+ today 80 meq (got 40 mg overnight & 40 mg on day shift)    Hyponatremia- improving  - Baseline Na on  normal at 143  -  Na 135,  Na 130,  Na 128, 12/3 Na 127->128->125,  Na 123 ->127,  128  - 12/3 Will check urine and serum studies   - 12/3 Will consult nephrology (case discussed with nephrology)  - Start oral Lasix 40 mg bid and change current fluid restriction of 1.5L to 1.2L  - Change daily RFP to RFP bid (0700, 1700)  -  Nephrology recommendations:  - Continue with Fluid restriction to 1.2L per day + lasix 40 mg PO BID until hyponatremia returns to baseline. Fluid restriction to keep him net negative by 1L or more   - stop NSAID ( ketorolac) and baclofen that may cause hyponatremia as well. Pain control with other meds.   - strict Is/Os  - Renal dosing for medications for latest eGFR, follow medication trough as appropriate  - Avoid hypotension/rapid fluctuations in BPs  - Nephrology will sign off at this time  -  Renal recommendations noted and appreciated     Leukocytosis - Resolved  Recent Labs     24  0605 24  0647 24  0710 24  0705 24  0710 24  0402 24  0740   WBC 12.4* 15.4* 11.8* 10.4 11.5* 9.4 10.3     Temp (36hrs), Av.4 °C (97.5 °F), Min:36.1 °C (97 °F), Max:36.5 °C (97.7 °F)    Post-op Pain and Nausea - resolved  -  On oxycodone per unit protocol, will toradol 15 mg IV q 6 hrs x 48 hours per Dr. Goldberg to back off narcotics and help alleviate nausea   - Cont Zofran as needed  - Cont bowel regimen   - Pain improving with Toradol. Nausea has significant  improved  -  Post-op pain significantly improved, last prn pain medication given on 12/3. Currently on scheduled tylenol      Hypertension: home meds: Lisinopril 5mg qd  Systolic (24hrs), Av , Min:98 , Max:116   - currently on no antihypertensive, bp is well controlled  - additional antihypertensives as needed     VTE Prophylaxis: SCDs/TEDs, ambulation, Eliquis  Code Status: Full Code    Dispo  - PT/OT recs  low intensity   - Would benefit from homecare RN for cardiac surgery [facility]path  - Anticipate discharge 1-2 days, pending sinus node recovery and Na normalization. Will need a ziopatch on discharge per EP. Per Dr Goldberg if Na is 130 or greater and no sustained AF will be ok to discharge. Will need to folllow up with Dr Franz cardiologist to read stevie Hilton PA-C  Cardiac Surgery Resident  [facility]  Team Phone [phone]    2024  12:32 PM

## 2024-12-08 NOTE — CARE PLAN
Problem: Safety - Medical Restraint  Goal: Remains free of injury from restraints (Restraint for Interference with Medical Device)  Outcome: Progressing  Goal: Free from restraint(s) (Restraint for Interference with Medical Device)  Outcome: Progressing     Problem: Skin  Goal: Decreased wound size/increased tissue granulation at next dressing change  Outcome: Progressing  Goal: Participates in plan/prevention/treatment measures  Outcome: Progressing  Goal: Prevent/manage excess moisture  Outcome: Progressing  Goal: Prevent/minimize sheer/friction injuries  Outcome: Progressing  Goal: Promote/optimize nutrition  Outcome: Progressing  Goal: Promote skin healing  Outcome: Progressing     Problem: Fall/Injury  Goal: Not fall by end of shift  Outcome: Progressing  Goal: Be free from injury by end of the shift  Outcome: Progressing  Goal: Verbalize understanding of personal risk factors for fall in the hospital  Outcome: Progressing  Goal: Verbalize understanding of risk factor reduction measures to prevent injury from fall in the home  Outcome: Progressing  Goal: Use assistive devices by end of the shift  Outcome: Progressing  Goal: Pace activities to prevent fatigue by end of the shift  Outcome: Progressing     Problem: Pain - Adult  Goal: Verbalizes/displays adequate comfort level or baseline comfort level  Outcome: Progressing     Problem: Safety - Adult  Goal: Free from fall injury  Outcome: Progressing     Problem: Discharge Planning  Goal: Discharge to home or other facility with appropriate resources  Outcome: Progressing     Problem: Chronic Conditions and Co-morbidities  Goal: Patient's chronic conditions and co-morbidity symptoms are monitored and maintained or improved  Outcome: Progressing     Problem: Pain  Goal: Takes deep breaths with improved pain control throughout the shift  Outcome: Progressing  Goal: Turns in bed with improved pain control throughout the shift  Outcome: Progressing  Goal: Walks  with improved pain control throughout the shift  Outcome: Progressing  Goal: Performs ADL's with improved pain control throughout shift  Outcome: Progressing  Goal: Participates in PT with improved pain control throughout the shift  Outcome: Progressing  Goal: Free from opioid side effects throughout the shift  Outcome: Progressing  Goal: Free from acute confusion related to pain meds throughout the shift  Outcome: Progressing     Problem: Arrythmia/Dysrhythmia  Goal: Promote self management  Outcome: Progressing  Goal: Verbalize understanding of procedures/devices  Outcome: Progressing  Goal: Vital signs return to baseline  Outcome: Progressing

## 2024-12-08 NOTE — CARE PLAN
The patient's goals for the shift include achieving adequate rest.      The clinical goals for the shift include Pt heart rate will be remain within normal parameters throughout shift.    Problem: Arrythmia/Dysrhythmia  Goal: Vital signs return to baseline  Outcome: Progressing

## 2024-12-09 ENCOUNTER — PHARMACY VISIT (OUTPATIENT)
Dept: PHARMACY | Facility: CLINIC | Age: 54
End: 2024-12-09
Payer: COMMERCIAL

## 2024-12-09 ENCOUNTER — HOSPITAL ENCOUNTER (INPATIENT)
Dept: CARDIOLOGY | Facility: HOSPITAL | Age: 54
Discharge: HOME | DRG: 219 | End: 2024-12-09
Payer: COMMERCIAL

## 2024-12-09 VITALS
WEIGHT: 175.5 LBS | HEART RATE: 74 BPM | BODY MASS INDEX: 25.13 KG/M2 | TEMPERATURE: 97.7 F | RESPIRATION RATE: 20 BRPM | DIASTOLIC BLOOD PRESSURE: 68 MMHG | HEIGHT: 70 IN | SYSTOLIC BLOOD PRESSURE: 109 MMHG | OXYGEN SATURATION: 96 %

## 2024-12-09 DIAGNOSIS — Z98.890 S/P MITRAL VALVE REPAIR: ICD-10-CM

## 2024-12-09 PROBLEM — I34.0 MITRAL REGURGITATION, MYXOMATOUS: Status: RESOLVED | Noted: 2024-11-22 | Resolved: 2024-12-09

## 2024-12-09 PROBLEM — I46.9 CARDIAC ARREST: Status: RESOLVED | Noted: 2024-11-26 | Resolved: 2024-12-09

## 2024-12-09 PROBLEM — I34.0 MITRAL VALVE INSUFFICIENCY: Status: RESOLVED | Noted: 2024-06-12 | Resolved: 2024-12-09

## 2024-12-09 PROBLEM — I34.0 MITRAL VALVE INSUFFICIENCY, UNSPECIFIED ETIOLOGY: Status: RESOLVED | Noted: 2024-11-26 | Resolved: 2024-12-09

## 2024-12-09 LAB
ALBUMIN SERPL BCP-MCNC: 3.5 G/DL (ref 3.4–5)
ANION GAP SERPL CALC-SCNC: 13 MMOL/L (ref 10–20)
ATRIAL RATE: 266 BPM
ATRIAL RATE: 55 BPM
ATRIAL RATE: 67 BPM
BODY SURFACE AREA: 1.98 M2
BUN SERPL-MCNC: 13 MG/DL (ref 6–23)
CALCIUM SERPL-MCNC: 8.5 MG/DL (ref 8.6–10.6)
CHLORIDE SERPL-SCNC: 100 MMOL/L (ref 98–107)
CO2 SERPL-SCNC: 24 MMOL/L (ref 21–32)
CREAT SERPL-MCNC: 0.84 MG/DL (ref 0.5–1.3)
EGFRCR SERPLBLD CKD-EPI 2021: >90 ML/MIN/1.73M*2
ERYTHROCYTE [DISTWIDTH] IN BLOOD BY AUTOMATED COUNT: 13.2 % (ref 11.5–14.5)
GLUCOSE SERPL-MCNC: 73 MG/DL (ref 74–99)
HCT VFR BLD AUTO: 36.7 % (ref 41–52)
HGB BLD-MCNC: 12.3 G/DL (ref 13.5–17.5)
MAGNESIUM SERPL-MCNC: 2.12 MG/DL (ref 1.6–2.4)
MCH RBC QN AUTO: 30 PG (ref 26–34)
MCHC RBC AUTO-ENTMCNC: 33.5 G/DL (ref 32–36)
MCV RBC AUTO: 90 FL (ref 80–100)
NRBC BLD-RTO: 0 /100 WBCS (ref 0–0)
P AXIS: 259 DEGREES
P OFFSET: 205 MS
P OFFSET: 210 MS
P ONSET: 156 MS
P ONSET: 169 MS
PHOSPHATE SERPL-MCNC: 4 MG/DL (ref 2.5–4.9)
PLATELET # BLD AUTO: 395 X10*3/UL (ref 150–450)
POTASSIUM SERPL-SCNC: 3.9 MMOL/L (ref 3.5–5.3)
PR INTERVAL: 114 MS
Q ONSET: 219 MS
Q ONSET: 220 MS
Q ONSET: 226 MS
QRS COUNT: 11 BEATS
QRS COUNT: 11 BEATS
QRS COUNT: 22 BEATS
QRS DURATION: 84 MS
QRS DURATION: 88 MS
QRS DURATION: 90 MS
QT INTERVAL: 316 MS
QT INTERVAL: 422 MS
QT INTERVAL: 460 MS
QTC CALCULATION(BAZETT): 438 MS
QTC CALCULATION(BAZETT): 470 MS
QTC CALCULATION(BAZETT): 486 MS
QTC FREDERICIA: 411 MS
QTC FREDERICIA: 433 MS
QTC FREDERICIA: 477 MS
R AXIS: 22 DEGREES
R AXIS: 56 DEGREES
R AXIS: 6 DEGREES
RBC # BLD AUTO: 4.1 X10*6/UL (ref 4.5–5.9)
SODIUM SERPL-SCNC: 133 MMOL/L (ref 136–145)
T AXIS: -11 DEGREES
T AXIS: -18 DEGREES
T AXIS: -2 DEGREES
T OFFSET: 377 MS
T OFFSET: 431 MS
T OFFSET: 456 MS
VENTRICULAR RATE: 133 BPM
VENTRICULAR RATE: 65 BPM
VENTRICULAR RATE: 67 BPM
WBC # BLD AUTO: 11.3 X10*3/UL (ref 4.4–11.3)

## 2024-12-09 PROCEDURE — 80069 RENAL FUNCTION PANEL: CPT | Performed by: PHYSICIAN ASSISTANT

## 2024-12-09 PROCEDURE — 83735 ASSAY OF MAGNESIUM: CPT

## 2024-12-09 PROCEDURE — 36416 COLLJ CAPILLARY BLOOD SPEC: CPT

## 2024-12-09 PROCEDURE — 2500000001 HC RX 250 WO HCPCS SELF ADMINISTERED DRUGS (ALT 637 FOR MEDICARE OP): Performed by: NURSE PRACTITIONER

## 2024-12-09 PROCEDURE — 2500000001 HC RX 250 WO HCPCS SELF ADMINISTERED DRUGS (ALT 637 FOR MEDICARE OP)

## 2024-12-09 PROCEDURE — 2500000002 HC RX 250 W HCPCS SELF ADMINISTERED DRUGS (ALT 637 FOR MEDICARE OP, ALT 636 FOR OP/ED): Performed by: PHYSICIAN ASSISTANT

## 2024-12-09 PROCEDURE — 99238 HOSP IP/OBS DSCHRG MGMT 30/<: CPT | Performed by: PHYSICIAN ASSISTANT

## 2024-12-09 PROCEDURE — 2500000001 HC RX 250 WO HCPCS SELF ADMINISTERED DRUGS (ALT 637 FOR MEDICARE OP): Performed by: PHYSICIAN ASSISTANT

## 2024-12-09 PROCEDURE — 85027 COMPLETE CBC AUTOMATED: CPT

## 2024-12-09 PROCEDURE — 93246 EXT ECG>7D<15D RECORDING: CPT

## 2024-12-09 PROCEDURE — RXMED WILLOW AMBULATORY MEDICATION CHARGE

## 2024-12-09 RX ORDER — METOPROLOL TARTRATE 25 MG/1
25 TABLET, FILM COATED ORAL 2 TIMES DAILY
Qty: 60 TABLET | Refills: 1 | Status: SHIPPED | OUTPATIENT
Start: 2024-12-09 | End: 2025-02-07

## 2024-12-09 RX ORDER — FUROSEMIDE 40 MG/1
40 TABLET ORAL DAILY
Qty: 10 TABLET | Refills: 0 | Status: SHIPPED | OUTPATIENT
Start: 2024-12-10 | End: 2024-12-20

## 2024-12-09 RX ORDER — ACETAMINOPHEN 325 MG/1
650 TABLET ORAL EVERY 6 HOURS PRN
COMMUNITY
Start: 2024-12-09

## 2024-12-09 RX ORDER — ASPIRIN 81 MG/1
81 TABLET ORAL DAILY
COMMUNITY
Start: 2024-12-10

## 2024-12-09 RX ORDER — POTASSIUM CHLORIDE 20 MEQ/1
40 TABLET, EXTENDED RELEASE ORAL DAILY
Qty: 20 TABLET | Refills: 0 | Status: SHIPPED | OUTPATIENT
Start: 2024-12-10 | End: 2024-12-20

## 2024-12-09 ASSESSMENT — COGNITIVE AND FUNCTIONAL STATUS - GENERAL
MOBILITY SCORE: 24
DAILY ACTIVITIY SCORE: 24

## 2024-12-09 ASSESSMENT — PAIN SCALES - WONG BAKER: WONGBAKER_NUMERICALRESPONSE: NO HURT

## 2024-12-09 ASSESSMENT — PAIN SCALES - GENERAL: PAINLEVEL_OUTOF10: 0 - NO PAIN

## 2024-12-09 ASSESSMENT — PAIN - FUNCTIONAL ASSESSMENT: PAIN_FUNCTIONAL_ASSESSMENT: 0-10

## 2024-12-09 NOTE — PROGRESS NOTES
12/09/24 1023   Discharge Planning   Expected Discharge Disposition Home H  ([facility])     [facility] was notified of patient's plan to discharge home today. Discharge documents sent in [facility].

## 2024-12-09 NOTE — DISCHARGE SUMMARY
Discharge Diagnosis  Mitral valve insufficiency    Issues Requiring Follow-Up  A event monitor will be placed at discharge this will be returned to  and the results will be sent to Dr Franz to interpret in [place], Please ensure you have an follow up set up with your PCP    Test Results Pending At Discharge  Pending Labs       No current pending labs.            Hospital Course  IMPRESSION & PLAN:  s/p Robotically assisted mitral valve repair with 36mm annuloplasty on 11/26/24  - Increase activity/ ambulation; PT/OT  - Encourage IS, C/DB; respiratory therapy; wean O2 as marialuisa   - Cardiac rehab referral   - Continue cardiac meds: ASA, statin   - Continue colchicine x1 month for robotic MidCAB procedure (0.6mg daily for < 70kg, 0.6mg BID for > 70kg; if concurrently receiving amiodarone reduce dose by 50%) (sent to meds to beds 11/30)  - Pain and anticonstipation meds  - Postop TTE completed on 11/29 with trace MR, EF 52%, RVSP 50  - 2v CXR completed 11/30  - Epicardial wire cut 12/9     Hiccups- resolved      Rhythm  - 11/29 Tele: Accelerated junctional, will obtain EKG to confirm and check QTC  - 11/29 Will reassess BB initiation in AM secondary to rhythm   - 11/30 No BB today, patient still accelerated junctional, reassess in AM  - 12/01: EKG this AM, still accelerated junctional rhythm. Consider EP consult within 1-2 days if no recovery. No BB  - Adjust medications as tolerated  - 12/2 ECG sinus node appears to be recovering, NSR this morning  - 12/2 Patient developed multiple episodes of NSVT and AF RVR. EP consulted  - 12/3 Tele: SR <-> junctional. Case discussed with EP. Will stop BB and AAD to allow sinus node to recovery. If patient remains junctional, pt may need a PPM  - 12/3 Will start Eliquis 5 mg bid for AF in AM (Ok to start DOAC per Dr Goldberg)  - 12/4  Tele: SR <-> junctional. Patient had one episode of AF RVR lasting a few minutes with subsequent conversion to junctional  - 12/5 EP recommendations:    -Continue eliquis 5 BID   -Allow permissive tachycardia 110-130  -Avoid BB and amiodarone given c/f PMVT/VF related to R on T  -Sensitivity increased on pacer box from 1 to 2 mV to avoid undersensing and R on T phenomenon.   -No indication for 2ry prevention ICD at this point, given above tele strip. Now, junctional rhythm is resolved and NSR is restored, likely was related to post-op inflammation.   -Please do not resume amio to allow for sinus node recovery  -Can use metop PRN for A fib RVR only if >130 or symptomatic.   -Ziopatch on discharge and please arrange for outpt Cardiology follow up on discharge.    Recommendations are preliminary until note is co-signed by an attending.   - 12/5 Tele: SR <-> junctional with a few transient episodes of AF RVR that quickly convert to accelerated junctional    - 12/5 Dicussed case with EP, cont to hold any AVB agent at discharge, EP will sign off today  - Discussed with EP 12/6--> given significant run overnight and increased number of episodes starting low dose metoprolol 12.5mg BID.  - 12/7 Patient in AF RVR with small bursts of v tach overnight. Patient given magnesium & potassium overnight with reduced bursts of v tach. EKG obtained, atrial flutter with 2:1 AV conduction. Touched base with EP, continue current plan.   - Received 1 dose of metoprolol 5mg IV 12/7 and converted to NSR  - Increase metoprolol tartrate to 25mg BID today--> remained in SR in the 70s overnight  - Will place Holter monitor at discharge--> plan for his established cardiologist in [place] Dr Franz to read--> Appointment for Dr Franz Scheduled for 2/7/2024 at 0700        Acute Blood Loss Anemia - Stable            Recent Labs     12/08/24  0605 12/07/24  0647 12/06/24  0710 12/05/24  0705 12/04/24  0710 12/03/24  0402 12/02/24  0740   HGB 12.9* 12.4* 11.9* 12.8* 13.3* 12.6* 11.8*   HCT 40.0* 35.4* 35.3* 38.3* 38.1* 36.0* 34.1*        Thrombocytopenia - resolved            Recent Labs      12/08/24  0605 12/07/24  0647 12/06/24  0710 12/05/24  0705 12/04/24  0710 12/03/24  0402 12/02/24  0740    [phone] [phone]        Volume/Electrolyte Status: Preop wt Weight: 84.5 kg (186 lb 4.6 oz)   Hyponatremia likely in setting of volume overload  Hypokalemia  Vitals       Vitals:     12/08/24 0443   Weight: 80.5 kg (177 lb 7.5 oz)         - Weight: Pre-op 84.5 ->12/7 82.4 kg, last 24 hr overall fluid status -545 ml  - 12/3 Lasix 40 mg IV bid has been on hold since 12/2 night  - Replete electrolytes for hypokalemia/hypomagnesemia/hypophosphatemia as needed  - 12/4 Replaced potassium today and 12/5, 12/6  - 12/4 Start oral Lasix 40 mg bid per nephrology   - discharge home on lasix 40mg daily x10 days     Hyponatremia- improving  - Baseline Na on 11/26 normal at 143  - 11/28 Na 135, 12/1 Na 130, 12/2 Na 128, 12/3 Na 127->128->125, 12/4 Na 123 ->127, 12/5 128  - 12/3 Will check urine and serum studies   - 12/3 Will consult nephrology (case discussed with nephrology)  -12/4 Start oral Lasix 40 mg bid and change current fluid restriction of 1.5L to 1.2L  -12/4 Change daily RFP to RFP bid (0700, 1700)  - 12/5 Nephrology recommendations:  - Continue with Fluid restriction to 1.2L per day + lasix 40 mg PO BID until hyponatremia returns to baseline. Fluid restriction to keep him net negative by 1L or more   - stop NSAID ( ketorolac) and baclofen that may cause hyponatremia as well. Pain control with other meds.   - strict Is/Os  - Renal dosing for medications for latest eGFR, follow medication trough as appropriate  - Avoid hypotension/rapid fluctuations in BPs  - Nephrology will sign off at this time  - 12/5 Renal recommendations noted and appreciated      Leukocytosis - no identifiable sites of infection likely inflammatory in nature            Recent Labs     12/08/24  0605 12/07/24  0647 12/06/24  0710 12/05/24  0705 12/04/24  0710 12/03/24  0402 12/02/24  0740   WBC 12.4* 15.4* 11.8* 10.4 11.5* 9.4  10.3      Temp (36hrs), Av.4 °C (97.5 °F), Min:36.1 °C (97 °F), Max:36.5 °C (97.7 °F)     Post-op Pain and Nausea - resolved  -  On oxycodone per unit protocol, will toradol 15 mg IV q 6 hrs x 48 hours per Dr. Goldberg to back off narcotics and help alleviate nausea   - Cont Zofran as needed  - Cont bowel regimen   - Pain improving with Toradol. Nausea has significant improved  -  Post-op pain significantly improved, last prn pain medication given on 12/3. Currently on scheduled tylenol       Hypertension: home meds: Lisinopril 5mg qd  Systolic (24hrs), Av , Min:98 , Max:116   - currently on no antihypertensive, bp is well controlled      Pertinent Physical Exam At Time of Discharge  Physical Exam  Constitutional:       General: He is not in acute distress.     Appearance: He is not toxic-appearing.   Eyes:      Pupils: Pupils are equal, round, and reactive to light.   Cardiovascular:      Rate and Rhythm: Normal rate and regular rhythm.      Pulses: Normal pulses.      Comments: SR in the 70s  V wire cut at exam with good retraction  Pulmonary:      Effort: Pulmonary effort is normal.   Abdominal:      General: Abdomen is flat. There is no distension.      Palpations: Abdomen is soft.      Tenderness: There is no abdominal tenderness.   Musculoskeletal:         General: Normal range of motion.   Skin:     General: Skin is warm and dry.   Neurological:      General: No focal deficit present.      Mental Status: He is alert and oriented to person, place, and time.      Comments: Thoracotomy and port sites well approximated without erythema or drainage   Psychiatric:         Mood and Affect: Mood normal.         Behavior: Behavior normal.         Home Medications     Medication List      START taking these medications     acetaminophen 325 mg tablet; Commonly known as: Tylenol; Take 2 tablets   (650 mg) by mouth every 6 hours if needed for mild pain (1 - 3).   apixaban 5 mg tablet; Commonly  known as: Eliquis; Take 1 tablet (5 mg)   by mouth 2 times a day.   aspirin 81 mg EC tablet; Take 1 tablet (81 mg) by mouth once daily.;   Start taking on: December 10, 2024   colchicine 0.6 mg tablet; Take 1 tablet (0.6 mg) by mouth 2 times a day.   furosemide 40 mg tablet; Commonly known as: Lasix; Take 1 tablet (40 mg)   by mouth once daily for 10 days.; Start taking on: December 10, 2024   metoprolol tartrate 25 mg tablet; Commonly known as: Lopressor; Take 1   tablet (25 mg) by mouth 2 times a day.   potassium chloride CR 20 mEq ER tablet; Commonly known as: Klor-Con M20;   Take 2 tablets (40 mEq) by mouth once daily for 10 days. Do not crush or   chew. Take with Furosemide; Start taking on: December 10, 2024     CONTINUE taking these medications     budesonide-formoteroL 80-4.5 mcg/actuation inhaler; Commonly known as:   Symbicort     STOP taking these medications     lisinopril 5 mg tablet       Outpatient Follow-Up  Future Appointments   Date Time Provider Department Center   12/12/2024 10:40 AM [facility] [place] [id number] NURSE [id number] Academic   1/13/2025  1:30 PM Simeon Goldberg MD, MS DONOVAN Hilton PA-C

## 2024-12-12 ENCOUNTER — TELEMEDICINE CLINICAL SUPPORT (OUTPATIENT)
Dept: CARDIAC SURGERY | Facility: HOSPITAL | Age: 54
End: 2024-12-12
Payer: COMMERCIAL

## 2024-12-12 NOTE — PROGRESS NOTES
A telephone visit (audio only) between Gavin Moreau (at the originating site) and the provider (at the distant site) was utilized to provide this telehealth service.    Patient is having a telehealth nurse visit today following hospital discharge on Dec 9, 2024.    Patient is 16 days s/p robotic mitral valve repair with Dr. Goldberg.  He feels well, has minimal incision discomfort, and denies chest pain.  He is taking Tylenol as needed for pain.  His chest incisions and groin incision are closed without redness or drainage.  He denies shortness of breath and I encouraged him to use the incentive spirometer as instructed.   Patient is ambulating often with periods of rest as needed and has mild edema in his right leg that is improving.  He is on furosemide 40 mg daily for 6 more days.  His appetite is improving and diarrhea while in the hospital is resolving.  Patient is taking all medications as prescribed.    Patient has a cardiology follow-up visit with Dr. Tiffanie Franz on Feb 7.  He has a post-op visit with Dr. Goldberg scheduled for Jan 14 with a chest x-ray prior to the visit.  Patient will call our office with any questions or concerns.  It was a pleasure speaking to Gavin Moreau today.

## 2024-12-20 RX ORDER — CEPHALEXIN 500 MG/1
500 CAPSULE ORAL 4 TIMES DAILY
COMMUNITY
Start: 2024-12-20 | End: 2024-12-27

## 2024-12-23 ENCOUNTER — OFFICE VISIT (OUTPATIENT)
Dept: CARDIAC SURGERY | Facility: HOSPITAL | Age: 54
End: 2024-12-23
Payer: COMMERCIAL

## 2024-12-23 ENCOUNTER — HOSPITAL ENCOUNTER (OUTPATIENT)
Dept: RADIOLOGY | Facility: HOSPITAL | Age: 54
Discharge: HOME | End: 2024-12-23
Payer: COMMERCIAL

## 2024-12-23 VITALS
OXYGEN SATURATION: 98 % | HEART RATE: 77 BPM | BODY MASS INDEX: 25.54 KG/M2 | WEIGHT: 178 LBS | SYSTOLIC BLOOD PRESSURE: 115 MMHG | DIASTOLIC BLOOD PRESSURE: 78 MMHG

## 2024-12-23 DIAGNOSIS — Z98.890 S/P MITRAL VALVE REPAIR: ICD-10-CM

## 2024-12-23 DIAGNOSIS — I34.0 MITRAL VALVE INSUFFICIENCY, UNSPECIFIED ETIOLOGY: ICD-10-CM

## 2024-12-23 PROCEDURE — 71046 X-RAY EXAM CHEST 2 VIEWS: CPT

## 2024-12-23 PROCEDURE — 3074F SYST BP LT 130 MM HG: CPT | Performed by: THORACIC SURGERY (CARDIOTHORACIC VASCULAR SURGERY)

## 2024-12-23 PROCEDURE — 99211 OFF/OP EST MAY X REQ PHY/QHP: CPT | Mod: 25 | Performed by: THORACIC SURGERY (CARDIOTHORACIC VASCULAR SURGERY)

## 2024-12-23 PROCEDURE — 3078F DIAST BP <80 MM HG: CPT | Performed by: THORACIC SURGERY (CARDIOTHORACIC VASCULAR SURGERY)

## 2024-12-23 ASSESSMENT — PAIN SCALES - GENERAL: PAINLEVEL_OUTOF10: 0-NO PAIN

## 2024-12-23 NOTE — PROGRESS NOTES
Harinder was seen for a quick visit.  We spoke with him last week.  He had some discharge from one of the port sites.    He had no fevers or chills.  We put him on Keflex starting last week.  There has been no discharge for 1 or 2 days.    On examination, there is very mild redness.  The port site in question is the camera port adjacent to the areola on the right side.  The rest of his incisions look excellent.  There is no redness and no purulent discharge.    We will continue to follow him in a conservative fashion, it appears that this is a mild infection that is healing.  Overall he looks great.  There is no murmur.  There is good air entry bilaterally.  We will follow-up with him in approximately 3 weeks.  He knows to call us if anything changes.

## 2025-01-03 LAB
ATRIAL RATE: 266 BPM
ATRIAL RATE: 66 BPM
ATRIAL RATE: 68 BPM
P AXIS: -63 DEGREES
P AXIS: 259 DEGREES
P OFFSET: 201 MS
P OFFSET: 205 MS
P ONSET: 136 MS
P ONSET: 156 MS
PR INTERVAL: 162 MS
Q ONSET: 217 MS
Q ONSET: 219 MS
Q ONSET: 224 MS
QRS COUNT: 11 BEATS
QRS COUNT: 11 BEATS
QRS COUNT: 22 BEATS
QRS DURATION: 162 MS
QRS DURATION: 84 MS
QRS DURATION: 90 MS
QT INTERVAL: 316 MS
QT INTERVAL: 476 MS
QT INTERVAL: 496 MS
QTC CALCULATION(BAZETT): 470 MS
QTC CALCULATION(BAZETT): 499 MS
QTC CALCULATION(BAZETT): 511 MS
QTC FREDERICIA: 411 MS
QTC FREDERICIA: 491 MS
QTC FREDERICIA: 506 MS
R AXIS: 13 DEGREES
R AXIS: 25 DEGREES
R AXIS: 56 DEGREES
T AXIS: -10 DEGREES
T AXIS: -18 DEGREES
T AXIS: 3 DEGREES
T OFFSET: 377 MS
T OFFSET: 455 MS
T OFFSET: 472 MS
VENTRICULAR RATE: 133 BPM
VENTRICULAR RATE: 64 BPM
VENTRICULAR RATE: 66 BPM

## 2025-01-13 ENCOUNTER — APPOINTMENT (OUTPATIENT)
Dept: CARDIAC SURGERY | Facility: HOSPITAL | Age: 55
End: 2025-01-13
Payer: COMMERCIAL

## 2025-01-14 ENCOUNTER — OFFICE VISIT (OUTPATIENT)
Dept: CARDIAC SURGERY | Facility: CLINIC | Age: 55
End: 2025-01-14
Payer: COMMERCIAL

## 2025-01-14 ENCOUNTER — HOSPITAL ENCOUNTER (OUTPATIENT)
Dept: CARDIOLOGY | Facility: CLINIC | Age: 55
Discharge: HOME | End: 2025-01-14
Payer: COMMERCIAL

## 2025-01-14 VITALS
WEIGHT: 179.1 LBS | OXYGEN SATURATION: 97 % | DIASTOLIC BLOOD PRESSURE: 75 MMHG | HEIGHT: 70 IN | BODY MASS INDEX: 25.64 KG/M2 | SYSTOLIC BLOOD PRESSURE: 114 MMHG | HEART RATE: 79 BPM

## 2025-01-14 DIAGNOSIS — I34.0 MITRAL VALVE INSUFFICIENCY, UNSPECIFIED ETIOLOGY: ICD-10-CM

## 2025-01-14 DIAGNOSIS — Z98.890 S/P MITRAL VALVE REPAIR: ICD-10-CM

## 2025-01-14 LAB
ATRIAL RATE: 79 BPM
BODY SURFACE AREA: 1.98 M2
P AXIS: -4 DEGREES
P OFFSET: 177 MS
P ONSET: 145 MS
PR INTERVAL: 162 MS
Q ONSET: 226 MS
QRS COUNT: 13 BEATS
QRS DURATION: 88 MS
QT INTERVAL: 416 MS
QTC CALCULATION(BAZETT): 477 MS
QTC FREDERICIA: 456 MS
R AXIS: 72 DEGREES
T AXIS: 31 DEGREES
T OFFSET: 434 MS
VENTRICULAR RATE: 79 BPM

## 2025-01-14 PROCEDURE — 99211 OFF/OP EST MAY X REQ PHY/QHP: CPT | Performed by: THORACIC SURGERY (CARDIOTHORACIC VASCULAR SURGERY)

## 2025-01-14 PROCEDURE — 3008F BODY MASS INDEX DOCD: CPT | Performed by: THORACIC SURGERY (CARDIOTHORACIC VASCULAR SURGERY)

## 2025-01-14 PROCEDURE — 3078F DIAST BP <80 MM HG: CPT | Performed by: THORACIC SURGERY (CARDIOTHORACIC VASCULAR SURGERY)

## 2025-01-14 PROCEDURE — 93248 EXT ECG>7D<15D REV&INTERPJ: CPT | Performed by: INTERNAL MEDICINE

## 2025-01-14 PROCEDURE — 93005 ELECTROCARDIOGRAM TRACING: CPT

## 2025-01-14 PROCEDURE — 3074F SYST BP LT 130 MM HG: CPT | Performed by: THORACIC SURGERY (CARDIOTHORACIC VASCULAR SURGERY)

## 2025-01-14 ASSESSMENT — ENCOUNTER SYMPTOMS
LOSS OF SENSATION IN FEET: 0
OCCASIONAL FEELINGS OF UNSTEADINESS: 0
DEPRESSION: 0

## 2025-01-14 ASSESSMENT — PATIENT HEALTH QUESTIONNAIRE - PHQ9
1. LITTLE INTEREST OR PLEASURE IN DOING THINGS: NOT AT ALL
2. FEELING DOWN, DEPRESSED OR HOPELESS: NOT AT ALL
SUM OF ALL RESPONSES TO PHQ9 QUESTIONS 1 AND 2: 0

## 2025-01-14 ASSESSMENT — COLUMBIA-SUICIDE SEVERITY RATING SCALE - C-SSRS
6. HAVE YOU EVER DONE ANYTHING, STARTED TO DO ANYTHING, OR PREPARED TO DO ANYTHING TO END YOUR LIFE?: NO
2. HAVE YOU ACTUALLY HAD ANY THOUGHTS OF KILLING YOURSELF?: NO
1. IN THE PAST MONTH, HAVE YOU WISHED YOU WERE DEAD OR WISHED YOU COULD GO TO SLEEP AND NOT WAKE UP?: NO

## 2025-01-14 ASSESSMENT — PAIN SCALES - GENERAL: PAINLEVEL_OUTOF10: 0-NO PAIN

## 2025-01-15 NOTE — PROGRESS NOTES
Chief Complaint  POST OP Evaluation    HPI:   Mr. Gavin Moreau is a 54 y.o. male, who presents for post-operative evaluation.   He is now 1 month out from his operation, and is recovering nicely.  On 11/26 he had a robotic radical MV repair, and developed R on T phenomenon before leaving the OR which caused V fib resulting in CPR and defibrillation.  He recovered well from all of this.  We saw him 2 weeks ago with some redness at a port site that responded well to antibiotics.  He has resumed normal activities and his appetite is returned to normal.  He has no chest pain, no shortness of breath and denies palpitations, dizziness, or syncope.     Past Medical History:   Diagnosis Date    Asthma     Colon polyp     Diverticulosis     GERD (gastroesophageal reflux disease)     History of rheumatic fever     Hypertension     Mitral valve prolapse     Severe mitral regurgitation     Splenic artery aneurysm (CMS-HCC)        Past Surgical History:   Procedure Laterality Date    CARDIAC CATHETERIZATION N/A 08/19/2024    Procedure: Left & Right Heart Cath w Angiography & LV;  Surgeon: Gavin Vogt MD;  Location: [facility] Cardiac Cath Lab;  Service: Cardiovascular;  Laterality: N/A;    CHOLECYSTECTOMY      COLONOSCOPY  01/18/2024       Family History   Problem Relation Name Age of Onset    Valvular heart disease Mother      Stroke Mother         Social History     Socioeconomic History    Marital status: Unknown     Spouse name: Not on file    Number of children: Not on file    Years of education: Not on file    Highest education level: Not on file   Occupational History    Not on file   Tobacco Use    Smoking status: Never    Smokeless tobacco: Never   Substance and Sexual Activity    Alcohol use: Yes     Comment: ocassionally    Drug use: Never    Sexual activity: Not on file   Other Topics Concern    Not on file   Social History Narrative    Not on file     Social Drivers of Health     Financial Resource Strain: Low Risk   (10/9/2023)    Received [facility]    Overall Financial Resource Strain (CARDIA)     Difficulty of Paying Living Expenses: Not hard at all   Food Insecurity: No Food Insecurity (10/9/2023)    Received [facility]    Hunger Vital Sign     Worried About Running Out of Food in the Last Year: Never true     Ran Out of Food in the Last Year: Never true   Transportation Needs: Unknown (10/9/2023)    Received from [facility]    PRAPARE - Transportation     Lack of Transportation (Medical): Not on file     Lack of Transportation (Non-Medical): No   Physical Activity: Not on file   Stress: Not on file   Social Connections: Not on file   Intimate Partner Violence: Not on file   Housing Stability: Unknown (10/9/2023)    [facility]    Housing Stability Vital Sign     Unable to Pay for Housing in the Last Year: Not on file     Number of Places Lived in the Last Year: Not on file     Unstable Housing in the Last Year: No       No Known Allergies    Outpatient Encounter Medications as of 2025   Medication Sig Dispense Refill    apixaban (Eliquis) 5 mg tablet Take 1 tablet (5 mg) by mouth 2 times a day. 60 tablet 0    aspirin 81 mg EC tablet Take 1 tablet (81 mg) by mouth once daily.      metoprolol tartrate (Lopressor) 25 mg tablet Take 1 tablet (25 mg) by mouth 2 times a day. 60 tablet 1    acetaminophen (Tylenol) 325 mg tablet Take 2 tablets (650 mg) by mouth every 6 hours if needed for mild pain (1 - 3).      budesonide-formoteroL (Symbicort) 80-4.5 mcg/actuation inhaler Inhale 2 puffs 2 times a day. Rinse mouth with water after use to reduce aftertaste and incidence of candidiasis. Do not swallow.      [] colchicine 0.6 mg tablet Take 1 tablet (0.6 mg) by mouth 2  times a day. 60 tablet 0    furosemide (Lasix) 40 mg tablet Take 1 tablet (40 mg) by mouth once daily for 10 days. 10 tablet 0     No facility-administered encounter medications on file as of 1/14/2025.       Physical Exam  Vitals reviewed.   Constitutional:       Appearance: Normal appearance.   Cardiovascular:      Rate and Rhythm: Normal rate and regular rhythm.      Pulses: Normal pulses.      Heart sounds: No murmur heard.  Pulmonary:      Effort: Pulmonary effort is normal.      Breath sounds: Normal breath sounds.   Abdominal:      Palpations: Abdomen is soft.   Skin:     General: Skin is warm and dry.   Neurological:      General: No focal deficit present.      Mental Status: He is alert and oriented to person, place, and time.   Psychiatric:         Mood and Affect: Mood normal.         Behavior: Behavior normal.         Encounter Date: 01/14/25   ECG 12 lead (Ancillary Performed)   Result Value    Ventricular Rate 79    Atrial Rate 79    WY Interval 162    QRS Duration 88    QT Interval 416    QTC Calculation(Bazett) 477    P Axis -4    R Axis 72    T Axis 31    QRS Count 13    Q Onset 226    P Onset 145    P Offset 177    T Offset 434    QTC Fredericia 456    Narrative    Normal sinus rhythm  Normal ECG  When compared with ECG of 07-DEC-2024 11:56,  Sinus rhythm has replaced Atrial flutter  Vent. rate has decreased BY  54 BPM  Non-specific change in ST segment in Inferior leads       Assessment and Plan:    Mr. Gavin Moreau is a 54 y.o. male, who is recovering well after surgery.  I have referred him for cardiopulmonary rehab.  I have encouraged him to slowly return to normal activities, but refrain from heavy lifting for a full 12 weeks after surgery.  I've asked him to increase his level of activity, to resume driving and to aim for 30-60 minutes of walking each day.  He will continue cardiovascular management with his cardiologist.  I am always happy to see him for any reason, but at the current time  have not scheduled any further follow-up visits.       He will follow up with his cardiologist Dr. Franz and undergo a yearly echo.

## 2025-01-21 LAB
ATRIAL RATE: 79 BPM
P AXIS: -4 DEGREES
P OFFSET: 177 MS
P ONSET: 145 MS
PR INTERVAL: 162 MS
Q ONSET: 226 MS
QRS COUNT: 13 BEATS
QRS DURATION: 88 MS
QT INTERVAL: 416 MS
QTC CALCULATION(BAZETT): 477 MS
QTC FREDERICIA: 456 MS
R AXIS: 72 DEGREES
T AXIS: 31 DEGREES
T OFFSET: 434 MS
VENTRICULAR RATE: 79 BPM

## 2025-01-23 DIAGNOSIS — Z00.01 ENCOUNTER FOR ANNUAL GENERAL MEDICAL EXAMINATION WITH ABNORMAL FINDINGS IN ADULT: ICD-10-CM

## 2025-01-23 LAB
ALBUMIN: 4.3 G/DL (ref 3.5–5.2)
ALP BLD-CCNC: 74 U/L (ref 40–129)
ALT SERPL-CCNC: 25 U/L (ref 0–40)
ANION GAP SERPL CALCULATED.3IONS-SCNC: 12 MMOL/L (ref 7–16)
AST SERPL-CCNC: 19 U/L (ref 0–39)
BASOPHILS ABSOLUTE: 0.04 K/UL (ref 0–0.2)
BASOPHILS RELATIVE PERCENT: 1 % (ref 0–2)
BILIRUB SERPL-MCNC: 0.3 MG/DL (ref 0–1.2)
BUN BLDV-MCNC: 17 MG/DL (ref 6–20)
CALCIUM SERPL-MCNC: 9.1 MG/DL (ref 8.6–10.2)
CHLORIDE BLD-SCNC: 109 MMOL/L (ref 98–107)
CHOLESTEROL, TOTAL: 132 MG/DL
CO2: 24 MMOL/L (ref 22–29)
CREAT SERPL-MCNC: 1.1 MG/DL (ref 0.7–1.2)
EOSINOPHILS ABSOLUTE: 0.36 K/UL (ref 0.05–0.5)
EOSINOPHILS RELATIVE PERCENT: 6 % (ref 0–6)
GFR, ESTIMATED: 80 ML/MIN/1.73M2
GLUCOSE BLD-MCNC: 90 MG/DL (ref 74–99)
HCT VFR BLD CALC: 49.9 % (ref 37–54)
HDLC SERPL-MCNC: 34 MG/DL
HEMOGLOBIN: 15.9 G/DL (ref 12.5–16.5)
IMMATURE GRANULOCYTES %: 1 % (ref 0–5)
IMMATURE GRANULOCYTES ABSOLUTE: 0.03 K/UL (ref 0–0.58)
LDL CHOLESTEROL: 86 MG/DL
LYMPHOCYTES ABSOLUTE: 1.75 K/UL (ref 1.5–4)
LYMPHOCYTES RELATIVE PERCENT: 28 % (ref 20–42)
MCH RBC QN AUTO: 29.6 PG (ref 26–35)
MCHC RBC AUTO-ENTMCNC: 31.9 G/DL (ref 32–34.5)
MCV RBC AUTO: 92.9 FL (ref 80–99.9)
MONOCYTES ABSOLUTE: 0.57 K/UL (ref 0.1–0.95)
MONOCYTES RELATIVE PERCENT: 9 % (ref 2–12)
NEUTROPHILS ABSOLUTE: 3.46 K/UL (ref 1.8–7.3)
NEUTROPHILS RELATIVE PERCENT: 56 % (ref 43–80)
PDW BLD-RTO: 13.6 % (ref 11.5–15)
PLATELET # BLD: 238 K/UL (ref 130–450)
PMV BLD AUTO: 10.3 FL (ref 7–12)
POTASSIUM SERPL-SCNC: 4.9 MMOL/L (ref 3.5–5)
PROSTATE SPECIFIC ANTIGEN: 2.02 NG/ML (ref 0–4)
RBC # BLD: 5.37 M/UL (ref 3.8–5.8)
SODIUM BLD-SCNC: 145 MMOL/L (ref 132–146)
TOTAL PROTEIN: 7 G/DL (ref 6.4–8.3)
TRIGL SERPL-MCNC: 58 MG/DL
VLDLC SERPL CALC-MCNC: 12 MG/DL
WBC # BLD: 6.2 K/UL (ref 4.5–11.5)

## 2025-01-25 ENCOUNTER — OFFICE VISIT (OUTPATIENT)
Dept: PRIMARY CARE CLINIC | Age: 55
End: 2025-01-25

## 2025-01-25 DIAGNOSIS — E78.2 MIXED HYPERLIPIDEMIA: ICD-10-CM

## 2025-01-25 DIAGNOSIS — E01.0 THYROMEGALY: ICD-10-CM

## 2025-01-25 DIAGNOSIS — I10 ESSENTIAL HYPERTENSION: ICD-10-CM

## 2025-01-25 DIAGNOSIS — I72.8 SPLENIC ARTERY ANEURYSM (HCC): ICD-10-CM

## 2025-01-25 DIAGNOSIS — Z00.01 ENCOUNTER FOR ANNUAL GENERAL MEDICAL EXAMINATION WITH ABNORMAL FINDINGS IN ADULT: Primary | ICD-10-CM

## 2025-01-25 DIAGNOSIS — I48.0 PAROXYSMAL ATRIAL FIBRILLATION (HCC): ICD-10-CM

## 2025-01-25 DIAGNOSIS — K76.89 LIVER CYST: ICD-10-CM

## 2025-01-25 DIAGNOSIS — I34.1 MVP (MITRAL VALVE PROLAPSE): ICD-10-CM

## 2025-01-25 DIAGNOSIS — J45.20 ASTHMA IN ADULT, MILD INTERMITTENT, UNCOMPLICATED: ICD-10-CM

## 2025-01-25 ASSESSMENT — ENCOUNTER SYMPTOMS
GASTROINTESTINAL NEGATIVE: 1
RESPIRATORY NEGATIVE: 1
ALLERGIC/IMMUNOLOGIC NEGATIVE: 1
EYES NEGATIVE: 1

## 2025-01-25 NOTE — PROGRESS NOTES
Transportation (Medical): No     Lack of Transportation (Non-Medical): No   Physical Activity: Not on file   Stress: Not on file   Social Connections: Not on file   Intimate Partner Violence: Not on file   Housing Stability: Unknown (1/26/2025)    Housing Stability Vital Sign     Unable to Pay for Housing in the Last Year: No     Number of Times Moved in the Last Year: Not on file     Homeless in the Last Year: No      Past Medical History:   Diagnosis Date    Allergic rhinitis     Allergy to cats     Asthma     controlled with inhaler     Diverticulitis     Diverticulitis 10/16/2023    MVP (mitral valve prolapse)     for OR 12-17-20     S/P transesophageal echocardiogram (AMY) 12/17/2020    Dr. Evans    Splenic artery aneurysm (HCC)      Family History   Problem Relation Age of Onset    Heart Surgery Mother 40        valve surgery    Heart Surgery Father 69      Past Surgical History:   Procedure Laterality Date    CHOLECYSTECTOMY, LAPAROSCOPIC      COLONOSCOPY      COLONOSCOPY N/A 1/18/2024    COLONOSCOPY POLYPECTOMY SNARE COLD performed by Oscar Osman MD at [facility] ENDOSCOPY    US KIDNEY DUPLEX BILAT  04/2015    neg       Vitals:    01/25/25 0753   BP: 128/74   Pulse: 79   Resp: 16   Temp: 97.2 °F (36.2 °C)   TempSrc: Temporal   SpO2: 99%   Weight: 83 kg (183 lb)       Objective:    Physical Exam  Vitals reviewed.   Constitutional:       Appearance: Normal appearance. He is well-developed.   HENT:      Head: Normocephalic.      Right Ear: Tympanic membrane normal.      Left Ear: Tympanic membrane normal.      Nose: Nose normal.      Mouth/Throat:      Mouth: Mucous membranes are moist.   Eyes:      Pupils: Pupils are equal, round, and reactive to light.   Cardiovascular:      Rate and Rhythm: Normal rate and regular rhythm.   Pulmonary:      Effort: Pulmonary effort is normal.      Breath sounds: Normal breath sounds.   Abdominal:      General: Bowel sounds are normal.      Palpations: Abdomen is soft.

## 2025-01-26 VITALS
TEMPERATURE: 97.2 F | OXYGEN SATURATION: 99 % | SYSTOLIC BLOOD PRESSURE: 128 MMHG | DIASTOLIC BLOOD PRESSURE: 74 MMHG | HEART RATE: 79 BPM | WEIGHT: 183 LBS | RESPIRATION RATE: 16 BRPM | BODY MASS INDEX: 25.52 KG/M2

## 2025-01-26 PROBLEM — I10 ESSENTIAL HYPERTENSION: Chronic | Status: ACTIVE | Noted: 2021-09-21

## 2025-01-26 PROBLEM — K57.92 DIVERTICULITIS: Status: RESOLVED | Noted: 2023-10-16 | Resolved: 2025-01-26

## 2025-01-26 PROBLEM — I72.8 SPLENIC ARTERY ANEURYSM (HCC): Chronic | Status: ACTIVE | Noted: 2023-10-16

## 2025-01-26 RX ORDER — METOPROLOL TARTRATE 25 MG/1
25 TABLET, FILM COATED ORAL 2 TIMES DAILY
COMMUNITY
Start: 2025-01-26

## 2025-01-26 RX ORDER — ASPIRIN 81 MG/1
81 TABLET ORAL DAILY
COMMUNITY
Start: 2025-01-26

## 2025-01-26 SDOH — ECONOMIC STABILITY: FOOD INSECURITY: WITHIN THE PAST 12 MONTHS, YOU WORRIED THAT YOUR FOOD WOULD RUN OUT BEFORE YOU GOT MONEY TO BUY MORE.: NEVER TRUE

## 2025-01-26 SDOH — ECONOMIC STABILITY: FOOD INSECURITY: WITHIN THE PAST 12 MONTHS, THE FOOD YOU BOUGHT JUST DIDN'T LAST AND YOU DIDN'T HAVE MONEY TO GET MORE.: NEVER TRUE

## 2025-01-26 ASSESSMENT — PATIENT HEALTH QUESTIONNAIRE - PHQ9
SUM OF ALL RESPONSES TO PHQ QUESTIONS 1-9: 0
1. LITTLE INTEREST OR PLEASURE IN DOING THINGS: NOT AT ALL
SUM OF ALL RESPONSES TO PHQ9 QUESTIONS 1 & 2: 0
2. FEELING DOWN, DEPRESSED OR HOPELESS: NOT AT ALL

## 2025-02-07 ENCOUNTER — OFFICE VISIT (OUTPATIENT)
Dept: CARDIOLOGY CLINIC | Age: 55
End: 2025-02-07
Payer: MEDICARE

## 2025-02-07 VITALS
DIASTOLIC BLOOD PRESSURE: 74 MMHG | SYSTOLIC BLOOD PRESSURE: 118 MMHG | HEIGHT: 70 IN | TEMPERATURE: 97.1 F | HEART RATE: 86 BPM | RESPIRATION RATE: 18 BRPM | OXYGEN SATURATION: 100 % | BODY MASS INDEX: 26.69 KG/M2 | WEIGHT: 186.4 LBS

## 2025-02-07 DIAGNOSIS — I10 ESSENTIAL HYPERTENSION: Primary | Chronic | ICD-10-CM

## 2025-02-07 DIAGNOSIS — R01.1 HEART MURMUR: ICD-10-CM

## 2025-02-07 DIAGNOSIS — I48.0 PAROXYSMAL ATRIAL FIBRILLATION (HCC): ICD-10-CM

## 2025-02-07 PROCEDURE — 99214 OFFICE O/P EST MOD 30 MIN: CPT | Performed by: INTERNAL MEDICINE

## 2025-02-07 PROCEDURE — 3074F SYST BP LT 130 MM HG: CPT | Performed by: INTERNAL MEDICINE

## 2025-02-07 PROCEDURE — 3078F DIAST BP <80 MM HG: CPT | Performed by: INTERNAL MEDICINE

## 2025-02-07 PROCEDURE — 93000 ELECTROCARDIOGRAM COMPLETE: CPT | Performed by: INTERNAL MEDICINE

## 2025-02-07 RX ORDER — METOPROLOL TARTRATE 25 MG/1
25 TABLET, FILM COATED ORAL 2 TIMES DAILY
Qty: 60 TABLET | Refills: 3 | Status: SHIPPED | OUTPATIENT
Start: 2025-02-07

## 2025-02-07 NOTE — PATIENT INSTRUCTIONS
Echo to assess mitral valve  Zio XT to assess AF burden  Continue metoprolol and Eliquis for at least 3 months  Start Cardiac rehab  Follow up with me in 3 months.

## 2025-02-07 NOTE — PROGRESS NOTES
Patient was seen today and a 14 day monitor was placed. Monitor was ordered by Dr. Evans. The monitor was applied, instructions were given to the patient. Patient stated understanding and gave verbalize readback.   Monitor company: [employer]  Serial number: [id number]

## 2025-02-07 NOTE — PROGRESS NOTES
OUTPATIENT CARDIOLOGY FOLLOW-UP    Name: Arvin Ruiz    Age: 55 y.o.    Primary Care Physician: Les Zapata DO    Date of Service: 2/7/2025    Chief Complaint:   Chief Complaint   Patient presents with    Follow-Up from Hospital       Interim History:   Mr. Ruiz is a 55-year-old  male with history of mitral valve prolapse with moderate to severe MR, GERD, initiated on lisinopril for hypertension, bronchial asthma, rheumatic fever allergic rhinitis presented for follow-up visit.  Previously, patient was following with Dr. Pelletier for his cardiology needs.     He was seen in the office on 3/26/24, since last visit, he was referred to [facility] for surgical evaluation due to severe mitral regurgitation with underlying prolapse.  Patient underwent minimally invasive robotic assisted mitral valve repair with number 36 mm annuloplasty ring insertion on 11/26/2024.  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  Patient was discharged home on colchicine x 1 month.  Patient also had a accelerated junctional rhythm.  Postoperatively patient also had a nonsustained VT and A-fib RVR.  Patient was initiated on 5 mg twice daily for anticoagulation due to underlying A-fib which was noted on 12/3/2024.  Recommended to avoid beta-blocker therapy and amiodarone due to polymorphic VT/V-fib related to R-on-T phenomenon.  Patient was discharged home.  Visit with thoracic surgeon on 1/14/2025 and following that visit he was cleared from the surgery follow-up and recommended to follow-up with cardiology service.  He did not start cardiac rehab.    He is compliant with medications, as well as salt and fluid intake.  He does not take any over-the-counter arthritis medications.    Denies any dyspnea orthopnea or PND or leg edema. Never passed out.  Patient denies any chest pain, palpitations, dizziness lightheadedness syncope presyncope.  Patient is

## 2025-02-19 ENCOUNTER — HOSPITAL ENCOUNTER (OUTPATIENT)
Dept: ULTRASOUND IMAGING | Age: 55
Discharge: HOME | End: 2025-02-21
Payer: COMMERCIAL

## 2025-02-19 DIAGNOSIS — E01.0 THYROMEGALY: ICD-10-CM

## 2025-02-19 DIAGNOSIS — K76.89 LIVER CYST: ICD-10-CM

## 2025-02-19 PROCEDURE — 76536 US EXAM OF HEAD AND NECK: CPT

## 2025-02-19 PROCEDURE — 76705 ECHO EXAM OF ABDOMEN: CPT

## 2025-02-20 ENCOUNTER — HOSPITAL ENCOUNTER (OUTPATIENT)
Dept: CARDIAC REHAB | Age: 55
Setting detail: THERAPIES SERIES
Discharge: HOME | End: 2025-02-20
Payer: COMMERCIAL

## 2025-02-20 VITALS
HEART RATE: 72 BPM | OXYGEN SATURATION: 99 % | BODY MASS INDEX: 26.63 KG/M2 | RESPIRATION RATE: 20 BRPM | WEIGHT: 186 LBS | HEIGHT: 70 IN

## 2025-02-20 PROCEDURE — 93797 PHYS/QHP OP CAR RHAB WO ECG: CPT

## 2025-02-20 PROCEDURE — 93798 PHYS/QHP OP CAR RHAB W/ECG: CPT

## 2025-02-20 ASSESSMENT — EJECTION FRACTION: EF_VALUE: 50

## 2025-02-20 NOTE — CARDIO/PULMONARY
PT. SEEN IN CARDIAC REHAB TODAY FOR INITIAL EVALUATION AND EXERCISE. PT. IS A PHASE 3. PT.S/P MITRAL VALVE REPAIR AT [facility] ON 11-26-24. PT. DEVELOPED EPISODES OF NSVY, AFIB RVR POSTOP. ALL WAS RESOLVED BEFORE DISCHARGE PER PT. PT. IS CURRENTLY WEARING A MONITOR THAT IS DUE TO COME OFF TODAY. PT. HAS A HX OF ASTHMA AND USES AN INHALER WHEN NEEDED. NO EXERCISE OR EQUIPMENT LIMITATIONS.  NO SWELLING OF HIS FEET OR ANKLES. PHQ9 SCORE IS A 0. DENIES ANY HX OF DEPRESSION. FALLS RISK SCORE IS A 1/8 LOW FALLS RISK. PT DOES NOT EXERCISE ROUTINELY AT HOME. HE DOES WALK HIS DOG DAILY ABOUT A HALF MILE.  PT.'S GOAL IS TO IMPROVE HIS STRENGTH AND STAMINA THROUGH EXERCISE AT WHILE AT CARDIAC REHAB.

## 2025-02-20 NOTE — RESULT ENCOUNTER NOTE
Notify patient ultrasound of thyroid shows no masses but slight irregularity and enlargement.  Nothing to worry about but I do want to get some added laboratories for his thyroid.  Also the ultrasound shows a cyst on the liver and some densities they are recommending MRI of the liver.  It has to be done with contrast.  I put lab orders in order to have updated kidney function to get that contrast.  So he needs to go to the lab fasting soon and will hear from x-ray about the MRI of the liver

## 2025-02-24 ENCOUNTER — HOSPITAL ENCOUNTER (OUTPATIENT)
Age: 55
Discharge: HOME | End: 2025-02-24
Payer: COMMERCIAL

## 2025-02-24 DIAGNOSIS — K76.89 LIVER CYST: ICD-10-CM

## 2025-02-24 DIAGNOSIS — E01.0 THYROMEGALY: ICD-10-CM

## 2025-02-24 DIAGNOSIS — I48.0 PAROXYSMAL ATRIAL FIBRILLATION (HCC): ICD-10-CM

## 2025-02-24 LAB
ANION GAP SERPL CALCULATED.3IONS-SCNC: 11 MMOL/L (ref 7–16)
BUN SERPL-MCNC: 22 MG/DL (ref 6–20)
CALCIUM SERPL-MCNC: 9.2 MG/DL (ref 8.6–10.2)
CHLORIDE SERPL-SCNC: 108 MMOL/L (ref 98–107)
CO2 SERPL-SCNC: 22 MMOL/L (ref 22–29)
CREAT SERPL-MCNC: 1.2 MG/DL (ref 0.7–1.2)
GFR, ESTIMATED: 74 ML/MIN/1.73M2
GLUCOSE SERPL-MCNC: 101 MG/DL (ref 74–99)
POTASSIUM SERPL-SCNC: 4.5 MMOL/L (ref 3.5–5)
SODIUM SERPL-SCNC: 141 MMOL/L (ref 132–146)
T4 SERPL-MCNC: 6.6 UG/DL (ref 4.5–11.7)
TSH SERPL DL<=0.05 MIU/L-ACNC: 0.89 UIU/ML (ref 0.27–4.2)

## 2025-02-24 PROCEDURE — 84443 ASSAY THYROID STIM HORMONE: CPT

## 2025-02-24 PROCEDURE — 84436 ASSAY OF TOTAL THYROXINE: CPT

## 2025-02-24 PROCEDURE — 36415 COLL VENOUS BLD VENIPUNCTURE: CPT

## 2025-02-24 PROCEDURE — 80048 BASIC METABOLIC PNL TOTAL CA: CPT

## 2025-02-26 ENCOUNTER — HOSPITAL ENCOUNTER (OUTPATIENT)
Dept: CARDIOLOGY | Age: 55
Discharge: HOME | End: 2025-02-28
Attending: INTERNAL MEDICINE
Payer: COMMERCIAL

## 2025-02-26 VITALS
WEIGHT: 183 LBS | DIASTOLIC BLOOD PRESSURE: 74 MMHG | SYSTOLIC BLOOD PRESSURE: 118 MMHG | BODY MASS INDEX: 26.2 KG/M2 | HEIGHT: 70 IN

## 2025-02-26 DIAGNOSIS — R01.1 HEART MURMUR: ICD-10-CM

## 2025-02-26 PROCEDURE — 93306 TTE W/DOPPLER COMPLETE: CPT

## 2025-02-27 LAB
ECHO AO ASC DIAM: 2.7 CM
ECHO AO ASCENDING AORTA INDEX: 1.34 CM/M2
ECHO AV AREA PEAK VELOCITY: 2 CM2
ECHO AV AREA VTI: 2.4 CM2
ECHO AV AREA/BSA PEAK VELOCITY: 1 CM2/M2
ECHO AV AREA/BSA VTI: 1.2 CM2/M2
ECHO AV CUSP MM: 2.1 CM
ECHO AV MEAN GRADIENT: 2 MMHG
ECHO AV MEAN VELOCITY: 0.7 M/S
ECHO AV PEAK GRADIENT: 4 MMHG
ECHO AV PEAK VELOCITY: 1 M/S
ECHO AV VELOCITY RATIO: 0.7
ECHO AV VTI: 19.5 CM
ECHO BSA: 2.02 M2
ECHO EST RA PRESSURE: 3 MMHG
ECHO LA DIAMETER INDEX: 2.14 CM/M2
ECHO LA DIAMETER: 4.3 CM
ECHO LA VOL A-L A2C: 48 ML (ref 18–58)
ECHO LA VOL A-L A4C: 39 ML (ref 18–58)
ECHO LA VOL MOD A2C: 45 ML (ref 18–58)
ECHO LA VOL MOD A4C: 38 ML (ref 18–58)
ECHO LA VOLUME AREA LENGTH: 45 ML
ECHO LA VOLUME INDEX A-L A2C: 24 ML/M2 (ref 16–34)
ECHO LA VOLUME INDEX A-L A4C: 19 ML/M2 (ref 16–34)
ECHO LA VOLUME INDEX AREA LENGTH: 22 ML/M2 (ref 16–34)
ECHO LA VOLUME INDEX MOD A2C: 22 ML/M2 (ref 16–34)
ECHO LA VOLUME INDEX MOD A4C: 19 ML/M2 (ref 16–34)
ECHO LV EF PHYSICIAN: 55 %
ECHO LV FRACTIONAL SHORTENING: 29 % (ref 28–44)
ECHO LV INTERNAL DIMENSION DIASTOLE INDEX: 2.59 CM/M2
ECHO LV INTERNAL DIMENSION DIASTOLIC: 5.2 CM (ref 4.2–5.9)
ECHO LV INTERNAL DIMENSION SYSTOLIC INDEX: 1.84 CM/M2
ECHO LV INTERNAL DIMENSION SYSTOLIC: 3.7 CM
ECHO LV ISOVOLUMETRIC RELAXATION TIME (IVRT): 76.1 MS
ECHO LV IVSD: 0.9 CM (ref 0.6–1)
ECHO LV IVSS: 1.3 CM
ECHO LV MASS 2D: 169 G (ref 88–224)
ECHO LV MASS INDEX 2D: 84.1 G/M2 (ref 49–115)
ECHO LV POSTERIOR WALL DIASTOLIC: 0.9 CM (ref 0.6–1)
ECHO LV POSTERIOR WALL SYSTOLIC: 1.3 CM
ECHO LV RELATIVE WALL THICKNESS RATIO: 0.35
ECHO LVOT AREA: 3.1 CM2
ECHO LVOT AV VTI INDEX: 0.77
ECHO LVOT DIAM: 2 CM
ECHO LVOT MEAN GRADIENT: 1 MMHG
ECHO LVOT PEAK GRADIENT: 2 MMHG
ECHO LVOT PEAK VELOCITY: 0.7 M/S
ECHO LVOT STROKE VOLUME INDEX: 23.6 ML/M2
ECHO LVOT SV: 47.4 ML
ECHO LVOT VTI: 15.1 CM
ECHO MV "A" WAVE DURATION: 78.4 MSEC
ECHO MV A VELOCITY: 0.71 M/S
ECHO MV AREA PHT: 2.4 CM2
ECHO MV AREA VTI: 1.5 CM2
ECHO MV E DECELERATION TIME (DT): 233.2 MS
ECHO MV E VELOCITY: 0.88 M/S
ECHO MV E/A RATIO: 1.24
ECHO MV LVOT VTI INDEX: 2.04
ECHO MV MAX VELOCITY: 1 M/S
ECHO MV MEAN GRADIENT: 2 MMHG
ECHO MV MEAN VELOCITY: 0.6 M/S
ECHO MV PEAK GRADIENT: 4 MMHG
ECHO MV PRESSURE HALF TIME (PHT): 92.1 MS
ECHO MV VTI: 30.8 CM
ECHO PV MAX VELOCITY: 0.7 M/S
ECHO PV MEAN GRADIENT: 1 MMHG
ECHO PV MEAN VELOCITY: 0.5 M/S
ECHO PV PEAK GRADIENT: 2 MMHG
ECHO PV VTI: 12.9 CM
ECHO PVEIN A DURATION: 83 MS
ECHO PVEIN A VELOCITY: 0.2 M/S
ECHO PVEIN PEAK D VELOCITY: 0.6 M/S
ECHO PVEIN PEAK S VELOCITY: 0.4 M/S
ECHO PVEIN S/D RATIO: 0.7
ECHO RIGHT VENTRICULAR SYSTOLIC PRESSURE (RVSP): 20 MMHG
ECHO TV REGURGITANT MAX VELOCITY: 2.08 M/S
ECHO TV REGURGITANT PEAK GRADIENT: 17 MMHG

## 2025-02-28 ENCOUNTER — TELEPHONE (OUTPATIENT)
Dept: CARDIOLOGY CLINIC | Age: 55
End: 2025-02-28

## 2025-02-28 NOTE — TELEPHONE ENCOUNTER
----- Message from Dr. Gabbie Evans MD sent at 2/28/2025  2:12 PM EST -----  Echocardiogram showed patient status post mitral valve repair without regurgitation or stenosis (no evidence of leaky valve or valve obstruction).  Continue current medications and follow-up with me as scheduled.

## 2025-03-03 DIAGNOSIS — I48.0 PAROXYSMAL ATRIAL FIBRILLATION (HCC): ICD-10-CM

## 2025-03-04 ENCOUNTER — TELEPHONE (OUTPATIENT)
Dept: CARDIOLOGY CLINIC | Age: 55
End: 2025-03-04

## 2025-03-04 DIAGNOSIS — I48.0 PAROXYSMAL ATRIAL FIBRILLATION (HCC): ICD-10-CM

## 2025-03-04 DIAGNOSIS — I10 ESSENTIAL HYPERTENSION: Chronic | ICD-10-CM

## 2025-03-04 NOTE — TELEPHONE ENCOUNTER
----- Message from Dr. Gabbie Evans MD sent at 3/4/2025  2:09 PM EST -----  Holter monitor showed predominantly normal sinus rhythm (normal rhythm).  He had 3 short runs of ventricular tachycardia lasting no more than 7 beats.  I did not see any definite evidence of A-fib but there were 5 short runs of SVT.  Occasional premature ventricular complexes.  I would like to refer him to EP service because of ventricular tachycardia.  In the meantime, please ask him to obtain BMP and also magnesium level.  I would also recommend increasing metoprolol to 50 mg p.o. twice daily if he cannot tolerate.

## 2025-03-05 DIAGNOSIS — I10 ESSENTIAL HYPERTENSION: Primary | Chronic | ICD-10-CM

## 2025-03-05 DIAGNOSIS — I34.1 MVP (MITRAL VALVE PROLAPSE): ICD-10-CM

## 2025-03-05 RX ORDER — METOPROLOL TARTRATE 50 MG
50 TABLET ORAL 2 TIMES DAILY
Qty: 180 TABLET | Refills: 1 | Status: SHIPPED | OUTPATIENT
Start: 2025-03-05

## 2025-03-30 ENCOUNTER — HOSPITAL ENCOUNTER (OUTPATIENT)
Dept: MRI IMAGING | Age: 55
Discharge: HOME | End: 2025-04-01
Payer: COMMERCIAL

## 2025-03-30 DIAGNOSIS — R93.2 ABNORMAL ULTRASOUND OF LIVER: ICD-10-CM

## 2025-03-30 DIAGNOSIS — K76.89 LIVER CYST: ICD-10-CM

## 2025-03-30 PROCEDURE — A9577 INJ MULTIHANCE: HCPCS | Performed by: RADIOLOGY

## 2025-03-30 PROCEDURE — 6360000004 HC RX CONTRAST MEDICATION: Performed by: RADIOLOGY

## 2025-03-30 PROCEDURE — 74183 MRI ABD W/O CNTR FLWD CNTR: CPT

## 2025-03-30 RX ADMIN — GADOBENATE DIMEGLUMINE 17 ML: 529 INJECTION, SOLUTION INTRAVENOUS at 12:51

## 2025-03-31 ENCOUNTER — TELEPHONE (OUTPATIENT)
Dept: PRIMARY CARE CLINIC | Age: 55
End: 2025-03-31

## 2025-03-31 DIAGNOSIS — J45.20 ASTHMA IN ADULT, MILD INTERMITTENT, UNCOMPLICATED: ICD-10-CM

## 2025-03-31 DIAGNOSIS — E04.9 ENLARGED THYROID: ICD-10-CM

## 2025-03-31 DIAGNOSIS — E01.0 THYROMEGALY: Primary | ICD-10-CM

## 2025-03-31 RX ORDER — BUDESONIDE AND FORMOTEROL FUMARATE DIHYDRATE 80; 4.5 UG/1; UG/1
2 AEROSOL RESPIRATORY (INHALATION)
Qty: 10.2 G | Refills: 3 | Status: SHIPPED | OUTPATIENT
Start: 2025-03-31

## 2025-03-31 NOTE — TELEPHONE ENCOUNTER
Notify patient MRI is normal.  There is a small hemangioma which is nothing to worry about.  We may repeat in a couple years to follow it.

## 2025-04-01 NOTE — TELEPHONE ENCOUNTER
Notify patient thyroid does not show any nodules.  Mildly enlarged.  I think we will go ahead and get an endocrinologist involved.  That may take several weeks to months.  No rash.  Referral done

## 2025-04-11 ENCOUNTER — TELEPHONE (OUTPATIENT)
Dept: ENDOCRINOLOGY | Age: 55
End: 2025-04-11

## 2025-04-11 NOTE — TELEPHONE ENCOUNTER
NP/ DX Enlarged thyroid / Scheduled 10/22/25. No soon appts available and patient would like to be seen sooner.  Please advise or call patient to schedule.

## 2025-05-05 ENCOUNTER — TELEPHONE (OUTPATIENT)
Dept: CARDIOLOGY CLINIC | Age: 55
End: 2025-05-05

## 2025-05-05 NOTE — TELEPHONE ENCOUNTER
From:Arvin Ruiz       Sent:5/4/2025 11:02 AM EDT         To:Dr. Gabbie Evans MD    Subject:Activity question     My next visit is scheduled for May 29. I just wanted to ask a question about activities.   Am I able to ride amusement park rides?  I have been walking and exercising with no problems.         Arvin Ruiz

## 2025-05-23 NOTE — CARE PLAN
Regional Block    Date/Time: 5/23/2025 1:34 PM    Performed by: Michele Colbert MD  Authorized by: Michele Colbert MD      General Information and Staff    Start Time:  5/23/2025 1:34 PM  End Time:  5/23/2025 1:37 PM  Anesthesiologist:  Michele Colbert MD  Performed by:  Anesthesiologist  Patient Location:  OR    Block Placement: Post Induction  Site Identification: real time ultrasound guided and image stored and retrievable    Block site/laterality marked before start: site marked  Reason for Block: at surgeon's request and post-op pain management    Preanesthetic Checklist: 2 patient identifers, IV checked, risks and benefits discussed, monitors and equipment checked, pre-op evaluation, timeout performed, anesthesia consent, sterile technique used, no prohibitive neurological deficits and no local skin infection at insertion site      Procedure Details    Patient Position:  Supine  Prep: ChloraPrep    Monitoring:  Cardiac monitor, continuous pulse ox, blood pressure cuff and heart rate  Block Type:  TAP  Laterality:  Bilateral  Injection Technique:  Single-shot    Needle    Needle Type:  Short-bevel and echogenic  Needle Gauge:  22 G  Needle Length:  110 mm  Needle Localization:  Ultrasound guidance  Reason for Ultrasound Use: appropriate spread of the medication was noted in real time and no ultrasound evidence of intravascular and/or intraneural injection            Assessment    Injection Assessment:  Good spread noted, negative resistance, negative aspiration for heme, incremental injection and low pressure  Heart Rate Change: No    - Patient tolerated block procedure well without evidence of immediate block related complications.     Medications  5/23/2025 1:34 PM      Additional Comments    Medication:  Bupivacaine 0.25% 60mL         The patient's goals for the shift include      The clinical goals for the shift include Pt will remain HDS during the shift    Patient will remain safe throughout the shift.

## 2025-05-28 ENCOUNTER — OFFICE VISIT (OUTPATIENT)
Dept: FAMILY MEDICINE CLINIC | Age: 55
End: 2025-05-28
Payer: COMMERCIAL

## 2025-05-28 VITALS
DIASTOLIC BLOOD PRESSURE: 64 MMHG | OXYGEN SATURATION: 97 % | HEART RATE: 77 BPM | TEMPERATURE: 97.1 F | SYSTOLIC BLOOD PRESSURE: 100 MMHG | RESPIRATION RATE: 16 BRPM | WEIGHT: 191 LBS | HEIGHT: 70 IN | BODY MASS INDEX: 27.35 KG/M2

## 2025-05-28 DIAGNOSIS — J45.20 ASTHMA IN ADULT, MILD INTERMITTENT, UNCOMPLICATED: ICD-10-CM

## 2025-05-28 DIAGNOSIS — J06.9 VIRAL URI: Primary | ICD-10-CM

## 2025-05-28 PROCEDURE — 3078F DIAST BP <80 MM HG: CPT | Performed by: FAMILY MEDICINE

## 2025-05-28 PROCEDURE — 99213 OFFICE O/P EST LOW 20 MIN: CPT | Performed by: FAMILY MEDICINE

## 2025-05-28 PROCEDURE — 3074F SYST BP LT 130 MM HG: CPT | Performed by: FAMILY MEDICINE

## 2025-05-28 RX ORDER — PREDNISONE 10 MG/1
10 TABLET ORAL DAILY
Qty: 10 TABLET | Refills: 0 | Status: SHIPPED | OUTPATIENT
Start: 2025-05-28 | End: 2025-06-07

## 2025-05-28 ASSESSMENT — ENCOUNTER SYMPTOMS
DIARRHEA: 0
BLOOD IN STOOL: 0
BACK PAIN: 0
NAUSEA: 0
EYE DISCHARGE: 0
CHEST TIGHTNESS: 0
TROUBLE SWALLOWING: 0
EYE REDNESS: 0
COUGH: 1
PHOTOPHOBIA: 0
VOMITING: 0
SORE THROAT: 0
ALLERGIC/IMMUNOLOGIC NEGATIVE: 1
SHORTNESS OF BREATH: 0
EYE PAIN: 0
SINUS PAIN: 0
ABDOMINAL PAIN: 0

## 2025-05-28 NOTE — PROGRESS NOTES
25  Arvin Ruiz : 1970 Sex: male  Age: 55 y.o.      Assessment and Plan:  Arvin was seen today for congestion.    Diagnoses and all orders for this visit:    Viral URI  -     predniSONE (DELTASONE) 10 MG tablet; Take 1 tablet by mouth daily for 10 days    Asthma in adult, mild intermittent, uncomplicated  -     predniSONE (DELTASONE) 10 MG tablet; Take 1 tablet by mouth daily for 10 days  -     Albuterol Sulfate, sensor, 108 (90 Base) MCG/ACT AEPB; Inhale 2 puffs into the lungs 4 times daily as needed (sob)    He is actually improving from his congestion.  No coughing or wheezing noted.  His drainage is clearing as well.  Will refill his rescue inhaler which he needs because he is asthmatic.  Also a prednisone burst for inflammation the next 5 days.  Symptomatic treatment can include Tylenol, fluids, rest, coolmist.  If complaints do not improve, or if they worsen in any way, present back to the office.    Return 3 to 5-day recheck if not improved.    Chief Complaint   Patient presents with    Congestion     Started , has been improving but not gone        Congestion, pressure, drainage, facial tenderness, cough, onset 4 days ago.  Denies fever, chills, diaphoresis, nausea, vomiting, decreased oral intake. Denies other GI or  complaints.   OTC treatments minimally effective.          Review of Systems   Constitutional:  Negative for appetite change, fatigue and unexpected weight change.   HENT:  Positive for congestion and postnasal drip. Negative for ear pain, hearing loss, sinus pain, sore throat and trouble swallowing.    Eyes:  Negative for photophobia, pain, discharge and redness.   Respiratory:  Positive for cough. Negative for chest tightness and shortness of breath.    Cardiovascular:  Negative for chest pain, palpitations and leg swelling.   Gastrointestinal:  Negative for abdominal pain, blood in stool, diarrhea, nausea and vomiting.   Endocrine: Negative.

## 2025-05-29 ENCOUNTER — OFFICE VISIT (OUTPATIENT)
Dept: CARDIOLOGY CLINIC | Age: 55
End: 2025-05-29
Payer: COMMERCIAL

## 2025-05-29 VITALS
HEART RATE: 77 BPM | WEIGHT: 188.4 LBS | RESPIRATION RATE: 18 BRPM | BODY MASS INDEX: 26.97 KG/M2 | HEIGHT: 70 IN | SYSTOLIC BLOOD PRESSURE: 114 MMHG | TEMPERATURE: 97.5 F | DIASTOLIC BLOOD PRESSURE: 62 MMHG | OXYGEN SATURATION: 98 %

## 2025-05-29 DIAGNOSIS — I97.89 POSTOPERATIVE ATRIAL FIBRILLATION (HCC): ICD-10-CM

## 2025-05-29 DIAGNOSIS — I10 ESSENTIAL HYPERTENSION: Primary | Chronic | ICD-10-CM

## 2025-05-29 DIAGNOSIS — I48.91 POSTOPERATIVE ATRIAL FIBRILLATION (HCC): ICD-10-CM

## 2025-05-29 DIAGNOSIS — Z98.890 STATUS POST MITRAL VALVE ANNULOPLASTY: ICD-10-CM

## 2025-05-29 PROCEDURE — 99214 OFFICE O/P EST MOD 30 MIN: CPT | Performed by: INTERNAL MEDICINE

## 2025-05-29 PROCEDURE — 3074F SYST BP LT 130 MM HG: CPT | Performed by: INTERNAL MEDICINE

## 2025-05-29 PROCEDURE — G2211 COMPLEX E/M VISIT ADD ON: HCPCS | Performed by: INTERNAL MEDICINE

## 2025-05-29 PROCEDURE — 93000 ELECTROCARDIOGRAM COMPLETE: CPT | Performed by: INTERNAL MEDICINE

## 2025-05-29 PROCEDURE — 3078F DIAST BP <80 MM HG: CPT | Performed by: INTERNAL MEDICINE

## 2025-05-29 RX ORDER — METOPROLOL TARTRATE 25 MG/1
25 TABLET, FILM COATED ORAL 2 TIMES DAILY
Qty: 180 TABLET | Refills: 3 | Status: SHIPPED | OUTPATIENT
Start: 2025-05-29

## 2025-05-29 NOTE — PROGRESS NOTES
OUTPATIENT CARDIOLOGY FOLLOW-UP    Name: Arvin Ruiz    Age: 55 y.o.    Primary Care Physician: Les Zapata DO    Date of Service: 5/29/2025    Chief Complaint:   Chief Complaint   Patient presents with    Follow-up     3 month ov       Interim History:   Mr. Ruiz is a 55-year-old  male with history of mitral valve prolapse with moderate to severe MR, GERD, initiated on lisinopril for hypertension, bronchial asthma, rheumatic fever allergic rhinitis presented for follow-up visit.  Previously, patient was following with Dr. Pelletier for his cardiology needs.     He was seen in the office on 2/7/25, since last visit, he has not had any further hospitalizations or ER visits.  Patient still on Eliquis for postop A-fib and remains in sinus rhythm.  Patient told me that he worried about stopping due to stroke risk and would like to continue Eliquis.    He was referred to [facility] for surgical evaluation due to severe mitral regurgitation with underlying prolapse.  Patient underwent minimally invasive robotic assisted mitral valve repair with number 36 mm annuloplasty ring insertion on 11/26/2024.  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  Patient was discharged home on colchicine x 1 month.  Patient also had a accelerated junctional rhythm.  Postoperatively patient also had a nonsustained VT and A-fib RVR.  Patient was initiated on 5 mg twice daily for anticoagulation due to underlying A-fib which was noted on 12/3/2024.  Recommended to avoid beta-blocker therapy and amiodarone due to polymorphic VT/V-fib related to R-on-T phenomenon.  Patient was discharged home.  Visit with thoracic surgeon on 1/14/2025 and following that visit he was cleared from the surgery follow-up and recommended to follow-up with cardiology service.  He did not start cardiac rehab.    He is compliant with medications, as well as salt and fluid intake.  He

## 2025-05-29 NOTE — PATIENT INSTRUCTIONS
Decrease metoprolol to 25 mg po twice a day due to fatigue   Lipid panel reviewed, well controlled without meds, HDL low advised to start an exercise program and exercise for 150 min of moderate exercise  Echo results reviewed, showed normal function without mitral regurgitation or stenosis  Zio XT showed no  AF burden, 3 episodes of non sustained VT and non sustained SVT  Continue  Eliquis 5 mg p.o. twice a day, patient would like to continue  Eliquis.  Continue Cardiac rehab  Follow up with me in 6 months.

## 2025-07-31 ENCOUNTER — OFFICE VISIT (OUTPATIENT)
Dept: FAMILY MEDICINE CLINIC | Age: 55
End: 2025-07-31
Payer: COMMERCIAL

## 2025-07-31 VITALS
DIASTOLIC BLOOD PRESSURE: 70 MMHG | BODY MASS INDEX: 26.98 KG/M2 | SYSTOLIC BLOOD PRESSURE: 116 MMHG | OXYGEN SATURATION: 98 % | HEART RATE: 78 BPM | TEMPERATURE: 97.8 F | WEIGHT: 188 LBS

## 2025-07-31 DIAGNOSIS — L25.9 CONTACT DERMATITIS, UNSPECIFIED CONTACT DERMATITIS TYPE, UNSPECIFIED TRIGGER: Primary | ICD-10-CM

## 2025-07-31 PROCEDURE — 3074F SYST BP LT 130 MM HG: CPT

## 2025-07-31 PROCEDURE — 99213 OFFICE O/P EST LOW 20 MIN: CPT

## 2025-07-31 PROCEDURE — 3078F DIAST BP <80 MM HG: CPT

## 2025-07-31 PROCEDURE — 96372 THER/PROPH/DIAG INJ SC/IM: CPT

## 2025-07-31 RX ORDER — TRIAMCINOLONE ACETONIDE 40 MG/ML
40 INJECTION, SUSPENSION INTRA-ARTICULAR; INTRAMUSCULAR ONCE
Status: COMPLETED | OUTPATIENT
Start: 2025-07-31 | End: 2025-07-31

## 2025-07-31 RX ORDER — METHYLPREDNISOLONE 4 MG/1
TABLET ORAL
Qty: 1 KIT | Refills: 0 | Status: SHIPPED | OUTPATIENT
Start: 2025-07-31

## 2025-07-31 RX ADMIN — TRIAMCINOLONE ACETONIDE 40 MG: 40 INJECTION, SUSPENSION INTRA-ARTICULAR; INTRAMUSCULAR at 14:58

## 2025-07-31 NOTE — PROGRESS NOTES
Chief Complaint       Poison Ivy (Over back and chest x2d )    History of Present Illness   Source of history provided by:  patient.  History of Present Illness  The patient presents for evaluation of a rash.    He initially suspected the rash to be a result of bug bites but now believes it may be due to poison ivy or a similar irritant. The rash was first noticed 2 days ago on his chest and has since spread to his arm, back, and shoulder. It is extremely itchy. He has not applied any topical treatments or taken any medications for the rash as he initially thought it was caused by bug bites. Some areas of the rash are improving, but the back still appears fresh.    He is not aware of having diabetes. He is currently taking Eliquis and metoprolol for heart conditions.    All other ROS negative unless otherwise stated in HPI.      ROS    Unless otherwise stated in this report or unable to obtain because of the patient's clinical or mental status as evidenced by the medical record, this patients's positive and negative responses for Review of Systems, constitutional, psych, eyes, ENT, cardiovascular, respiratory, gastrointestinal, neurological, genitourinary, musculoskeletal, integument systems and systems related to the presenting problem are either stated in the preceding or were not pertinent or were negative for the symptoms and/or complaints related to the medical problem.    Physical Exam         VS:  /70   Pulse 78   Temp 97.8 °F (36.6 °C)   Wt 85.3 kg (188 lb)   SpO2 98%   BMI 26.98 kg/m²    Oxygen Saturation Interpretation: Normal.    Constitutional:  Alert, development consistent with age.  HEENT:  NC/NT.  Airway patent.  Eyes:  PERRL, EOMI, no discharge.     Lungs:  CTAB, no wheezing, rales, or rhonchi  Heart:  RRR without pathologic murmurs  Skin:  Normal turgor and appropriately dry to touch. Erythematous, maculopapular rash noted over the bilateral forearms, left lower abdomen, left flank,

## 2025-08-05 DIAGNOSIS — I48.0 PAROXYSMAL ATRIAL FIBRILLATION (HCC): ICD-10-CM

## 2025-08-05 DIAGNOSIS — I10 ESSENTIAL HYPERTENSION: Chronic | ICD-10-CM

## 2025-08-19 DIAGNOSIS — J45.20 ASTHMA IN ADULT, MILD INTERMITTENT, UNCOMPLICATED: ICD-10-CM

## 2025-08-19 RX ORDER — BUDESONIDE AND FORMOTEROL FUMARATE DIHYDRATE 80; 4.5 UG/1; UG/1
2 AEROSOL RESPIRATORY (INHALATION)
Qty: 10.2 G | Refills: 3 | Status: SHIPPED | OUTPATIENT
Start: 2025-08-19

## 2025-09-02 ENCOUNTER — APPOINTMENT (OUTPATIENT)
Dept: GENERAL RADIOLOGY | Age: 55
DRG: 315 | End: 2025-09-02
Payer: COMMERCIAL

## 2025-09-02 ENCOUNTER — APPOINTMENT (OUTPATIENT)
Age: 55
DRG: 315 | End: 2025-09-02
Attending: INTERNAL MEDICINE
Payer: COMMERCIAL

## 2025-09-02 ENCOUNTER — APPOINTMENT (OUTPATIENT)
Dept: CT IMAGING | Age: 55
DRG: 315 | End: 2025-09-02
Payer: COMMERCIAL

## 2025-09-02 ENCOUNTER — HOSPITAL ENCOUNTER (INPATIENT)
Age: 55
LOS: 1 days | Discharge: HOME | DRG: 315 | End: 2025-09-03
Attending: STUDENT IN AN ORGANIZED HEALTH CARE EDUCATION/TRAINING PROGRAM | Admitting: INTERNAL MEDICINE
Payer: COMMERCIAL

## 2025-09-02 ENCOUNTER — APPOINTMENT (OUTPATIENT)
Dept: ULTRASOUND IMAGING | Age: 55
DRG: 315 | End: 2025-09-02
Payer: COMMERCIAL

## 2025-09-02 ENCOUNTER — OFFICE VISIT (OUTPATIENT)
Dept: FAMILY MEDICINE CLINIC | Age: 55
End: 2025-09-02
Payer: COMMERCIAL

## 2025-09-02 VITALS
TEMPERATURE: 97.8 F | WEIGHT: 189 LBS | OXYGEN SATURATION: 98 % | BODY MASS INDEX: 27.06 KG/M2 | DIASTOLIC BLOOD PRESSURE: 84 MMHG | HEART RATE: 101 BPM | SYSTOLIC BLOOD PRESSURE: 120 MMHG | HEIGHT: 70 IN

## 2025-09-02 DIAGNOSIS — R94.31 ABNORMAL ELECTROCARDIOGRAPHY: Primary | ICD-10-CM

## 2025-09-02 DIAGNOSIS — R07.9 CHEST PAIN, UNSPECIFIED TYPE: ICD-10-CM

## 2025-09-02 DIAGNOSIS — I31.9 PERICARDITIS, UNSPECIFIED CHRONICITY, UNSPECIFIED TYPE: ICD-10-CM

## 2025-09-02 DIAGNOSIS — R00.2 PALPITATIONS: ICD-10-CM

## 2025-09-02 DIAGNOSIS — R00.0 TACHYCARDIA: Primary | ICD-10-CM

## 2025-09-02 DIAGNOSIS — R06.00 DYSPNEA, UNSPECIFIED TYPE: ICD-10-CM

## 2025-09-02 PROBLEM — E80.6 HYPERBILIRUBINEMIA: Status: ACTIVE | Noted: 2025-09-02

## 2025-09-02 PROBLEM — I50.9 ACUTE DECOMPENSATED HEART FAILURE (HCC): Status: ACTIVE | Noted: 2025-09-02

## 2025-09-02 PROBLEM — I30.0 ACUTE IDIOPATHIC PERICARDITIS: Status: ACTIVE | Noted: 2025-09-02

## 2025-09-02 LAB
ALBUMIN SERPL-MCNC: 3.7 G/DL (ref 3.5–5.2)
ALP SERPL-CCNC: 403 U/L (ref 40–129)
ALT SERPL-CCNC: 19 U/L (ref 0–50)
ANION GAP SERPL CALCULATED.3IONS-SCNC: 15 MMOL/L (ref 7–16)
AST SERPL-CCNC: 19 U/L (ref 0–50)
BACTERIA URNS QL MICRO: ABNORMAL
BASOPHILS # BLD: 0.03 K/UL (ref 0–0.2)
BASOPHILS NFR BLD: 0 % (ref 0–2)
BILIRUB SERPL-MCNC: 1.6 MG/DL (ref 0–1.2)
BILIRUB UR QL STRIP: ABNORMAL
BNP SERPL-MCNC: 1255 PG/ML (ref 0–125)
BUN SERPL-MCNC: 17 MG/DL (ref 6–20)
CALCIUM SERPL-MCNC: 8.9 MG/DL (ref 8.6–10)
CASTS #/AREA URNS LPF: ABNORMAL /LPF
CASTS #/AREA URNS LPF: ABNORMAL /LPF
CHLORIDE SERPL-SCNC: 97 MMOL/L (ref 98–107)
CHOLEST SERPL-MCNC: 98 MG/DL
CLARITY UR: CLEAR
CO2 SERPL-SCNC: 22 MMOL/L (ref 22–29)
COLOR UR: YELLOW
CREAT SERPL-MCNC: 1.1 MG/DL (ref 0.7–1.2)
CRP SERPL HS-MCNC: 290 MG/L (ref 0–5)
ECHO AO ASC DIAM: 2.8 CM
ECHO AO ASCENDING AORTA INDEX: 1.37 CM/M2
ECHO AV AREA PEAK VELOCITY: 3.1 CM2
ECHO AV AREA VTI: 3.5 CM2
ECHO AV AREA/BSA PEAK VELOCITY: 1.5 CM2/M2
ECHO AV AREA/BSA VTI: 1.7 CM2/M2
ECHO AV CUSP MM: 2.1 CM
ECHO AV MEAN GRADIENT: 3 MMHG
ECHO AV MEAN VELOCITY: 0.8 M/S
ECHO AV PEAK GRADIENT: 6 MMHG
ECHO AV PEAK VELOCITY: 1.2 M/S
ECHO AV VELOCITY RATIO: 0.75
ECHO AV VTI: 18.8 CM
ECHO BSA: 2.06 M2
ECHO EST RA PRESSURE: 8 MMHG
ECHO LA DIAMETER INDEX: 1.81 CM/M2
ECHO LA DIAMETER: 3.7 CM
ECHO LA VOL A-L A2C: 51 ML (ref 18–58)
ECHO LA VOL A-L A4C: 63 ML (ref 18–58)
ECHO LA VOL MOD A2C: 47 ML (ref 18–58)
ECHO LA VOL MOD A4C: 60 ML (ref 18–58)
ECHO LA VOLUME AREA LENGTH: 59 ML
ECHO LA VOLUME INDEX A-L A2C: 25 ML/M2 (ref 16–34)
ECHO LA VOLUME INDEX A-L A4C: 31 ML/M2 (ref 16–34)
ECHO LA VOLUME INDEX AREA LENGTH: 29 ML/M2 (ref 16–34)
ECHO LA VOLUME INDEX MOD A2C: 23 ML/M2 (ref 16–34)
ECHO LA VOLUME INDEX MOD A4C: 29 ML/M2 (ref 16–34)
ECHO LV EDV A2C: 80 ML
ECHO LV EDV A4C: 116 ML
ECHO LV EDV BP: 100 ML (ref 67–155)
ECHO LV EDV INDEX A4C: 57 ML/M2
ECHO LV EDV INDEX BP: 49 ML/M2
ECHO LV EDV NDEX A2C: 39 ML/M2
ECHO LV EF PHYSICIAN: 60 %
ECHO LV EJECTION FRACTION A2C: 62 %
ECHO LV EJECTION FRACTION A4C: 61 %
ECHO LV EJECTION FRACTION BIPLANE: 61 % (ref 55–100)
ECHO LV ESV A2C: 30 ML
ECHO LV ESV A4C: 46 ML
ECHO LV ESV BP: 39 ML (ref 22–58)
ECHO LV ESV INDEX A2C: 15 ML/M2
ECHO LV ESV INDEX A4C: 23 ML/M2
ECHO LV ESV INDEX BP: 19 ML/M2
ECHO LV FRACTIONAL SHORTENING: 44 % (ref 28–44)
ECHO LV INTERNAL DIMENSION DIASTOLE INDEX: 2.11 CM/M2
ECHO LV INTERNAL DIMENSION DIASTOLIC: 4.3 CM (ref 4.2–5.9)
ECHO LV INTERNAL DIMENSION SYSTOLIC INDEX: 1.18 CM/M2
ECHO LV INTERNAL DIMENSION SYSTOLIC: 2.4 CM
ECHO LV ISOVOLUMETRIC RELAXATION TIME (IVRT): 51.9 MS
ECHO LV IVSD: 1.3 CM (ref 0.6–1)
ECHO LV IVSS: 1.8 CM
ECHO LV MASS 2D: 207.8 G (ref 88–224)
ECHO LV MASS INDEX 2D: 101.8 G/M2 (ref 49–115)
ECHO LV POSTERIOR WALL DIASTOLIC: 1.3 CM (ref 0.6–1)
ECHO LV POSTERIOR WALL SYSTOLIC: 1.7 CM
ECHO LV RELATIVE WALL THICKNESS RATIO: 0.6
ECHO LVOT AREA: 4.2 CM2
ECHO LVOT AV VTI INDEX: 0.88
ECHO LVOT DIAM: 2.3 CM
ECHO LVOT MEAN GRADIENT: 2 MMHG
ECHO LVOT PEAK GRADIENT: 4 MMHG
ECHO LVOT PEAK VELOCITY: 0.9 M/S
ECHO LVOT STROKE VOLUME INDEX: 33.8 ML/M2
ECHO LVOT SV: 68.9 ML
ECHO LVOT VTI: 16.6 CM
ECHO MV "A" WAVE DURATION: 141.9 MSEC
ECHO MV A VELOCITY: 0.76 M/S
ECHO MV AREA PHT: 3.9 CM2
ECHO MV AREA VTI: 2.7 CM2
ECHO MV E DECELERATION TIME (DT): 166.8 MS
ECHO MV E VELOCITY: 1 M/S
ECHO MV E/A RATIO: 1.32
ECHO MV LVOT VTI INDEX: 1.52
ECHO MV MAX VELOCITY: 1.1 M/S
ECHO MV MEAN GRADIENT: 2 MMHG
ECHO MV MEAN VELOCITY: 0.8 M/S
ECHO MV PEAK GRADIENT: 5 MMHG
ECHO MV PRESSURE HALF TIME (PHT): 55.9 MS
ECHO MV VTI: 25.2 CM
ECHO PV MAX VELOCITY: 1 M/S
ECHO PV MEAN GRADIENT: 2 MMHG
ECHO PV MEAN VELOCITY: 0.6 M/S
ECHO PV PEAK GRADIENT: 4 MMHG
ECHO PV VTI: 13.7 CM
ECHO RIGHT VENTRICULAR SYSTOLIC PRESSURE (RVSP): 21 MMHG
ECHO RV INTERNAL DIMENSION: 3 CM
ECHO RVOT MEAN GRADIENT: 1 MMHG
ECHO RVOT PEAK GRADIENT: 2 MMHG
ECHO RVOT PEAK VELOCITY: 0.6 M/S
ECHO RVOT VTI: 9.6 CM
ECHO TV REGURGITANT MAX VELOCITY: 1.79 M/S
ECHO TV REGURGITANT PEAK GRADIENT: 13 MMHG
EKG ATRIAL RATE: 101 BPM
EKG ATRIAL RATE: 99 BPM
EKG P AXIS: -8 DEGREES
EKG P AXIS: 29 DEGREES
EKG P-R INTERVAL: 122 MS
EKG P-R INTERVAL: 134 MS
EKG Q-T INTERVAL: 330 MS
EKG Q-T INTERVAL: 336 MS
EKG QRS DURATION: 84 MS
EKG QRS DURATION: 90 MS
EKG QTC CALCULATION (BAZETT): 427 MS
EKG QTC CALCULATION (BAZETT): 431 MS
EKG R AXIS: 68 DEGREES
EKG R AXIS: 71 DEGREES
EKG T AXIS: 66 DEGREES
EKG T AXIS: 69 DEGREES
EKG VENTRICULAR RATE: 101 BPM
EKG VENTRICULAR RATE: 99 BPM
EOSINOPHIL # BLD: 0 K/UL (ref 0.05–0.5)
EOSINOPHILS RELATIVE PERCENT: 0 % (ref 0–6)
ERYTHROCYTE [DISTWIDTH] IN BLOOD BY AUTOMATED COUNT: 13.3 % (ref 11.5–15)
ERYTHROCYTE [SEDIMENTATION RATE] IN BLOOD BY WESTERGREN METHOD: 56 MM/HR (ref 0–15)
GFR, ESTIMATED: 81 ML/MIN/1.73M2
GLUCOSE SERPL-MCNC: 125 MG/DL (ref 74–99)
GLUCOSE UR STRIP-MCNC: NEGATIVE MG/DL
HCT VFR BLD AUTO: 46.5 % (ref 37–54)
HDLC SERPL-MCNC: 34 MG/DL
HGB BLD-MCNC: 15.8 G/DL (ref 12.5–16.5)
HGB UR QL STRIP.AUTO: ABNORMAL
IMM GRANULOCYTES # BLD AUTO: 0.1 K/UL (ref 0–0.58)
IMM GRANULOCYTES NFR BLD: 1 % (ref 0–5)
KETONES UR STRIP-MCNC: ABNORMAL MG/DL
LDLC SERPL CALC-MCNC: 48 MG/DL
LEUKOCYTE ESTERASE UR QL STRIP: NEGATIVE
LYMPHOCYTES NFR BLD: 0.91 K/UL (ref 1.5–4)
LYMPHOCYTES RELATIVE PERCENT: 6 % (ref 20–42)
MAGNESIUM SERPL-MCNC: 2.1 MG/DL (ref 1.6–2.6)
MCH RBC QN AUTO: 29.5 PG (ref 26–35)
MCHC RBC AUTO-ENTMCNC: 34 G/DL (ref 32–34.5)
MCV RBC AUTO: 86.8 FL (ref 80–99.9)
MONOCYTES NFR BLD: 1.16 K/UL (ref 0.1–0.95)
MONOCYTES NFR BLD: 7 % (ref 2–12)
MUCOUS THREADS URNS QL MICRO: PRESENT
NEUTROPHILS NFR BLD: 86 % (ref 43–80)
NEUTS SEG NFR BLD: 13.89 K/UL (ref 1.8–7.3)
NITRITE UR QL STRIP: NEGATIVE
PH UR STRIP: 5.5 [PH] (ref 5–8)
PLATELET # BLD AUTO: 215 K/UL (ref 130–450)
PMV BLD AUTO: 9.1 FL (ref 7–12)
POTASSIUM SERPL-SCNC: 4.6 MMOL/L (ref 3.5–5.1)
PROCALCITONIN SERPL-MCNC: 0.24 NG/ML (ref 0–0.08)
PROT SERPL-MCNC: 6.8 G/DL (ref 6.4–8.3)
PROT UR STRIP-MCNC: 100 MG/DL
RBC # BLD AUTO: 5.36 M/UL (ref 3.8–5.8)
RBC #/AREA URNS HPF: ABNORMAL /HPF
SODIUM SERPL-SCNC: 134 MMOL/L (ref 136–145)
SP GR UR STRIP: 1.02 (ref 1–1.03)
TRIGL SERPL-MCNC: 79 MG/DL
TROPONIN I SERPL HS-MCNC: 16 NG/L (ref 0–22)
TSH SERPL DL<=0.05 MIU/L-ACNC: 0.54 UIU/ML (ref 0.27–4.2)
UROBILINOGEN UR STRIP-ACNC: >8 EU/DL (ref 0–1)
VLDLC SERPL CALC-MCNC: 16 MG/DL
WBC #/AREA URNS HPF: ABNORMAL /HPF
WBC OTHER # BLD: 16.1 K/UL (ref 4.5–11.5)

## 2025-09-02 PROCEDURE — 76705 ECHO EXAM OF ABDOMEN: CPT

## 2025-09-02 PROCEDURE — 81001 URINALYSIS AUTO W/SCOPE: CPT

## 2025-09-02 PROCEDURE — 2500000003 HC RX 250 WO HCPCS: Performed by: NURSE PRACTITIONER

## 2025-09-02 PROCEDURE — 93306 TTE W/DOPPLER COMPLETE: CPT | Performed by: INTERNAL MEDICINE

## 2025-09-02 PROCEDURE — 84443 ASSAY THYROID STIM HORMONE: CPT

## 2025-09-02 PROCEDURE — 99215 OFFICE O/P EST HI 40 MIN: CPT | Performed by: PHYSICIAN ASSISTANT

## 2025-09-02 PROCEDURE — 93005 ELECTROCARDIOGRAM TRACING: CPT

## 2025-09-02 PROCEDURE — 3079F DIAST BP 80-89 MM HG: CPT | Performed by: PHYSICIAN ASSISTANT

## 2025-09-02 PROCEDURE — 83735 ASSAY OF MAGNESIUM: CPT

## 2025-09-02 PROCEDURE — 2140000000 HC CCU INTERMEDIATE R&B

## 2025-09-02 PROCEDURE — 84484 ASSAY OF TROPONIN QUANT: CPT

## 2025-09-02 PROCEDURE — 6370000000 HC RX 637 (ALT 250 FOR IP): Performed by: NURSE PRACTITIONER

## 2025-09-02 PROCEDURE — 3074F SYST BP LT 130 MM HG: CPT | Performed by: PHYSICIAN ASSISTANT

## 2025-09-02 PROCEDURE — 6360000002 HC RX W HCPCS: Performed by: INTERNAL MEDICINE

## 2025-09-02 PROCEDURE — 71046 X-RAY EXAM CHEST 2 VIEWS: CPT

## 2025-09-02 PROCEDURE — 84145 PROCALCITONIN (PCT): CPT

## 2025-09-02 PROCEDURE — 93000 ELECTROCARDIOGRAM COMPLETE: CPT | Performed by: PHYSICIAN ASSISTANT

## 2025-09-02 PROCEDURE — 85652 RBC SED RATE AUTOMATED: CPT

## 2025-09-02 PROCEDURE — 85025 COMPLETE CBC W/AUTO DIFF WBC: CPT

## 2025-09-02 PROCEDURE — 93010 ELECTROCARDIOGRAM REPORT: CPT | Performed by: INTERNAL MEDICINE

## 2025-09-02 PROCEDURE — 86140 C-REACTIVE PROTEIN: CPT

## 2025-09-02 PROCEDURE — 99285 EMERGENCY DEPT VISIT HI MDM: CPT

## 2025-09-02 PROCEDURE — 94640 AIRWAY INHALATION TREATMENT: CPT

## 2025-09-02 PROCEDURE — 6360000002 HC RX W HCPCS: Performed by: NURSE PRACTITIONER

## 2025-09-02 PROCEDURE — 93005 ELECTROCARDIOGRAM TRACING: CPT | Performed by: STUDENT IN AN ORGANIZED HEALTH CARE EDUCATION/TRAINING PROGRAM

## 2025-09-02 PROCEDURE — 80053 COMPREHEN METABOLIC PANEL: CPT

## 2025-09-02 PROCEDURE — 80061 LIPID PANEL: CPT

## 2025-09-02 PROCEDURE — 83036 HEMOGLOBIN GLYCOSYLATED A1C: CPT

## 2025-09-02 PROCEDURE — 87040 BLOOD CULTURE FOR BACTERIA: CPT

## 2025-09-02 PROCEDURE — 99223 1ST HOSP IP/OBS HIGH 75: CPT | Performed by: NURSE PRACTITIONER

## 2025-09-02 PROCEDURE — 83880 ASSAY OF NATRIURETIC PEPTIDE: CPT

## 2025-09-02 PROCEDURE — 93306 TTE W/DOPPLER COMPLETE: CPT

## 2025-09-02 PROCEDURE — 6370000000 HC RX 637 (ALT 250 FOR IP): Performed by: INTERNAL MEDICINE

## 2025-09-02 PROCEDURE — 71250 CT THORAX DX C-: CPT

## 2025-09-02 RX ORDER — ATORVASTATIN CALCIUM 40 MG/1
40 TABLET, FILM COATED ORAL NIGHTLY
Status: DISCONTINUED | OUTPATIENT
Start: 2025-09-02 | End: 2025-09-03 | Stop reason: HOSPADM

## 2025-09-02 RX ORDER — POTASSIUM CHLORIDE 7.45 MG/ML
10 INJECTION INTRAVENOUS PRN
Status: DISCONTINUED | OUTPATIENT
Start: 2025-09-02 | End: 2025-09-03 | Stop reason: HOSPADM

## 2025-09-02 RX ORDER — ONDANSETRON 4 MG/1
4 TABLET, ORALLY DISINTEGRATING ORAL EVERY 8 HOURS PRN
Status: DISCONTINUED | OUTPATIENT
Start: 2025-09-02 | End: 2025-09-03 | Stop reason: HOSPADM

## 2025-09-02 RX ORDER — ONDANSETRON 2 MG/ML
4 INJECTION INTRAMUSCULAR; INTRAVENOUS EVERY 6 HOURS PRN
Status: DISCONTINUED | OUTPATIENT
Start: 2025-09-02 | End: 2025-09-03 | Stop reason: HOSPADM

## 2025-09-02 RX ORDER — ACETAMINOPHEN 325 MG/1
650 TABLET ORAL EVERY 6 HOURS PRN
Status: DISCONTINUED | OUTPATIENT
Start: 2025-09-02 | End: 2025-09-03 | Stop reason: HOSPADM

## 2025-09-02 RX ORDER — SODIUM CHLORIDE 0.9 % (FLUSH) 0.9 %
5-40 SYRINGE (ML) INJECTION EVERY 12 HOURS SCHEDULED
Status: DISCONTINUED | OUTPATIENT
Start: 2025-09-02 | End: 2025-09-03 | Stop reason: HOSPADM

## 2025-09-02 RX ORDER — FUROSEMIDE 10 MG/ML
20 INJECTION INTRAMUSCULAR; INTRAVENOUS ONCE
Status: COMPLETED | OUTPATIENT
Start: 2025-09-02 | End: 2025-09-02

## 2025-09-02 RX ORDER — ASPIRIN 81 MG/1
81 TABLET ORAL DAILY
Status: DISCONTINUED | OUTPATIENT
Start: 2025-09-02 | End: 2025-09-03 | Stop reason: HOSPADM

## 2025-09-02 RX ORDER — CALCIUM CARBONATE 500 MG/1
500 TABLET, CHEWABLE ORAL 3 TIMES DAILY PRN
Status: DISCONTINUED | OUTPATIENT
Start: 2025-09-02 | End: 2025-09-03 | Stop reason: HOSPADM

## 2025-09-02 RX ORDER — DEXTROSE MONOHYDRATE 100 MG/ML
INJECTION, SOLUTION INTRAVENOUS CONTINUOUS PRN
Status: DISCONTINUED | OUTPATIENT
Start: 2025-09-02 | End: 2025-09-03 | Stop reason: HOSPADM

## 2025-09-02 RX ORDER — SODIUM CHLORIDE 0.9 % (FLUSH) 0.9 %
5-40 SYRINGE (ML) INJECTION PRN
Status: DISCONTINUED | OUTPATIENT
Start: 2025-09-02 | End: 2025-09-03 | Stop reason: HOSPADM

## 2025-09-02 RX ORDER — GLUCAGON 1 MG/ML
1 KIT INJECTION PRN
Status: DISCONTINUED | OUTPATIENT
Start: 2025-09-02 | End: 2025-09-03 | Stop reason: HOSPADM

## 2025-09-02 RX ORDER — SODIUM CHLORIDE 9 MG/ML
INJECTION, SOLUTION INTRAVENOUS PRN
Status: DISCONTINUED | OUTPATIENT
Start: 2025-09-02 | End: 2025-09-03 | Stop reason: HOSPADM

## 2025-09-02 RX ORDER — MECOBALAMIN 5000 MCG
5 TABLET,DISINTEGRATING ORAL NIGHTLY PRN
Status: DISCONTINUED | OUTPATIENT
Start: 2025-09-02 | End: 2025-09-03 | Stop reason: HOSPADM

## 2025-09-02 RX ORDER — MAGNESIUM SULFATE IN WATER 40 MG/ML
2000 INJECTION, SOLUTION INTRAVENOUS PRN
Status: DISCONTINUED | OUTPATIENT
Start: 2025-09-02 | End: 2025-09-03 | Stop reason: HOSPADM

## 2025-09-02 RX ORDER — METOPROLOL TARTRATE 25 MG/1
25 TABLET, FILM COATED ORAL 2 TIMES DAILY
Status: DISCONTINUED | OUTPATIENT
Start: 2025-09-02 | End: 2025-09-03 | Stop reason: HOSPADM

## 2025-09-02 RX ORDER — POLYETHYLENE GLYCOL 3350 17 G/17G
17 POWDER, FOR SOLUTION ORAL DAILY PRN
Status: DISCONTINUED | OUTPATIENT
Start: 2025-09-02 | End: 2025-09-03 | Stop reason: HOSPADM

## 2025-09-02 RX ORDER — COLCHICINE 0.6 MG/1
1.2 TABLET ORAL ONCE
Status: COMPLETED | OUTPATIENT
Start: 2025-09-02 | End: 2025-09-02

## 2025-09-02 RX ORDER — MECOBALAMIN 5000 MCG
5 TABLET,DISINTEGRATING ORAL EVERY EVENING
Status: DISCONTINUED | OUTPATIENT
Start: 2025-09-02 | End: 2025-09-03 | Stop reason: HOSPADM

## 2025-09-02 RX ORDER — COLCHICINE 0.6 MG/1
0.6 TABLET ORAL 2 TIMES DAILY
Status: DISCONTINUED | OUTPATIENT
Start: 2025-09-03 | End: 2025-09-03 | Stop reason: HOSPADM

## 2025-09-02 RX ORDER — POTASSIUM CHLORIDE 1500 MG/1
40 TABLET, EXTENDED RELEASE ORAL PRN
Status: DISCONTINUED | OUTPATIENT
Start: 2025-09-02 | End: 2025-09-03 | Stop reason: HOSPADM

## 2025-09-02 RX ORDER — ACETAMINOPHEN 650 MG/1
650 SUPPOSITORY RECTAL EVERY 6 HOURS PRN
Status: DISCONTINUED | OUTPATIENT
Start: 2025-09-02 | End: 2025-09-03 | Stop reason: HOSPADM

## 2025-09-02 RX ORDER — BENZONATATE 100 MG/1
100 CAPSULE ORAL 3 TIMES DAILY PRN
Status: DISCONTINUED | OUTPATIENT
Start: 2025-09-02 | End: 2025-09-03 | Stop reason: HOSPADM

## 2025-09-02 RX ORDER — ASPIRIN 81 MG/1
81 TABLET ORAL DAILY
Status: ON HOLD | COMMUNITY
Start: 2024-12-10 | End: 2025-09-03 | Stop reason: HOSPADM

## 2025-09-02 RX ORDER — HYDRALAZINE HYDROCHLORIDE 20 MG/ML
10 INJECTION INTRAMUSCULAR; INTRAVENOUS EVERY 6 HOURS PRN
Status: DISCONTINUED | OUTPATIENT
Start: 2025-09-02 | End: 2025-09-03 | Stop reason: HOSPADM

## 2025-09-02 RX ADMIN — ASPIRIN 81 MG: 81 TABLET, COATED ORAL at 13:38

## 2025-09-02 RX ADMIN — APIXABAN 5 MG: 5 TABLET, FILM COATED ORAL at 13:38

## 2025-09-02 RX ADMIN — FUROSEMIDE 20 MG: 10 INJECTION, SOLUTION INTRAMUSCULAR; INTRAVENOUS at 17:20

## 2025-09-02 RX ADMIN — METOPROLOL TARTRATE 25 MG: 25 TABLET, FILM COATED ORAL at 13:38

## 2025-09-02 RX ADMIN — ARFORMOTEROL TARTRATE: 15 SOLUTION RESPIRATORY (INHALATION) at 21:21

## 2025-09-02 RX ADMIN — ARFORMOTEROL TARTRATE: 15 SOLUTION RESPIRATORY (INHALATION) at 14:02

## 2025-09-02 RX ADMIN — ATORVASTATIN CALCIUM 40 MG: 40 TABLET, FILM COATED ORAL at 20:54

## 2025-09-02 RX ADMIN — COLCHICINE 1.2 MG: 0.6 TABLET, FILM COATED ORAL at 17:20

## 2025-09-02 RX ADMIN — SODIUM CHLORIDE, PRESERVATIVE FREE 10 ML: 5 INJECTION INTRAVENOUS at 20:54

## 2025-09-02 RX ADMIN — Medication 5 MG: at 17:31

## 2025-09-02 RX ADMIN — METOPROLOL TARTRATE 25 MG: 25 TABLET, FILM COATED ORAL at 20:53

## 2025-09-02 ASSESSMENT — PAIN SCALES - GENERAL: PAINLEVEL_OUTOF10: 0

## 2025-09-03 VITALS
HEART RATE: 85 BPM | OXYGEN SATURATION: 93 % | HEIGHT: 70 IN | SYSTOLIC BLOOD PRESSURE: 114 MMHG | TEMPERATURE: 98.1 F | WEIGHT: 189 LBS | BODY MASS INDEX: 27.06 KG/M2 | RESPIRATION RATE: 18 BRPM | DIASTOLIC BLOOD PRESSURE: 79 MMHG

## 2025-09-03 LAB
ALBUMIN SERPL-MCNC: 3.1 G/DL (ref 3.5–5.2)
ALP SERPL-CCNC: 54 U/L (ref 40–129)
ALT SERPL-CCNC: 18 U/L (ref 0–50)
ANION GAP SERPL CALCULATED.3IONS-SCNC: 14 MMOL/L (ref 7–16)
AST SERPL-CCNC: 16 U/L (ref 0–50)
BASOPHILS # BLD: 0.03 K/UL (ref 0–0.2)
BASOPHILS NFR BLD: 0 % (ref 0–2)
BILIRUB DIRECT SERPL-MCNC: 0.5 MG/DL (ref 0–0.2)
BILIRUB INDIRECT SERPL-MCNC: 0.6 MG/DL (ref 0–1)
BILIRUB SERPL-MCNC: 1.1 MG/DL (ref 0–1.2)
BUN SERPL-MCNC: 19 MG/DL (ref 6–20)
CALCIUM SERPL-MCNC: 8.6 MG/DL (ref 8.6–10)
CHLORIDE SERPL-SCNC: 98 MMOL/L (ref 98–107)
CO2 SERPL-SCNC: 22 MMOL/L (ref 22–29)
CREAT SERPL-MCNC: 1.1 MG/DL (ref 0.7–1.2)
EOSINOPHIL # BLD: 0.03 K/UL (ref 0.05–0.5)
EOSINOPHILS RELATIVE PERCENT: 0 % (ref 0–6)
ERYTHROCYTE [DISTWIDTH] IN BLOOD BY AUTOMATED COUNT: 13.3 % (ref 11.5–15)
GFR, ESTIMATED: 83 ML/MIN/1.73M2
GLUCOSE SERPL-MCNC: 122 MG/DL (ref 74–99)
HBA1C MFR BLD: 6 % (ref 4–5.6)
HCT VFR BLD AUTO: 43.5 % (ref 37–54)
HGB BLD-MCNC: 14.7 G/DL (ref 12.5–16.5)
IMM GRANULOCYTES # BLD AUTO: 0.07 K/UL (ref 0–0.58)
IMM GRANULOCYTES NFR BLD: 1 % (ref 0–5)
LYMPHOCYTES NFR BLD: 1 K/UL (ref 1.5–4)
LYMPHOCYTES RELATIVE PERCENT: 8 % (ref 20–42)
MCH RBC QN AUTO: 29.6 PG (ref 26–35)
MCHC RBC AUTO-ENTMCNC: 33.8 G/DL (ref 32–34.5)
MCV RBC AUTO: 87.7 FL (ref 80–99.9)
MONOCYTES NFR BLD: 1.02 K/UL (ref 0.1–0.95)
MONOCYTES NFR BLD: 8 % (ref 2–12)
NEUTROPHILS NFR BLD: 82 % (ref 43–80)
NEUTS SEG NFR BLD: 10.05 K/UL (ref 1.8–7.3)
PLATELET # BLD AUTO: 212 K/UL (ref 130–450)
PMV BLD AUTO: 9.1 FL (ref 7–12)
POTASSIUM SERPL-SCNC: 4.2 MMOL/L (ref 3.5–5.1)
PROCALCITONIN SERPL-MCNC: 0.29 NG/ML (ref 0–0.08)
PROT SERPL-MCNC: 6.4 G/DL (ref 6.4–8.3)
RBC # BLD AUTO: 4.96 M/UL (ref 3.8–5.8)
SODIUM SERPL-SCNC: 133 MMOL/L (ref 136–145)
WBC OTHER # BLD: 12.2 K/UL (ref 4.5–11.5)

## 2025-09-03 PROCEDURE — 84145 PROCALCITONIN (PCT): CPT

## 2025-09-03 PROCEDURE — 2500000003 HC RX 250 WO HCPCS: Performed by: NURSE PRACTITIONER

## 2025-09-03 PROCEDURE — 85025 COMPLETE CBC W/AUTO DIFF WBC: CPT

## 2025-09-03 PROCEDURE — 36415 COLL VENOUS BLD VENIPUNCTURE: CPT

## 2025-09-03 PROCEDURE — 6360000002 HC RX W HCPCS: Performed by: NURSE PRACTITIONER

## 2025-09-03 PROCEDURE — 6370000000 HC RX 637 (ALT 250 FOR IP): Performed by: INTERNAL MEDICINE

## 2025-09-03 PROCEDURE — 99239 HOSP IP/OBS DSCHRG MGMT >30: CPT | Performed by: INTERNAL MEDICINE

## 2025-09-03 PROCEDURE — 80053 COMPREHEN METABOLIC PANEL: CPT

## 2025-09-03 PROCEDURE — 94640 AIRWAY INHALATION TREATMENT: CPT

## 2025-09-03 PROCEDURE — 82248 BILIRUBIN DIRECT: CPT

## 2025-09-03 PROCEDURE — 6370000000 HC RX 637 (ALT 250 FOR IP): Performed by: NURSE PRACTITIONER

## 2025-09-03 RX ORDER — COLCHICINE 0.6 MG/1
0.6 TABLET ORAL 2 TIMES DAILY
Qty: 180 TABLET | Refills: 0 | Status: SHIPPED | OUTPATIENT
Start: 2025-09-03

## 2025-09-03 RX ADMIN — METOPROLOL TARTRATE 25 MG: 25 TABLET, FILM COATED ORAL at 08:07

## 2025-09-03 RX ADMIN — SODIUM CHLORIDE, PRESERVATIVE FREE 10 ML: 5 INJECTION INTRAVENOUS at 08:09

## 2025-09-03 RX ADMIN — COLCHICINE 0.6 MG: 0.6 TABLET, FILM COATED ORAL at 08:08

## 2025-09-03 RX ADMIN — APIXABAN 5 MG: 5 TABLET, FILM COATED ORAL at 08:52

## 2025-09-03 RX ADMIN — ARFORMOTEROL TARTRATE: 15 SOLUTION RESPIRATORY (INHALATION) at 08:29

## 2025-09-03 RX ADMIN — ASPIRIN 81 MG: 81 TABLET, COATED ORAL at 08:07

## 2025-09-03 ASSESSMENT — PAIN SCALES - GENERAL: PAINLEVEL_OUTOF10: 0

## 2025-09-04 ENCOUNTER — TELEPHONE (OUTPATIENT)
Dept: PRIMARY CARE CLINIC | Age: 55
End: 2025-09-04

## 2025-09-05 ENCOUNTER — TELEPHONE (OUTPATIENT)
Dept: PRIMARY CARE CLINIC | Age: 55
End: 2025-09-05

## 2025-09-07 LAB
MICROORGANISM SPEC CULT: NORMAL
MICROORGANISM SPEC CULT: NORMAL
SERVICE CMNT-IMP: NORMAL
SERVICE CMNT-IMP: NORMAL
SPECIMEN DESCRIPTION: NORMAL
SPECIMEN DESCRIPTION: NORMAL

## (undated) DEVICE — CARE KIT, LAPAROSCOPIC, ADVANCED

## (undated) DEVICE — TRAY, SURESTEP, URINE METER, 14FR, SILICONE

## (undated) DEVICE — DRESSING, MEPILEX, BORDER, HEEL, 8.7 X 9.1 IN

## (undated) DEVICE — TR BAND, RADIAL COMPRESSION, STANDARD, 24CM

## (undated) DEVICE — CASSETTE, BLOOD, PLEGIC SET

## (undated) DEVICE — DRAPE, FLUID WARMER

## (undated) DEVICE — SUTURE, PROLENE, 5-0, 30 IN, RB2, DA, BLUE

## (undated) DEVICE — ADAPTER, CARDIOPLEGIA, VENTING, Y, 19.1 CM

## (undated) DEVICE — LEAD, PACING, MYOCARDIAL, BIPOLAR, TEMPORARY

## (undated) DEVICE — GUIDEWIRE, INQWIRE, 3MM J, .035, 260

## (undated) DEVICE — CANNULA, CARDIAC SUMP

## (undated) DEVICE — SUCKER LINE, EXTRA, RED VENT

## (undated) DEVICE — CATHETER, WEDGE PRESSURE, BALLOON, DOUBLE LUMEN, 5 FR, 110 CM

## (undated) DEVICE — DRESSING, ISLAND, TELFA, 4 X 5 IN

## (undated) DEVICE — BLADE, SAW STERNUM, STERILE

## (undated) DEVICE — SPONGE GZ W4XL4IN RAYON POLY CVR W/NONWOVEN FAB STRL 2/PK

## (undated) DEVICE — TUBING PACK, OXYGENATOR, ADULT

## (undated) DEVICE — SUTURE, MONOCRYL, 3-0, 18 IN, PS2, UNDYED

## (undated) DEVICE — GRADUATE TRIANG MEASURE 1000ML BLK PRNT

## (undated) DEVICE — TROCAR, KII OPTICAL BLADELESS 5MM Z THREAD 100MM LNGTH

## (undated) DEVICE — KIT, CELL SAVER, W/COLLECTION SET, 225ML WASH SET

## (undated) DEVICE — ELECTRODE, QUICK-COMBO, EDGE SYSTEM, REDI PACK

## (undated) DEVICE — KIT, FAST START

## (undated) DEVICE — TUBING, 0.375 X 3/32 IN X 6 FT

## (undated) DEVICE — SPONGE, HEMOSTATIC, CELLULOSE, SURGICEL, 2 X 14 IN

## (undated) DEVICE — MARKER, SKIN, DUAL TIP INK W/9 LABEL AND REMOVABLE TIME OUT SLEEVE

## (undated) DEVICE — SUTURE, PROLENE, 4-0, 36 IN, RB1, DA, BLUE

## (undated) DEVICE — CAUTERY, PENCIL, PUSH BUTTON, SMOKE EVAC, 70MM

## (undated) DEVICE — SNARE ENDOSCP L240CM LOOP W13MM SHTH DIA2.4MM SM OVL FLX

## (undated) DEVICE — WASH SET, XTRA, 125ML

## (undated) DEVICE — TRAP POLYP ETRAP

## (undated) DEVICE — CANNULA, AORTIC ROOT, LONG

## (undated) DEVICE — DRAIN, CHANNEL, HUBLESS, ROUND, FULL FLUTE, 19FR

## (undated) DEVICE — TUBING, SUCTION, CONNECTING, NON-CONDUCTIVE, SURE GRIP CONNECTORS, 3/16 X 18 IN, PVC

## (undated) DEVICE — TIP,  ELECTRODE COATED INSULATED, EXTENDED, LF

## (undated) DEVICE — KIT, MIS COR-KNOT COMBO

## (undated) DEVICE — COLLECTION UNIT, DRAINAGE, THORACIC, SINGLE TUBE, DRY SUCTION, ATS COMPATIBLE, OASIS 3600, LF

## (undated) DEVICE — SPONGE GAUZE, XRAY SC+RFID, 4X4 16 PLY, STERILE

## (undated) DEVICE — ELECTRODE, ELECTROSURGICAL, BLADE, INSULATED, ENT/IMA, STERILE

## (undated) DEVICE — OXYGENATOR FX 25, W/HR, ARTERIAL FILTER

## (undated) DEVICE — PACING CABLE, EXTENSION, 12 FT BEIGE, DISPOSABLE

## (undated) DEVICE — COVER, CART, 45 X 27 X 48 IN, CLEAR

## (undated) DEVICE — CATHETER, URETHRAL, ROBNEL, 18 FR, 16 IN, LF, RED

## (undated) DEVICE — INSERT, CLAMP, SURGICAL, SOFT/TRACTION, STEALTH, 5 MM

## (undated) DEVICE — INSTRUMENT, DISSECTING, ENDOSCOPIC, PEANUT, 5 MM, STERILE

## (undated) DEVICE — SHUNT, SENSOR

## (undated) DEVICE — APPLICATOR, CHLORAPREP, W/ORANGE TINT, 26ML

## (undated) DEVICE — TROCAR, THOROACOPORT, 10.5MM

## (undated) DEVICE — SURGISLEEVE, WOUND PROTECTOR, SMALL 2.5-6CM

## (undated) DEVICE — TIP, SUCTION, YANKAUER, FLEXIBLE

## (undated) DEVICE — Device

## (undated) DEVICE — PUMP, STRYKERFLOW 2 & HANDPIECE W/10FT. IRRIGATION TUBING

## (undated) DEVICE — TUBING, SMOKE EVAC, 3/8 X 10 FT

## (undated) DEVICE — COUNTER, NEEDLE, FOAM BLOCK, POP-N-COUNT, W/BLADEGUARD, W/ADHESIVE 40 COUNT, RED

## (undated) DEVICE — CATHETER, DIAGNOSTIC, 4 FR-JL 3.5

## (undated) DEVICE — CATHETER, DRAINAGE, NASOGASTRIC, DOUBLE LUMEN, FUNNEL END, SUMP, SALEM, 18 FR, 48 IN, PVC, STERILE

## (undated) DEVICE — LOADS, COR-KNOT (6PK)

## (undated) DEVICE — CONNECTOR, Y, 0.375 X 0.375 X 0.5 IN

## (undated) DEVICE — DRESSING, MEPILEX, BORDER, SACRUM, 8.7 X 9.8 IN

## (undated) DEVICE — FILTER, IV, BLOOD, MICROAGGREGATE, 40 MIC, RBC TRANSFUSION

## (undated) DEVICE — WASH SET, XTRA, 225ML

## (undated) DEVICE — CONNECTOR, W/O PARTING LINE, 0.25 X 0.375 IN

## (undated) DEVICE — INTRODUCER, GLIDESHEATH SLENDER A-KIT, 5FR 10CM

## (undated) DEVICE — TUBING SET, BIFURCATED, SMOKE EVAC, FILTERED, F/AIRSEAL

## (undated) DEVICE — PLEDGET, PTFE, SOFT, LARGE, 3/8 X 3/16 X 1/16 IN

## (undated) DEVICE — KIT, COLLECTION, CARDIO

## (undated) DEVICE — CONNECTOR, STRAIGHT, 0.375 X 0.375 IN

## (undated) DEVICE — DRESSING, ADHESIVE, ISLAND, TELFA, 2 X 3.75 IN, LF

## (undated) DEVICE — CONNECTOR, Y, CARDIOPULMONARY, 0.375 X 0.25 X 0.25 IN

## (undated) DEVICE — PORT, 5MM THORACOPORT SOFT

## (undated) DEVICE — DRAPE, SHEET, CARDIOVASCULAR, ANTIMICROBIAL, W/ANESTHESIA SCREEN, IOBAN 2, STERI DRAPE, 107 X 133 IN, DISPOSABLE, FABRIC, BLUE, STERILE

## (undated) DEVICE — CANNULA, AORTIC ROOT, 12G

## (undated) DEVICE — SUTURE, PROLENE 4-0, TAPER POINT, SH-1 BLUE 30 INCH

## (undated) DEVICE — GOWN, SURGICAL, SMARTGOWN, XLARGE, STERILE

## (undated) DEVICE — TUBE SET, PNEUMOCLEAR, SMOKE EVACU, HIGH-FLOW

## (undated) DEVICE — MANIFOLD, 4 PORT NEPTUNE STANDARD

## (undated) DEVICE — CLIPPER, SURGICAL BLADE ASSEMBLY, GENERAL PURPOSE, SINGLE USE

## (undated) DEVICE — SUTURE, ETHIBOND, 5, 30 IN, V40, MULTIPACK, GREEN

## (undated) DEVICE — CATHETER, URETHRAL, ROBNEL,  8 FR, 16 IN, LF, RED

## (undated) DEVICE — MICROCOAGULATION TEST, ACT+ TEST CUVETTE

## (undated) DEVICE — PERFUSION PACK, HEMOCONCENTRATOR, MINNTECH, W/TUBING

## (undated) DEVICE — CATHETER, DIAGNOSTIC, 4 FR-JR 4